# Patient Record
Sex: MALE | Race: WHITE | NOT HISPANIC OR LATINO | Employment: OTHER | URBAN - METROPOLITAN AREA
[De-identification: names, ages, dates, MRNs, and addresses within clinical notes are randomized per-mention and may not be internally consistent; named-entity substitution may affect disease eponyms.]

---

## 2017-01-03 ENCOUNTER — TELEPHONE (OUTPATIENT)
Dept: OTOLARYNGOLOGY | Facility: CLINIC | Age: 72
End: 2017-01-03

## 2017-01-03 NOTE — TELEPHONE ENCOUNTER
Patient come in today to sign his surgery consent, schedule for January 11.  Informed him that his MA or Nurse will call him to give time of surgery//bhg

## 2017-01-10 ENCOUNTER — TELEPHONE (OUTPATIENT)
Dept: OTOLARYNGOLOGY | Facility: CLINIC | Age: 72
End: 2017-01-10

## 2017-01-10 ENCOUNTER — ANESTHESIA EVENT (OUTPATIENT)
Dept: SURGERY | Facility: HOSPITAL | Age: 72
End: 2017-01-10
Payer: MEDICARE

## 2017-01-10 DIAGNOSIS — J32.4 CHRONIC PANSINUSITIS: Primary | ICD-10-CM

## 2017-01-10 NOTE — TELEPHONE ENCOUNTER
Surgery arrival time of 5:30 am provided to patient along with bathing instructions, and npo at midnight with verbal understanding.

## 2017-01-11 ENCOUNTER — HOSPITAL ENCOUNTER (OUTPATIENT)
Dept: RADIOLOGY | Facility: HOSPITAL | Age: 72
Discharge: HOME | End: 2017-01-11
Attending: OTOLARYNGOLOGY | Admitting: OTOLARYNGOLOGY
Payer: MEDICARE

## 2017-01-11 ENCOUNTER — ANESTHESIA (OUTPATIENT)
Dept: SURGERY | Facility: HOSPITAL | Age: 72
End: 2017-01-11
Payer: MEDICARE

## 2017-01-11 DIAGNOSIS — J32.4 CHRONIC PANSINUSITIS: ICD-10-CM

## 2017-01-11 PROBLEM — J34.2 DEVIATED NASAL SEPTUM: Status: RESOLVED | Noted: 2017-01-11 | Resolved: 2017-01-11

## 2017-01-11 PROBLEM — J34.2 DEVIATED NASAL SEPTUM: Status: ACTIVE | Noted: 2017-01-11

## 2017-01-11 PROCEDURE — 25000003 PHARM REV CODE 250: Performed by: NURSE ANESTHETIST, CERTIFIED REGISTERED

## 2017-01-11 PROCEDURE — 63600175 PHARM REV CODE 636 W HCPCS: Performed by: NURSE ANESTHETIST, CERTIFIED REGISTERED

## 2017-01-11 PROCEDURE — 25000003 PHARM REV CODE 250: Performed by: ANESTHESIOLOGY

## 2017-01-11 PROCEDURE — 70486 CT MAXILLOFACIAL W/O DYE: CPT | Mod: TC

## 2017-01-11 RX ORDER — ONDANSETRON 2 MG/ML
INJECTION INTRAMUSCULAR; INTRAVENOUS
Status: DISCONTINUED | OUTPATIENT
Start: 2017-01-11 | End: 2017-01-11

## 2017-01-11 RX ORDER — LIDOCAINE HCL/PF 100 MG/5ML
SYRINGE (ML) INTRAVENOUS
Status: DISCONTINUED | OUTPATIENT
Start: 2017-01-11 | End: 2017-01-11

## 2017-01-11 RX ORDER — MIDAZOLAM HYDROCHLORIDE 1 MG/ML
INJECTION, SOLUTION INTRAMUSCULAR; INTRAVENOUS
Status: DISCONTINUED | OUTPATIENT
Start: 2017-01-11 | End: 2017-01-11

## 2017-01-11 RX ORDER — PROPOFOL 10 MG/ML
VIAL (ML) INTRAVENOUS
Status: DISCONTINUED | OUTPATIENT
Start: 2017-01-11 | End: 2017-01-11

## 2017-01-11 RX ORDER — ROCURONIUM BROMIDE 10 MG/ML
INJECTION, SOLUTION INTRAVENOUS
Status: DISCONTINUED | OUTPATIENT
Start: 2017-01-11 | End: 2017-01-11

## 2017-01-11 RX ORDER — PROPOFOL 10 MG/ML
VIAL (ML) INTRAVENOUS CONTINUOUS PRN
Status: DISCONTINUED | OUTPATIENT
Start: 2017-01-11 | End: 2017-01-11

## 2017-01-11 RX ORDER — FENTANYL CITRATE 50 UG/ML
INJECTION, SOLUTION INTRAMUSCULAR; INTRAVENOUS
Status: DISCONTINUED | OUTPATIENT
Start: 2017-01-11 | End: 2017-01-11

## 2017-01-11 RX ORDER — DEXAMETHASONE SODIUM PHOSPHATE 4 MG/ML
INJECTION, SOLUTION INTRA-ARTICULAR; INTRALESIONAL; INTRAMUSCULAR; INTRAVENOUS; SOFT TISSUE
Status: DISCONTINUED | OUTPATIENT
Start: 2017-01-11 | End: 2017-01-11

## 2017-01-11 RX ADMIN — FENTANYL CITRATE 100 MCG: 50 INJECTION, SOLUTION INTRAMUSCULAR; INTRAVENOUS at 09:01

## 2017-01-11 RX ADMIN — FENTANYL CITRATE 100 MCG: 50 INJECTION, SOLUTION INTRAMUSCULAR; INTRAVENOUS at 11:01

## 2017-01-11 RX ADMIN — FENTANYL CITRATE 100 MCG: 50 INJECTION, SOLUTION INTRAMUSCULAR; INTRAVENOUS at 08:01

## 2017-01-11 RX ADMIN — DEXAMETHASONE SODIUM PHOSPHATE 12 MG: 4 INJECTION, SOLUTION INTRA-ARTICULAR; INTRALESIONAL; INTRAMUSCULAR; INTRAVENOUS; SOFT TISSUE at 10:01

## 2017-01-11 RX ADMIN — LIDOCAINE HYDROCHLORIDE 80 MG: 20 INJECTION, SOLUTION INTRAVENOUS at 08:01

## 2017-01-11 RX ADMIN — PROPOFOL 725 MG/HR: 10 INJECTION, EMULSION INTRAVENOUS at 08:01

## 2017-01-11 RX ADMIN — MIDAZOLAM HYDROCHLORIDE 2 MG: 1 INJECTION, SOLUTION INTRAMUSCULAR; INTRAVENOUS at 08:01

## 2017-01-11 RX ADMIN — SODIUM CHLORIDE, SODIUM LACTATE, POTASSIUM CHLORIDE, AND CALCIUM CHLORIDE: 600; 310; 30; 20 INJECTION, SOLUTION INTRAVENOUS at 08:01

## 2017-01-11 RX ADMIN — ONDANSETRON 4 MG: 2 INJECTION, SOLUTION INTRAMUSCULAR; INTRAVENOUS at 10:01

## 2017-01-11 RX ADMIN — SODIUM CHLORIDE, SODIUM LACTATE, POTASSIUM CHLORIDE, AND CALCIUM CHLORIDE: 600; 310; 30; 20 INJECTION, SOLUTION INTRAVENOUS at 09:01

## 2017-01-11 RX ADMIN — ROCURONIUM BROMIDE 50 MG: 10 INJECTION, SOLUTION INTRAVENOUS at 08:01

## 2017-01-11 RX ADMIN — SODIUM CHLORIDE, SODIUM LACTATE, POTASSIUM CHLORIDE, AND CALCIUM CHLORIDE: 600; 310; 30; 20 INJECTION, SOLUTION INTRAVENOUS at 11:01

## 2017-01-11 RX ADMIN — PROPOFOL 200 MG: 10 INJECTION, EMULSION INTRAVENOUS at 08:01

## 2017-01-11 NOTE — ANESTHESIA PREPROCEDURE EVALUATION
01/11/2017  Clay Marcos is a 71 y.o., male.    [facility] Anesthesia Evaluation    I have reviewed the Patient Summary Reports.    I have reviewed the Nursing Notes.   I have reviewed the Medications.   Prednisone    Review of Systems  Anesthesia Hx:  No problems with previous Anesthesia  Denies Family Hx of Anesthesia complications.   Denies Personal Hx of Anesthesia complications.   Social:  Former Smoker    Hematology/Oncology:     Oncology Normal     EENT/Dental:EENT/Dental Normal   Cardiovascular:   Exercise tolerance: good    Pulmonary:   Pneumonia    Renal/:  Renal/ Normal     Hepatic/GI:   GERD, well controlled    Musculoskeletal:   Arthritis     Neurological:  Neurology Normal    Endocrine:  Endocrine Normal    Dermatological:  Skin Normal    Psych:  Psychiatric Normal           Physical Exam  General:  Well nourished    Airway/Jaw/Neck:  Airway Findings: Mouth Opening: Normal Tongue: Normal  General Airway Assessment: Adult, Good  Mallampati: II  TM Distance: Normal, at least 6 cm      Dental:  Dental Findings: In tact   Chest/Lungs:  Chest/Lungs Findings: Normal Respiratory Rate     Heart/Vascular:  Heart Findings: Rate: Normal  Rhythm: Regular Rhythm  Sounds: Normal        Mental Status:  Mental Status Findings:  Cooperative, Alert and Oriented         Anesthesia Plan  Type of Anesthesia, risks & benefits discussed:  Anesthesia Type:  general  Patient's Preference:   Intra-op Monitoring Plan:   Intra-op Monitoring Plan Comments:   Post Op Pain Control Plan:   Post Op Pain Control Plan Comments:   Induction:   IV  Beta Blocker:  Patient is not currently on a Beta-Blocker (No further documentation required).       Informed Consent: Patient understands risks and agrees with Anesthesia plan.  Questions answered. Anesthesia consent signed with patient.  ASA Score: 2     Day of Surgery Review of History  & Physical: I have interviewed and examined the patient. I have reviewed the patient's H&P dated: 1/11/17. There are no significant changes.  H&P update referred to the surgeon.         Ready For Surgery From Anesthesia Perspective.

## 2017-01-11 NOTE — TRANSFER OF CARE
"Anesthesia Transfer of Care Note    Patient: Clay Marcos    Procedure(s) Performed: Procedure(s) (LRB):  SINUS SURGERY FUNCTIONAL ENDOSCOPIC WITH NAVIGATION  3 hour case   (Bilateral)    Patient location: PACU    Anesthesia Type: general    Transport from OR: Transported from OR on room air with adequate spontaneous ventilation    Post pain: adequate analgesia    Post assessment: no apparent anesthetic complications    Post vital signs: stable    Level of consciousness: awake    Nausea/Vomiting: no nausea/vomiting    Complications: none          Last vitals:   Visit Vitals    BP (!) 156/80 (BP Location: Left arm, Patient Position: Lying, BP Method: Automatic)    Pulse 61    Temp 36.4 °C (97.6 °F) (Oral)    Resp 18    Ht 6' 0.5" (1.842 m)    Wt 107.5 kg (237 lb)    SpO2 98%    BMI 31.7 kg/m2     "

## 2017-01-11 NOTE — ANESTHESIA POSTPROCEDURE EVALUATION
"Anesthesia Post Evaluation    Patient: Clay Marcos    Procedure(s) Performed: Procedure(s) (LRB):  SINUS SURGERY FUNCTIONAL ENDOSCOPIC WITH NAVIGATION  3 hour case   (Bilateral)    Final Anesthesia Type: general  Patient location during evaluation: PACU  Patient participation: Yes- Able to Participate  Level of consciousness: awake and alert  Post-procedure vital signs: reviewed and stable  Pain management: adequate  Airway patency: patent  PONV status at discharge: No PONV  Anesthetic complications: no      Cardiovascular status: blood pressure returned to baseline  Respiratory status: unassisted  Hydration status: euvolemic  Follow-up not needed.        Visit Vitals    /79    Pulse 67    Temp 36.8 °C (98.2 °F) (Temporal)    Resp 15    Ht 6' 0.5" (1.842 m)    Wt 107.5 kg (237 lb)    SpO2 (!) 94%    BMI 31.7 kg/m2       Pain/Bailey Score: Pain Assessment Performed: Yes (1/11/2017 12:05 PM)  Presence of Pain: complains of pain/discomfort (1/11/2017  1:30 PM)  Pain Rating Prior to Med Admin: 4 (1/11/2017  1:08 PM)  Pain Rating Post Med Admin: 3 (pt states pain tolerable) (1/11/2017  1:30 PM)  Bailey Score: 10 (1/11/2017  1:30 PM)      "

## 2017-01-11 NOTE — ANESTHESIA RELEASE NOTE
"Anesthesia Release from PACU Note    Patient: Clay Marcos    Procedure(s) Performed: Procedure(s) (LRB):  SINUS SURGERY FUNCTIONAL ENDOSCOPIC WITH NAVIGATION  3 hour case   (Bilateral)    Anesthesia type: general    Post pain: Adequate analgesia    Post assessment: no apparent anesthetic complications    Last Vitals:   Visit Vitals    BP (!) 156/80 (BP Location: Left arm, Patient Position: Lying, BP Method: Automatic)    Pulse 61    Temp 36.4 °C (97.6 °F) (Oral)    Resp 18    Ht 6' 0.5" (1.842 m)    Wt 107.5 kg (237 lb)    SpO2 98%    BMI 31.7 kg/m2       Post vital signs: stable    Level of consciousness: awake    Nausea/Vomiting: no nausea/no vomiting    Complications: none    Airway Patency: patent    Respiratory: unassisted    Cardiovascular: stable and blood pressure at baseline    Hydration: euvolemic  "

## 2017-01-18 ENCOUNTER — OFFICE VISIT (OUTPATIENT)
Dept: OTOLARYNGOLOGY | Facility: CLINIC | Age: 72
End: 2017-01-18
Payer: MEDICARE

## 2017-01-18 VITALS
SYSTOLIC BLOOD PRESSURE: 102 MMHG | WEIGHT: 239.88 LBS | TEMPERATURE: 97 F | DIASTOLIC BLOOD PRESSURE: 70 MMHG | HEART RATE: 68 BPM | BODY MASS INDEX: 32.08 KG/M2

## 2017-01-18 DIAGNOSIS — J32.4 CHRONIC PANSINUSITIS: Primary | ICD-10-CM

## 2017-01-18 PROCEDURE — 31237 NSL/SINS NDSC SURG BX POLYPC: CPT | Mod: 50,79,S$GLB, | Performed by: OTOLARYNGOLOGY

## 2017-01-18 PROCEDURE — 1159F MED LIST DOCD IN RCRD: CPT | Mod: S$GLB,,, | Performed by: OTOLARYNGOLOGY

## 2017-01-18 PROCEDURE — 1160F RVW MEDS BY RX/DR IN RCRD: CPT | Mod: S$GLB,,, | Performed by: OTOLARYNGOLOGY

## 2017-01-18 PROCEDURE — 99999 PR PBB SHADOW E&M-EST. PATIENT-LVL II: CPT | Mod: PBBFAC,,, | Performed by: OTOLARYNGOLOGY

## 2017-01-18 PROCEDURE — 1157F ADVNC CARE PLAN IN RCRD: CPT | Mod: S$GLB,,, | Performed by: OTOLARYNGOLOGY

## 2017-01-18 PROCEDURE — 99024 POSTOP FOLLOW-UP VISIT: CPT | Mod: S$GLB,,, | Performed by: OTOLARYNGOLOGY

## 2017-01-18 PROCEDURE — 1126F AMNT PAIN NOTED NONE PRSNT: CPT | Mod: S$GLB,,, | Performed by: OTOLARYNGOLOGY

## 2017-01-18 RX ORDER — METRONIDAZOLE 500 MG/1
500 TABLET ORAL 3 TIMES DAILY
Qty: 42 TABLET | Refills: 0 | Status: SHIPPED | OUTPATIENT
Start: 2017-01-18 | End: 2017-02-01

## 2017-01-18 RX ORDER — METRONIDAZOLE 500 MG/1
500 TABLET ORAL 3 TIMES DAILY
Qty: 42 TABLET | Refills: 0 | Status: SHIPPED | OUTPATIENT
Start: 2017-01-18 | End: 2017-01-18

## 2017-01-18 NOTE — PROGRESS NOTES
Subjective:   Patient: Clay Marcos [id number], :1945   Visit date:2017 4:50 PM    Chief Complaint: Status post sinus surgery    HPI:  Clay is a 71 y.o. male with Chronic sinusitis.    Subjective:  No pain.  Very mild drainage.  He has been using the rinses.  Also taking levaquin.    Surgery: 2016    Sinuses operated/OR findings:   Technical Procedures Used:   - bilateral Frontal sinusotomy, with or without removal of tissue (CPT 85138)  - bilateral Total ethmoidectomy (CPT 45257)  - BILATERAL Maxillary antrostomy without removal of tissue (CPT 49469)  - bilateral Sphenoid sinusotomy without removal of tissue (CPT 81362)  - Sterotactic computer assisted (navigational) procedure; cranial, extradural (CPT 28371)  - Septoplasty (CPT 55483)          Endoscopic Sinonasal Exam Findings at TIME OF SURGERY:  Purulent secretions in all sinuses except the right maxillary. Significant narrowing of the frontal sinuses due to prominent frontal beak bilaterally.      Final path: Nonspecific inflammation    OR Culture:   Anaerobic Culture PROPIONIBACTERIUM SPECIES  Few        Review of Systems:  -     Allergic/Immunologic: has No Known Allergies..  -     Constitutional: Current temp: 97.4 °F (36.3 °C) (Tympanic)    His meds, allergies, medical, surgical, social & family histories were reviewed & updated:  -     He has a current medication list which includes the following prescription(s): albuterol, levofloxacin, multivitamin, pantoprazole, tamsulosin, and metronidazole.  -     He  has a past medical history of Basal cell carcinoma; Calcaneal spur of left foot; Colon polyps; DDD (degenerative disc disease), lumbar; Diverticulosis of colon; Dyslipidemia; Dysmetabolic syndrome; Fracture of rib of left side; History of tobacco use; Internal and external hemorrhoids without complication; MVP (mitral valve prolapse); Obesity; Osteoarthritis of both thumbs (9/15/2015); and Pneumonia (2016).   -     He  does not have  any pertinent problems on file.   -     He  has a past surgical history that includes Hemorrhoid surgery; Excision basal cell carcinoma; Anal fistulotomy; and Tonsillectomy.  -     He  reports that he quit smoking about 39 years ago. His smoking use included Cigarettes. He has a 12.00 pack-year smoking history. He has never used smokeless tobacco. He reports that he drinks about 9.0 oz of alcohol per week  He reports that he does not use illicit drugs.  -     His family history includes Breast cancer in his mother; Coronary artery disease in his brother, other, sister, and sister; Heart attack in his brother, other, sister, and sister; Heart attacks under age 50 in his father; Hyperlipidemia in his brother; Vision loss in his other.  -     He has No Known Allergies.    Objective:   Physical Exam:  Vitals:    Visit Vitals    /70    Pulse 68    Temp 97.4 °F (36.3 °C) (Tympanic)    Wt 108.8 kg (239 lb 13.8 oz)    BMI 32.08 kg/m2     General appearance:  Well developed, well nourished    Eyes:  Extraocular motions intact, PERRL    Communication:  no hoarseness, no dysphonia    Ears:  Otoscopy of external auditory canals and tympanic membranes was normal, clinical speech reception thresholds grossly intact, no mass/lesion of auricle.  Nose:  No masses/lesions of external nose, nasal mucosa, septum, and turbinates were within normal limits.  Mouth:  No mass/lesion of lips, teeth, gums, hard/soft palate, tongue, tonsils, or oropharynx.    Cardiovascular:  No pedal edema; Radial Pulses +2     Neck & Lymphatics:  No cervical lymphadenopathy, no neck mass/crepitus/ asymmetry, trachea is midline, no thyroid enlargement/tenderness/mass.    Psych: Oriented x3,  Alert with normal mood and affect.     Respiration/Chest:  Symmetric expansion during respiration, normal respiratory effort.    Skin:  Warm and intact. No ulcerations of face, scalp, neck.      Assessment & Plan:   Chronic Rhinosinusitis s/p endoscopic sinus  surgery.  Individualized endoscopic debridements following routine functional endoscopic sinus surgery (FESS) provide improved long-term outcomes and may prevent future, more extensive revision procedures. Although two to three debridements in the first 30 days is typical for the majority of patients, once a week may be an appropriate frequency for postoperative debridement  in select patients with difficult problems. However, the frequency and length of time for which debridement is medically necessary will vary from case to case and must be individualized, a conclusion, which multiple studies analyzing debridement outcomes have acknowledged.  While 2-3 debridements will likely suffice in the majority of cases, there are situations in which a patient may require more frequent, very long-term debridements. Clinically, these include, but are not limited to, persistent crusting within the surgical bed, adhesion formation noted upon examination, more extensive surgery (eg, complex frontal sinusotomies, neoplasm  resections), underlying immunologic disorders, diffuse polyposis, revision FESS, mucociliary disorders, allergic fungal sinusitis, and postoperative complications (eg, visual loss, cerebrospinal fluid leak, nasal hemorrhage).     Clay presents today with significant crusting, discharge, synechia formation or narrowing of sinus ostia.  Therefore, the decision for endoscopic sinus debridement was determined to be necessary.    Clay will return to clinic in 2-3 weeks.    Changes to medical regimen: add flagyl to levaquin              NASAL ENDOSCOPY WITH POLYPECTOMY &/OR DEBRIDEMENT  (cpt 37606)  Procedure: Risks, benefits, and alternatives of the procedure were discussed with the patient, and the patient consented to the nasal endoscopy with debridement.  The nasal cavity was sprayed with a topical decongestant and anesthetic . The endoscope was passed into each nostril and each nasal cavity was visualized.  On  each side the nasal cavity, sinuses (if open), turbinates, and septum were examined with the findings described below.  Crusting and polypoid material was removed with suction and straight non thru cut forceps.   At the end of the examination, the scope was removed. The patient tolerated the procedure well with no complications.     Endoscopic Sinonasal Exam Findings:  -     Sinuses examined: bilateral maxillary, bilateral ethmoid anterior/posterior and bilateral sphenoid            The right sinus(es) have mild edema            The left sinus(es) have mild edema  -     Nasal secretions: dried crusts, dried blood and thick glue-like mucous bilaterally; especially severe in right maxillary with thick mucus filling and large crust in OMU; left with moderate crusting  -     Nasal septum: no significant deviation and no perforation appreciated   -     Inferior turbinate: - normal mucosa without significant hypertrophy - bilaterally  -     Middle turbinate: partially lateralized on the left  -     Other findings: - no mass or obstructive lesion -    Assessment & Plan:  See today's clinic note.

## 2017-01-18 NOTE — MR AVS SNAPSHOT
[facility]  [address] [address]  Phone: [phone]  Fax: [phone]                  Clay Marcos   2017 2:00 PM   Office Visit    Description:  Male : 1945   Provider:  Clay Philippe MD   Department:  [facility]                To Do List           Future Appointments        Provider Department Dept Phone    2017 9:00 AM KIERA Nieto [facility] - Gastroenterology [phone]    2017 9:10 AM [facility] CT1 LIMIT 500 LBS [facility] [phone]    2017 11:40 AM Luciano Hernadez MD [facility] - Pulmonary Services [phone]    2017 2:45 PM Clay Philippe MD [facility] Otohinol[facility]ology [phone]      Your Future Surgeries/Procedures     2017   Surgery with Travis Cardona MD   [facility]  ([facility])    [address] [address] [phone]              Goals (5 Years of Data)     None       These Medications        Disp Refills Start End    metronidazole (FLAGYL) 500 MG tablet 42 tablet 0 2017    Take 1 tablet (500 mg total) by mouth 3 (three) times daily. - Oral    Pharmacy: [id number] - [place], [place] - [address] [address] Ph #: [phone]         [facility]     [facility] Nurse Care [facility] -  Assistance  Registered nurses in the [facility] provide clinical advisement, health education, appointment booking, and other advisory services.  Call for this free service at [phone].             Medications           Message regarding Medications     Verify the changes and/or additions to your medication regime listed below are the same as discussed with your clinician today.  If any of these changes or additions are incorrect, please notify your healthcare provider.        START taking these NEW medications        Refills    metronidazole (FLAGYL) 500 MG tablet 0     Sig: Take 1 tablet (500 mg total) by mouth 3 (three) times daily.    Class: Normal    Route: Oral      STOP taking these medications     benzonatate (TESSALON) 100 MG capsule 100 mg as needed.     oxycodone-acetaminophen (PERCOCET) 5-325 mg per tablet Take 1 tablet by mouth every 4 (four) hours as needed for Pain.    predniSONE (DELTASONE) 20 MG tablet Take 3 tab in am x 3d, then 2 tab in am x 3d, then 1 tab in am x 3d, then 1/2 tab in am x 3d           Verify that the below list of medications is an accurate representation of the medications you are currently taking.  If none reported, the list may be blank. If incorrect, please contact your healthcare provider. Carry this list with you in case of emergency.           Current Medications     albuterol 90 mcg/actuation inhaler Inhale 2 puffs into the lungs every 6 (six) hours as needed for Wheezing.    levoFLOXacin (LEVAQUIN) 500 MG tablet Take 1 tablet (500 mg total) by mouth once daily.    metronidazole (FLAGYL) 500 MG tablet Take 1 tablet (500 mg total) by mouth 3 (three) times daily.    multivitamin (THERAGRAN) tablet Take 1 tablet by mouth once daily.    pantoprazole (PROTONIX) 40 MG tablet Take 1 tablet (40 mg total) by mouth 2 (two) times daily.    tamsulosin (FLOMAX) 0.4 mg Cp24 Take 1 capsule (0.4 mg total) by mouth every evening.           Clinical Reference Information           Vital Signs - Last Recorded  Most recent update: 1/18/2017  2:03 PM by Zarina Gomez LPN    BP Pulse Temp Wt BMI    102/70 68 97.4 °F (36.3 °C) (Tympanic) 108.8 kg (239 lb 13.8 oz) 32.08 kg/m2      Blood Pressure          Most Recent Value    BP  102/70      Allergies as of 1/18/2017     No Known Allergies      Immunizations Administered on Date of Encounter - 1/18/2017     None

## 2017-01-19 ENCOUNTER — OFFICE VISIT (OUTPATIENT)
Dept: GASTROENTEROLOGY | Facility: CLINIC | Age: 72
End: 2017-01-19
Payer: MEDICARE

## 2017-01-19 VITALS
WEIGHT: 239.44 LBS | DIASTOLIC BLOOD PRESSURE: 68 MMHG | HEART RATE: 68 BPM | SYSTOLIC BLOOD PRESSURE: 122 MMHG | HEIGHT: 73 IN | BODY MASS INDEX: 31.73 KG/M2

## 2017-01-19 DIAGNOSIS — R13.14 PHARYNGOESOPHAGEAL DYSPHAGIA: Primary | Chronic | ICD-10-CM

## 2017-01-19 DIAGNOSIS — K21.00 GASTROESOPHAGEAL REFLUX DISEASE WITH ESOPHAGITIS: ICD-10-CM

## 2017-01-19 PROCEDURE — 1160F RVW MEDS BY RX/DR IN RCRD: CPT | Mod: S$GLB,,, | Performed by: NURSE PRACTITIONER

## 2017-01-19 PROCEDURE — 99499 UNLISTED E&M SERVICE: CPT | Mod: S$GLB,,, | Performed by: NURSE PRACTITIONER

## 2017-01-19 PROCEDURE — 1126F AMNT PAIN NOTED NONE PRSNT: CPT | Mod: S$GLB,,, | Performed by: NURSE PRACTITIONER

## 2017-01-19 PROCEDURE — 1157F ADVNC CARE PLAN IN RCRD: CPT | Mod: S$GLB,,, | Performed by: NURSE PRACTITIONER

## 2017-01-19 PROCEDURE — 99214 OFFICE O/P EST MOD 30 MIN: CPT | Mod: S$GLB,,, | Performed by: NURSE PRACTITIONER

## 2017-01-19 PROCEDURE — 1159F MED LIST DOCD IN RCRD: CPT | Mod: S$GLB,,, | Performed by: NURSE PRACTITIONER

## 2017-01-19 PROCEDURE — 99999 PR PBB SHADOW E&M-EST. PATIENT-LVL III: CPT | Mod: PBBFAC,,, | Performed by: NURSE PRACTITIONER

## 2017-01-19 RX ORDER — FLUTICASONE PROPIONATE 50 MCG
SPRAY, SUSPENSION (ML) NASAL
COMMUNITY
Start: 2016-12-23 | End: 2017-01-19

## 2017-01-19 NOTE — PROGRESS NOTES
Clinic Follow Up:  [facility] Follow Up Note    Reason for Follow Up:  The primary encounter diagnosis was Pharyngoesophageal dysphagia. A diagnosis of Gastroesophageal reflux disease with esophagitis was also pertinent to this visit.    PCP: Tori Marino       HPI:  This is a 71 y.o. male here for follow up of the above  He reports that since his last visit, he has been feeling overall weel without any further GI complaints.  He has been taking his PPI as prescribed.  EGD showed LA Grade C (one or more mucosal breaks continuous between tops of 2        or more mucosal folds, less than 75% circumference) esophagitis with        no bleeding was found in the lower third of the esophagus.       A 3 cm hiatus hernia was present.  Repeat EGD is schedule for next week.     Review of Systems   Constitutional: Negative for chills, fever, malaise/fatigue and weight loss.   Respiratory: Negative for cough.    Cardiovascular: Negative for chest pain.   Gastrointestinal:        Per HPI   Musculoskeletal: Negative for myalgias.   Skin: Negative for itching and rash.   Neurological: Negative for headaches.   Psychiatric/Behavioral: The patient is not nervous/anxious.        Medical History:  Past Medical History   Diagnosis Date    Basal cell carcinoma      Scalp    Calcaneal spur of left foot     Colon polyps     DDD (degenerative disc disease), lumbar     Diverticulosis of colon     Dyslipidemia     Dysmetabolic syndrome     Fracture of rib of left side     History of tobacco use     Internal and external hemorrhoids without complication     MVP (mitral valve prolapse)     Obesity     Osteoarthritis of both thumbs 9/15/2015    Pneumonia 08/2016       Surgical History:   Past Surgical History   Procedure Laterality Date    Hemorrhoid surgery      Basal cell carcinoma excision       Scalp    Anal fistulotomy      Tonsillectomy         Family History:   Family History   Problem Relation  "Age of Onset    Heart attacks under age 50 Father      Age 45    Breast cancer Mother     Hyperlipidemia Brother     Coronary artery disease Brother      Status post CABG, age 55    Heart attack Brother     Coronary artery disease Sister      Status post CABG    Heart attack Sister     Coronary artery disease Sister      Status post stent    Heart attack Sister     Heart attack Other      Nephew, age 51    Vision loss Other     Coronary artery disease Other        Social History:   Social History   Substance Use Topics    Smoking status: Former Smoker     Packs/day: 1.00     Years: 12.00     Types: Cigarettes     Quit date: 1/1/1978    Smokeless tobacco: Never Used    Alcohol use 9.0 oz/week     14 Glasses of wine, 1 Shots of liquor per week      Comment: sometimes daily       Allergies: Reviewed    Home Medications:  Current Outpatient Prescriptions on File Prior to Visit   Medication Sig Dispense Refill    levoFLOXacin (LEVAQUIN) 500 MG tablet Take 1 tablet (500 mg total) by mouth once daily. 21 tablet 0    metronidazole (FLAGYL) 500 MG tablet Take 1 tablet (500 mg total) by mouth 3 (three) times daily. 42 tablet 0    multivitamin (THERAGRAN) tablet Take 1 tablet by mouth once daily.      pantoprazole (PROTONIX) 40 MG tablet Take 1 tablet (40 mg total) by mouth 2 (two) times daily. 60 tablet 11    tamsulosin (FLOMAX) 0.4 mg Cp24 Take 1 capsule (0.4 mg total) by mouth every evening. 30 capsule 11    [DISCONTINUED] albuterol 90 mcg/actuation inhaler Inhale 2 puffs into the lungs every 6 (six) hours as needed for Wheezing. 1 Inhaler 0     No current facility-administered medications on file prior to visit.        Physical Exam:  Vital Signs:  Visit Vitals    /68    Pulse 68    Ht 6' 0.5" (1.842 m)    Wt 108.6 kg (239 lb 6.7 oz)    BMI 32.02 kg/m2     Body mass index is 32.02 kg/(m^2).  Physical Exam   Constitutional: He is oriented to person, place, and time. He appears well-developed. "   HENT:   Head: Normocephalic.   Neck: Normal range of motion.   Cardiovascular: Normal rate and regular rhythm.    Pulmonary/Chest: Effort normal and breath sounds normal.   Abdominal: Soft. Bowel sounds are normal. He exhibits no distension. There is no tenderness.   Musculoskeletal: Normal range of motion.   Neurological: He is alert and oriented to person, place, and time.   Skin: Skin is warm and dry.   Psychiatric: He has a normal mood and affect.   Vitals reviewed.      Labs: Pertinent labs reviewed.        Assessment:  1. Pharyngoesophageal dysphagia    2. Gastroesophageal reflux disease with esophagitis        Recommendations:  Pharyngoesophageal dysphagia  - No longer having any difficulty swallowing.    Gastroesophageal reflux disease with esophagitis  - Continue PPI as directed.  - F/U EGD scheduled.  Further recs to follow result of procedure.         Return to Clinic:    Follow up to be determined by results of procedure.

## 2017-01-20 ENCOUNTER — HOSPITAL ENCOUNTER (OUTPATIENT)
Dept: RADIOLOGY | Facility: HOSPITAL | Age: 72
Discharge: HOME | End: 2017-01-20
Attending: INTERNAL MEDICINE
Payer: MEDICARE

## 2017-01-20 DIAGNOSIS — R91.8 MULTIPLE LUNG NODULES ON CT: ICD-10-CM

## 2017-01-20 PROCEDURE — 71260 CT THORAX DX C+: CPT | Mod: TC,PO

## 2017-01-20 PROCEDURE — 25500020 PHARM REV CODE 255: Mod: PO | Performed by: INTERNAL MEDICINE

## 2017-01-20 PROCEDURE — 71260 CT THORAX DX C+: CPT | Mod: 26,,, | Performed by: RADIOLOGY

## 2017-01-20 RX ADMIN — IOHEXOL 75 ML: 350 INJECTION, SOLUTION INTRAVENOUS at 09:01

## 2017-01-21 ENCOUNTER — TELEPHONE (OUTPATIENT)
Dept: PULMONOLOGY | Facility: CLINIC | Age: 72
End: 2017-01-21

## 2017-01-21 ENCOUNTER — OFFICE VISIT (OUTPATIENT)
Dept: PULMONOLOGY | Facility: CLINIC | Age: 72
End: 2017-01-21
Payer: MEDICARE

## 2017-01-21 VITALS
SYSTOLIC BLOOD PRESSURE: 102 MMHG | WEIGHT: 238 LBS | BODY MASS INDEX: 31.54 KG/M2 | DIASTOLIC BLOOD PRESSURE: 70 MMHG | RESPIRATION RATE: 18 BRPM | HEIGHT: 73 IN | HEART RATE: 76 BPM | OXYGEN SATURATION: 97 %

## 2017-01-21 DIAGNOSIS — R91.8 MULTIPLE LUNG NODULES ON CT: Primary | Chronic | ICD-10-CM

## 2017-01-21 DIAGNOSIS — R91.8 PULMONARY NODULES: Primary | ICD-10-CM

## 2017-01-21 DIAGNOSIS — G47.30 SLEEP-DISORDERED BREATHING: ICD-10-CM

## 2017-01-21 PROCEDURE — 99215 OFFICE O/P EST HI 40 MIN: CPT | Mod: S$GLB,,, | Performed by: INTERNAL MEDICINE

## 2017-01-21 PROCEDURE — 1126F AMNT PAIN NOTED NONE PRSNT: CPT | Mod: S$GLB,,, | Performed by: INTERNAL MEDICINE

## 2017-01-21 PROCEDURE — 99499 UNLISTED E&M SERVICE: CPT | Mod: S$GLB,,, | Performed by: INTERNAL MEDICINE

## 2017-01-21 PROCEDURE — 99999 PR PBB SHADOW E&M-EST. PATIENT-LVL III: CPT | Mod: PBBFAC,,, | Performed by: INTERNAL MEDICINE

## 2017-01-21 PROCEDURE — 1159F MED LIST DOCD IN RCRD: CPT | Mod: S$GLB,,, | Performed by: INTERNAL MEDICINE

## 2017-01-21 PROCEDURE — 1157F ADVNC CARE PLAN IN RCRD: CPT | Mod: S$GLB,,, | Performed by: INTERNAL MEDICINE

## 2017-01-21 PROCEDURE — 1160F RVW MEDS BY RX/DR IN RCRD: CPT | Mod: S$GLB,,, | Performed by: INTERNAL MEDICINE

## 2017-01-21 NOTE — PROGRESS NOTES
Subjective:      Established patient    Patient ID: Clay Marcos is a 71 y.o. male.    Patient Active Problem List   Diagnosis    Dysmetabolic syndrome    History of basal cell carcinoma    Tortuous aorta    MVP (mitral valve prolapse)    Dyslipidemia    Paresthesia of left leg    DDD (degenerative disc disease), lumbar    Multiple lung nodules on CT    Obesity (BMI 30-39.9)    Pharyngoesophageal dysphagia    Sleep-disordered breathing    GERD (gastroesophageal reflux disease)    Chronic pansinusitis       Problem list has been reviewed.    Chief Complaint: Pulmonary Nodules (rev ct)      HPI    Follow up for lung nodules and review CT chest. CT chest reviewd with patient who voiced understanding. He reports that his cough has improved.  Home sleep study has not yet been done. He hs been evaluated by GI. EGD noted esophagitis. He has been started on PPIs. ENT evaluation completed for chroninc pansinusitis. No pseudomonas cultured from sinus aspirate. He states that he  is fine and has no new onset pulmonary complaints. He denies  hemoptysis,  pain with breathing, wheezing, asthma.   A full  review of systems, past , family  and social histories was performed except as mentioned in the note above, these are non contributory to the main issues discussed  today    Previous Report Reviewed: lab reports, office notes and radiology reports     The following portions of the patient's history were reviewed and updated as appropriate: He  has a past medical history of Basal cell carcinoma; Calcaneal spur of left foot; Colon polyps; DDD (degenerative disc disease), lumbar; Diverticulosis of colon; Dyslipidemia; Dysmetabolic syndrome; Fracture of rib of left side; History of tobacco use; Internal and external hemorrhoids without complication; MVP (mitral valve prolapse); Obesity; Osteoarthritis of both thumbs (9/15/2015); and Pneumonia (08/2016).  He  has a past surgical history that includes Hemorrhoid surgery;  "Excision basal cell carcinoma; Anal fistulotomy; and Tonsillectomy.  His family history includes Breast cancer in his mother; Coronary artery disease in his brother, other, sister, and sister; Heart attack in his brother, other, sister, and sister; Heart attacks under age 50 in his father; Hyperlipidemia in his brother; Vision loss in his other.  He  reports that he quit smoking about 39 years ago. His smoking use included Cigarettes. He has a 12.00 pack-year smoking history. He has never used smokeless tobacco. He reports that he drinks about 9.0 oz of alcohol per week  He reports that he does not use illicit drugs.  He has a current medication list which includes the following prescription(s): levofloxacin, metronidazole, multivitamin, pantoprazole, and tamsulosin.  He has No Known Allergies..    Review of Systems   Constitutional: Negative for fever, chills, fatigue and night sweats.   HENT: Negative for nosebleeds, postnasal drip, rhinorrhea, sore throat and congestion.    Eyes: Negative for itching.   Respiratory: Positive for apnea, snoring and cough. Negative for hemoptysis, choking, chest tightness and orthopnea.    Cardiovascular: Negative for chest pain, palpitations and leg swelling.   Genitourinary: Negative for difficulty urinating and hematuria.   Endocrine: Negative for cold intolerance and heat intolerance.    Musculoskeletal: Positive for back pain. Negative for arthralgias, gait problem and joint swelling.   Gastrointestinal: Negative for nausea, vomiting, abdominal pain and abdominal distention.   Neurological: Negative for dizziness, syncope, light-headedness and headaches.   Hematological: No excessive bruising.   Psychiatric/Behavioral: Negative for confusion. The patient is not nervous/anxious.    All other systems reviewed and are negative.       Objective:     Visit Vitals    /70    Pulse 76    Resp 18    Ht 6' 0.5" (1.842 m)    Wt 108 kg (238 lb)    SpO2 97%    BMI 31.83 kg/m2 "     Body mass index is 31.83 kg/(m^2).     Physical Exam   Constitutional: He is oriented to person, place, and time. He appears well-developed and well-nourished.   HENT:   Head: Normocephalic and atraumatic.   Eyes: EOM are normal. Pupils are equal, round, and reactive to light.   Neck: Normal range of motion. Neck supple.   Cardiovascular: Normal rate and regular rhythm.    Pulmonary/Chest: Effort normal and breath sounds normal.   Abdominal: Soft. Bowel sounds are normal.   Musculoskeletal: Normal range of motion.   Neurological: He is alert and oriented to person, place, and time.   Skin: Skin is warm and dry.   Psychiatric: He has a normal mood and affect. His behavior is normal.   Nursing note and vitals reviewed.      Personal Diagnostic Review    CT chest with IV contrast:  1/ 20/17 :     Heart and Great vessels:Within normal limits.    Thoracic Adenopathy:Scattered shotty mediastinal and hilar lymph nodes appear unchanged slightly decreased in size from prior.    Lungs: Previously described smoothly marginated lobular mass like lesion on the surface of the diaphragm in the left lower lobe appears similar in size as seen on the axial images measuring 3.5 x 2.2 cm but does appear to have gradually decreased in size in transverse dimension measuring up to 2.1 cm as seen on coronal image 115, previously measuring 2.7 cm.  Findings are again favored to represent loculated pleural fluid or resolving hematoma.  The remaining portions of the left-sided pleural effusion has essentially resolved.  Mild scarring again noted in the bilateral apices.  Other small noncalcified pulmonary nodules are unchanged measuring up to 1 cm on the surface of the major fissure in the right lower lobe.  No new pulmonary nodules identified.  No parenchymal consolidations.    Upper Abdomen: No acute findings.    Bones: Left-sided rib fracture deformities are unchanged.      Assessment:     1. Multiple lung nodules on CT Stable   2.  Sleep-disordered breathing      Outpatient Encounter Prescriptions as of 2017   Medication Sig Dispense Refill    levoFLOXacin (LEVAQUIN) 500 MG tablet Take 1 tablet (500 mg total) by mouth once daily. 21 tablet 0    metronidazole (FLAGYL) 500 MG tablet Take 1 tablet (500 mg total) by mouth 3 (three) times daily. 42 tablet 0    multivitamin (THERAGRAN) tablet Take 1 tablet by mouth once daily.      pantoprazole (PROTONIX) 40 MG tablet Take 1 tablet (40 mg total) by mouth 2 (two) times daily. 60 tablet 11    tamsulosin (FLOMAX) 0.4 mg Cp24 Take 1 capsule (0.4 mg total) by mouth every evening. 30 capsule 11    [DISCONTINUED] albuterol 90 mcg/actuation inhaler Inhale 2 puffs into the lungs every 6 (six) hours as needed for Wheezing. 1 Inhaler 0    [DISCONTINUED] benzonatate (TESSALON) 100 MG capsule 100 mg as needed.       [DISCONTINUED] oxycodone-acetaminophen (PERCOCET) 5-325 mg per tablet Take 1 tablet by mouth every 4 (four) hours as needed for Pain. 30 tablet 0    [DISCONTINUED] predniSONE (DELTASONE) 20 MG tablet Take 3 tab in am x 3d, then 2 tab in am x 3d, then 1 tab in am x 3d, then 1/2 tab in am x 3d 21 tablet 0    [] omnipaque 350 iohexol 75 mL        No facility-administered encounter medications on file as of 2017.      Orders Placed This Encounter   Procedures    CT Chest With Contrast     Standing Status:   Future     Standing Expiration Date:   2018     Order Specific Question:   Reason for Exam:     Answer:   Lung nodules     Order Specific Question:   Is the patient allergic to iodine or contrast? Has a steroid / antihistamine prep been administered?     Answer:   No     Order Specific Question:   Is the patient on ANY Metformin drug such as Glugophage/Glucovance?           Should be off drug 48 hours after contrast. Check renal function before restart.     Answer:   No     Order Specific Question:   Age > 60 years?     Answer:   Yes     Order Specific Question:    History of Kidney Disease - including: decreased kidney function, dialysis, kidney transplay, single kidney, kidney cancer, kidney surgery?     Answer:   None     Order Specific Question:   Does the patient have high blood preasure requiring medical treatment?     Answer:   No     Order Specific Question:   Diabetes?     Answer:   No     Order Specific Question:   May the Radiologist modify the order per protocol to meet the clinical needs of the patient?     Answer:   Yes     Order Specific Question:   Recist criteria?     Answer:   No     Order Specific Question:   Will this service be billed to a Worker's Comp policy?     Answer:   No     Plan:     Discussed diagnosis, its evaluation, treatment and usual course. All questions answered.    Multiple lung nodules on CT  Improving. Repeat CT chest in 6 months.     Sleep-disordered breathing  Await sleep study. Patient will call sleep lab to schedule at his convenience..     TIME SPENT WITH PATIENT: Time spent: 40 minutes in face to face  discussion concerning diagnosis, prognosis, review of lab and test results, benefits of treatment as well as management of disease, counseling of patient and coordination of care between various health  care providers . Greater than half the time spent was used for coordination of care and counseling of patient.         Return in about 6 months (around 7/21/2017) for Multiple lung nodules.

## 2017-01-21 NOTE — MR AVS SNAPSHOT
[facility] - Pulmonary Services  [address]  [facility] [address]  Phone: [phone]  Fax: [phone]                  Clay Marcos   2017 11:40 AM   Office Visit    Description:  Male : 1945   Provider:  Luciano Hernadez MD   Department:  [facility] - Pulmonary Services           Reason for Visit     Pulmonary Nodules           Diagnoses this Visit        Comments    Multiple lung nodules on CT    -  Primary            To Do List           Future Appointments        Provider Department Dept Phone    2017 2:45 PM Clay Philippe MD [facility] - Otohinolaryngology [phone]    2017 10:00 AM LABORATORY, [facility] -  [phone]    2017 11:30 AM [facility] CT1 LIMIT 500 LBS [facility] -  [phone]    2017 1:00 PM Luciano Hernadez MD [facility] - Pulmonary Services [phone]      Your Future Surgeries/Procedures     2017   Surgery with Travis Cardona MD   [facility] -  ([facility])    [address]  [facility] [address] [phone]              Goals (5 Years of Data)     None      [facility]     [facility] Care [facility] -  Assistance  Registered nurses in the [facility] provide clinical advisement, health education, appointment booking, and other advisory services.  Call for this free service at [phone].             Medications           Message regarding Medications     Verify the changes and/or additions to your medication regime listed below are the same as discussed with your clinician today.  If any of these changes or additions are incorrect, please notify your healthcare provider.             Verify that the below list of medications is an accurate representation of the medications you are currently taking.  If none reported, the list may be blank. If incorrect, please contact your healthcare provider. Carry this list with you in case of  "emergency.           Current Medications     levoFLOXacin (LEVAQUIN) 500 MG tablet Take 1 tablet (500 mg total) by mouth once daily.    metronidazole (FLAGYL) 500 MG tablet Take 1 tablet (500 mg total) by mouth 3 (three) times daily.    multivitamin (THERAGRAN) tablet Take 1 tablet by mouth once daily.    pantoprazole (PROTONIX) 40 MG tablet Take 1 tablet (40 mg total) by mouth 2 (two) times daily.    tamsulosin (FLOMAX) 0.4 mg Cp24 Take 1 capsule (0.4 mg total) by mouth every evening.           Clinical Reference Information           Vital Signs - Last Recorded  Most recent update: 1/21/2017 12:00 PM by Kartik England LPN    BP Pulse Resp Ht Wt SpO2    102/70 76 18 6' 0.5" (1.842 m) 108 kg (238 lb) 97%    BMI                31.83 kg/m2          Blood Pressure          Most Recent Value    BP  102/70      Allergies as of 1/21/2017     No Known Allergies      Immunizations Administered on Date of Encounter - 1/21/2017     None      Orders Placed During Today's Visit     Future Labs/Procedures Expected by Expires    CT Chest With Contrast  7/21/2017 1/21/2018      "

## 2017-01-23 ENCOUNTER — TELEPHONE (OUTPATIENT)
Dept: PULMONOLOGY | Facility: CLINIC | Age: 72
End: 2017-01-23

## 2017-01-23 DIAGNOSIS — R91.8 PULMONARY NODULES: Primary | ICD-10-CM

## 2017-01-23 NOTE — PATIENT INSTRUCTIONS
Diagnosing Chest and Lung Problems: Imaging Tests  Youve been told that you need imaging tests to diagnose a problem in your chest or lung. These images (scans) help the doctor find the problem and figure out if it affects other structures. You will likely need more than one imaging test. If a mass has been found, imaging tests can also help find out if it has spread. Common imaging tests are described below.  CT scan  CT scans allow the healthcare provider to se a more detailed picture of the chest and lungs than a regular chest X-ray. During a CT scan, many images are taken of the lungs and chest. A computer combines the images to create one detailed image. In some cases, special dye (contrast) is given through an IV (intravenous) line. The contrast highlights any suspicious area on the scan.  PET scan    Positron emission tomography (PET) is used to diagnose chest and lung problems. For a PET scan, a safe radioactive liquid (tracer) is injected into the bloodstream. It takes about 45 minutes for the tracer to be in your system. Once the tracer is there, the healthcare provider takes a scan of your body. A PET scan can be helpful for finding cancer. It can also help find out if a cancer has spread. This is called staging. In some cases, you may need more testing before cancer is diagnosed or ruled out.  MRI   Like the CT scan, MRI takes many images of the chest and lungs. Contrast may be given through an IV line. The healthcare provider then takes a scan. MRI helps your provider find out if a mass is affecting other structures in the chest, such as blood vessels.  Getting ready for the test  Before your imaging test, do the following:  · Follow your healthcare providers instructions about eating and drinking  · Tell your provider about the medicines you take. You may need to stop taking certain medicines before the test.  · Discuss any allergies and health problems with your healthcare provider. Be sure to  tell him or her if you are allergic to iodine or contrast or if you have kidney problems.  · Tell your healthcare provider and the healthcare person doing the scan if you wear a medicated adhesive patch.  · Mention if you have any metal in your body. This includes loose pieces of metal or metal devices such as an aneurysm clip, a pacemaker, a prosthesis, or an intraocular lens.  · Tell your healthcare provider if you are pregnant.  During the test  For the test, you lie on your back inside a tubelike machine (scanner). You hear loud clicking sounds as images are taken. To make sure the images taken are clear, you must lie still. Straps may be used to help with this. Imaging tests (especially MRI) are done in a confined space. Talk with your healthcare provider before the day of your test if you are afraid of confined spaces. You may be given medicine (sedation) to help you relax during the test.     Risks and complications  · Swelling, infection, or other problems at the IV site  · Contrast or tracer-related problems, such as allergic reaction or kidney damage  · Damage to metal devices or prostheses from large magnetic MRI scanner   © 7590-3431 [employer]. [address] [place] [id number]. All rights reserved. This information is not intended as a substitute for professional medical care. Always follow your healthcare professional's instructions.

## 2017-01-30 ENCOUNTER — ANESTHESIA (OUTPATIENT)
Dept: ENDOSCOPY | Facility: HOSPITAL | Age: 72
End: 2017-01-30
Payer: MEDICARE

## 2017-01-30 ENCOUNTER — SURGERY (OUTPATIENT)
Age: 72
End: 2017-01-30

## 2017-01-30 ENCOUNTER — ANESTHESIA EVENT (OUTPATIENT)
Dept: ENDOSCOPY | Facility: HOSPITAL | Age: 72
End: 2017-01-30
Payer: MEDICARE

## 2017-01-30 VITALS — RESPIRATION RATE: 20 BRPM

## 2017-01-30 PROCEDURE — 25000003 PHARM REV CODE 250: Performed by: NURSE ANESTHETIST, CERTIFIED REGISTERED

## 2017-01-30 PROCEDURE — 63600175 PHARM REV CODE 636 W HCPCS: Performed by: NURSE ANESTHETIST, CERTIFIED REGISTERED

## 2017-01-30 PROCEDURE — 25000003 PHARM REV CODE 250: Performed by: INTERNAL MEDICINE

## 2017-01-30 RX ORDER — PROPOFOL 10 MG/ML
INJECTION, EMULSION INTRAVENOUS
Status: DISCONTINUED | OUTPATIENT
Start: 2017-01-30 | End: 2017-01-30

## 2017-01-30 RX ORDER — LIDOCAINE HCL/PF 100 MG/5ML
SYRINGE (ML) INTRAVENOUS
Status: DISCONTINUED | OUTPATIENT
Start: 2017-01-30 | End: 2017-01-30

## 2017-01-30 RX ADMIN — PROPOFOL 30 MG: 10 INJECTION, EMULSION INTRAVENOUS at 06:01

## 2017-01-30 RX ADMIN — SODIUM CHLORIDE, SODIUM LACTATE, POTASSIUM CHLORIDE, AND CALCIUM CHLORIDE: 600; 310; 30; 20 INJECTION, SOLUTION INTRAVENOUS at 06:01

## 2017-01-30 RX ADMIN — LIDOCAINE HYDROCHLORIDE 70 MG: 20 INJECTION, SOLUTION INTRAVENOUS at 06:01

## 2017-01-30 RX ADMIN — PROPOFOL 140 MG: 10 INJECTION, EMULSION INTRAVENOUS at 06:01

## 2017-01-30 NOTE — TRANSFER OF CARE
"Anesthesia Transfer of Care Note    Patient: Clay Marcos    Procedure(s) Performed: Procedure(s) (LRB):  ESOPHAGOGASTRODUODENOSCOPY (EGD) (N/A)    Patient location: PACU    Anesthesia Type: MAC    Transport from OR: Transported from OR on room air with adequate spontaneous ventilation    Post pain: adequate analgesia    Post assessment: no apparent anesthetic complications    Post vital signs: stable    Level of consciousness: sedated    Nausea/Vomiting: no nausea/vomiting    Complications: none          Last vitals:   Visit Vitals    /76 (BP Location: Right leg, Patient Position: Lying, BP Method: Automatic)    Pulse 77    Temp 36.5 °C (97.7 °F) (Oral)    Resp 20    Ht 6' 0.5" (1.842 m)    Wt 107 kg (236 lb)    SpO2 (!) 94%    BMI 31.57 kg/m2     "

## 2017-01-30 NOTE — ANESTHESIA PREPROCEDURE EVALUATION
01/30/2017  Clay Marcos is a 71 y.o., male.    [facility] Anesthesia Evaluation    I have reviewed the Patient Summary Reports.    I have reviewed the Nursing Notes.   I have reviewed the Medications.     Review of Systems  Anesthesia Hx:  No problems with previous Anesthesia    Social:  Former Smoker, No Alcohol Use    Hematology/Oncology:  Hematology Normal      Oncology Comments: Skin cancer    EENT/Dental:EENT/Dental Normal   Cardiovascular:   Exercise tolerance: good hyperlipidemia Cardiac stress test over 5 years ago due to MVP.  Tortuous aorta   Pulmonary:   Pneumonia Bronchitis 2016  Snore: possible sleep apnea  Lung nodules   Hepatic/GI:   GERD, well controlled 1800 last drink of fluid.   Musculoskeletal:   Arthritis         Physical Exam  General:  Well nourished, Obesity    Airway/Jaw/Neck:  Airway Findings: Mallampati: III                Anesthesia Plan  Type of Anesthesia, risks & benefits discussed:  Anesthesia Type:  MAC  Patient's Preference:   Intra-op Monitoring Plan:   Intra-op Monitoring Plan Comments:   Post Op Pain Control Plan:   Post Op Pain Control Plan Comments:   Induction:   IV  Beta Blocker:  Patient is not currently on a Beta-Blocker (No further documentation required).       Informed Consent: Patient understands risks and agrees with Anesthesia plan.  Questions answered. Anesthesia consent signed with patient.  ASA Score: 2     Day of Surgery Review of History & Physical: I have interviewed and examined the patient. I have reviewed the patient's H&P dated: 01/30/17. There are no significant changes.  H&P update referred to the surgeon.         Ready For Surgery From Anesthesia Perspective.

## 2017-01-30 NOTE — ANESTHESIA POSTPROCEDURE EVALUATION
"Anesthesia Post Evaluation    Patient: Clay Marcos    Procedure(s) Performed: Procedure(s) (LRB):  ESOPHAGOGASTRODUODENOSCOPY (EGD) (N/A)    Final Anesthesia Type: MAC  Patient location during evaluation: PACU  Patient participation: Yes- Able to Participate  Level of consciousness: awake and alert and oriented  Post-procedure vital signs: reviewed and stable  Pain management: adequate  Airway patency: patent  PONV status at discharge: No PONV  Anesthetic complications: no      Cardiovascular status: blood pressure returned to baseline  Respiratory status: unassisted, room air and spontaneous ventilation  Hydration status: euvolemic  Follow-up not needed.        Visit Vitals    /76 (BP Location: Right leg, Patient Position: Lying, BP Method: Automatic)    Pulse 77    Temp 36.5 °C (97.7 °F) (Oral)    Resp 20    Ht 6' 0.5" (1.842 m)    Wt 107 kg (236 lb)    SpO2 (!) 94%    BMI 31.57 kg/m2       Pain/Bailey Score: Pain Assessment Performed: Yes (1/30/2017  7:07 AM)  Presence of Pain: denies (1/30/2017  6:31 AM)  Bailey Score: 9 (1/30/2017  7:07 AM)      "

## 2017-02-01 ENCOUNTER — PATIENT MESSAGE (OUTPATIENT)
Dept: OTOLARYNGOLOGY | Facility: CLINIC | Age: 72
End: 2017-02-01

## 2017-02-07 ENCOUNTER — OFFICE VISIT (OUTPATIENT)
Dept: OTOLARYNGOLOGY | Facility: CLINIC | Age: 72
End: 2017-02-07
Payer: MEDICARE

## 2017-02-07 VITALS
TEMPERATURE: 98 F | BODY MASS INDEX: 32.05 KG/M2 | WEIGHT: 239.63 LBS | SYSTOLIC BLOOD PRESSURE: 123 MMHG | HEART RATE: 68 BPM | DIASTOLIC BLOOD PRESSURE: 73 MMHG

## 2017-02-07 DIAGNOSIS — J32.4 CHRONIC PANSINUSITIS: Primary | ICD-10-CM

## 2017-02-07 PROCEDURE — 31237 NSL/SINS NDSC SURG BX POLYPC: CPT | Mod: 50,79,S$GLB, | Performed by: OTOLARYNGOLOGY

## 2017-02-07 PROCEDURE — 1157F ADVNC CARE PLAN IN RCRD: CPT | Mod: S$GLB,,, | Performed by: OTOLARYNGOLOGY

## 2017-02-07 PROCEDURE — 1159F MED LIST DOCD IN RCRD: CPT | Mod: S$GLB,,, | Performed by: OTOLARYNGOLOGY

## 2017-02-07 PROCEDURE — 99213 OFFICE O/P EST LOW 20 MIN: CPT | Mod: 25,24,S$GLB, | Performed by: OTOLARYNGOLOGY

## 2017-02-07 PROCEDURE — 99999 PR PBB SHADOW E&M-EST. PATIENT-LVL III: CPT | Mod: PBBFAC,,, | Performed by: OTOLARYNGOLOGY

## 2017-02-07 PROCEDURE — 1160F RVW MEDS BY RX/DR IN RCRD: CPT | Mod: S$GLB,,, | Performed by: OTOLARYNGOLOGY

## 2017-02-07 PROCEDURE — 1126F AMNT PAIN NOTED NONE PRSNT: CPT | Mod: S$GLB,,, | Performed by: OTOLARYNGOLOGY

## 2017-02-07 NOTE — PROGRESS NOTES
Subjective:   Patient: Clay Marcos [id number], :1945   Visit date:2017 4:50 PM    Chief Complaint: Status post sinus surgery    HPI:  Clay is a 71 y.o. male with Chronic sinusitis.    Subjective:  No pain.  Very mild drainage.  He has been using the rinses.  Completed levaquin and flagyl.  Still having significant cough at night.  Last seen .     Surgery: 2016    Sinuses operated/OR findings:   Technical Procedures Used:   - bilateral Frontal sinusotomy, with or without removal of tissue (CPT 96110)  - bilateral Total ethmoidectomy (CPT 61184)  - BILATERAL Maxillary antrostomy without removal of tissue (CPT 13768)  - bilateral Sphenoid sinusotomy without removal of tissue (CPT 81683)  - Sterotactic computer assisted (navigational) procedure; cranial, extradural (CPT 75201)  - Septoplasty (CPT 24206)          Endoscopic Sinonasal Exam Findings at TIME OF SURGERY:  Purulent secretions in all sinuses except the right maxillary. Significant narrowing of the frontal sinuses due to prominent frontal beak bilaterally.      Final path: Nonspecific inflammation    OR Culture:   Anaerobic Culture PROPIONIBACTERIUM SPECIES  Few        Review of Systems:  -     Allergic/Immunologic: has No Known Allergies..  -     Constitutional: Current temp: 97.7 °F (36.5 °C) (Tympanic)    His meds, allergies, medical, surgical, social & family histories were reviewed & updated:  -     He has a current medication list which includes the following prescription(s): multivitamin, pantoprazole, and tamsulosin.  -     He  has a past medical history of Basal cell carcinoma; Calcaneal spur of left foot; Colon polyps; DDD (degenerative disc disease), lumbar; Diverticulosis of colon; Dyslipidemia; Dysmetabolic syndrome; Fracture of rib of left side; History of tobacco use; Internal and external hemorrhoids without complication; MVP (mitral valve prolapse); Obesity; Osteoarthritis of both thumbs (9/15/2015); and Pneumonia  (08/2016).   -     He  does not have any pertinent problems on file.   -     He  has a past surgical history that includes Hemorrhoid surgery; Excision basal cell carcinoma; Anal fistulotomy; Tonsillectomy; and Sinus surgery.  -     He  reports that he quit smoking about 39 years ago. His smoking use included Cigarettes. He has a 12.00 pack-year smoking history. He has never used smokeless tobacco. He reports that he drinks about 9.0 oz of alcohol per week  He reports that he does not use illicit drugs.  -     His family history includes Breast cancer in his mother; Coronary artery disease in his brother, other, sister, and sister; Heart attack in his brother, other, sister, and sister; Heart attacks under age 50 in his father; Hyperlipidemia in his brother; Vision loss in his other.  -     He has No Known Allergies.    Objective:   Physical Exam:  Vitals:    Visit Vitals    /73    Pulse 68    Temp 97.7 °F (36.5 °C) (Tympanic)    Wt 108.7 kg (239 lb 10.2 oz)    BMI 32.05 kg/m2     General appearance:  Well developed, well nourished    Eyes:  Extraocular motions intact, PERRL    Communication:  no hoarseness, no dysphonia    Ears:  Otoscopy of external auditory canals and tympanic membranes was normal, clinical speech reception thresholds grossly intact, no mass/lesion of auricle.  Nose:  No masses/lesions of external nose, nasal mucosa, septum, and turbinates were within normal limits.  Mouth:  No mass/lesion of lips, teeth, gums, hard/soft palate, tongue, tonsils, or oropharynx.    Cardiovascular:  No pedal edema; Radial Pulses +2     Neck & Lymphatics:  No cervical lymphadenopathy, no neck mass/crepitus/ asymmetry, trachea is midline, no thyroid enlargement/tenderness/mass.    Psych: Oriented x3,  Alert with normal mood and affect.     Respiration/Chest:  Symmetric expansion during respiration, normal respiratory effort.    Skin:  Warm and intact. No ulcerations of face, scalp, neck.      Assessment &  Plan:   Chronic Rhinosinusitis s/p endoscopic sinus surgery.  Individualized endoscopic debridements following routine functional endoscopic sinus surgery (FESS) provide improved long-term outcomes and may prevent future, more extensive revision procedures. Although two to three debridements in the first 30 days is typical for the majority of patients, once a week may be an appropriate frequency for postoperative debridement  in select patients with difficult problems. However, the frequency and length of time for which debridement is medically necessary will vary from case to case and must be individualized, a conclusion, which multiple studies analyzing debridement outcomes have acknowledged.  While 2-3 debridements will likely suffice in the majority of cases, there are situations in which a patient may require more frequent, very long-term debridements. Clinically, these include, but are not limited to, persistent crusting within the surgical bed, adhesion formation noted upon examination, more extensive surgery (eg, complex frontal sinusotomies, neoplasm  resections), underlying immunologic disorders, diffuse polyposis, revision FESS, mucociliary disorders, allergic fungal sinusitis, and postoperative complications (eg, visual loss, cerebrospinal fluid leak, nasal hemorrhage).     Clay presents today with significant crusting, discharge, synechia formation or narrowing of sinus ostia.  Therefore, the decision for endoscopic sinus debridement was determined to be necessary.    Clay will return to clinic in 4 weeks.    Changes to medical regimen: awaiting topical vancomycin/tobra/budesonide              NASAL ENDOSCOPY WITH POLYPECTOMY &/OR DEBRIDEMENT  (cpt 52542)  Procedure: Risks, benefits, and alternatives of the procedure were discussed with the patient, and the patient consented to the nasal endoscopy with debridement.  The nasal cavity was sprayed with a topical decongestant and anesthetic . The endoscope  was passed into each nostril and each nasal cavity was visualized.  On each side the nasal cavity, sinuses (if open), turbinates, and septum were examined with the findings described below.  Crusting and polypoid material was removed with suction and straight non thru cut forceps.   At the end of the examination, the scope was removed. The patient tolerated the procedure well with no complications.     Endoscopic Sinonasal Exam Findings:  -     Sinuses examined: bilateral maxillary, bilateral ethmoid anterior/posterior and bilateral sphenoid            The right sinus(es) have mild edema            The left sinus(es) have mild edema  -     Nasal secretions: dried crusts, dried blood and thick glue-like mucous bilaterally; especially severe in right maxillary with thick mucus filling and large crust in OMU; left with moderate crusting and purulence in posterior ethmoids  -     Nasal septum: no significant deviation and no perforation appreciated   -     Inferior turbinate: - normal mucosa without significant hypertrophy - bilaterally  -     Middle turbinate: lateralized on the left with synechia formation- this was lateralized with breakage of synechia and dissolvable packing was placed to hold the turbinate more medially.  -     Other findings: - no mass or obstructive lesion -    Assessment & Plan:  See today's clinic note.

## 2017-02-07 NOTE — MR AVS SNAPSHOT
[facility]  [address] [facility] [address]  Phone: [phone]  Fax: [phone]                  Clay Palominoner   2017 2:45 PM   Office Visit    Description:  Male : 1945   Provider:  Clay Philippe MD   Department:  [facility]           Reason for Visit     Follow-up                To Do List           Future Appointments        Provider Department Dept Phone    3/17/2017 9:30 AM Clay Philippe MD [facility] - [phone]    2017 10:00 AM LAB[facility]ATORY, [facility] - [phone]    2017 11:30 AM [facility] CT1 LIMIT [address] [facility] -  [phone]    2017 1:00 PM Luciano Hernadez MD [facility] [phone]      Goals (5 Years of Data)     None      [facility]     [facility] Nurse Care Line -  Assistance  Registered nurses in the [facility] provide clinical advisement, health education, appointment booking, and other advisory services.  Call for this free service at [phone].             Medications           Message regarding Medications     Verify the changes and/or additions to your medication regime listed below are the same as discussed with your clinician today.  If any of these changes or additions are incorrect, please notify your healthcare provider.             Verify that the below list of medications is an accurate representation of the medications you are currently taking.  If none reported, the list may be blank. If incorrect, please contact your healthcare provider. Carry this list with you in case of emergency.           Current Medications     multivitamin (THERAGRAN) tablet Take 1 tablet by mouth once daily.    pantoprazole (PROTONIX) 40 MG tablet Take 1 tablet (40 mg total) by mouth once daily.    tamsulosin (FLOMAX) 0.4 mg Cp24 Take 1 capsule (0.4 mg total) by mouth every evening.           Clinical Reference Information            Your Vitals Were     BP Pulse Temp Weight BMI    123/73 68 97.7 °F (36.5 °C) (Tympanic) 108.7 kg (239 lb 10.2 oz) 32.05 kg/m2      Blood Pressure          Most Recent Value    BP  123/73      Allergies as of 2/7/2017     No Known Allergies      Immunizations Administered on Date of Encounter - 2/7/2017     None      Language Assistance Services     ATTENTION: Language assistance services are available, free of charge. Please call [phone].      ATENCIÓN: Si habla juanito, tiene a early disposición servicios gratuitos de asistencia lingüística. Llame al [phone].     CHÚ Ý: N?u b?n nói Ti?ng Vi?t, có các d?ch v? h? tr? ngôn ng? mi?n phí dành cho b?n. G?i s? [phone].         [facility] complies with applicable Federal civil rights laws and does not discriminate on the basis of race, color, national origin, age, disability, or sex.

## 2017-02-13 ENCOUNTER — PATIENT MESSAGE (OUTPATIENT)
Dept: OTOLARYNGOLOGY | Facility: CLINIC | Age: 72
End: 2017-02-13

## 2017-02-15 ENCOUNTER — OFFICE VISIT (OUTPATIENT)
Dept: PULMONOLOGY | Facility: CLINIC | Age: 72
End: 2017-02-15
Payer: MEDICARE

## 2017-02-15 VITALS
SYSTOLIC BLOOD PRESSURE: 116 MMHG | DIASTOLIC BLOOD PRESSURE: 72 MMHG | HEART RATE: 70 BPM | WEIGHT: 237.63 LBS | OXYGEN SATURATION: 98 % | BODY MASS INDEX: 31.49 KG/M2 | HEIGHT: 73 IN

## 2017-02-15 DIAGNOSIS — R13.14 PHARYNGOESOPHAGEAL DYSPHAGIA: Chronic | ICD-10-CM

## 2017-02-15 DIAGNOSIS — J40 BRONCHITIS: ICD-10-CM

## 2017-02-15 DIAGNOSIS — K43.9 HERNIA OF ABDOMINAL WALL: ICD-10-CM

## 2017-02-15 DIAGNOSIS — S22.42XD CLOSED FRACTURE OF MULTIPLE RIBS OF LEFT SIDE WITH ROUTINE HEALING, SUBSEQUENT ENCOUNTER: ICD-10-CM

## 2017-02-15 DIAGNOSIS — J32.4 CHRONIC PANSINUSITIS: ICD-10-CM

## 2017-02-15 DIAGNOSIS — K21.00 GASTROESOPHAGEAL REFLUX DISEASE WITH ESOPHAGITIS: ICD-10-CM

## 2017-02-15 DIAGNOSIS — G47.30 SLEEP-DISORDERED BREATHING: ICD-10-CM

## 2017-02-15 DIAGNOSIS — J44.89 ASTHMA WITH COPD: Primary | ICD-10-CM

## 2017-02-15 DIAGNOSIS — S27.802D: ICD-10-CM

## 2017-02-15 PROCEDURE — 99499 UNLISTED E&M SERVICE: CPT | Mod: S$GLB,,, | Performed by: INTERNAL MEDICINE

## 2017-02-15 PROCEDURE — 99215 OFFICE O/P EST HI 40 MIN: CPT | Mod: S$GLB,,, | Performed by: INTERNAL MEDICINE

## 2017-02-15 PROCEDURE — 1160F RVW MEDS BY RX/DR IN RCRD: CPT | Mod: S$GLB,,, | Performed by: INTERNAL MEDICINE

## 2017-02-15 PROCEDURE — 1126F AMNT PAIN NOTED NONE PRSNT: CPT | Mod: S$GLB,,, | Performed by: INTERNAL MEDICINE

## 2017-02-15 PROCEDURE — 99999 PR PBB SHADOW E&M-EST. PATIENT-LVL III: CPT | Mod: PBBFAC,,, | Performed by: INTERNAL MEDICINE

## 2017-02-15 PROCEDURE — 1157F ADVNC CARE PLAN IN RCRD: CPT | Mod: S$GLB,,, | Performed by: INTERNAL MEDICINE

## 2017-02-15 PROCEDURE — 1159F MED LIST DOCD IN RCRD: CPT | Mod: S$GLB,,, | Performed by: INTERNAL MEDICINE

## 2017-02-15 RX ORDER — VANCOMYCIN HYDROCHLORIDE 500 MG/10ML
INJECTION, POWDER, LYOPHILIZED, FOR SOLUTION INTRAVENOUS
COMMUNITY
Start: 2017-02-09 | End: 2017-03-17

## 2017-02-15 RX ORDER — ALBUTEROL SULFATE 90 UG/1
2 AEROSOL, METERED RESPIRATORY (INHALATION) EVERY 6 HOURS
Qty: 1 INHALER | Refills: 12 | Status: SHIPPED | OUTPATIENT
Start: 2017-02-15 | End: 2017-07-21

## 2017-02-15 RX ORDER — BUDESONIDE 0.5 MG/2ML
0.5 INHALANT ORAL 2 TIMES DAILY
Qty: 120 ML | Refills: 11 | Status: SHIPPED | OUTPATIENT
Start: 2017-02-15 | End: 2017-02-19 | Stop reason: SDUPTHER

## 2017-02-15 RX ORDER — IPRATROPIUM BROMIDE AND ALBUTEROL SULFATE 2.5; .5 MG/3ML; MG/3ML
3 SOLUTION RESPIRATORY (INHALATION) EVERY 6 HOURS
Qty: 120 VIAL | Refills: 12 | Status: SHIPPED | OUTPATIENT
Start: 2017-02-15 | End: 2017-07-21

## 2017-02-15 RX ORDER — TOBRAMYCIN 40 MG/ML
INJECTION INTRAMUSCULAR; INTRAVENOUS
COMMUNITY
Start: 2017-02-09 | End: 2017-03-17

## 2017-02-15 RX ORDER — BUDESONIDE 0.5 MG/2ML
INHALANT ORAL
COMMUNITY
Start: 2017-02-09 | End: 2017-02-15 | Stop reason: SDUPTHER

## 2017-02-15 NOTE — Clinical Note
Please call patient - budesonide jet nebs sent to [facility] for authorization - need to send office note

## 2017-02-15 NOTE — MR AVS SNAPSHOT
[facility]  [address]  [place] [address]  Phone: [phone]  Fax: [phone]                  Clay Marcos   2/15/2017 1:00 PM   Office Visit    Description:  Male : 1945   Provider:  Carlos Blank MD   Department:  [facility]           Diagnoses this Visit        Comments    Asthma with COPD    -  Primary     Bronchitis         Sleep-disordered breathing                To Do List           Future Appointments        Provider Department Dept Phone    3/17/2017 9:30 AM Clay Philippe MD [facility] - [phone]    2017 10:00 AM LAB[facility]ATORY, [facility] - [phone]    2017 11:30 AM [facility] CT1 LIMIT 500 [address] [facility] - [phone]    2017 1:00 PM Luciano Hernadez MD [facility] [phone]      Goals (5 Years of Data)     None      Follow-Up and Disposition     Return if symptoms worsen or fail to improve.       These Medications        Disp Refills Start End    albuterol-ipratropium 2.5mg-0.5mg/3mL (DUO-NEB) 0.5 mg-3 mg(2.5 mg base)/3 mL nebulizer solution 120 vial 12 2/15/2017 2/15/2018    Take 3 mLs by nebulization every 6 (six) hours. - Nebulization    Pharmacy: [facility] #[facility] [place] A Ph #: [phone]       budesonide (PULMICORT) 0.5 mg/2 mL nebulizer solution 120 mL 11 2/15/2017     Take 2 mLs (0.5 mg total) by nebulization 2 (two) times daily. - Nebulization    Pharmacy: [facility] #[facility] [place], [place] - [address] [facility] A Ph #: [phone]       albuterol 90 mcg/actuation inhaler 1 Inhaler 12 2/15/2017 2/15/2018    Inhale 2 puffs into the lungs every 6 (six) hours. - Inhalation    Pharmacy: [facility] #[id number]  [place], [place] - [address] [facility] A Ph #: [phone]         [facility]     [facility] Nurse Care Line -  Assistance  Registered nurses in the [facility]  Call Center provide clinical advisement, health education, appointment booking, and other advisory services.  Call for this free service at [phone].             Medications           Message regarding Medications     Verify the changes and/or additions to your medication regime listed below are the same as discussed with your clinician today.  If any of these changes or additions are incorrect, please notify your healthcare provider.        START taking these NEW medications        Refills    albuterol-ipratropium 2.5mg-0.5mg/3mL (DUO-NEB) 0.5 mg-3 mg(2.5 mg base)/3 mL nebulizer solution 12    Sig: Take 3 mLs by nebulization every 6 (six) hours.    Class: Normal    Route: Nebulization    budesonide (PULMICORT) 0.5 mg/2 mL nebulizer solution 11    Sig: Take 2 mLs (0.5 mg total) by nebulization 2 (two) times daily.    Class: Normal    Route: Nebulization    albuterol 90 mcg/actuation inhaler 12    Sig: Inhale 2 puffs into the lungs every 6 (six) hours.    Class: Normal    Route: Inhalation           Verify that the below list of medications is an accurate representation of the medications you are currently taking.  If none reported, the list may be blank. If incorrect, please contact your healthcare provider. Carry this list with you in case of emergency.           Current Medications     budesonide (PULMICORT) 0.5 mg/2 mL nebulizer solution Take 2 mLs (0.5 mg total) by nebulization 2 (two) times daily.    multivitamin (THERAGRAN) tablet Take 1 tablet by mouth once daily.    pantoprazole (PROTONIX) 40 MG tablet Take 1 tablet (40 mg total) by mouth once daily.    tamsulosin (FLOMAX) 0.4 mg Cp24 Take 1 capsule (0.4 mg total) by mouth every evening.    tobramycin (NEBCIN) 40 mg/mL injection     vancomycin (VANCOCIN) 500 mg injection     albuterol 90 mcg/actuation inhaler Inhale 2 puffs into the lungs every 6 (six) hours.    albuterol-ipratropium 2.5mg-0.5mg/3mL (DUO-NEB) 0.5 mg-3 mg(2.5 mg base)/3 mL nebulizer  "solution Take 3 mLs by nebulization every 6 (six) hours.           Clinical Reference Information           Your Vitals Were     BP Pulse Height Weight SpO2 BMI    116/72 70 6' 0.5" (1.842 m) 107.8 kg (237 lb 10.5 oz) 98% 31.79 kg/m2      Blood Pressure          Most Recent Value    BP  116/72      Allergies as of 2/15/2017     No Known Allergies      Immunizations Administered on Date of Encounter - 2/15/2017     None      Instructions    Anti Reflux Regimen  1. Keep head of bed elevated 30 -45 % while sleeping  2. No eating of drinking for 3 hours before going to bed  3. Avoid sedatives at night while sleeping    Budesonide inhalation solution  What is this medicine?  BUDESONIDE (bue WOODY oh nide) is a corticosteroid. It helps decrease inflammation in your lungs. This medicine is used to treat the symptoms of asthma. Never use this medicine for an acute asthma attack.  How should I use this medicine?  This medicine is used in a nebulizer. Nebulizers make a liquid into an aerosol that you breathe in through your mouth or your mouth and nose into your lungs. You will be taught how to use your nebulizer. Rinse your mouth with water after use. Follow the directions on your prescription label. Do not mix this medicine with other medicines in your nebulizer. Do not use more often than directed.  Talk to your pediatrician regarding the use of this medicine in children. Special care may be needed.  What side effects may I notice from receiving this medicine?  Side effects that you should report to your doctor or health care professional as soon as possible:  · allergic reactions like skin rash, itching or hives, swelling of the face, lips, or tongue  · breathing problems  · changes in vision  · unusual swelling  · white patches or sores in the mouth or throat  Side effects that usually do not require medical attention (report to your doctor or health care professional if they continue or are bothersome):  · coughing, " hoarseness  · headache  · runny nose  · stomach upset  What may interact with this medicine?  Do not take this medicine with any of the following medications:  · mifepristone  This medicine may also interact with the following medications:  · cimetidine  · clarithromycin  · erythromycin  · itraconazole  · ketoconazole  · some vaccinations  What if I miss a dose?  If you miss a dose, use it as soon as you remember. If it is almost time for your next dose, use only that dose and continue with your regular schedule, spacing doses evenly. Do not use double or extra doses.  Where should I keep my medicine?  Keep out of the reach of children.  Store at a room temperature between 20 and 25 degrees C (68 and 77 degrees F). Do not refrigerate or freeze. Keep unopened vials in the foil pouch. When the package has been opened, the shelf life of the unused medicine is 2 weeks when protected from light. Unused medicine should be returned to the aluminum foil envelope right away to protect them from light. Throw away any unused medicine after the expiration date.  What should I tell my health care provider before I take this medicine?  They need to know if you have any of these conditions:  · bone problems  · glaucoma  · immune system problems  · infection, like chickenpox, tuberculosis, herpes, or fungal infection  · recent surgery or injury of the mouth or throat  · taking corticosteroids by mouth  · an unusual or allergic reaction to budesonide, steroids, other medicines, foods, dyes, or preservatives  · pregnant or trying to get pregnant  · breast-feeding  What should I watch for while using this medicine?  Visit your doctor or health care professional for regular checks on your progress. Check with your health care professional if your symptoms do not improve. If your symptoms get worse or if you need your short acting inhalers more often, call your doctor right away.  The medicine may increase your risk of getting an  infection. Stay away from people who are sick. Tell your doctor or health care professional if you are around anyone with measles or chickenpox.  Date Last Reviewed:   NOTE:This sheet is a summary. It may not cover all possible information. If you have questions about this medicine, talk to your doctor, pharmacist, or health care provider. Copyright© 2016 [employer]        Albuterol; Ipratropium inhalation aerosol  What is this medicine?  ALBUTEROL; IPRATROPIUM (al BYOO ter ole; i pra TROE pee um) has two bronchodilators. It helps open up the airways in your lungs to make it easier to breathe.This medicine is used to treat chronic obstructive pulmonary disease (COPD).  How should I use this medicine?  This medicine is for inhalation only. Follow the instructions on your prescription label. Do not use more often than directed. Make sure that you are using your inhaler correctly. Ask you doctor or health care provider if you have any questions.  Talk to your pediatrician regarding the use of this medicine in children. Special care may be needed.  What side effects may I notice from receiving this medicine?  Side effects that you should report to your doctor or health care professional as soon as possible:  · allergic reactions like skin rash, itching or hives, swelling of the face, lips, or tongue  · breathing problems  · feeling faint or lightheaded, falls  · fever  · high blood pressure  · irregular heartbeat or chest pain  · muscle cramps or weakness  · pain, tingling, numbness in the hands or feet  · vomiting  Side effects that usually do not require medical attention (report to your doctor or health care professional if they continue or are bothersome):  · blurred vision  · cough  · difficulty passing urine  · difficulty sleeping  · headache  · nervousness or trembling  · stuffy or runny nose  · unusual taste  · upset stomach  What may interact with this medicine?  Do not take this medicine with any of the  following medications:  · MAOIs like Carbex, Eldepryl, Marplan, Nardil, and Parnate  This medicine may also interact with the following medications:  · diuretics  · medicines for depression, anxiety, or psychotic disturbances  · medicines for irregular heartbeat  · medicines for weight loss including some herbal products  · methadone  · pimozide  · sertindole  · some medicines for blood pressure or the heart  What if I miss a dose?  If you miss a dose, use it as soon as you can. If it is almost time for your next dose, use only that dose. Do not use double or extra doses.  Where should I keep my medicine?  Keep out of the reach of children.  Store at a room temperature between 15 and 30 degrees C (59 and 86 degrees F). Do not freeze. This medicine does not work as well if it is too cold. Protect from humidity. Never throw the container into a fire or incinerator. Keep track of the number of sprays used and discard after 200 sprays. Throw away any unused medicine after the expiration date.  What should I tell my health care provider before I take this medicine?  They need to know if you have any of the following conditions:  · heart disease  · high blood pressure  · irregular heartbeat  · an unusual or allergic reaction to albuterol, ipratropium, atropine, soya protein, soybeans or peanuts, other medicines, foods, dyes, or preservatives  · pregnant or trying to get pregnant  · breast-feeding  What should I watch for while using this medicine?  Tell your doctor or health care professional if your symptoms do not improve. If your breathing gets worse while you are using this medicine, call your doctor right away. Do not stop using your medicine unless your doctor tells you to.  Your mouth may get dry. Chewing sugarless gum or sucking hard candy, and drinking plenty of water may help. Contact your doctor if the problem does not go away or is severe.  You may get dizzy or have blurred vision. Do not drive, use machinery,  or do anything that needs mental alertness until you know how this medicine affects you. Do not stand or sit up quickly, especially if you are an older patient. This reduces the risk of dizzy or fainting spells.  Date Last Reviewed:   NOTE:This sheet is a summary. It may not cover all possible information. If you have questions about this medicine, talk to your doctor, pharmacist, or health care provider. Copyright© 2016 [employer]      Use Budesonide jet nebs twice a day - no more or no less.  Use Albuterol prior to budesonide at least twice a day. May use albuterol every 4 - 6 hours if needed for shortness of breath, cough or chest congestion             Language Assistance Services     ATTENTION: Language assistance services are available, free of charge. Please call [phone].      ATENCIÓN: Si habla espwilma, tiene a early disposición servicios gratuitos de asistencia lingüística. Llame al [phone].     CHÚ Ý: N?u b?n nói Ti?ng Vi?t, có các d?ch v? h? tr? ngôn ng? mi?n phí dành cho b?n. G?i s? [phone].         [facility] complies with applicable Federal civil rights laws and does not discriminate on the basis of race, color, national origin, age, disability, or sex.

## 2017-02-15 NOTE — PROGRESS NOTES
Subjective:       Patient ID: Clay Marcos is a 71 y.o. male.    Chief Complaint: He       No chief complaint on file.    HPI     Abnormal CT scan  GERD  Sinusitis  Chronic Obstructive Pulmonary Disease    HISTORY OF PRESENT ILLNESS:  This 71-year-old gentleman who has been followed by   a member of the Pulmonary staff who comes into the office today for a second   Opinion.  Last year, the patient developed a pneumonia, had a severe   episode of coughing resulting in a hematoma on his chest wall.  He was treated   appropriately, but had persistent symptoms of postnasal drip, sinus congestion   as well as gastroesophageal reflux disease.  He was seen by Dr. Philippe for   pansinusitis.  He was seen by Dr. Cardona for gastroesophageal reflux.  He   comes in today to discuss the abnormalities on his CT scan and the fact that he   has swelling on the lateral aspect of his left lower chest.    The patient reports that swelling occurred following a severe episode of   coughing associated with hematoma that may have gotten slightly larger over the   past few months. Rib fractures were documented with these coughing episodes.   The patient has had multiple CT scans, which were reviewed.    Abnormalities noted on multiple CT scans of the chest performed on 10/17/2016   and 01/20/2017, best summarized as follows:  On a CT scan on 10/17/2016, the   patient had infiltrates in the left lower lobe primarily and he had increased   density associated with his diaphragm.  The infiltrates have cleared on   subsequent CT scan of 01/20/2017; however, the density in the left lower lobe   persists.  Radiologist reports that this has a fluid or blood density on CT scan   based on that fact this area appears to be slowly resolving on serial scans -    appears to be smaller.  I suspect most likely represents a hematoma on his   diaphragm associated with his severe episodes of coughing that he described   during his acute illness.   Additionally, the patient has a hernia of his lateral   chest wall in the location of his lower ribs and upper abdomen, while the   hematoma that he had previously is largely resolved.  He clearly has a weak   area in which the lateral abdominal muscle group - resulting in a hernia in   this area.  The patient also has a history of left-sided rib fractures, which   are unchanged/healing.  Radiologist does not make note of the abnormalities of the   lateral abdominal wall muscles, but based on my review of the CT scan and his   physical findings, I feel this is consistent with abdominal wall hernia.  I   offered obtaining a magnetic resonance imaging scan to further document this   abnormality, but at the present time, the patient declined.  I suspect if this   area persists in causing further discomfort at a later date, it may be useful to   investigate it further.  I suspect there is something could be done surgically,   but presently, the patient has persistent symptoms of postnasal drip, sinus   congestion as well as obstructive sleep apnea.  I believe it would be prudent to   have these problems resolved for any undergoing any type of surgical procedure.    The patient was informed of these findings and appears to understand.    I discussed in detail with him the association between obstructive sleep apnea   and gastroesophageal reflux disease.  On his last office visit with Dr. Hernadez  dated 01/21/2017, it was noted that he has sleep disordered breathing, thus a   sleep study has been ordered.  At this time, the patient does not want to have   this performed due to persistent symptoms of coughing and chest congestion.    Overall, I am in agreement with the plan outlined by the other physicians   mentioned above.  Of greatest concern was the abnormality noted on his CT scan.    I agree that this abnormal area on his CT scan will most likely resolve itself   over time, but followup CT scan of the chest was  recommended in six months.    A sleep study is recommended when the patient's sinus symptoms are improved.    I have advised the patient to wear a support belt around his mid section to help   prevent any further extension of his lateral abdominal wall hernia.    Time spent 50 minutes.  Followup examination with Dr. Satya SOSA/ANY  dd: 02/19/2017 22:01:12 (CST)  td: 02/20/2017 00:09:51 (CST)  Doc ID   #[id number]  Job ID #[id number]    CC:     [id number]      Past Medical History   Diagnosis Date    Basal cell carcinoma      Scalp    Calcaneal spur of left foot     Colon polyps     DDD (degenerative disc disease), lumbar     Diverticulosis of colon     Dyslipidemia     Dysmetabolic syndrome     Fracture of rib of left side     History of tobacco use     Internal and external hemorrhoids without complication     MVP (mitral valve prolapse)     Obesity     Osteoarthritis of both thumbs 9/15/2015    Pneumonia 08/2016     Past Surgical History   Procedure Laterality Date    Hemorrhoid surgery      Basal cell carcinoma excision       Scalp    Anal fistulotomy      Tonsillectomy      Sinus surgery       Social History     Social History    Marital status:      Spouse name: N/A    Number of children: N/A    Years of education: N/A     Occupational History    Not on file.     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     Review of Systems   Constitutional: Positive for fatigue. Negative for fever.   HENT: Positive for postnasal drip and rhinorrhea. Negative for congestion.    Respiratory:  Positive for apnea, cough, sputum production, shortness of breath, dyspnea on extertion, use of rescue inhaler and Paroxysmal Nocturnal Dyspnea.    Cardiovascular: Negative for chest pain, palpitations and leg swelling.   Skin: Negative for rash.   Gastrointestinal: Positive for abdominal pain, abdominal distention and acid reflux. Negative for nausea.        Discomfort on left lateral abdominal wall   Neurological: Negative for dizziness, syncope, weakness and light-headedness.   Hematological: Negative for adenopathy. Does not bruise/bleed easily.   Psychiatric/Behavioral: Positive for sleep disturbance. The patient is not nervous/anxious.        Objective:      Physical Exam   Constitutional: He is oriented to person, place, and time. He appears well-developed and well-nourished.   HENT:   Head: Normocephalic and atraumatic.   Mouth/Throat: Oropharyngeal exudate present.   Eyes: Conjunctivae are normal. Pupils are equal, round, and reactive to light.   Neck: Neck supple. No JVD present. No tracheal deviation present. No thyromegaly present.   Cardiovascular: Normal rate and regular rhythm.    No murmur heard.  Pulmonary/Chest: He has decreased breath sounds. He has wheezes in the right lower field and the left lower field. He has no rhonchi. He has no rales.   Abdominal: Soft. Bowel sounds are normal. A hernia is present.   hernai - left lateral abdominal wall   Musculoskeletal: Normal range of motion. He exhibits no edema or tenderness.   Lymphadenopathy:     He has no cervical adenopathy.   Neurological: He is alert and oriented to person, place, and time.   Skin: Skin is warm and dry.   Nursing note and vitals reviewed.    Personal Diagnostic Review  CT of chest performed on No flowsheet data found.      Radiology Result       Name:  :  Patient MRN:   Clay Marcos [id number]   Account Number: Room & Bed Accession Number:   [id number]   [id number]   Authorizing Physician: Patient Class: Diagnosis:    Ulciano Satya OP- Outpatient Diagnostic Testing Multiple lung nodules on CT [R91.8 (ICD-10-CM)]   Procedure: Exam Date: Reason for Exam:   CT Chest With Contrast 01/20/2017 Lung nodules          RESULTS:  Technique: CT of the chest was obtained following administration of 75 cc of IV Omnipaque 350. Multiplanar reconstructions were obtained and reviewed.     Comparison: 10/17/2016.     Findings:   Heart and Great vessels:Within normal limits.     Thoracic Adenopathy:Scattered shotty mediastinal and hilar lymph nodes appear unchanged slightly decreased in size from prior.     Lungs: Previously described smoothly marginated lobular masslike lesion on the surface of the diaphragm in the left lower lobe appears similar in size as seen on the axial images measuring 3.5 x 2.2 cm but does appear to have gradually decreased in size in transverse dimension measuring up to 2.1 cm as seen on coronal image 115, previously measuring 2.7 cm. Findings are again favored to represent loculated pleural fluid or resolving hematoma. The remaining portions of the left-sided pleural effusion has essentially resolved. Mild scarring again noted in the bilateral apices. Other small noncalcified pulmonary nodules are unchanged measuring up to 1 cm on the surface of the major fissure in the right lower lobe. No new pulmonary nodules identified. No parenchymal consolidations.     Upper Abdomen: No acute findings.     Bones: Left-sided rib fracture deformities are unchanged.  IMPRESSION:      1. Suspected slight gradual decrease in size of the smoothly marginated lobular masslike lesion in the left lower lobe along the surface of the diaphragm as compared to exams dating back to 9/1/2016. Findings are again favored to be benign in etiology and likely represent loculated pleural fluid or blood products. Consider additional followup CT in 6 months.     2. Other stable small pulmonary nodules as above.     3. Unchanged appearance of multiple  left-sided rib fracture deformities.     4. Continued interval decrease in size of left-sided pleural effusion.                 Electronically signed by: TIFFANIE LAY MD  Date:     01/20/17  Time:    10:12        Signed By: Tiffanie Lay MD on 1/20/2017 10:12 AM             Assessment:       1. Asthma with COPD    2. Hernia of abdominal wall - left lateral    3. Diaphragmatic hematoma, subsequent encounter    4. Sleep-disordered breathing    5. Bronchitis    6. Chronic pansinusitis    7. Pharyngoesophageal dysphagia    8. Gastroesophageal reflux disease with esophagitis    9. Closed fracture of multiple ribs of left side with routine healing, subsequent encounter        Outpatient Encounter Prescriptions as of 2/15/2017   Medication Sig Dispense Refill    budesonide (PULMICORT) 0.5 mg/2 mL nebulizer solution Take 2 mLs (0.5 mg total) by nebulization 2 (two) times daily. 120 mL 11    multivitamin (THERAGRAN) tablet Take 1 tablet by mouth once daily.      pantoprazole (PROTONIX) 40 MG tablet Take 1 tablet (40 mg total) by mouth once daily. 90 tablet 3    tamsulosin (FLOMAX) 0.4 mg Cp24 Take 1 capsule (0.4 mg total) by mouth every evening. 30 capsule 11    tobramycin (NEBCIN) 40 mg/mL injection       vancomycin (VANCOCIN) 500 mg injection       [DISCONTINUED] budesonide (PULMICORT) 0.5 mg/2 mL nebulizer solution       albuterol 90 mcg/actuation inhaler Inhale 2 puffs into the lungs every 6 (six) hours. 1 Inhaler 12    albuterol-ipratropium 2.5mg-0.5mg/3mL (DUO-NEB) 0.5 mg-3 mg(2.5 mg base)/3 mL nebulizer solution Take 3 mLs by nebulization every 6 (six) hours. 120 vial 12     No facility-administered encounter medications on file as of 2/15/2017.      No orders of the defined types were placed in this encounter.    Plan:       Requested Prescriptions     Signed Prescriptions Disp Refills    albuterol-ipratropium 2.5mg-0.5mg/3mL (DUO-NEB) 0.5 mg-3 mg(2.5 mg base)/3 mL nebulizer solution 120 vial 12      Sig: Take 3 mLs by nebulization every 6 (six) hours.    budesonide (PULMICORT) 0.5 mg/2 mL nebulizer solution 120 mL 11     Sig: Take 2 mLs (0.5 mg total) by nebulization 2 (two) times daily.    albuterol 90 mcg/actuation inhaler 1 Inhaler 12     Sig: Inhale 2 puffs into the lungs every 6 (six) hours.     Asthma with COPD  -     albuterol-ipratropium 2.5mg-0.5mg/3mL (DUO-NEB) 0.5 mg-3 mg(2.5 mg base)/3 mL nebulizer solution; Take 3 mLs by nebulization every 6 (six) hours.  Dispense: 120 vial; Refill: 12  -     budesonide (PULMICORT) 0.5 mg/2 mL nebulizer solution; Take 2 mLs (0.5 mg total) by nebulization 2 (two) times daily.  Dispense: 120 mL; Refill: 11  -     albuterol 90 mcg/actuation inhaler; Inhale 2 puffs into the lungs every 6 (six) hours.  Dispense: 1 Inhaler; Refill: 12    Hernia of abdominal wall - left lateral    Diaphragmatic hematoma, subsequent encounter    Sleep-disordered breathing    Bronchitis  -     albuterol-ipratropium 2.5mg-0.5mg/3mL (DUO-NEB) 0.5 mg-3 mg(2.5 mg base)/3 mL nebulizer solution; Take 3 mLs by nebulization every 6 (six) hours.  Dispense: 120 vial; Refill: 12  -     budesonide (PULMICORT) 0.5 mg/2 mL nebulizer solution; Take 2 mLs (0.5 mg total) by nebulization 2 (two) times daily.  Dispense: 120 mL; Refill: 11    Chronic pansinusitis    Pharyngoesophageal dysphagia    Gastroesophageal reflux disease with esophagitis    Closed fracture of multiple ribs of left side with routine healing, subsequent encounter           Return if symptoms worsen or fail to improve.    MEDICAL DECISION MAKING: Moderate to high complexity.  Overall, the multiple problems listed are of moderate to high severity that may impact quality of life and activities of daily living. Side effects of medications, treatment plan as well as options and alternatives reviewed and discussed with patient. There was counseling of patient concerning these issues.    Total time spent in face to face counseling and  coordination of care - 50  minutes over 50% of time was used in discussion of prognosis, risks, benefits of treatment, instructions and compliance with regimen . Discussion with other physicians or health care providers (DME, NP, pharmacy, respiratory therapy) occurred.

## 2017-02-15 NOTE — PATIENT INSTRUCTIONS
Anti Reflux Regimen  1. Keep head of bed elevated 30 -45 % while sleeping  2. No eating of drinking for 3 hours before going to bed  3. Avoid sedatives at night while sleeping    Budesonide inhalation solution  What is this medicine?  BUDESONIDE (bue WOODY oh nide) is a corticosteroid. It helps decrease inflammation in your lungs. This medicine is used to treat the symptoms of asthma. Never use this medicine for an acute asthma attack.  How should I use this medicine?  This medicine is used in a nebulizer. Nebulizers make a liquid into an aerosol that you breathe in through your mouth or your mouth and nose into your lungs. You will be taught how to use your nebulizer. Rinse your mouth with water after use. Follow the directions on your prescription label. Do not mix this medicine with other medicines in your nebulizer. Do not use more often than directed.  Talk to your pediatrician regarding the use of this medicine in children. Special care may be needed.  What side effects may I notice from receiving this medicine?  Side effects that you should report to your doctor or health care professional as soon as possible:  · allergic reactions like skin rash, itching or hives, swelling of the face, lips, or tongue  · breathing problems  · changes in vision  · unusual swelling  · white patches or sores in the mouth or throat  Side effects that usually do not require medical attention (report to your doctor or health care professional if they continue or are bothersome):  · coughing, hoarseness  · headache  · runny nose  · stomach upset  What may interact with this medicine?  Do not take this medicine with any of the following medications:  · mifepristone  This medicine may also interact with the following medications:  · cimetidine  · clarithromycin  · erythromycin  · itraconazole  · ketoconazole  · some vaccinations  What if I miss a dose?  If you miss a dose, use it as soon as you remember. If it is almost time for your  next dose, use only that dose and continue with your regular schedule, spacing doses evenly. Do not use double or extra doses.  Where should I keep my medicine?  Keep out of the reach of children.  Store at a room temperature between 20 and 25 degrees C (68 and 77 degrees F). Do not refrigerate or freeze. Keep unopened vials in the foil pouch. When the package has been opened, the shelf life of the unused medicine is 2 weeks when protected from light. Unused medicine should be returned to the aluminum foil envelope right away to protect them from light. Throw away any unused medicine after the expiration date.  What should I tell my health care provider before I take this medicine?  They need to know if you have any of these conditions:  · bone problems  · glaucoma  · immune system problems  · infection, like chickenpox, tuberculosis, herpes, or fungal infection  · recent surgery or injury of the mouth or throat  · taking corticosteroids by mouth  · an unusual or allergic reaction to budesonide, steroids, other medicines, foods, dyes, or preservatives  · pregnant or trying to get pregnant  · breast-feeding  What should I watch for while using this medicine?  Visit your doctor or health care professional for regular checks on your progress. Check with your health care professional if your symptoms do not improve. If your symptoms get worse or if you need your short acting inhalers more often, call your doctor right away.  The medicine may increase your risk of getting an infection. Stay away from people who are sick. Tell your doctor or health care professional if you are around anyone with measles or chickenpox.  Date Last Reviewed:   NOTE:This sheet is a summary. It may not cover all possible information. If you have questions about this medicine, talk to your doctor, pharmacist, or health care provider. Copyright© 2016 [employer]        Albuterol; Ipratropium inhalation aerosol  What is this  medicine?  ALBUTEROL; IPRATROPIUM (al BYOO ter ole; i pra GIBSONE pee um) has two bronchodilators. It helps open up the airways in your lungs to make it easier to breathe.This medicine is used to treat chronic obstructive pulmonary disease (COPD).  How should I use this medicine?  This medicine is for inhalation only. Follow the instructions on your prescription label. Do not use more often than directed. Make sure that you are using your inhaler correctly. Ask you doctor or health care provider if you have any questions.  Talk to your pediatrician regarding the use of this medicine in children. Special care may be needed.  What side effects may I notice from receiving this medicine?  Side effects that you should report to your doctor or health care professional as soon as possible:  · allergic reactions like skin rash, itching or hives, swelling of the face, lips, or tongue  · breathing problems  · feeling faint or lightheaded, falls  · fever  · high blood pressure  · irregular heartbeat or chest pain  · muscle cramps or weakness  · pain, tingling, numbness in the hands or feet  · vomiting  Side effects that usually do not require medical attention (report to your doctor or health care professional if they continue or are bothersome):  · blurred vision  · cough  · difficulty passing urine  · difficulty sleeping  · headache  · nervousness or trembling  · stuffy or runny nose  · unusual taste  · upset stomach  What may interact with this medicine?  Do not take this medicine with any of the following medications:  · MAOIs like Carbex, Eldepryl, Marplan, Nardil, and Parnate  This medicine may also interact with the following medications:  · diuretics  · medicines for depression, anxiety, or psychotic disturbances  · medicines for irregular heartbeat  · medicines for weight loss including some herbal products  · methadone  · pimozide  · sertindole  · some medicines for blood pressure or the heart  What if I miss a  dose?  If you miss a dose, use it as soon as you can. If it is almost time for your next dose, use only that dose. Do not use double or extra doses.  Where should I keep my medicine?  Keep out of the reach of children.  Store at a room temperature between 15 and 30 degrees C (59 and 86 degrees F). Do not freeze. This medicine does not work as well if it is too cold. Protect from humidity. Never throw the container into a fire or incinerator. Keep track of the number of sprays used and discard after 200 sprays. Throw away any unused medicine after the expiration date.  What should I tell my health care provider before I take this medicine?  They need to know if you have any of the following conditions:  · heart disease  · high blood pressure  · irregular heartbeat  · an unusual or allergic reaction to albuterol, ipratropium, atropine, soya protein, soybeans or peanuts, other medicines, foods, dyes, or preservatives  · pregnant or trying to get pregnant  · breast-feeding  What should I watch for while using this medicine?  Tell your doctor or health care professional if your symptoms do not improve. If your breathing gets worse while you are using this medicine, call your doctor right away. Do not stop using your medicine unless your doctor tells you to.  Your mouth may get dry. Chewing sugarless gum or sucking hard candy, and drinking plenty of water may help. Contact your doctor if the problem does not go away or is severe.  You may get dizzy or have blurred vision. Do not drive, use machinery, or do anything that needs mental alertness until you know how this medicine affects you. Do not stand or sit up quickly, especially if you are an older patient. This reduces the risk of dizzy or fainting spells.  Date Last Reviewed:   NOTE:This sheet is a summary. It may not cover all possible information. If you have questions about this medicine, talk to your doctor, pharmacist, or health care provider. Copyright© 2016 [employer]      Use Budesonide jet nebs twice a day - no more or no less.  Use Albuterol prior to budesonide at least twice a day. May use albuterol every 4 - 6 hours if needed for shortness of breath, cough or chest congestion

## 2017-02-16 ENCOUNTER — TELEPHONE (OUTPATIENT)
Dept: PULMONOLOGY | Facility: CLINIC | Age: 72
End: 2017-02-16

## 2017-02-19 PROBLEM — K43.9 HERNIA OF ABDOMINAL WALL: Status: ACTIVE | Noted: 2017-02-19

## 2017-02-19 RX ORDER — BUDESONIDE 0.5 MG/2ML
0.5 INHALANT ORAL 2 TIMES DAILY
Qty: 120 ML | Refills: 11 | Status: SHIPPED | OUTPATIENT
Start: 2017-02-19 | End: 2017-03-17 | Stop reason: SDUPTHER

## 2017-02-20 ENCOUNTER — TELEPHONE (OUTPATIENT)
Dept: PULMONOLOGY | Facility: CLINIC | Age: 72
End: 2017-02-20

## 2017-03-17 ENCOUNTER — OFFICE VISIT (OUTPATIENT)
Dept: OTOLARYNGOLOGY | Facility: CLINIC | Age: 72
End: 2017-03-17
Payer: MEDICARE

## 2017-03-17 ENCOUNTER — OFFICE VISIT (OUTPATIENT)
Dept: PULMONOLOGY | Facility: CLINIC | Age: 72
End: 2017-03-17
Payer: MEDICARE

## 2017-03-17 VITALS
WEIGHT: 240.75 LBS | OXYGEN SATURATION: 97 % | HEIGHT: 77 IN | BODY MASS INDEX: 28.43 KG/M2 | DIASTOLIC BLOOD PRESSURE: 82 MMHG | RESPIRATION RATE: 18 BRPM | SYSTOLIC BLOOD PRESSURE: 120 MMHG | HEART RATE: 82 BPM

## 2017-03-17 VITALS
SYSTOLIC BLOOD PRESSURE: 115 MMHG | HEART RATE: 77 BPM | DIASTOLIC BLOOD PRESSURE: 77 MMHG | TEMPERATURE: 98 F | WEIGHT: 240.75 LBS | BODY MASS INDEX: 32.2 KG/M2

## 2017-03-17 DIAGNOSIS — J32.4 CHRONIC PANSINUSITIS: Primary | ICD-10-CM

## 2017-03-17 DIAGNOSIS — J44.89 ASTHMA WITH COPD: Chronic | ICD-10-CM

## 2017-03-17 DIAGNOSIS — G47.30 SLEEP-DISORDERED BREATHING: ICD-10-CM

## 2017-03-17 DIAGNOSIS — R91.8 MULTIPLE LUNG NODULES ON CT: ICD-10-CM

## 2017-03-17 DIAGNOSIS — J40 BRONCHITIS: ICD-10-CM

## 2017-03-17 DIAGNOSIS — J44.89 ASTHMA WITH COPD: ICD-10-CM

## 2017-03-17 DIAGNOSIS — R05.9 COUGH IN ADULT: Primary | Chronic | ICD-10-CM

## 2017-03-17 PROCEDURE — 31231 NASAL ENDOSCOPY DX: CPT | Mod: S$GLB,,, | Performed by: OTOLARYNGOLOGY

## 2017-03-17 PROCEDURE — 99999 PR PBB SHADOW E&M-EST. PATIENT-LVL III: CPT | Mod: PBBFAC,,, | Performed by: OTOLARYNGOLOGY

## 2017-03-17 PROCEDURE — 99215 OFFICE O/P EST HI 40 MIN: CPT | Mod: 25,S$GLB,, | Performed by: INTERNAL MEDICINE

## 2017-03-17 PROCEDURE — 96372 THER/PROPH/DIAG INJ SC/IM: CPT | Mod: S$GLB,,, | Performed by: INTERNAL MEDICINE

## 2017-03-17 PROCEDURE — 1157F ADVNC CARE PLAN IN RCRD: CPT | Mod: S$GLB,,, | Performed by: INTERNAL MEDICINE

## 2017-03-17 PROCEDURE — 1157F ADVNC CARE PLAN IN RCRD: CPT | Mod: S$GLB,,, | Performed by: OTOLARYNGOLOGY

## 2017-03-17 PROCEDURE — 99499 UNLISTED E&M SERVICE: CPT | Mod: S$GLB,,, | Performed by: INTERNAL MEDICINE

## 2017-03-17 PROCEDURE — 1159F MED LIST DOCD IN RCRD: CPT | Mod: S$GLB,,, | Performed by: INTERNAL MEDICINE

## 2017-03-17 PROCEDURE — 1126F AMNT PAIN NOTED NONE PRSNT: CPT | Mod: S$GLB,,, | Performed by: OTOLARYNGOLOGY

## 2017-03-17 PROCEDURE — 1160F RVW MEDS BY RX/DR IN RCRD: CPT | Mod: S$GLB,,, | Performed by: OTOLARYNGOLOGY

## 2017-03-17 PROCEDURE — 1159F MED LIST DOCD IN RCRD: CPT | Mod: S$GLB,,, | Performed by: OTOLARYNGOLOGY

## 2017-03-17 PROCEDURE — 99999 PR PBB SHADOW E&M-EST. PATIENT-LVL III: CPT | Mod: PBBFAC,,, | Performed by: INTERNAL MEDICINE

## 2017-03-17 PROCEDURE — 99213 OFFICE O/P EST LOW 20 MIN: CPT | Mod: 25,S$GLB,, | Performed by: OTOLARYNGOLOGY

## 2017-03-17 PROCEDURE — 1160F RVW MEDS BY RX/DR IN RCRD: CPT | Mod: S$GLB,,, | Performed by: INTERNAL MEDICINE

## 2017-03-17 RX ORDER — PROMETHAZINE HYDROCHLORIDE AND DEXTROMETHORPHAN HYDROBROMIDE 6.25; 15 MG/5ML; MG/5ML
5 SYRUP ORAL
Qty: 473 ML | Refills: 11 | Status: SHIPPED | OUTPATIENT
Start: 2017-03-17 | End: 2017-03-27

## 2017-03-17 RX ORDER — PROMETHAZINE HYDROCHLORIDE AND DEXTROMETHORPHAN HYDROBROMIDE 6.25; 15 MG/5ML; MG/5ML
5 SYRUP ORAL
Qty: 473 ML | Refills: 11 | Status: SHIPPED | OUTPATIENT
Start: 2017-03-17 | End: 2017-03-17 | Stop reason: SDUPTHER

## 2017-03-17 RX ORDER — BUDESONIDE 0.5 MG/2ML
0.5 INHALANT ORAL 2 TIMES DAILY
Qty: 120 ML | Refills: 11 | Status: SHIPPED | OUTPATIENT
Start: 2017-03-17 | End: 2017-04-01

## 2017-03-17 RX ORDER — BUDESONIDE 0.5 MG/2ML
0.5 INHALANT ORAL 2 TIMES DAILY
Qty: 120 ML | Refills: 11 | Status: SHIPPED | OUTPATIENT
Start: 2017-03-17 | End: 2017-03-20

## 2017-03-17 RX ORDER — TRIAMCINOLONE ACETONIDE 40 MG/ML
40 INJECTION, SUSPENSION INTRA-ARTICULAR; INTRAMUSCULAR
Status: COMPLETED | OUTPATIENT
Start: 2017-03-17 | End: 2017-03-17

## 2017-03-17 RX ADMIN — TRIAMCINOLONE ACETONIDE 40 MG: 40 INJECTION, SUSPENSION INTRA-ARTICULAR; INTRAMUSCULAR at 03:03

## 2017-03-17 NOTE — PATIENT INSTRUCTIONS
Bronchitis with Wheezing (Viral or Bacterial: Adult)    Bronchitis is an infection of the air passages. It often occurs during a cold and is usually caused by a virus. Symptoms include cough with mucus (phlegm) and low-grade fever. This illness is contagious during the first few days and is spread through the air by coughing and sneezing, or by direct contact (touching the sick person and then touching your own eyes, nose, or mouth).  If there is a lot of inflammation, air flow is restricted. The air passages may also go into spasm, especially if you have asthma. This causes wheezing and difficulty breathing even in people who do not have asthma.  Bronchitis usually lasts 7 to 14 days. The wheezing should improve with treatment during the first week. An inhaler is often prescribed to relax the air passages and stop wheezing. Antibiotics will be prescribed if your doctor thinks there is also a secondary bacterial infection.  Home care  · If symptoms are severe, rest at home for the first 2 to 3 days. When you go back to your usual activities, don't let yourself get too tired.  · Do not smoke. Also avoid being exposed to secondhand smoke.  · You may use over-the-counter medicine to control fever or pain, unless another medicine was prescribed. Note: If you have chronic liver or kidney disease or have ever had a stomach ulcer or gastrointestinal bleeding, talk with your healthcare provider before using these medicines. Also talk to your provider if you are taking medicine to prevent blood clots.) Aspirin should never be given to anyone younger than 18 years of age who is ill with a viral infection or fever. It may cause severe liver or brain damage.  · Your appetite may be poor, so a light diet is fine. Avoid dehydration by drinking 6 to 8 glasses of fluids per day (such as water, soft drinks, sports drinks, juices, tea, or soup). Extra fluids will help loosen secretions in the nose and lungs.  · Over-the-counter  cough, cold, and sore-throat medicines will not shorten the length of the illness, but they may be helpful to reduce symptoms. (Note: Do not use decongestants if you have high blood pressure.)  · If you were given an inhaler, use it exactly as directed. If you need to use it more often than prescribed, your condition may be worsening. If this happens, contact your healthcare provider.  · If prescribed, finish all antibiotic medicine, even if you are feeling better after only a few days.  Follow-up care  Follow up with your healthcare provider, or as advised. If you had an X-ray or ECG (electrocardiogram), a specialist will review it. You will be notified of any new findings that may affect your care.  Note: If you are age 65 or older, or if you have a chronic lung disease or condition that affects your immune system, or you smoke, talk to your healthcare provider about having a pneumococcal vaccinations and a yearly influenza vaccination (flu shot).  When to seek medical advice  Call your healthcare provider right away if any of these occur:  · Fever of 100.4°F (38°C) or higher  · Coughing up increasing amounts of colored sputum  · Weakness, drowsiness, headache, facial pain, ear pain, or a stiff neck  Call 911, or get immediate medical care  Contact emergency services right away if any of these occur.  · Coughing up blood  · Worsening weakness, drowsiness, headache, or stiff neck  · Increased wheezing not helped with medication, shortness of breath, or pain with breathing  Date Last Reviewed: 9/13/2015  © 6302-7545 [employer]. [address] [place] [address], [place] [address]. All rights reserved. This information is not intended as a substitute for professional medical care. Always follow your healthcare professional's instructions.

## 2017-03-17 NOTE — MR AVS SNAPSHOT
[facility]  [address]  [facility] [address]  Phone: [phone]  Fax: [phone]                  Clay Marcos   3/17/2017 9:30 AM   Office Visit    Description:  Male : 1945   Provider:  Clay Philippe MD   Department:  [facility]           Reason for Visit     Follow-up     Cough           Diagnoses this Visit        Comments    Asthma with COPD         Bronchitis                To Do List           Future Appointments        Provider Department Dept Phone    3/17/2017 2:00 PM Luciano Hernadez MD [facility] [phone]    2017 8:15 AM Clay Philippe MD [facility] [phone]    2017 10:00 AM LABORATORY, [facility] -  [phone]    2017 11:30 AM [facility] CT1 LIMIT 500 LBS [facility] -  [phone]    2017 1:00 PM Luciano Hernadez MD [facility] [phone]      Goals (5 Years of Data)     None      [facility] On [facility]     [facility] On Call Nurse Care Line -  Assistance  Registered nurses in the [facility] provide clinical advisement, health education, appointment booking, and other advisory services.  Call for this free service at [phone].             Medications           Message regarding Medications     Verify the changes and/or additions to your medication regime listed below are the same as discussed with your clinician today.  If any of these changes or additions are incorrect, please notify your healthcare provider.             Verify that the below list of medications is an accurate representation of the medications you are currently taking.  If none reported, the list may be blank. If incorrect, please contact your healthcare provider. Carry this list with you in case of emergency.           Current Medications     albuterol 90 mcg/actuation inhaler Inhale 2 puffs into the lungs every 6 (six) hours.    albuterol-ipratropium 2.5mg-0.5mg/3mL (DUO-NEB) 0.5 mg-3 mg(2.5 mg  base)/3 mL nebulizer solution Take 3 mLs by nebulization every 6 (six) hours.    budesonide (PULMICORT) 0.5 mg/2 mL nebulizer solution Take 2 mLs (0.5 mg total) by nebulization 2 (two) times daily.    multivitamin (THERAGRAN) tablet Take 1 tablet by mouth once daily.    pantoprazole (PROTONIX) 40 MG tablet Take 1 tablet (40 mg total) by mouth once daily.    tamsulosin (FLOMAX) 0.4 mg Cp24 Take 1 capsule (0.4 mg total) by mouth every evening.    tobramycin (NEBCIN) 40 mg/mL injection     vancomycin (VANCOCIN) 500 mg injection            Clinical Reference Information           Your Vitals Were     BP Pulse Temp Weight BMI    115/77 77 97.9 °F (36.6 °C) (Tympanic) 109.2 kg (240 lb 11.9 oz) 32.2 kg/m2      Blood Pressure          Most Recent Value    BP  115/77      Allergies as of 3/17/2017     No Known Allergies      Immunizations Administered on Date of Encounter - 3/17/2017     None      Language Assistance Services     ATTENTION: Language assistance services are available, free of charge. Please call [phone].      ATENCIÓN: Si habla español, tiene a early disposición servicios gratuitos de asistencia lingüística. Llame al [phone].     BILL Ý: N?u b?n nói Ti?ng Vi?t, có các d?ch v? h? tr? ngôn ng? mi?n phí dành cho b?n. G?i s? [phone].         [facility] complies with applicable Federal civil rights laws and does not discriminate on the basis of race, color, national origin, age, disability, or sex.

## 2017-03-17 NOTE — PROGRESS NOTES
Subjective:      Established patient    Patient ID: Clay Marcos is a 71 y.o. male.    Patient Active Problem List   Diagnosis    Dysmetabolic syndrome    History of basal cell carcinoma    Tortuous aorta    MVP (mitral valve prolapse)    Dyslipidemia    Paresthesia of left leg    DDD (degenerative disc disease), lumbar    Multiple lung nodules on CT - probable hematoma LLL    Obesity (BMI 30-39.9)    Pharyngoesophageal dysphagia    Sleep-disordered breathing    GERD (gastroesophageal reflux disease)    Chronic pansinusitis    Hernia of abdominal wall - left lateral    Cough in adult    Asthma with COPD       Problem list has been reviewed.    Chief Complaint: Shortness of Breath      HPI    Accompanied by his wife. He reports recurrent cough and wheezing. Cough occurs more at night and is worse in recumbent position. Home sleep study has not yet been done. Repeat EGD revealed resolution of resolved. He is on Protonix.  ENT reports resolution of his chroninc pansinusitis.  He denies  hemoptysis,  pain with breathing, wheezing, asthma. Follow up CT chest not performed. HE IS REQUESTING COUGH SYRUP WITH CODEINE. He reports that he has ran out of his budesonide and is requesting a refill.    A full  review of systems, past , family  and social histories was performed except as mentioned in the note above, these are non contributory to the main issues discussed  today    COPD QUESTIONNAIRE  How often do you cough?: Most of the time  How often do you have phlegm (mucus) in your chest?: Some of the time  How often does your chest feel tight?: A little of the time  When you walk up a hill or one flight of stairs, how often are you breathless?: Most of the time  How often are you limited doing any activities at home?: Never  How often are you confident leaving the house despite your lung condition?: All of the time  How often do you sleep soundly?: Never  How often do you have energy?: Some of the  time  Total score: 20     His ACT score is 19.    Previous Report Reviewed: lab reports, office notes and radiology reports     The following portions of the patient's history were reviewed and updated as appropriate: He  has a past medical history of Basal cell carcinoma; Calcaneal spur of left foot; Colon polyps; DDD (degenerative disc disease), lumbar; Diverticulosis of colon; Dyslipidemia; Dysmetabolic syndrome; Fracture of rib of left side; History of tobacco use; Internal and external hemorrhoids without complication; MVP (mitral valve prolapse); Obesity; Osteoarthritis of both thumbs (9/15/2015); and Pneumonia (08/2016).  He  has a past surgical history that includes Hemorrhoid surgery; Excision basal cell carcinoma; Anal fistulotomy; Tonsillectomy; and Sinus surgery.  His family history includes Breast cancer in his mother; Coronary artery disease in his brother, other, sister, and sister; Heart attack in his brother, other, sister, and sister; Heart attacks under age 50 in his father; Hyperlipidemia in his brother; Vision loss in his other.  He  reports that he quit smoking about 39 years ago. His smoking use included Cigarettes. He has a 12.00 pack-year smoking history. He has never used smokeless tobacco. He reports that he drinks about 9.0 oz of alcohol per week  He reports that he does not use illicit drugs.  He has a current medication list which includes the following prescription(s): albuterol, albuterol-ipratropium 2.5mg-0.5mg/3ml, budesonide, budesonide, multivitamin, pantoprazole, tamsulosin, and promethazine-dextromethorphan.  He has No Known Allergies..    Review of Systems   Constitutional: Negative for fever, chills, fatigue and night sweats.   HENT: Negative for nosebleeds, postnasal drip, rhinorrhea, sore throat and congestion.    Eyes: Negative for itching.   Respiratory: Positive for apnea, snoring, cough and shortness of breath. Negative for hemoptysis, choking, chest tightness and  "orthopnea.    Cardiovascular: Negative for chest pain, palpitations and leg swelling.   Genitourinary: Negative for difficulty urinating and hematuria.   Endocrine: Negative for cold intolerance and heat intolerance.    Musculoskeletal: Positive for back pain. Negative for arthralgias, gait problem and joint swelling.   Gastrointestinal: Negative for nausea, vomiting, abdominal pain and abdominal distention.   Neurological: Negative for dizziness, syncope, light-headedness and headaches.   Hematological: No excessive bruising.   Psychiatric/Behavioral: Negative for confusion. The patient is not nervous/anxious.    All other systems reviewed and are negative.       Objective:     /82  Pulse 82  Resp 18  Ht 6' 5" (1.956 m)  Wt 109.2 kg (240 lb 11.9 oz)  SpO2 97%  BMI 28.55 kg/m2  Body mass index is 28.55 kg/(m^2).     Physical Exam   Constitutional: He is oriented to person, place, and time. He appears well-developed and well-nourished.   HENT:   Head: Normocephalic and atraumatic.   Eyes: EOM are normal. Pupils are equal, round, and reactive to light.   Neck: Normal range of motion. Neck supple.   Cardiovascular: Normal rate and regular rhythm.    Pulmonary/Chest: Effort normal and breath sounds normal.   Abdominal: Soft. Bowel sounds are normal.   Musculoskeletal: Normal range of motion.   Neurological: He is alert and oriented to person, place, and time.   Skin: Skin is warm and dry.   Psychiatric: He has a normal mood and affect. His behavior is normal.   Nursing note and vitals reviewed.      Personal Diagnostic Review  None pertinent.       Assessment:     1. Cough in adult Active   2. Asthma with COPD Active   3. Asthma with COPD    4. Bronchitis    5. Sleep-disordered breathing    6. Multiple lung nodules on CT - probable hematoma LLL      Outpatient Encounter Prescriptions as of 3/17/2017   Medication Sig Dispense Refill    albuterol 90 mcg/actuation inhaler Inhale 2 puffs into the lungs every 6 " (six) hours. 1 Inhaler 12    albuterol-ipratropium 2.5mg-0.5mg/3mL (DUO-NEB) 0.5 mg-3 mg(2.5 mg base)/3 mL nebulizer solution Take 3 mLs by nebulization every 6 (six) hours. 120 vial 12    budesonide (PULMICORT) 0.5 mg/2 mL nebulizer solution Take 2 mLs (0.5 mg total) by nebulization 2 (two) times daily. 120 mL 11    budesonide (PULMICORT) 0.5 mg/2 mL nebulizer solution Take 2 mLs (0.5 mg total) by nebulization 2 (two) times daily. 120 mL 11    multivitamin (THERAGRAN) tablet Take 1 tablet by mouth once daily.      pantoprazole (PROTONIX) 40 MG tablet Take 1 tablet (40 mg total) by mouth once daily. 90 tablet 3    tamsulosin (FLOMAX) 0.4 mg Cp24 Take 1 capsule (0.4 mg total) by mouth every evening. 30 capsule 11    [DISCONTINUED] budesonide (PULMICORT) 0.5 mg/2 mL nebulizer solution Take 2 mLs (0.5 mg total) by nebulization 2 (two) times daily. 120 mL 11    promethazine-dextromethorphan (PROMETHAZINE-DM) 6.25-15 mg/5 mL Syrp Take 5 mLs by mouth every 6 to 8 hours as needed. 473 mL 11    [DISCONTINUED] promethazine-dextromethorphan (PROMETHAZINE-DM) 6.25-15 mg/5 mL Syrp Take 5 mLs by mouth every 6 to 8 hours as needed. 473 mL 11    [DISCONTINUED] tobramycin (NEBCIN) 40 mg/mL injection       [DISCONTINUED] vancomycin (VANCOCIN) 500 mg injection        Facility-Administered Encounter Medications as of 3/17/2017   Medication Dose Route Frequency Provider Last Rate Last Dose    [COMPLETED] triamcinolone acetonide injection 40 mg  40 mg Intramuscular 1 time in Clinic/HOD Luciano Hernadez MD   40 mg at 03/17/17 1527     Orders Placed This Encounter   Procedures    Bronchial challenge with methacholine     Standing Status:   Future     Standing Expiration Date:   3/17/2018     Plan:     Discussed diagnosis, its evaluation, treatment and usual course. All questions answered.    Cough in adult  Etiology of cough unclear. Associated /Contriubutory factors    RESPIRATORY:  [] Shortness of breath    []   Wheeze  [x] Previous respiratory disease  [] Childhood asthma  ENT:  [] Hay fever  [] Throat clearing  [] Post nasal drip  [x] History of sinus disease / rhinitis  CARDIAC:  [] Chest pain  [] Ankle swelling  [] Orthopnea, PND  [] Known cardiac disease . Valvular heart disease  GI:  [] Heart burn / dyspepsia  [x] History of GERD  MEDICATIONS:  [] ACE inhibitor.      Plan:  All questions answered.  Prescription antitussive per orders..  Avoid exposure to tobacco smoke and fumes.  Bronchial challenge with methacholine; Future  triamcinolone acetonide injection 40 mg; Inject 1 mL (40 mg total) into the muscle one time.      Sleep-disordered breathing  Await sleep study. Patient will call sleep lab to schedule at his convenience.     Multiple lung nodules on CT - probable hematoma LLL  Repeating CT chest.     Asthma with COPD  Asthma ROS: taking medications as instructed, no medication side effects noted, no significant ongoing wheezing or shortness of breath, using bronchodilator MDI less than twice a week.   New concerns: Recurrent Cough.   Exam: appears well, vitals normal, no respiratory distress, acyanotic, normal RR, chest clear, no wheezing, crepitations, rhonchi, normal symmetric air entry.   Assessment:  Asthma reasonably well controlled.   Plan: Methacholine challenge test. Orders as documented in EMR. Pulmicort refilled.    TIME SPENT WITH PATIENT: Time spent: 40 minutes in face to face  discussion concerning diagnosis, prognosis, review of lab and test results, benefits of treatment as well as management of disease, counseling of patient and coordination of care between various health  care providers . Greater than half the time spent was used for coordination of care and counseling of patient.         Return in about 1 month (around 4/17/2017) for Cough.

## 2017-03-18 NOTE — ASSESSMENT & PLAN NOTE
Asthma ROS: taking medications as instructed, no medication side effects noted, no significant ongoing wheezing or shortness of breath, using bronchodilator MDI less than twice a week.   New concerns: Recurrent Cough.   Exam: appears well, vitals normal, no respiratory distress, acyanotic, normal RR, chest clear, no wheezing, crepitations, rhonchi, normal symmetric air entry.   Assessment:  Asthma reasonably well controlled.   Plan: Methacholine challenge test. Orders as documented in EMR. Pulmicort refilled.

## 2017-03-18 NOTE — ASSESSMENT & PLAN NOTE
Etiology of cough unclear. Associated /Contriubutory factors    RESPIRATORY:  [] Shortness of breath    []  Wheeze  [x] Previous respiratory disease  [] Childhood asthma  ENT:  [] Hay fever  [] Throat clearing  [] Post nasal drip  [x] History of sinus disease / rhinitis  CARDIAC:  [] Chest pain  [] Ankle swelling  [] Orthopnea, PND  [] Known cardiac disease . Valvular heart disease  GI:  [] Heart burn / dyspepsia  [x] History of GERD  MEDICATIONS:  [] ACE inhibitor.      Plan:  All questions answered.  Prescription antitussive per orders..  Avoid exposure to tobacco smoke and fumes.  Bronchial challenge with methacholine; Future  triamcinolone acetonide injection 40 mg; Inject 1 mL (40 mg total) into the muscle one time.

## 2017-03-20 ENCOUNTER — OFFICE VISIT (OUTPATIENT)
Dept: FAMILY MEDICINE | Facility: CLINIC | Age: 72
End: 2017-03-20
Payer: MEDICARE

## 2017-03-20 VITALS
BODY MASS INDEX: 32.13 KG/M2 | HEIGHT: 72 IN | DIASTOLIC BLOOD PRESSURE: 80 MMHG | SYSTOLIC BLOOD PRESSURE: 124 MMHG | HEART RATE: 88 BPM | OXYGEN SATURATION: 93 % | TEMPERATURE: 97 F | WEIGHT: 237.19 LBS | RESPIRATION RATE: 18 BRPM

## 2017-03-20 DIAGNOSIS — R05.3 CHRONIC COUGH: Primary | ICD-10-CM

## 2017-03-20 DIAGNOSIS — I77.1 TORTUOUS AORTA: ICD-10-CM

## 2017-03-20 DIAGNOSIS — F41.1 GAD (GENERALIZED ANXIETY DISORDER): ICD-10-CM

## 2017-03-20 DIAGNOSIS — K22.4 ESOPHAGEAL DYSMOTILITY: ICD-10-CM

## 2017-03-20 PROCEDURE — 1157F ADVNC CARE PLAN IN RCRD: CPT | Mod: S$GLB,,, | Performed by: FAMILY MEDICINE

## 2017-03-20 PROCEDURE — 99499 UNLISTED E&M SERVICE: CPT | Mod: S$GLB,,, | Performed by: FAMILY MEDICINE

## 2017-03-20 PROCEDURE — 1126F AMNT PAIN NOTED NONE PRSNT: CPT | Mod: S$GLB,,, | Performed by: FAMILY MEDICINE

## 2017-03-20 PROCEDURE — 1160F RVW MEDS BY RX/DR IN RCRD: CPT | Mod: S$GLB,,, | Performed by: FAMILY MEDICINE

## 2017-03-20 PROCEDURE — 99999 PR PBB SHADOW E&M-EST. PATIENT-LVL III: CPT | Mod: PBBFAC,,, | Performed by: FAMILY MEDICINE

## 2017-03-20 PROCEDURE — 1159F MED LIST DOCD IN RCRD: CPT | Mod: S$GLB,,, | Performed by: FAMILY MEDICINE

## 2017-03-20 PROCEDURE — 99214 OFFICE O/P EST MOD 30 MIN: CPT | Mod: S$GLB,,, | Performed by: FAMILY MEDICINE

## 2017-03-20 RX ORDER — CLONAZEPAM 1 MG/1
1 TABLET ORAL NIGHTLY
Qty: 30 TABLET | Refills: 2 | Status: SHIPPED | OUTPATIENT
Start: 2017-03-20 | End: 2017-04-01

## 2017-03-20 RX ORDER — DILTIAZEM HYDROCHLORIDE 180 MG/1
180 CAPSULE, COATED, EXTENDED RELEASE ORAL DAILY
Qty: 30 CAPSULE | Refills: 2 | Status: SHIPPED | OUTPATIENT
Start: 2017-03-20 | End: 2017-07-21

## 2017-03-20 NOTE — MR AVS SNAPSHOT
[facility]  [address] [facility] [address]  Phone: [phone]                  Clay Marcos   3/20/2017 11:15 AM   Office Visit    Description:  Male : 1945   Provider:  Tori Marino MD   Department:  [facility]           Reason for Visit     Shortness of Breath     Cough           Diagnoses this Visit        Comments    Chronic cough    -  Primary     SABIHA (generalized anxiety disorder)         Esophageal dysmotility         Tortuous aorta                To Do List           Future Appointments        Provider Department Dept Phone    2017 1:00 PM PULMONARY LAB, [facility] [phone]    2017 2:00 PM MD [facility] [phone]    2017 8:15 AM Clay Pihlippe MD [facility] - [phone]    2017 10:00 AM LAB[facility]ATORY, [facility] -  [phone]    2017 11:30 AM [facility] CT1 LIMIT 500 LBS [facility] -  [phone]      Goals (5 Years of Data)     None       These Medications        Disp Refills Start End    clonazePAM (KLONOPIN) 1 MG tablet 30 tablet 2 3/20/2017 3/20/2018    Take 1 tablet (1 mg total) by mouth every evening. - Oral    Pharmacy: [facility][id number] - [address] A Ph #: [phone]       diltiaZEM (CARDIZEM CD) 180 MG 24 hr capsule 30 capsule 2 3/20/2017 3/20/2018    Take 1 capsule (180 mg total) by mouth once daily. - Oral    Pharmacy: [id number] [address] [facility] Ph #: [phone]         [facility]     [facility] Care [facility] -  Assistance  Registered nurses in the [facility] provide clinical advisement, health education, appointment booking, and other advisory services.  Call for this free service at [phone].             Medications           Message regarding Medications      Verify the changes and/or additions to your medication regime listed below are the same as discussed with your clinician today.  If any of these changes or additions are incorrect, please notify your healthcare provider.        START taking these NEW medications        Refills    clonazePAM (KLONOPIN) 1 MG tablet 2    Sig: Take 1 tablet (1 mg total) by mouth every evening.    Class: Print    Route: Oral    diltiaZEM (CARDIZEM CD) 180 MG 24 hr capsule 2    Sig: Take 1 capsule (180 mg total) by mouth once daily.    Class: Normal    Route: Oral           Verify that the below list of medications is an accurate representation of the medications you are currently taking.  If none reported, the list may be blank. If incorrect, please contact your healthcare provider. Carry this list with you in case of emergency.           Current Medications     albuterol 90 mcg/actuation inhaler Inhale 2 puffs into the lungs every 6 (six) hours.    albuterol-ipratropium 2.5mg-0.5mg/3mL (DUO-NEB) 0.5 mg-3 mg(2.5 mg base)/3 mL nebulizer solution Take 3 mLs by nebulization every 6 (six) hours.    budesonide (PULMICORT) 0.5 mg/2 mL nebulizer solution Take 2 mLs (0.5 mg total) by nebulization 2 (two) times daily.    multivitamin (THERAGRAN) tablet Take 1 tablet by mouth once daily.    pantoprazole (PROTONIX) 40 MG tablet Take 1 tablet (40 mg total) by mouth once daily.    promethazine-dextromethorphan (PROMETHAZINE-DM) 6.25-15 mg/5 mL Syrp Take 5 mLs by mouth every 6 to 8 hours as needed.    tamsulosin (FLOMAX) 0.4 mg Cp24 Take 1 capsule (0.4 mg total) by mouth every evening.    clonazePAM (KLONOPIN) 1 MG tablet Take 1 tablet (1 mg total) by mouth every evening.    diltiaZEM (CARDIZEM CD) 180 MG 24 hr capsule Take 1 capsule (180 mg total) by mouth once daily.           Clinical Reference Information           Your Vitals Were     BP Pulse Temp Resp Height Weight    124/80 88 96.7 °F (35.9 °C) (Tympanic) 18 6' (1.829 m) 107.6 kg (237 lb 3.4  oz)    SpO2 BMI             93% 32.17 kg/m2         Blood Pressure          Most Recent Value    BP  124/80      Allergies as of 3/20/2017     No Known Allergies      Immunizations Administered on Date of Encounter - 3/20/2017     None      Language Assistance Services     ATTENTION: Language assistance services are available, free of charge. Please call [phone].      ATENCIÓN: Si habla serenityañol, tiene a early disposición servicios gratuitos de asistencia lingüística. Llame al [phone].     CHÚ Ý: N?u b?n nói Ti?ng Vi?t, có các d?ch v? h? tr? ngôn ng? mi?n phí dành cho b?n. G?i s? [phone].         [facility] complies with applicable Federal civil rights laws and does not discriminate on the basis of race, color, national origin, age, disability, or sex.

## 2017-03-20 NOTE — PROGRESS NOTES
CHIEF COMPLAINT: This is a 71-year-old male complaining of recurrent cough and shortness of breath.    SUBJECTIVE: The patient has been having episodes of persistent cough since at least May 2016.  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  He has been evaluated by pulmonology, gastroenterology and ENT.  ENT treated him for chronic pansinusitis and he had sinus surgery in January 2017.  He had barium swallow because of possible pharyngoesophageal dysphagia.  No aspiration was noted.  He had EGD with findings of grade C reflux esophagitis and hiatal hernia.  He was placed on Protonix 40 mg twice daily.    Patient complains of onset of excessive coughing over the last few days.  He states that he gets a tickling sensation in his lower lungs and then spasmodic coughing which he describes as machine gun-like in quality and intensity.  Coughing spasms are violent and hard.  Patient coughs up greenish yellow phlegm.  He wonders if he is having spasms in his esophagus.  His wife accompanies him and states that he is not sleeping because he is anxious.  Patient is currently taking Promethazine DM and using Pulmicort and albuterol inhalers.  He also uses a nebulizer with DuoNeb.  Patient denies fever, chills, sore throat, earache.  Patient was given Kenalog 40 mg IM by pulmonologist 3 days ago.  Patient has mildly minimally ectatic and aorta per chest x-ray, May 2016.    ROS:  GENERAL: Patient denies fever, chills, night sweats. Patient denies weight gain or loss. Patient denies anorexia, weakness or swollen glands. Positive for fatigue.  SKIN: Clear without rash.  Normal color and tone.  HEENT: Patient ear pain, hearing loss, sore throat, visual disturbance, eye irritation or discharge.  LUNGS: Patient denies wheezing or hemoptysis.  CARDIOVASCULAR: Patient denies chest pain, palpitations, syncope or lower extremity edema.  Positive for shortness of  breath.  GI: Patient denies abdominal pain, nausea, vomiting, diarrhea, constipation, blood in stool or melena.  MUSCULOSKELETAL: Patient denies joint pain, swelling, redness or warmth. Patient denies neck pain. Positive for back/flank pain.  NEURO: Patient denies headache, vertigo, numbness, tingling, weakness in limb, or abnormality of gait.     OBJECTIVE:    VITAL SIGNS: O2 saturation 93 %.  GENERAL: Well-developed well-nourished, obese, white male alert and oriented x3 in no acute distress. Memory, judgment and cognition without deficit. No audible wheezing.  SKIN: Extensive ecchymosis left flank extending anteriorly to abdomen.  HEENT: Eyes: Clear conjunctivae. No scleral icterus. Ears: Clear canals. Clear TMs. Nose: Mild bilateral congestion. Pharynx: Without injection or exudates.  NECK: Supple, normal range of motion. No lymphadenopathy. No masses or enlarged thyroid. No JVD. Carotids 2+ and equal. No bruits.  LUNGS: Clear to auscultation.  Normal respiratory effort.  CARDIOVASCULAR: Regular rhythm, normal S1, S2 without murmur, gallop or rub.  BACK: No CVA or spinal tenderness.  Normal range of motion in extension and flexion.  EXTREMITIES: No cyanosis, clubbing or edema. Distal pulses 2+ and equal. No joint effusion, erythema or warmth.  NEURO: Motor strength equal bilaterally. Sensation normal to touch. Gait without abnormality. No tremor.    ASSESSMENT:  1. Chronic cough    2. SABIHA (generalized anxiety disorder)    3. Esophageal dysmotility    4. Tortuous aorta      PLAN:   1.  Discussed treatment of anxiety with Klonopin 1 mg at bedtime for help with sleep.  2.  Continue pantoprazole.  3.  Trial of diltiazem 180 mg daily for possible esophageal spasm.  4.  Continue inhalers.  5.  Patient to have sleep study.  6.  Follow-up if no improvement or worsening symptoms.

## 2017-03-20 NOTE — PROGRESS NOTES
Subjective:   Patient: Clay Marcos [id number], :1945   Visit date:3/17/2017 4:50 PM    Chief Complaint: Status post sinus surgery    HPI:  Clay is a 71 y.o. male with Chronic sinusitis.    Subjective:  He is no longer having any significant nasal drainage.  He has no facial pressure or pain.  He is still having a fairly severe cough and some shortness of breath.  He had a breathing treatment recently that provided significant, temporary relief. He was last seen  and had severe crusting and nasal drainage.  I placed him on irrigations of budesonide/tobramycin/vancomycin.  He reports that a about 1 week after starting this, he began having clear rinses and dramatic improvement in congestion.      Surgery: 2016    Sinuses operated/OR findings:   Technical Procedures Used:   - bilateral Frontal sinusotomy, with or without removal of tissue (CPT 80093)  - bilateral Total ethmoidectomy (CPT 74562)  - BILATERAL Maxillary antrostomy without removal of tissue (CPT 22172)  - bilateral Sphenoid sinusotomy without removal of tissue (CPT 84683)  - Sterotactic computer assisted (navigational) procedure; cranial, extradural (CPT 80150)  - Septoplasty (CPT 12231)          Endoscopic Sinonasal Exam Findings at TIME OF SURGERY:  Purulent secretions in all sinuses except the right maxillary. Significant narrowing of the frontal sinuses due to prominent frontal beak bilaterally.      Final path: Nonspecific inflammation    OR Culture:   Anaerobic Culture PROPIONIBACTERIUM SPECIES  Few        Review of Systems:  -     Allergic/Immunologic: has No Known Allergies..  -     Constitutional: Current temp: 97.9 °F (36.6 °C) (Tympanic)    His meds, allergies, medical, surgical, social & family histories were reviewed & updated:  -     He has a current medication list which includes the following prescription(s): albuterol, albuterol-ipratropium 2.5mg-0.5mg/3ml, multivitamin, pantoprazole, tamsulosin, budesonide,  budesonide, and promethazine-dextromethorphan.  -     He  has a past medical history of Basal cell carcinoma; Calcaneal spur of left foot; Colon polyps; DDD (degenerative disc disease), lumbar; Diverticulosis of colon; Dyslipidemia; Dysmetabolic syndrome; Fracture of rib of left side; History of tobacco use; Internal and external hemorrhoids without complication; MVP (mitral valve prolapse); Obesity; Osteoarthritis of both thumbs (9/15/2015); and Pneumonia (08/2016).   -     He  does not have any pertinent problems on file.   -     He  has a past surgical history that includes Hemorrhoid surgery; Excision basal cell carcinoma; Anal fistulotomy; Tonsillectomy; and Sinus surgery.  -     He  reports that he quit smoking about 39 years ago. His smoking use included Cigarettes. He has a 12.00 pack-year smoking history. He has never used smokeless tobacco. He reports that he drinks about 9.0 oz of alcohol per week  He reports that he does not use illicit drugs.  -     His family history includes Breast cancer in his mother; Coronary artery disease in his brother, other, sister, and sister; Heart attack in his brother, other, sister, and sister; Heart attacks under age 50 in his father; Hyperlipidemia in his brother; Vision loss in his other.  -     He has No Known Allergies.    Objective:   Physical Exam:  Vitals:    /77  Pulse 77  Temp 97.9 °F (36.6 °C) (Tympanic)   Wt 109.2 kg (240 lb 11.9 oz)  BMI 32.2 kg/m2  General appearance:  Well developed, well nourished    Eyes:  Extraocular motions intact, PERRL    Communication:  no hoarseness, no dysphonia    Ears:  Otoscopy of external auditory canals and tympanic membranes was normal, clinical speech reception thresholds grossly intact, no mass/lesion of auricle.  Nose:  No masses/lesions of external nose, nasal mucosa, septum, and turbinates were within normal limits.  Mouth:  No mass/lesion of lips, teeth, gums, hard/soft palate, tongue, tonsils, or  oropharynx.    Cardiovascular:  No pedal edema; Radial Pulses +2     Neck & Lymphatics:  No cervical lymphadenopathy, no neck mass/crepitus/ asymmetry, trachea is midline, no thyroid enlargement/tenderness/mass.    Psych: Oriented x3,  Alert with normal mood and affect.     Respiration/Chest:  Symmetric expansion during respiration, normal respiratory effort.    Skin:  Warm and intact. No ulcerations of face, scalp, neck.      Assessment & Plan:   Chronic Rhinosinusitis s/p endoscopic sinus surgery.  Individualized endoscopic debridements following routine functional endoscopic sinus surgery (FESS) provide improved long-term outcomes and may prevent future, more extensive revision procedures. Although two to three debridements in the first 30 days is typical for the majority of patients, once a week may be an appropriate frequency for postoperative debridement  in select patients with difficult problems. However, the frequency and length of time for which debridement is medically necessary will vary from case to case and must be individualized, a conclusion, which multiple studies analyzing debridement outcomes have acknowledged.  While 2-3 debridements will likely suffice in the majority of cases, there are situations in which a patient may require more frequent, very long-term debridements. Clinically, these include, but are not limited to, persistent crusting within the surgical bed, adhesion formation noted upon examination, more extensive surgery (eg, complex frontal sinusotomies, neoplasm  resections), underlying immunologic disorders, diffuse polyposis, revision FESS, mucociliary disorders, allergic fungal sinusitis, and postoperative complications (eg, visual loss, cerebrospinal fluid leak, nasal hemorrhage).     Clay presents today without significant crusting, discharge, synechia formation or narrowing of sinus ostia.  Therefore, the decision for endoscopic sinus debridement was not determined to be  necessary.    Clay will return to clinic in 8 weeks.    Changes to medical regimen: stop antibiotic irrigations.  Saline irrigation and fluticasone BID    Nasal exam suggests that his cough is primarily pulmonary at this point.              NASAL ENDOSCOPY  Procedure: Risks, benefits, and alternatives of the procedure were discussed with the patient, and the patient consented to the nasal endoscopy with debridement.  The nasal cavity was sprayed with a topical decongestant and anesthetic . The endoscope was passed into each nostril and each nasal cavity was visualized.  On each side the nasal cavity, sinuses (if open), turbinates, and septum were examined with the findings described below.    At the end of the examination, the scope was removed. The patient tolerated the procedure well with no complications.     Endoscopic Sinonasal Exam Findings:  -     Sinuses examined: bilateral maxillary, bilateral ethmoid anterior/posterior and bilateral sphenoid            The right sinus(es) have normal mucosa            The left sinus(es) have normal mucosa  -     Nasal secretions: no significant secretions bilaterally  -     Nasal septum: no significant deviation and no perforation appreciated   -     Inferior turbinate: - normal mucosa without significant hypertrophy - bilaterally  -     Middle turbinate: lateralized on the left with synechia formation- this was lateralized with breakage of synechia and dissolvable packing was placed to hold the turbinate more medially.  -     Other findings: - no mass or obstructive lesion -    Assessment & Plan:  See today's clinic note.

## 2017-03-22 ENCOUNTER — PATIENT MESSAGE (OUTPATIENT)
Dept: PULMONOLOGY | Facility: CLINIC | Age: 72
End: 2017-03-22

## 2017-03-23 ENCOUNTER — PATIENT MESSAGE (OUTPATIENT)
Dept: PULMONOLOGY | Facility: CLINIC | Age: 72
End: 2017-03-23

## 2017-03-23 ENCOUNTER — PATIENT MESSAGE (OUTPATIENT)
Dept: FAMILY MEDICINE | Facility: CLINIC | Age: 72
End: 2017-03-23

## 2017-03-28 ENCOUNTER — OFFICE VISIT (OUTPATIENT)
Dept: SLEEP MEDICINE | Facility: CLINIC | Age: 72
End: 2017-03-28
Payer: MEDICARE

## 2017-03-28 DIAGNOSIS — G47.30 SLEEP-DISORDERED BREATHING: Chronic | ICD-10-CM

## 2017-03-28 DIAGNOSIS — G47.33 OSA (OBSTRUCTIVE SLEEP APNEA): ICD-10-CM

## 2017-03-28 PROCEDURE — 95806 SLEEP STUDY UNATT&RESP EFFT: CPT | Mod: S$GLB,,, | Performed by: INTERNAL MEDICINE

## 2017-03-28 PROCEDURE — 99999 PR PBB SHADOW E&M-EST. PATIENT-LVL I: CPT | Mod: PBBFAC,,,

## 2017-03-28 PROCEDURE — 99499 UNLISTED E&M SERVICE: CPT | Mod: S$GLB,,, | Performed by: INTERNAL MEDICINE

## 2017-03-28 NOTE — MR AVS SNAPSHOT
[facility]  [address]  [place] [address]  Phone: [phone]                  Clay Marcos   3/28/2017 8:00 AM   Office Visit    Description:  Male : 1945   Provider:  ANNE EMMANUEL   Department:  [facility]           Diagnoses this Visit        Comments    Sleep-disordered breathing                To Do List           Future Appointments        Provider Department Dept Phone    3/29/2017 3:20 PM PULMONARY LAB, [facility] - Pulm Function [facility] [phone]    2017 8:20 AM Luciano Hernadez MD [facility] [phone]    2017 8:15 AM Clay Philippe MD [facility] [phone]    2017 10:00 AM LABORATORY, [facility] -  [phone]    2017 11:30 AM [facility] CT1 LIMIT 500 LBS [facility] -  [phone]      Goals (5 Years of Data)     None      [facility]     [facility] Nurse Care Line -  Assistance  Registered nurses in the [facility] Center provide clinical advisement, health education, appointment booking, and other advisory services.  Call for this free service at [phone].             Medications           Message regarding Medications     Verify the changes and/or additions to your medication regime listed below are the same as discussed with your clinician today.  If any of these changes or additions are incorrect, please notify your healthcare provider.             Verify that the below list of medications is an accurate representation of the medications you are currently taking.  If none reported, the list may be blank. If incorrect, please contact your healthcare provider. Carry this list with you in case of emergency.           Current Medications     albuterol 90 mcg/actuation inhaler Inhale 2 puffs into the lungs every 6 (six) hours.    albuterol-ipratropium 2.5mg-0.5mg/3mL (DUO-NEB) 0.5 mg-3 mg(2.5 mg base)/3 mL nebulizer solution Take 3 mLs by  nebulization every 6 (six) hours.    budesonide (PULMICORT) 0.5 mg/2 mL nebulizer solution Take 2 mLs (0.5 mg total) by nebulization 2 (two) times daily.    clonazePAM (KLONOPIN) 1 MG tablet Take 1 tablet (1 mg total) by mouth every evening.    diltiaZEM (CARDIZEM CD) 180 MG 24 hr capsule Take 1 capsule (180 mg total) by mouth once daily.    multivitamin (THERAGRAN) tablet Take 1 tablet by mouth once daily.    pantoprazole (PROTONIX) 40 MG tablet Take 1 tablet (40 mg total) by mouth once daily.    tamsulosin (FLOMAX) 0.4 mg Cp24 Take 1 capsule (0.4 mg total) by mouth every evening.           Clinical Reference Information           Allergies as of 3/28/2017     No Known Allergies      Immunizations Administered on Date of Encounter - 3/28/2017     None      Orders Placed During Today's Visit      Normal Orders This Visit    Home Sleep Studies       Language Assistance Services     ATTENTION: Language assistance services are available, free of charge. Please call [phone].      ATENCIÓN: Si habla juanito, tiene a early disposición servicios gratuitos de asistencia lingüística. Llame al [phone].     BILL Ý: N?u b?n nói Ti?ng Vi?t, có các d?ch v? h? tr? ngôn ng? mi?n phí dành cho b?n. G?i s? [phone].         [facility] complies with applicable Federal civil rights laws and does not discriminate on the basis of race, color, national origin, age, disability, or sex.

## 2017-03-28 NOTE — PROGRESS NOTES
Assessment and Recommendations  Monitor duration was 7 hours 52 minutes.  Excluded respiratory signal 3 minutes. Excluded O2 signal 7 minutes.  Lowest oxygen saturation was 82%.  Cumulatively oxygen saturation was below 90% for 50% of the study duration.  This is about 3 hours and 56 minutes.  Average heart rate was 74 bpm with a maximal heart rate of 103 bpm.  Snoring recorded above 50 dB 100% of the time.  Apnea-hypopnea index/respiratory event index 11.2 events per hour. Total events 88.  Based on clinical data and data collected for monitoring device obstructive sleep apnea-hypopnea syndrome is  detected.  There is also concurrent significant hypoxemia related to sleep disorder breathing  Based on [employer] sleep medicine practice parameters treatment with CPAP or BiPAP is indicated.  In lab CPAP titration would be preferred to ensure resolution of hypoxemia symptoms  Follow-up with ordering clinician

## 2017-03-29 ENCOUNTER — PROCEDURE VISIT (OUTPATIENT)
Dept: PULMONOLOGY | Facility: CLINIC | Age: 72
End: 2017-03-29
Payer: MEDICARE

## 2017-03-29 DIAGNOSIS — R05.9 COUGH IN ADULT: Chronic | ICD-10-CM

## 2017-03-29 PROCEDURE — 94070 EVALUATION OF WHEEZING: CPT | Mod: S$GLB,,, | Performed by: INTERNAL MEDICINE

## 2017-03-30 LAB
POST FEF 25 75: 0.34 L/S (ref 1.72–3.41)
POST FET 100: 12.94 S
POST FEV1 FVC: 44 %
POST FEV1: 1.26 L (ref 3.03–3.86)
POST FIF 50: 4.93 L/S
POST FVC: 2.88 L (ref 4.21–5.19)
POST PEF: 4.47 L/S (ref 7.4–9.85)
PRE FEF 25 75: 0.31 L/S (ref 1.72–3.41)
PRE FET 100: 10.62 S
PRE FEV1 FVC: 44 %
PRE FEV1: 1.17 L (ref 3.03–3.86)
PRE FIF 50: 3.71 L/S
PRE FVC: 2.63 L (ref 4.21–5.19)
PRE PEF: 4.91 L/S (ref 7.4–9.85)
PREDICTED FEV1 FVC: 73.19 % (ref 68.35–78.03)
PREDICTED FEV1: 3.45 L (ref 3.03–3.86)
PREDICTED FVC: 4.7 L (ref 4.21–5.19)
PROVOCATION PROTOCOL: ABNORMAL

## 2017-03-31 ENCOUNTER — TELEPHONE (OUTPATIENT)
Dept: PULMONOLOGY | Facility: CLINIC | Age: 72
End: 2017-03-31

## 2017-03-31 DIAGNOSIS — G47.33 OSA (OBSTRUCTIVE SLEEP APNEA): Primary | ICD-10-CM

## 2017-03-31 NOTE — TELEPHONE ENCOUNTER
Sleep study reviewed. Pateint has mild MARCOS . AHI of 11.2 events per hour. During the study, moderate oxygen desaturation was noted. Given the severity of oxygen desaturation, in lab CPAP titration is recommended. CPAP titration has been ordered. Please inform patient.

## 2017-04-01 ENCOUNTER — OFFICE VISIT (OUTPATIENT)
Dept: PULMONOLOGY | Facility: CLINIC | Age: 72
End: 2017-04-01
Payer: MEDICARE

## 2017-04-01 VITALS
SYSTOLIC BLOOD PRESSURE: 142 MMHG | OXYGEN SATURATION: 94 % | DIASTOLIC BLOOD PRESSURE: 80 MMHG | HEART RATE: 78 BPM | WEIGHT: 235 LBS | BODY MASS INDEX: 31.83 KG/M2 | HEIGHT: 72 IN | RESPIRATION RATE: 18 BRPM

## 2017-04-01 DIAGNOSIS — G47.33 OSA (OBSTRUCTIVE SLEEP APNEA): ICD-10-CM

## 2017-04-01 DIAGNOSIS — J44.89 ASTHMA WITH COPD: Primary | Chronic | ICD-10-CM

## 2017-04-01 PROBLEM — J32.4 CHRONIC PANSINUSITIS: Status: RESOLVED | Noted: 2017-01-11 | Resolved: 2017-04-01

## 2017-04-01 PROBLEM — R05.9 COUGH IN ADULT: Chronic | Status: RESOLVED | Noted: 2017-03-17 | Resolved: 2017-04-01

## 2017-04-01 PROCEDURE — 99999 PR PBB SHADOW E&M-EST. PATIENT-LVL III: CPT | Mod: PBBFAC,,, | Performed by: INTERNAL MEDICINE

## 2017-04-01 PROCEDURE — 1157F ADVNC CARE PLAN IN RCRD: CPT | Mod: S$GLB,,, | Performed by: INTERNAL MEDICINE

## 2017-04-01 PROCEDURE — 1159F MED LIST DOCD IN RCRD: CPT | Mod: S$GLB,,, | Performed by: INTERNAL MEDICINE

## 2017-04-01 PROCEDURE — 99499 UNLISTED E&M SERVICE: CPT | Mod: S$GLB,,, | Performed by: INTERNAL MEDICINE

## 2017-04-01 PROCEDURE — 99215 OFFICE O/P EST HI 40 MIN: CPT | Mod: S$GLB,,, | Performed by: INTERNAL MEDICINE

## 2017-04-01 PROCEDURE — 1160F RVW MEDS BY RX/DR IN RCRD: CPT | Mod: S$GLB,,, | Performed by: INTERNAL MEDICINE

## 2017-04-01 RX ORDER — MONTELUKAST SODIUM 10 MG/1
10 TABLET ORAL NIGHTLY
Qty: 30 TABLET | Refills: 11 | Status: SHIPPED | OUTPATIENT
Start: 2017-04-01 | End: 2017-05-01

## 2017-04-01 RX ORDER — FLUTICASONE FUROATE AND VILANTEROL 200; 25 UG/1; UG/1
1 POWDER RESPIRATORY (INHALATION) DAILY
Qty: 60 EACH | Refills: 11 | Status: SHIPPED | OUTPATIENT
Start: 2017-04-01 | End: 2017-09-22

## 2017-04-01 NOTE — PATIENT INSTRUCTIONS
Lung Anatomy  Your lungs take air in to give your body oxygen, which the body needs to work. Your lungs, like all the tissues in your body, are made up of billions of tiny specialized cells. Old lung cells die and are replaced by new, identical lung cells. This natural process helps ensure healthy lungs.    Date Last Reviewed: 11/1/2016  © 5112-0728 [employer]. [address] [place] [address]. All rights reserved. This information is not intended as a substitute for professional medical care. Always follow your healthcare professional's instructions.

## 2017-04-01 NOTE — ASSESSMENT & PLAN NOTE
Asthma ROS: taking medications as instructed, no medication side effects noted, no significant ongoing wheezing or shortness of breath, using bronchodilator MDI less than twice a week.   New concerns: Recurrent Cough.   Exam: appears well, vitals normal, no respiratory distress, acyanotic, normal RR, chest clear, no wheezing, crepitations, rhonchi, normal symmetric air entry.   Assessment:  Asthma poorly controlled.    Plan: START BREO. START SINGULAIR. Orders as documented in EMR.

## 2017-04-01 NOTE — PROGRESS NOTES
Subjective:      Established patient    Patient ID: Clay Marcos is a 71 y.o. male.    Patient Active Problem List   Diagnosis    Dysmetabolic syndrome    History of basal cell carcinoma    Tortuous aorta    MVP (mitral valve prolapse)    Dyslipidemia    Paresthesia of left leg    DDD (degenerative disc disease), lumbar    Multiple lung nodules on CT - probable hematoma LLL    Obesity (BMI 30-39.9)    Pharyngoesophageal dysphagia    MARCOS (obstructive sleep apnea)    GERD (gastroesophageal reflux disease)    Hernia of abdominal wall - left lateral    Asthma with COPD       Problem list has been reviewed.    Chief Complaint: Cough (rev meth and sleep test)      HPI    Accompanied by his wife. He reports that his cough has improved.  Home sleep study revealed AHI of 11.1 with oxygen saturation < 90% for more than 50% of the time. CPAP titration has been ordered.Pateint was unable to perform MCT however he reacted to the first dose of methacholine with wheezing, coughing indicating a clinical response. He remains on Protonix.  CT chest awaited.  He denies  hemoptysis,  pain with breathing, wheezing, asthma. He reports that he has ran out of his budesonide and is requesting a refill.    A full  review of systems, past , family  and social histories was performed except as mentioned in the note above, these are non contributory to the main issues discussed  today    COPD QUESTIONNAIRE  How often do you cough?: Most of the time  How often do you have phlegm (mucus) in your chest?: Most of the time  How often does your chest feel tight?: Some of the time  When you walk up a hill or one flight of stairs, how often are you breathless?: Most of the time  How often are you limited doing any activities at home?: Some of the time  How often are you confident leaving the house despite your lung condition?: All of the time  How often do you sleep soundly?: Almost never  How often do you have energy?: A little of the  time  Total score: 25     His ACT score is 10.    Previous Report Reviewed: lab reports, office notes and radiology reports     The following portions of the patient's history were reviewed and updated as appropriate: He  has a past medical history of Basal cell carcinoma; Calcaneal spur of left foot; Colon polyps; DDD (degenerative disc disease), lumbar; Diverticulosis of colon; Dyslipidemia; Dysmetabolic syndrome; Fracture of rib of left side; History of tobacco use; Internal and external hemorrhoids without complication; MVP (mitral valve prolapse); Obesity; Osteoarthritis of both thumbs (9/15/2015); and Pneumonia (08/2016).  He  has a past surgical history that includes Hemorrhoid surgery; Excision basal cell carcinoma; Anal fistulotomy; Tonsillectomy; and Sinus surgery (01/2017).  His family history includes Breast cancer in his mother; Coronary artery disease in his brother, other, sister, and sister; Heart attack in his brother, other, sister, and sister; Heart attacks under age 50 in his father; Hyperlipidemia in his brother; Vision loss in his other.  He  reports that he quit smoking about 39 years ago. His smoking use included Cigarettes. He has a 12.00 pack-year smoking history. He has never used smokeless tobacco. He reports that he drinks about 9.0 oz of alcohol per week  He reports that he does not use illicit drugs.  He has a current medication list which includes the following prescription(s): albuterol, albuterol-ipratropium 2.5mg-0.5mg/3ml, multivitamin, pantoprazole, tamsulosin, diltiazem, fluticasone-vilanterol, and montelukast.  He has No Known Allergies..    Review of Systems   Constitutional: Negative for fever, chills, fatigue and night sweats.   HENT: Negative for nosebleeds, postnasal drip, rhinorrhea, sore throat and congestion.    Eyes: Negative for itching.   Respiratory: Positive for apnea, snoring, cough and shortness of breath. Negative for hemoptysis, choking, chest tightness and  orthopnea.    Cardiovascular: Negative for chest pain, palpitations and leg swelling.   Genitourinary: Negative for difficulty urinating and hematuria.   Endocrine: Negative for cold intolerance and heat intolerance.    Musculoskeletal: Positive for back pain. Negative for arthralgias, gait problem and joint swelling.   Gastrointestinal: Negative for nausea, vomiting, abdominal pain and abdominal distention.   Neurological: Negative for dizziness, syncope, light-headedness and headaches.   Hematological: No excessive bruising.   Psychiatric/Behavioral: Negative for confusion. The patient is not nervous/anxious.    All other systems reviewed and are negative.       Objective:     BP (!) 142/80  Pulse 78  Resp 18  Ht 6' (1.829 m)  Wt 106.6 kg (235 lb 0.2 oz)  SpO2 (!) 94%  BMI 31.87 kg/m2  Body mass index is 31.87 kg/(m^2).     Physical Exam   Constitutional: He is oriented to person, place, and time. He appears well-developed and well-nourished.   HENT:   Head: Normocephalic and atraumatic.   Eyes: EOM are normal. Pupils are equal, round, and reactive to light.   Neck: Normal range of motion. Neck supple.   Cardiovascular: Normal rate and regular rhythm.    Pulmonary/Chest: Effort normal and breath sounds normal.   Abdominal: Soft. Bowel sounds are normal.   Musculoskeletal: Normal range of motion.   Neurological: He is alert and oriented to person, place, and time.   Skin: Skin is warm and dry.   Psychiatric: He has a normal mood and affect. His behavior is normal.   Nursing note and vitals reviewed.      Personal Diagnostic Review  Bronchial challenge with methacholine: False negative.       Assessment:     1. Asthma with COPD Active   2. MARCOS (obstructive sleep apnea) Active     Outpatient Encounter Prescriptions as of 4/1/2017   Medication Sig Dispense Refill    albuterol 90 mcg/actuation inhaler Inhale 2 puffs into the lungs every 6 (six) hours. 1 Inhaler 12    albuterol-ipratropium 2.5mg-0.5mg/3mL  (DUO-NEB) 0.5 mg-3 mg(2.5 mg base)/3 mL nebulizer solution Take 3 mLs by nebulization every 6 (six) hours. 120 vial 12    multivitamin (THERAGRAN) tablet Take 1 tablet by mouth once daily.      pantoprazole (PROTONIX) 40 MG tablet Take 1 tablet (40 mg total) by mouth once daily. 90 tablet 3    tamsulosin (FLOMAX) 0.4 mg Cp24 Take 1 capsule (0.4 mg total) by mouth every evening. 30 capsule 11    [DISCONTINUED] budesonide (PULMICORT) 0.5 mg/2 mL nebulizer solution Take 2 mLs (0.5 mg total) by nebulization 2 (two) times daily. 120 mL 11    diltiaZEM (CARDIZEM CD) 180 MG 24 hr capsule Take 1 capsule (180 mg total) by mouth once daily. 30 capsule 2    fluticasone-vilanterol (BREO ELLIPTA) 200-25 mcg/dose DsDv diskus inhaler Inhale 1 puff into the lungs once daily. Controller 60 each 11    montelukast (SINGULAIR) 10 mg tablet Take 1 tablet (10 mg total) by mouth every evening. 30 tablet 11    [] promethazine-dextromethorphan (PROMETHAZINE-DM) 6.25-15 mg/5 mL Syrp Take 5 mLs by mouth every 6 to 8 hours as needed. 473 mL 11    [DISCONTINUED] clonazePAM (KLONOPIN) 1 MG tablet Take 1 tablet (1 mg total) by mouth every evening. 30 tablet 2     No facility-administered encounter medications on file as of 2017.      No orders of the defined types were placed in this encounter.    Plan:     Discussed diagnosis, its evaluation, treatment and usual course. All questions answered.    MARCOS (obstructive sleep apnea)  CPAP titration awaited.    Asthma with COPD  Asthma ROS: taking medications as instructed, no medication side effects noted, no significant ongoing wheezing or shortness of breath, using bronchodilator MDI less than twice a week.   New concerns: Recurrent Cough.   Exam: appears well, vitals normal, no respiratory distress, acyanotic, normal RR, chest clear, no wheezing, crepitations, rhonchi, normal symmetric air entry.   Assessment:  Asthma poorly controlled.    Plan: START BREO. START SINGULAIR.  Orders as documented in EMR.     TIME SPENT WITH PATIENT: Time spent: 40 minutes in face to face  discussion concerning diagnosis, prognosis, review of lab and test results, benefits of treatment as well as management of disease, counseling of patient and coordination of care between various health  care providers . Greater than half the time spent was used for coordination of care and counseling of patient.         Return in about 6 weeks (around 5/13/2017) for MARCOS, Asthma with COPD.

## 2017-04-08 ENCOUNTER — HOSPITAL ENCOUNTER (OUTPATIENT)
Dept: SLEEP MEDICINE | Facility: HOSPITAL | Age: 72
Discharge: HOME | End: 2017-04-08
Attending: INTERNAL MEDICINE
Payer: MEDICARE

## 2017-04-08 DIAGNOSIS — G47.33 OSA (OBSTRUCTIVE SLEEP APNEA): ICD-10-CM

## 2017-04-08 PROCEDURE — 95811 POLYSOM 6/>YRS CPAP 4/> PARM: CPT

## 2017-04-08 PROCEDURE — 95811 POLYSOM 6/>YRS CPAP 4/> PARM: CPT | Mod: 26,,, | Performed by: PSYCHOLOGIST

## 2017-05-03 ENCOUNTER — TELEPHONE (OUTPATIENT)
Dept: PULMONOLOGY | Facility: CLINIC | Age: 72
End: 2017-05-03

## 2017-05-03 DIAGNOSIS — G47.33 OBSTRUCTIVE SLEEP APNEA: Primary | ICD-10-CM

## 2017-05-03 NOTE — TELEPHONE ENCOUNTER
Patient notified of sleep study results and need for full night bipap titration study. Patient verbalized understanding

## 2017-05-15 ENCOUNTER — OFFICE VISIT (OUTPATIENT)
Dept: OTOLARYNGOLOGY | Facility: CLINIC | Age: 72
End: 2017-05-15
Payer: MEDICARE

## 2017-05-15 VITALS
HEART RATE: 66 BPM | DIASTOLIC BLOOD PRESSURE: 86 MMHG | SYSTOLIC BLOOD PRESSURE: 146 MMHG | BODY MASS INDEX: 32.55 KG/M2 | TEMPERATURE: 97 F | WEIGHT: 240.31 LBS | HEIGHT: 72 IN | RESPIRATION RATE: 18 BRPM

## 2017-05-15 DIAGNOSIS — K13.0 CELLULITIS, LIP: Primary | ICD-10-CM

## 2017-05-15 DIAGNOSIS — J32.4 CHRONIC PANSINUSITIS: ICD-10-CM

## 2017-05-15 PROCEDURE — 99214 OFFICE O/P EST MOD 30 MIN: CPT | Mod: 25,S$GLB,, | Performed by: OTOLARYNGOLOGY

## 2017-05-15 PROCEDURE — 1159F MED LIST DOCD IN RCRD: CPT | Mod: S$GLB,,, | Performed by: OTOLARYNGOLOGY

## 2017-05-15 PROCEDURE — 31231 NASAL ENDOSCOPY DX: CPT | Mod: S$GLB,,, | Performed by: OTOLARYNGOLOGY

## 2017-05-15 PROCEDURE — 99999 PR PBB SHADOW E&M-EST. PATIENT-LVL III: CPT | Mod: PBBFAC,,, | Performed by: OTOLARYNGOLOGY

## 2017-05-15 PROCEDURE — 1126F AMNT PAIN NOTED NONE PRSNT: CPT | Mod: S$GLB,,, | Performed by: OTOLARYNGOLOGY

## 2017-05-15 PROCEDURE — 1160F RVW MEDS BY RX/DR IN RCRD: CPT | Mod: S$GLB,,, | Performed by: OTOLARYNGOLOGY

## 2017-05-15 RX ORDER — MONTELUKAST SODIUM 10 MG/1
10 TABLET ORAL NIGHTLY
COMMUNITY
End: 2018-04-09 | Stop reason: SDUPTHER

## 2017-05-15 RX ORDER — CLINDAMYCIN HYDROCHLORIDE 300 MG/1
300 CAPSULE ORAL 3 TIMES DAILY
Qty: 21 CAPSULE | Refills: 0 | Status: SHIPPED | OUTPATIENT
Start: 2017-05-15 | End: 2017-05-22

## 2017-05-15 NOTE — PROGRESS NOTES
Subjective:   Patient: Clay Marcos [id number], :1945   Visit date:5/15/2017 4:50 PM    Chief Complaint: Status post sinus surgery    HPI:  Clay is a 71 y.o. male with Chronic sinusitis.    Subjective:  He is no longer having any significant nasal drainage.  He has no facial pressure or pain.  He was seen  and had severe crusting and nasal drainage.  I placed him on irrigations of budesonide/tobramycin/vancomycin.  He reports that a about 1 week after starting this, he began having clear rinses and dramatic improvement in congestion.  He then returned in March.  Nasal endoscopy was only remarkable for lateralization of the left MT but no longer with secretions.   He was still having a severe cough.  He had PFT's performed and this suggested asthma.  He was placed on Breo.  Over the past month, the cough has resolved.  No facial pressure or pain.  No nasal drainage.  Still using fluticasone.  Over the past several hours, he has noted significant swelling in the upper lip.  He has moderate pain with this.  He's had no drainage or fevers.  Pain is a pressure-like sensation.  He denies any modifying factors.     Surgery: 2016    Sinuses operated/OR findings:   Technical Procedures Used:   - bilateral Frontal sinusotomy, with or without removal of tissue (CPT 99016)  - bilateral Total ethmoidectomy (CPT 40906)  - BILATERAL Maxillary antrostomy without removal of tissue (CPT 13617)  - bilateral Sphenoid sinusotomy without removal of tissue (CPT 63962)  - Sterotactic computer assisted (navigational) procedure; cranial, extradural (CPT 22313)  - Septoplasty (CPT 76966)          Endoscopic Sinonasal Exam Findings at TIME OF SURGERY:  Purulent secretions in all sinuses except the right maxillary. Significant narrowing of the frontal sinuses due to prominent frontal beak bilaterally.      Final path: Nonspecific inflammation    OR Culture:   Anaerobic Culture PROPIONIBACTERIUM SPECIES  Few        Review of  Systems:  -     Allergic/Immunologic: has No Known Allergies..  -     Constitutional: Current temp: 96.7 °F (35.9 °C) (Tympanic)    His meds, allergies, medical, surgical, social & family histories were reviewed & updated:  -     He has a current medication list which includes the following prescription(s): diltiazem, fluticasone-vilanterol, montelukast, multivitamin, pantoprazole, tamsulosin, albuterol, albuterol-ipratropium 2.5mg-0.5mg/3ml, and clindamycin.  -     He  has a past medical history of Basal cell carcinoma; Calcaneal spur of left foot; Colon polyps; DDD (degenerative disc disease), lumbar; Diverticulosis of colon; Dyslipidemia; Dysmetabolic syndrome; Fracture of rib of left side; History of tobacco use; Internal and external hemorrhoids without complication; MVP (mitral valve prolapse); Obesity; Osteoarthritis of both thumbs (9/15/2015); and Pneumonia (08/2016).   -     He  does not have any pertinent problems on file.   -     He  has a past surgical history that includes Hemorrhoid surgery; Excision basal cell carcinoma; Anal fistulotomy; Tonsillectomy; and Sinus surgery (01/2017).  -     He  reports that he quit smoking about 39 years ago. His smoking use included Cigarettes. He has a 12.00 pack-year smoking history. He has never used smokeless tobacco. He reports that he drinks about 9.0 oz of alcohol per week  He reports that he does not use illicit drugs.  -     His family history includes Breast cancer in his mother; Coronary artery disease in his brother, other, sister, and sister; Heart attack in his brother, other, sister, and sister; Heart attacks under age 50 in his father; Hyperlipidemia in his brother; Vision loss in his other.  -     He has No Known Allergies.    Objective:   Physical Exam:  Vitals:    BP (!) 146/86  Pulse 66  Temp 96.7 °F (35.9 °C) (Tympanic)   Resp 18  Ht 6' (1.829 m)  Wt 109 kg (240 lb 4.8 oz)  BMI 32.59 kg/m2  General appearance:  Well developed, well  nourished    Eyes:  Extraocular motions intact, PERRL    Communication:  no hoarseness, no dysphonia    Ears:  Otoscopy of external auditory canals and tympanic membranes was normal, clinical speech reception thresholds grossly intact, no mass/lesion of auricle.  Nose:  No masses/lesions of external nose, nasal mucosa, septum, and turbinates were within normal limits.  Mouth:  No mass/lesion of teeth, gums, hard/soft palate, tongue, tonsils, or oropharynx.  Centrally on the upper lip, there is erythema and some induration.  There is no fluctuance.    Cardiovascular:  No pedal edema; Radial Pulses +2     Neck & Lymphatics:  No cervical lymphadenopathy, no neck mass/crepitus/ asymmetry, trachea is midline, no thyroid enlargement/tenderness/mass.    Psych: Oriented x3,  Alert with normal mood and affect.     Respiration/Chest:  Symmetric expansion during respiration, normal respiratory effort.    Skin:  Warm and intact. No ulcerations of face, scalp, neck.      Assessment & Plan:   Chronic Rhinosinusitis s/p endoscopic sinus surgery.  Individualized endoscopic debridements following routine functional endoscopic sinus surgery (FESS) provide improved long-term outcomes and may prevent future, more extensive revision procedures. Although two to three debridements in the first 30 days is typical for the majority of patients, once a week may be an appropriate frequency for postoperative debridement  in select patients with difficult problems. However, the frequency and length of time for which debridement is medically necessary will vary from case to case and must be individualized, a conclusion, which multiple studies analyzing debridement outcomes have acknowledged.  While 2-3 debridements will likely suffice in the majority of cases, there are situations in which a patient may require more frequent, very long-term debridements. Clinically, these include, but are not limited to, persistent crusting within the surgical  bed, adhesion formation noted upon examination, more extensive surgery (eg, complex frontal sinusotomies, neoplasm  resections), underlying immunologic disorders, diffuse polyposis, revision FESS, mucociliary disorders, allergic fungal sinusitis, and postoperative complications (eg, visual loss, cerebrospinal fluid leak, nasal hemorrhage).     Clay presents today without significant crusting, discharge, synechia formation or narrowing of sinus ostia.  Therefore, the decision for endoscopic sinus debridement was not determined to be necessary.    Clay will return to clinic in 1 year or earlier if problems arise..    Changes to medical regimen: none.  Continue fluticasone.    Clinda for suspected cellulitis of upper lip.              NASAL ENDOSCOPY  Procedure: Risks, benefits, and alternatives of the procedure were discussed with the patient, and the patient consented to the nasal endoscopy with debridement.  The nasal cavity was sprayed with a topical decongestant and anesthetic . The endoscope was passed into each nostril and each nasal cavity was visualized.  On each side the nasal cavity, sinuses (if open), turbinates, and septum were examined with the findings described below.    At the end of the examination, the scope was removed. The patient tolerated the procedure well with no complications.     Endoscopic Sinonasal Exam Findings:  -     Sinuses examined: bilateral maxillary, bilateral ethmoid anterior/posterior and bilateral sphenoid            The right sinus(es) have normal mucosa            The left sinus(es) have normal mucosa  -     Nasal secretions: no significant secretions bilaterally  -     Nasal septum: no significant deviation and no perforation appreciated   -     Inferior turbinate: - normal mucosa without significant hypertrophy - bilaterally  -     Middle turbinate: normal bilaterally  -     Other findings: - no mass or obstructive lesion -    Assessment & Plan:  See today's clinic note.

## 2017-06-16 ENCOUNTER — HOSPITAL ENCOUNTER (OUTPATIENT)
Dept: SLEEP MEDICINE | Facility: HOSPITAL | Age: 72
Discharge: HOME | End: 2017-06-16
Attending: INTERNAL MEDICINE
Payer: MEDICARE

## 2017-06-16 DIAGNOSIS — G47.39 TREATMENT-EMERGENT CENTRAL SLEEP APNEA: ICD-10-CM

## 2017-06-16 DIAGNOSIS — G47.33 OBSTRUCTIVE SLEEP APNEA: Primary | ICD-10-CM

## 2017-06-16 PROCEDURE — 95811 POLYSOM 6/>YRS CPAP 4/> PARM: CPT | Mod: 26,,, | Performed by: PSYCHOLOGIST

## 2017-06-16 PROCEDURE — 95811 POLYSOM 6/>YRS CPAP 4/> PARM: CPT

## 2017-06-28 ENCOUNTER — TELEPHONE (OUTPATIENT)
Dept: PULMONOLOGY | Facility: CLINIC | Age: 72
End: 2017-06-28

## 2017-06-28 DIAGNOSIS — G47.33 OBSTRUCTIVE SLEEP APNEA: Primary | Chronic | ICD-10-CM

## 2017-06-28 NOTE — TELEPHONE ENCOUNTER
Sleep study reviewed: CPAP titration was inconclusive and has to be repeated: Please schedule and inform patient.

## 2017-07-20 ENCOUNTER — TELEPHONE (OUTPATIENT)
Dept: PULMONOLOGY | Facility: CLINIC | Age: 72
End: 2017-07-20

## 2017-07-20 NOTE — TELEPHONE ENCOUNTER
----- Message from Jacque Krishna sent at 7/20/2017  8:36 AM CDT -----  Contact: Pt  Pt is requesting to speak to the nurse regarding his appts for tomorrow. Pls call pt back at [phone].

## 2017-07-21 ENCOUNTER — HOSPITAL ENCOUNTER (OUTPATIENT)
Dept: RADIOLOGY | Facility: HOSPITAL | Age: 72
Discharge: HOME | End: 2017-07-21
Attending: INTERNAL MEDICINE
Payer: MEDICARE

## 2017-07-21 ENCOUNTER — OFFICE VISIT (OUTPATIENT)
Dept: PULMONOLOGY | Facility: CLINIC | Age: 72
End: 2017-07-21
Payer: MEDICARE

## 2017-07-21 VITALS
WEIGHT: 243.94 LBS | HEART RATE: 87 BPM | OXYGEN SATURATION: 97 % | DIASTOLIC BLOOD PRESSURE: 80 MMHG | BODY MASS INDEX: 32.33 KG/M2 | SYSTOLIC BLOOD PRESSURE: 132 MMHG | HEIGHT: 73 IN | RESPIRATION RATE: 18 BRPM

## 2017-07-21 DIAGNOSIS — G47.33 OSA (OBSTRUCTIVE SLEEP APNEA): Primary | Chronic | ICD-10-CM

## 2017-07-21 DIAGNOSIS — R91.8 MULTIPLE LUNG NODULES ON CT: Chronic | ICD-10-CM

## 2017-07-21 DIAGNOSIS — J44.89 ASTHMA WITH COPD: Chronic | ICD-10-CM

## 2017-07-21 PROCEDURE — 99999 PR PBB SHADOW E&M-EST. PATIENT-LVL III: CPT | Mod: PBBFAC,,, | Performed by: INTERNAL MEDICINE

## 2017-07-21 PROCEDURE — 99215 OFFICE O/P EST HI 40 MIN: CPT | Mod: S$GLB,,, | Performed by: INTERNAL MEDICINE

## 2017-07-21 PROCEDURE — 1159F MED LIST DOCD IN RCRD: CPT | Mod: S$GLB,,, | Performed by: INTERNAL MEDICINE

## 2017-07-21 RX ORDER — FLUTICASONE PROPIONATE 50 MCG
SPRAY, SUSPENSION (ML) NASAL
COMMUNITY
Start: 2017-05-16 | End: 2017-09-22

## 2017-07-21 RX ADMIN — IOHEXOL 75 ML: 350 INJECTION, SOLUTION INTRAVENOUS at 12:07

## 2017-07-21 NOTE — PROGRESS NOTES
Subjective:      Patient ID: Clay Marcos is a 71 y.o. male.    Patient Active Problem List   Diagnosis    Dysmetabolic syndrome    History of basal cell carcinoma    Tortuous aorta    MVP (mitral valve prolapse)    Dyslipidemia    Paresthesia of left leg    DDD (degenerative disc disease), lumbar    Multiple lung nodules on CT - probable hematoma LLL    Obesity (BMI 30-39.9)    Pharyngoesophageal dysphagia    MARCOS (obstructive sleep apnea)    GERD (gastroesophageal reflux disease)    Hernia of abdominal wall - left lateral    Asthma with COPD    Treatment-emergent central sleep apnea     Problem list has been reviewed.    Chief Complaint: Asthma with copd; Pulmonary Nodules; and Complex Sleep Apnea    HPI  He is accompanied by his spouse. CT chest and CPAP titration reviewed. Lung nodules are stable. Treatment emergent central sleep apnea noted. BIPAP titration at higher pressures or ASV titration will be scheduled. He states that he is fine and has no specific pulmonary complaints. He denies cough sputum, hemoptysis,  pain with breathing, wheezing, asthma. He reports a soft tissue bulge on his left anterolateral costal margin which is probably a hernia. I have recommended surgical evaluation. Probable relation with his ribe fracture was discussed. There is not significant corresponding associated abnormality reported  on his chest CT scan. He will contact surgery and schedule an appointment for evaluation.  His current respiratory therapy regimen is BREO, SINGULAIR, ALBUTEROL which provides relief. He is  adherent with his regimen. A full  review of systems, past , family  and social histories was performed except as mentioned in the note above, these are non contributory to the main issues discussed  today    Asthma Control Test  In the past 4  weeks, how much of the time did your asthma keep you from getting as much done at work, school or at home?: None of the time  During the past 4 weeks, how  often have you had shortness of breath?: Not at all  During the past 4 weeks, how often did your asthma symptoms (wheezing, couging, shortness of breath, chest tightness or pain) wake you up at night or earlier than usual in the morning?: Not at all  During the past 4 weeks, how often have you used your rescue inhaler or nebulizer medication (such as albuterol)?: Not at all  How would you rate your asthma control during the past 4 weeks?: Completely controlled  If your score is 19 or less, your asthma may not be under control: 25     COPD QUESTIONNAIRE  How often do you cough?: A little of the time  How often do you have phlegm (mucus) in your chest?: A little of the time  How often does your chest feel tight?: Never  When you walk up a hill or one flight of stairs, how often are you breathless?: Never  How often are you limited doing any activities at home?: Never  How often are you confident leaving the house despite your lung condition?: All of the time  How often do you sleep soundly?: All of the time  How often do you have energy?: Most of the time  Total score: 5     [place] Sleepiness Scale   EPWORTH SLEEPINESS SCALE 4/1/2017   Sitting and reading 0   Watching TV 3   Sitting, inactive in a public place (e.g. a theatre or a meeting) 1   As a passenger in a car for an hour without a break 2   Lying down to rest in the afternoon when circumstances permit 2   Sitting and talking to someone 0   Sitting quietly after a lunch without alcohol 0   In a car, while stopped for a few minutes in traffic 0   Total score 8     Previous Report Reviewed: lab reports, office notes and radiology reports     The following portions of the patient's history were reviewed and updated as appropriate: He  has a past medical history of Basal cell carcinoma; Calcaneal spur of left foot; Colon polyps; DDD (degenerative disc disease), lumbar; Diverticulosis of colon; Dyslipidemia; Dysmetabolic syndrome; Fracture of rib of left side;  History of tobacco use; Internal and external hemorrhoids without complication; MVP (mitral valve prolapse); Obesity; Osteoarthritis of both thumbs (9/15/2015); and Pneumonia (08/2016).  He  has a past surgical history that includes Hemorrhoid surgery; Excision basal cell carcinoma; Anal fistulotomy; Tonsillectomy; and Sinus surgery (01/2017).  His family history includes Breast cancer in his mother; Coronary artery disease in his brother, other, sister, and sister; Heart attack in his brother, other, sister, and sister; Heart attacks under age 50 in his father; Hyperlipidemia in his brother; Vision loss in his other.  He  reports that he quit smoking about 39 years ago. His smoking use included Cigarettes. He has a 12.00 pack-year smoking history. He has never used smokeless tobacco. He reports that he drinks about 9.0 oz of alcohol per week . He reports that he does not use drugs.  He has a current medication list which includes the following prescription(s): fluticasone, fluticasone-vilanterol, montelukast, multivitamin, pantoprazole, and tamsulosin.  He has No Known Allergies..    Review of Systems   Constitutional: Negative for fever, chills, fatigue and night sweats.   HENT: Negative for nosebleeds, postnasal drip, rhinorrhea, sore throat and congestion.    Eyes: Negative for itching.   Respiratory: Positive for apnea, snoring, cough and shortness of breath. Negative for hemoptysis, choking, chest tightness and orthopnea.    Cardiovascular: Negative for chest pain, palpitations and leg swelling.   Genitourinary: Negative for difficulty urinating and hematuria.   Endocrine: Negative for cold intolerance and heat intolerance.    Musculoskeletal: Positive for back pain. Negative for arthralgias, gait problem and joint swelling.   Gastrointestinal: Negative for nausea, vomiting, abdominal pain and abdominal distention.   Neurological: Negative for dizziness, syncope, light-headedness and headaches.  "  Hematological: No excessive bruising.   Psychiatric/Behavioral: Negative for confusion. The patient is not nervous/anxious.    All other systems reviewed and are negative.     Objective:   /80   Pulse 87   Resp 18   Ht 6' 1" (1.854 m)   Wt 110.6 kg (243 lb 15 oz)   SpO2 97%   BMI 32.18 kg/m²   Body mass index is 32.18 kg/m².    Physical Exam   Constitutional: He is oriented to person, place, and time. He appears well-developed and well-nourished.   HENT:   Head: Normocephalic and atraumatic.   Eyes: EOM are normal. Pupils are equal, round, and reactive to light.   Neck: Normal range of motion. Neck supple.   Cardiovascular: Normal rate and regular rhythm.    Pulmonary/Chest: Effort normal and breath sounds normal.   Abdominal: Soft. Bowel sounds are normal.   Musculoskeletal: Normal range of motion.   Neurological: He is alert and oriented to person, place, and time.   Skin: Skin is warm and dry.   Psychiatric: He has a normal mood and affect. His behavior is normal.   Nursing note and vitals reviewed.      Personal Diagnostic Review:    CPAP titration:  The BiPAP titration polysomnography revealed that BiPAP (Bi - Flex 3, humidity 2) of 14 cm IPAP / 10 cm EPAP, the  pressure most completely tested, was inadequate, as it increased the rate of respiratory events 34 % to A + H Index = 15.0 events / hr asleep in 2.5 hrs sleep (0.5 hrs REM sleep). Central sleep apneas persisted throughout both BiPAP pressures (12 cm / 8 cm and 14 cm / 10 cm), but higher BiPAP pressures should have been tested. Snoring was very nearly eliminated at BiPAP 14 cm / 10 cm. The overall A + H Index was 20.0 / hr asleep, with 13.5 respiratory event - related arousals / hr asleep (plus an additional 2.5 RERAs / hr asleep) for the titration trial. The mean SpO2 value was 94.6 % throughout the study, with a minimum oxygen saturation during sleep of 86.0 % (waking baseline SpO2 was 97 %). A large ResMed Mirage Quattro full - face CPAP " mask was used and was poorly tolerated, as sleep during BiPAP was markedly reduced and greatly impaired. Please see PAP trial outcomes table, below.      CT Chest With Contrast :    Heart and Great vessels:Within normal limits.    Thoracic Adenopathy:Scattered shotty mediastinal and hilar lymph nodes appear unchanged slightly decreased in size from prior.    Lungs: Previously described smoothly marginated lobular masslike lesion on the surface of the diaphragm in the left lower lobe appears similar in size as seen on the axial images measuring 3.4 x 2.2 cm (previously 3.5 x 2.2 cm).  Scarring is noted at the left lung base and findings can reflect rounded atelectasis or small amount residual loculated effusion or. No significant pleural effusions Mild scarring again noted in the bilateral apices. Stable nodule is seen within the right lower lobe just above the diaphragm on image 54. No new pulmonary nodules identified.  No parenchymal consolidations.    Upper Abdomen: No acute findings.    Bones: Left-sided rib fracture deformities are unchanged.    Assessment:     1. MARCOS (obstructive sleep apnea) Stable   2. Asthma with COPD Stable   3. Multiple lung nodules on CT - probable hematoma LLL Stable     Outpatient Encounter Prescriptions as of 7/21/2017   Medication Sig Dispense Refill    fluticasone (FLONASE) 50 mcg/actuation nasal spray       fluticasone-vilanterol (BREO ELLIPTA) 200-25 mcg/dose DsDv diskus inhaler Inhale 1 puff into the lungs once daily. Controller 60 each 11    montelukast (SINGULAIR) 10 mg tablet Take 10 mg by mouth every evening.      multivitamin (THERAGRAN) tablet Take 1 tablet by mouth once daily.      pantoprazole (PROTONIX) 40 MG tablet Take 1 tablet (40 mg total) by mouth once daily. 90 tablet 3    tamsulosin (FLOMAX) 0.4 mg Cp24 Take 1 capsule (0.4 mg total) by mouth every evening. 30 capsule 11    [DISCONTINUED] albuterol 90 mcg/actuation inhaler Inhale 2 puffs into the lungs every  6 (six) hours. 1 Inhaler 12    [DISCONTINUED] albuterol-ipratropium 2.5mg-0.5mg/3mL (DUO-NEB) 0.5 mg-3 mg(2.5 mg base)/3 mL nebulizer solution Take 3 mLs by nebulization every 6 (six) hours. 120 vial 12    [DISCONTINUED] diltiaZEM (CARDIZEM CD) 180 MG 24 hr capsule Take 1 capsule (180 mg total) by mouth once daily. 30 capsule 2    [] omnipaque 350 iohexol 75 mL        No facility-administered encounter medications on file as of 2017.      Orders Placed This Encounter   Procedures    CPAP titration (Must have diagnosis of MARCOS from previous sleep study.)     Standing Status:   Future     Standing Expiration Date:   2018     Scheduling Instructions:      Repeat BiPAP (higher pressures) and / or Auto - servoventilation titration PSG is recommended     Plan:     Discussed diagnosis, its evaluation, treatment and usual course. All questions answered.    Asthma with COPD  Asthma ROS: taking medications as instructed, no medication side effects noted, no significant ongoing wheezing or shortness of breath, using bronchodilator MDI less than twice a week.   New concerns: NONE  Exam: appears well, vitals normal, no respiratory distress, acyanotic, normal RR, chest clear, no wheezing, crepitations, rhonchi, normal symmetric air entry.   Assessment:  Asthma poorly controlled.    Plan: CONTIUE BREO. CONTUNUE SINGULAIR.     Multiple lung nodules on CT - probable hematoma LLL  Stable. Repeat CT chest in 12 months.     MARCOS (obstructive sleep apnea)  Repeat BiPAP (higher pressures) and / or Auto - servoventilation titration PSG is recommended      TIME SPENT WITH PATIENT: Time spent: 40 minutes in face to face  discussion concerning diagnosis, prognosis, review of lab and test results, benefits of treatment as well as management of disease, counseling of patient and coordination of care between various health  care providers . Greater than half the time spent was used for coordination of care and counseling of  patient.        Return in about 6 weeks (around 9/1/2017) for MARCOS, Asthma with COPD, Multiple lung nodules.

## 2017-07-21 NOTE — ASSESSMENT & PLAN NOTE
Asthma ROS: taking medications as instructed, no medication side effects noted, no significant ongoing wheezing or shortness of breath, using bronchodilator MDI less than twice a week.   New concerns: NONE  Exam: appears well, vitals normal, no respiratory distress, acyanotic, normal RR, chest clear, no wheezing, crepitations, rhonchi, normal symmetric air entry.   Assessment:  Asthma poorly controlled.    Plan: CONTIUE BREO. CONTUNUE SINGULAIR.

## 2017-07-24 ENCOUNTER — PATIENT MESSAGE (OUTPATIENT)
Dept: FAMILY MEDICINE | Facility: CLINIC | Age: 72
End: 2017-07-24

## 2017-07-24 DIAGNOSIS — K46.9 HERNIA: Primary | ICD-10-CM

## 2017-07-24 NOTE — PATIENT INSTRUCTIONS
Continuous Positive Air Pressure (CPAP)  Continuous positive air pressure (CPAP) uses gentle air pressure to hold the airway open. CPAP is often the most effective treatment for sleep apnea and severe snoring. It works very well for many people. But keep in mind that it can take several adjustments before the setup is right for you.    How CPAP works  The CPAPmachine  is a small portable pump beside the bed. The pump sends air through a hose, which is held over your nose and mouth by a mask. Mild air pressure is gently pushed through your airway. The air pressure nudges sagging tissues aside. This widens the airway so you can breathe better. CPAP may be combined with other kinds of therapy for sleep apnea.     A mask over the nose gently directs air into the throat to keep the airway open.   Types of air pressure treatments  There are different types of CPAP. Your doctor or CPAP technician will help you decide which type is best for you:  · Basic CPAP keeps the pressure constant all night long.  · A bilevel device (BiPAP) provides more pressure when you breathe in and less when you breathe out. A BiPAP machine also may be set to provide automatic breaths to maintain breathing if you stop breathing while sleeping.  · An autoCPAP device automatically adjusts pressure throughout the night and in response to changes such as body position, sleep stage, and snoring.  Date Last Reviewed: 8/10/2015  © 9686-7254 [employer]. [address] [place] [address], [place] [address]. All rights reserved. This information is not intended as a substitute for professional medical care. Always follow your healthcare professional's instructions.

## 2017-07-24 NOTE — TELEPHONE ENCOUNTER
He wants an appointment on summa with Dr. Ac.  He might change his mind if the sooner appointment than [place].  Consult ordered.

## 2017-08-02 ENCOUNTER — OFFICE VISIT (OUTPATIENT)
Dept: SURGERY | Facility: CLINIC | Age: 72
End: 2017-08-02
Payer: MEDICARE

## 2017-08-02 VITALS
WEIGHT: 244.25 LBS | SYSTOLIC BLOOD PRESSURE: 134 MMHG | BODY MASS INDEX: 32.23 KG/M2 | HEART RATE: 61 BPM | DIASTOLIC BLOOD PRESSURE: 83 MMHG | TEMPERATURE: 98 F

## 2017-08-02 DIAGNOSIS — R22.2 MASS OF LEFT CHEST WALL: Primary | ICD-10-CM

## 2017-08-02 PROCEDURE — 1126F AMNT PAIN NOTED NONE PRSNT: CPT | Mod: S$GLB,,, | Performed by: SURGERY

## 2017-08-02 PROCEDURE — 99999 PR PBB SHADOW E&M-EST. PATIENT-LVL III: CPT | Mod: PBBFAC,,, | Performed by: SURGERY

## 2017-08-02 PROCEDURE — 1159F MED LIST DOCD IN RCRD: CPT | Mod: S$GLB,,, | Performed by: SURGERY

## 2017-08-02 PROCEDURE — 99203 OFFICE O/P NEW LOW 30 MIN: CPT | Mod: S$GLB,,, | Performed by: SURGERY

## 2017-08-02 NOTE — PROGRESS NOTES
Patient ID: Clay Marcos is a 71 y.o. male.    Chief Complaint: Consult      HPI:  The patient was seen in consultation for for a left flank protrusion at the lower chest and upper abdominal area.  He is noticed this for about 6 months and the wife feels its increasing in size.  Patient denies really any pain and he is able to perform exercises and golfs without any limitations.  The patient notes that about 1 year ago he actually developed a pneumonia which required hospitalization for several days.  Before presenting to the hospital with pneumonia he had severe coughing and remembers one episode where he had a particularly severe coughing bout which caused severe pain in his left chest.  Soon after that he had significant bruising and swelling on the left lateral chest wall.  He is being followed by pulmonary since then for was felt to be asthma and COPD.  He only smoked for about 12 years and quit about 40 years ago.  He does have obstructive sleep apnea and is getting further evaluation for that.  Currently he denies any real respiratory complaints.  Denies any GI complaints.  He had a CT of the chest recently which showed what appeared to be an area in the left lower chest and mild old effusion that appears to have resolved.  The protrusion could not be seen well as it was not included in the films.  The patient denies any blunt trauma to this area and denies any abdominal surgery in this area.        Review of Systems   Constitutional: Negative for activity change and unexpected weight change.   HENT: Negative for hearing loss, rhinorrhea and trouble swallowing.    Eyes: Negative for discharge and visual disturbance.   Respiratory: Negative for chest tightness and wheezing.         See history of present illness   Cardiovascular: Negative for chest pain and palpitations.   Gastrointestinal: Negative for blood in stool, constipation, diarrhea and vomiting.   Endocrine: Negative for polydipsia and polyuria.    Genitourinary: Negative for difficulty urinating, hematuria and urgency.   Musculoskeletal: Positive for arthralgias. Negative for joint swelling and neck pain.   Neurological: Negative for weakness and headaches.   Psychiatric/Behavioral: Negative for confusion and dysphoric mood.       Current Outpatient Prescriptions   Medication Sig Dispense Refill    fluticasone (FLONASE) 50 mcg/actuation nasal spray       fluticasone-vilanterol (BREO ELLIPTA) 200-25 mcg/dose DsDv diskus inhaler Inhale 1 puff into the lungs once daily. Controller 60 each 11    montelukast (SINGULAIR) 10 mg tablet Take 10 mg by mouth every evening.      multivitamin (THERAGRAN) tablet Take 1 tablet by mouth once daily.      pantoprazole (PROTONIX) 40 MG tablet Take 1 tablet (40 mg total) by mouth once daily. 90 tablet 3    tamsulosin (FLOMAX) 0.4 mg Cp24 Take 1 capsule (0.4 mg total) by mouth every evening. 30 capsule 11     No current facility-administered medications for this visit.        Review of patient's allergies indicates:  No Known Allergies    Past Medical History:   Diagnosis Date    Basal cell carcinoma     Scalp    Calcaneal spur of left foot     Colon polyps     DDD (degenerative disc disease), lumbar     Diverticulosis of colon     Dyslipidemia     Dysmetabolic syndrome     Fracture of rib of left side     History of tobacco use     Internal and external hemorrhoids without complication     MVP (mitral valve prolapse)     Obesity     Osteoarthritis of both thumbs 9/15/2015    Pneumonia 08/2016       Past Surgical History:   Procedure Laterality Date    ANAL FISTULOTOMY      BASAL CELL CARCINOMA EXCISION      Scalp    HEMORRHOID SURGERY      SINUS SURGERY  01/2017    TONSILLECTOMY         Family History   Problem Relation Age of Onset    Heart attacks under age 50 Father      Age 45    Breast cancer Mother     Hyperlipidemia Brother     Coronary artery disease Brother      Status post CABG, age 55     Heart attack Brother     Coronary artery disease Sister      Status post CABG    Heart attack Sister     Coronary artery disease Sister      Status post stent    Heart attack Sister     Heart attack Other      Nephew, age 51    Vision loss Other     Coronary artery disease Other        Social History     Social History    Marital status:      Spouse name: N/A    Number of children: N/A    Years of education: N/A     Occupational History    Not on file.     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       Vitals:    08/02/17 1107   BP: 134/83   Pulse: 61   Temp: 97.9 °F (36.6 °C)       Physical Exam   Constitutional: He is oriented to person, place, and time. He appears well-developed and well-nourished.   HENT:   Head: Atraumatic.   Eyes: EOM are normal.   Neck: Neck supple.   Cardiovascular: Normal rate, regular rhythm and normal heart sounds.    Pulmonary/Chest: Effort normal and breath sounds normal. No respiratory distress.   Abdominal: Soft. Bowel sounds are normal. He exhibits no distension. There is no tenderness.   Examination of the lower lateral left chest and upper lateral abdomen/flank reveals an obvious protrusion.  This protrusion does increase with coughing and straining.  It covers an area about 15 x 13 cm.  It appears to be a thinned out area on the patient's abdominal wall laterally.  Its there appears to be some muscle fascia but it's very thinned out and difficult to tell whether is a true hernia there.  Also there appears to be a significant gap posterolaterally on  his left chest between about the ninth rib and 10th 11th and 12th rib.  The fascia between the ninth and 10th rib is thinned out significantly and there is a large gap between those 2 ribs.  When he coughs there is mild protrusion in that area and this protrusion extends down to join the lateral abdominal wall protrusion.   Lymphadenopathy:     He has no cervical adenopathy.   Neurological: He is alert and oriented to person, place, and time.   Psychiatric: He has a normal mood and affect. His behavior is normal.    radiology: Did review his most recent CT scan of the chest.  These small abnormalities in the pleura appeared to be resolving.  The protrusion at the upper abdominal area could not be completely included in the CAT scan.  However a large gap between the ninth and 10th rib posterior laterally can be seen.    Assessment & Plan:    impression: Left lateral upper abdominal wall protrusion which appears to be a significant thinning out of the upper abdominal wall musculature.  There is also contiguous with this a large palpable gap between what appears to be the ninth and 10th rib.  The strong firm fascia ordinarily seen between the ribs is now thinned out with development of a large gap between the ninth and 10th rib.  I think this had to have occurred with the severe episode of coughing 1 year ago with severe bruising in the left chest wall.  I think he probably tore a muscle fascia between the ribs which led to a thinning of the musculature there.  Probably when he tore this this area he sustained summa denervation of this musculature which has led to the thinning out of the muscle and the denervated area between the ninth and 10th ribs causing attenuation and protrusion.  Recommendations.  I told the patient about to get a better imaging of the upper left abdominal area just to make sure that there is not a complete hernia there.  I told patient and wife as he has not had any symptoms from this wound  possibility is to observe the area and inferiorly, symptomatic over the area which are enlarged to consider repair of this area.  There is a definite hernia present by imaging then would consider repair of the area.  However I told him I would be a fairly uninvolved operation as mesh would probably have to be placed between the  ribs and along the lateral abdominal wall.  He will see me back after the CT scan.

## 2017-08-02 NOTE — LETTER
August 2, 2017      Tori Marino MD  [address]  [place] [address]           [facility] General Surgery  [address]  [place] [address]  Phone: [phone]  Fax: [phone]          Patient: Clay Marcos   MR Number: [id number]   YOB: 1945   Date of Visit: 8/2/2017       Dear Dr. Tori Marino:    Thank you for referring Clay Marcos to me for evaluation. Attached you will find relevant portions of my assessment and plan of care.    If you have questions, please do not hesitate to call me. I look forward to following Clay Marcos along with you.    Sincerely,    Everette Ac MD    Enclosure  CC:  No Recipients    If you would like to receive this communication electronically, please contact [email or web address] or [phone] to request more information on [employer] Link access.    For providers and/or their staff who would like to refer a patient to [facility], please contact us through our one-stop-shop provider referral line, [facility] , at [phone].    If you feel you have received this communication in error or would no longer like to receive these types of communications, please e-mail [email or web address]

## 2017-08-03 ENCOUNTER — HOSPITAL ENCOUNTER (OUTPATIENT)
Dept: SLEEP MEDICINE | Facility: HOSPITAL | Age: 72
Discharge: HOME | End: 2017-08-03
Attending: INTERNAL MEDICINE
Payer: MEDICARE

## 2017-08-03 DIAGNOSIS — G47.33 OSA (OBSTRUCTIVE SLEEP APNEA): ICD-10-CM

## 2017-08-03 DIAGNOSIS — G47.39 TREATMENT-EMERGENT CENTRAL SLEEP APNEA: ICD-10-CM

## 2017-08-03 PROCEDURE — 95811 POLYSOM 6/>YRS CPAP 4/> PARM: CPT | Mod: 26,,, | Performed by: INTERNAL MEDICINE

## 2017-08-03 NOTE — Clinical Note
"STUDY PARAMETERS: The study was performed with a sleep technologist in attendance for the entire test period.  Video monitoring was carried out throughout the study, and the following clinical parameters were recorded:  SUMMARY STATEMENTS: 1. Findings related to sleep diagnoses: " Titration for BiPAP and ASV. " The best BiPAP settings were 14/10 with 3 central emergent apneas.  AHI reduced to 15.0 events per hour..  Treatment emergent central apneas were present on bi-level 8/4, 12/8. " With ASV settings AHI was reduced to 0.0/h.  There were 25 respiratory effort related arousals.  Mean oxygen saturation 92.1%.  ASV settings 20 cm max pressure, 12/8 max/min EPAP, age over 3 max/min pressure support, rate set to auto. " Total of 27 central apneas were recorded during the study abolished with ASV settings. " The overall AHI was 17.1 (88 events). 2. EEG abnormalities: " Sleep efficiency was poor 65.1%.  Similar to prior studies. " Short sleep latency normal REM latency.  Sleep architecture was normal."

## 2017-08-04 PROCEDURE — 95811 POLYSOM 6/>YRS CPAP 4/> PARM: CPT

## 2017-08-07 NOTE — PROCEDURES
"STUDY PARAMETERS: The study was performed with a sleep technologist in attendance for the entire test period.  Video monitoring was carried out throughout the study, and the following clinical parameters were recorded:    SUMMARY STATEMENTS:  1. Findings related to sleep diagnoses:   Titration for BiPAP and ASV.   The best BiPAP settings were 14/10 with 3 central emergent apneas.  AHI reduced to 15.0 events per hour..  Treatment emergent central apneas were present on bi-level 8/4, 12/8.   With ASV settings AHI was reduced to 0.0/h.  There were 25 respiratory effort related arousals.  Mean oxygen saturation 92.1%.  ASV settings 20 cm max pressure, 12/8 max/min EPAP, age over 3 max/min pressure support, rate set to auto.   Total of 27 central apneas were recorded during the study abolished with ASV settings.   The overall AHI was 17.1 (88 events).  2. EEG abnormalities:   Sleep efficiency was poor 65.1%.  Similar to prior studies.   Short sleep latency normal REM latency.  Sleep architecture was normal.   Arousal remained high 50.4 events per hour.  3. ECG abnormalities:   Sinus bradycardia and occasional PVCs.    INTERPRETATION:     1. Patient tolerated titration relatively well.  2. Settings of bi-level 14/10 were well-tolerated however best AHI reversal was with   ASV settings: Max pressure 20, EPAP Max 12/ EPAP min 8, PS max: 8, PS min 3 and rate set to auto.  3. Mild periodic limb movements  4.   RECOMMENDATIONS:     1. Please complete 2-D echocardiogram to ensure normal EF before commencing on ASV settings.  2. Commenced therapy with the above stated ASV settings.          See imported Sleep Study result in "Chart Review" under the   "Media tab".      (This Sleep Study was interpreted by a Board Certified Sleep   Specialist who conducted an epoch-by-epoch review of the entire   raw data recording.)     (The indication for this sleep study was reviewed and deemed   appropriate by [facility] " Parameters or other reasons by a   Board Certified Sleep Specialist.)    Maurice Ramey MD

## 2017-08-08 ENCOUNTER — TELEPHONE (OUTPATIENT)
Dept: RADIOLOGY | Facility: HOSPITAL | Age: 72
End: 2017-08-08

## 2017-08-09 ENCOUNTER — HOSPITAL ENCOUNTER (OUTPATIENT)
Dept: RADIOLOGY | Facility: HOSPITAL | Age: 72
Discharge: HOME | End: 2017-08-09
Attending: SURGERY
Payer: MEDICARE

## 2017-08-09 ENCOUNTER — OFFICE VISIT (OUTPATIENT)
Dept: SURGERY | Facility: CLINIC | Age: 72
End: 2017-08-09
Payer: MEDICARE

## 2017-08-09 VITALS
WEIGHT: 246.69 LBS | HEART RATE: 60 BPM | BODY MASS INDEX: 32.55 KG/M2 | SYSTOLIC BLOOD PRESSURE: 130 MMHG | TEMPERATURE: 99 F | DIASTOLIC BLOOD PRESSURE: 80 MMHG

## 2017-08-09 DIAGNOSIS — R22.2 MASS OF LEFT CHEST WALL: ICD-10-CM

## 2017-08-09 DIAGNOSIS — R22.2 MASS OF LEFT CHEST WALL: Primary | ICD-10-CM

## 2017-08-09 PROCEDURE — 71260 CT THORAX DX C+: CPT | Mod: 26,,, | Performed by: RADIOLOGY

## 2017-08-09 PROCEDURE — 99999 PR PBB SHADOW E&M-EST. PATIENT-LVL III: CPT | Mod: PBBFAC,,, | Performed by: SURGERY

## 2017-08-09 PROCEDURE — 99211 OFF/OP EST MAY X REQ PHY/QHP: CPT | Mod: S$GLB,,, | Performed by: SURGERY

## 2017-08-09 PROCEDURE — 74178 CT ABD&PLV WO CNTR FLWD CNTR: CPT | Mod: 26,,, | Performed by: RADIOLOGY

## 2017-08-09 RX ADMIN — IOHEXOL 30 ML: 350 INJECTION, SOLUTION INTRAVENOUS at 08:08

## 2017-08-09 RX ADMIN — IOHEXOL 100 ML: 350 INJECTION, SOLUTION INTRAVENOUS at 09:08

## 2017-08-10 NOTE — PROGRESS NOTES
She returns in follow-up please see my note from last week.  Bases follow for the CAT scan of the abdomen and pelvis to better evaluate the protrusion from his left lateral flank at the rib margin.  Again he denies any change and it over the last week and denies any pain.  He can do all his regular activities without discomfort.  Objective:'s lateral protrusion which extends into his upper abdomen and actually goes posteriorly between the ninth and 10th rib is about the same.  The area weakness measures about 12 x 15 cm.  On clinical exam the protrusion appears to be extreme attenuation of the lateral muscle wall.  I cannot feel a distinct hernia at the lateral muscle wall was very thinned out.  This correlates to the CT findings.  I do not have a report from radiology yet however on my evaluation of the CT findings they are: Large separation between the ninth and 10th ribs which indicated a break in the costal cartilage posterolaterally.  There is a large gap between the ninth and 10th ribs posteriorly and the 10th 11th and 12th ribs are displaced inferiorly.  Between the ninth and 10th rib and extending inferolaterally there is attenuation of the muscle fascia.  It appears that this most likely the external oblique fascia is still intact with the deeper muscle is been .  Impression: Attenuation of the fascia, where only the external oblique fascia is intact causing a weakness and protrusion of the abdominal and chest wall posterolaterally between the ninth and 10th ribs.  Again the area covers a 15 x 12 cm area of fascial weakness.  Recommendations: At this point the patient does not have any symptoms from it.  I told him if he needs to do strenuous exercises, he make consider wearing a lumbar support belt.  He is not real anxious for surgery as he realizes that surgery would involve placement of a large piece of mesh.  I told the patient I would be glad to recheck him in about 2 months and if this were  to enlarge then consider surgery at that time or if it enlarges significantly for a recheck him.

## 2017-08-15 ENCOUNTER — OFFICE VISIT (OUTPATIENT)
Dept: PULMONOLOGY | Facility: CLINIC | Age: 72
End: 2017-08-15
Payer: MEDICARE

## 2017-08-15 VITALS
RESPIRATION RATE: 18 BRPM | SYSTOLIC BLOOD PRESSURE: 120 MMHG | BODY MASS INDEX: 32.7 KG/M2 | OXYGEN SATURATION: 96 % | WEIGHT: 246.69 LBS | HEIGHT: 73 IN | HEART RATE: 60 BPM | DIASTOLIC BLOOD PRESSURE: 80 MMHG

## 2017-08-15 DIAGNOSIS — J44.89 ASTHMA WITH COPD: Primary | Chronic | ICD-10-CM

## 2017-08-15 DIAGNOSIS — R91.8 MULTIPLE LUNG NODULES ON CT: ICD-10-CM

## 2017-08-15 DIAGNOSIS — G47.39 TREATMENT-EMERGENT CENTRAL SLEEP APNEA: ICD-10-CM

## 2017-08-15 DIAGNOSIS — G47.33 OSA (OBSTRUCTIVE SLEEP APNEA): ICD-10-CM

## 2017-08-15 PROCEDURE — 3008F BODY MASS INDEX DOCD: CPT | Mod: S$GLB,,, | Performed by: INTERNAL MEDICINE

## 2017-08-15 PROCEDURE — 99499 UNLISTED E&M SERVICE: CPT | Mod: S$GLB,,, | Performed by: INTERNAL MEDICINE

## 2017-08-15 PROCEDURE — 99215 OFFICE O/P EST HI 40 MIN: CPT | Mod: S$GLB,,, | Performed by: INTERNAL MEDICINE

## 2017-08-15 PROCEDURE — 99999 PR PBB SHADOW E&M-EST. PATIENT-LVL III: CPT | Mod: PBBFAC,,, | Performed by: INTERNAL MEDICINE

## 2017-08-15 PROCEDURE — 1159F MED LIST DOCD IN RCRD: CPT | Mod: S$GLB,,, | Performed by: INTERNAL MEDICINE

## 2017-08-15 NOTE — PATIENT INSTRUCTIONS
Continuous Positive Air Pressure (CPAP)  Continuous positive air pressure (CPAP) uses gentle air pressure to hold the airway open. CPAP is often the most effective treatment for sleep apnea and severe snoring. It works very well for many people. But keep in mind that it can take several adjustments before the setup is right for you.    How CPAP works  The CPAPmachine  is a small portable pump beside the bed. The pump sends air through a hose, which is held over your nose and mouth by a mask. Mild air pressure is gently pushed through your airway. The air pressure nudges sagging tissues aside. This widens the airway so you can breathe better. CPAP may be combined with other kinds of therapy for sleep apnea.     A mask over the nose gently directs air into the throat to keep the airway open.   Types of air pressure treatments  There are different types of CPAP. Your doctor or CPAP technician will help you decide which type is best for you:  · Basic CPAP keeps the pressure constant all night long.  · A bilevel device (BiPAP) provides more pressure when you breathe in and less when you breathe out. A BiPAP machine also may be set to provide automatic breaths to maintain breathing if you stop breathing while sleeping.  · An autoCPAP device automatically adjusts pressure throughout the night and in response to changes such as body position, sleep stage, and snoring.  Date Last Reviewed: 8/10/2015  © 4013-1263 [employer]. [address] [place] [address], [place] [address]. All rights reserved. This information is not intended as a substitute for professional medical care. Always follow your healthcare professional's instructions.

## 2017-08-15 NOTE — ASSESSMENT & PLAN NOTE
Start ASV . (DME - [facility])     ASV settings AHI was reduced to 0.0/h. There were 25 respiratory effort related arousals. Mean oxygen saturation 92.1%. ASV settings 20 cm max pressure, 12/8 max/min EPAP, age over 3 max/min pressure support, rate set to auto.        Discussed therapeutic goals for positive airway pressure therapy: Ideal is usage 100% of nights for 6 - 8 hours per night. Minimum usage is 70% of night for at least 4 hours per night used. Pateint expressed understanding. All Questions answered.

## 2017-08-15 NOTE — PROGRESS NOTES
Subjective:      Patient ID: Clay Marcos is a 71 y.o. male.    Patient Active Problem List   Diagnosis    Dysmetabolic syndrome    History of basal cell carcinoma    Tortuous aorta    MVP (mitral valve prolapse)    Dyslipidemia    Paresthesia of left leg    DDD (degenerative disc disease), lumbar    Multiple lung nodules on CT - probable hematoma LLL    Obesity (BMI 30-39.9)    Pharyngoesophageal dysphagia    MARCOS (obstructive sleep apnea)    GERD (gastroesophageal reflux disease)    Hernia of abdominal wall - left lateral    Asthma with COPD    Treatment-emergent central sleep apnea     Problem list has been reviewed.    Chief Complaint: Sleep Apnea and Asthma with COPD    HPI  He is accompanied by his spouse. ASV was sufficient to abolish his treatment related central apneas. He states that he is fine and has no specific pulmonary complaints. He denies cough sputum, hemoptysis,  pain with breathing, wheezing, asthma. He reports a soft tissue bulge on his left anterolateral costal margin which is probably a hernia. I have recommended surgical evaluation. Probable relation with his ribe fracture was discussed. There is not significant corresponding associated abnormality reported  on his chest CT scan. He will contact surgery and schedule an appointment for evaluation.  His current respiratory therapy regimen is BREO, SINGULAIR, ALBUTEROL which provides relief. He seldom uses albuterol. He is  adherent with his regimen. A full  review of systems, past , family  and social histories was performed except as mentioned in the note above, these are non contributory to the main issues discussed  today    Answers for HPI/ROS submitted by the patient on 8/8/2017   Asthma  In the past 4 weeks, how much of the time did your asthma keep you from getting as much done at work, school, or at home?: none of the time  During the past 4 weeks, how often have you had shortness of breath?: not at all  During the past 4  weeks, how often did your asthma symptoms (Wheezing, coughing, shortness of breath, chest tightness or pain) wake you up at night or earlier that usual in the morning?: not at all  During the past 4 weeks, how often have you used your rescue inhaler or nebulizer medication (such as albuterol)?: not at all  How would you rate your asthma control during the past 4 weeks?: completely controlled   : 25       COPD QUESTIONNAIRE  How often do you cough?: A little of the time  How often do you have phlegm (mucus) in your chest?: A little of the time  How often does your chest feel tight?: Never  When you walk up a hill or one flight of stairs, how often are you breathless?: Never  How often are you limited doing any activities at home?: Never  How often are you confident leaving the house despite your lung condition?: All of the time  How often do you sleep soundly?: All of the time  How often do you have energy?: Most of the time  Total score: 5     [place] Sleepiness Scale   EPWORTH SLEEPINESS SCALE 8/15/2017 4/1/2017   Sitting and reading 1 0   Watching TV 1 3   Sitting, inactive in a public place (e.g. a theatre or a meeting) 0 1   As a passenger in a car for an hour without a break 0 2   Lying down to rest in the afternoon when circumstances permit 0 2   Sitting and talking to someone 0 0   Sitting quietly after a lunch without alcohol 0 0   In a car, while stopped for a few minutes in traffic 0 0   Total score 2 8     Previous Report Reviewed: lab reports, office notes and radiology reports     The following portions of the patient's history were reviewed and updated as appropriate: He  has a past medical history of Basal cell carcinoma; Calcaneal spur of left foot; Colon polyps; DDD (degenerative disc disease), lumbar; Diverticulosis of colon; Dyslipidemia; Dysmetabolic syndrome; Fracture of rib of left side; History of tobacco use; Internal and external hemorrhoids without complication; MVP (mitral valve prolapse);  Obesity; Osteoarthritis of both thumbs (9/15/2015); and Pneumonia (08/2016).  He  has a past surgical history that includes Hemorrhoid surgery; Excision basal cell carcinoma; Anal fistulotomy; Tonsillectomy; and Sinus surgery (01/2017).  His family history includes Breast cancer in his mother; Coronary artery disease in his brother, other, sister, and sister; Heart attack in his brother, other, sister, and sister; Heart attacks under age 50 in his father; Hyperlipidemia in his brother; Vision loss in his other.  He  reports that he quit smoking about 39 years ago. His smoking use included Cigarettes. He has a 12.00 pack-year smoking history. He has never used smokeless tobacco. He reports that he drinks about 9.0 oz of alcohol per week . He reports that he does not use drugs.  He has a current medication list which includes the following prescription(s): fluticasone, fluticasone-vilanterol, montelukast, multivitamin, pantoprazole, and tamsulosin.  He has No Known Allergies..    Review of Systems   Constitutional: Negative for fever, chills, fatigue and night sweats.   HENT: Negative for nosebleeds, postnasal drip, rhinorrhea, sore throat and congestion.    Eyes: Negative for itching.   Respiratory: Positive for apnea, snoring, cough and shortness of breath. Negative for hemoptysis, choking, chest tightness and orthopnea.    Cardiovascular: Negative for chest pain, palpitations and leg swelling.   Genitourinary: Negative for difficulty urinating and hematuria.   Endocrine: Negative for cold intolerance and heat intolerance.    Musculoskeletal: Positive for back pain. Negative for arthralgias, gait problem and joint swelling.   Gastrointestinal: Negative for nausea, vomiting, abdominal pain and abdominal distention.   Neurological: Negative for dizziness, syncope, light-headedness and headaches.   Hematological: No excessive bruising.   Psychiatric/Behavioral: Negative for confusion. The patient is not  "nervous/anxious.    All other systems reviewed and are negative.     Objective:   /80   Pulse 60   Resp 18   Ht 6' 1" (1.854 m)   Wt 111.9 kg (246 lb 11.1 oz)   SpO2 96%   BMI 32.55 kg/m²   Body mass index is 32.55 kg/m².    Physical Exam   Constitutional: He is oriented to person, place, and time. He appears well-developed and well-nourished.   HENT:   Head: Normocephalic and atraumatic.   Eyes: EOM are normal. Pupils are equal, round, and reactive to light.   Neck: Normal range of motion. Neck supple.   Cardiovascular: Normal rate and regular rhythm.    Pulmonary/Chest: Effort normal and breath sounds normal.   Abdominal: Soft. Bowel sounds are normal.   Musculoskeletal: Normal range of motion.   Neurological: He is alert and oriented to person, place, and time.   Skin: Skin is warm and dry.   Psychiatric: He has a normal mood and affect. His behavior is normal.   Nursing note and vitals reviewed.      Personal Diagnostic Review:       Titration for BiPAP and ASV:   The best BiPAP settings were 14/10 with 3 central emergent apneas. AHI reduced to 15.0 events per hour.. Treatment emergent central apneas were present on bi-level 8/4, 12/8.   With ASV settings AHI was reduced to 0.0/h. There were 25 respiratory effort related arousals. Mean oxygen saturation 92.1%. ASV settings 20 cm max pressure, 12/8 max/min EPAP, age over 3 max/min pressure support, rate set to auto.   Total of 27 central apneas were recorded during the study abolished with ASV settings.   The overall AHI was 17.1 (88 events).         Assessment:     1. Asthma with COPD    2. MARCOS (obstructive sleep apnea)    3. Treatment-emergent central sleep apnea    4. Multiple lung nodules on CT - probable hematoma LLL      Outpatient Encounter Prescriptions as of 8/15/2017   Medication Sig Dispense Refill    fluticasone (FLONASE) 50 mcg/actuation nasal spray       fluticasone-vilanterol (BREO ELLIPTA) 200-25 mcg/dose DsDv diskus inhaler Inhale 1 " "puff into the lungs once daily. Controller 60 each 11    montelukast (SINGULAIR) 10 mg tablet Take 10 mg by mouth every evening.      multivitamin (THERAGRAN) tablet Take 1 tablet by mouth once daily.      pantoprazole (PROTONIX) 40 MG tablet Take 1 tablet (40 mg total) by mouth once daily. 90 tablet 3    tamsulosin (FLOMAX) 0.4 mg Cp24 Take 1 capsule (0.4 mg total) by mouth every evening. 30 capsule 11     No facility-administered encounter medications on file as of 8/15/2017.      Orders Placed This Encounter   Procedures    HME - OTHER     Order Specific Question:   Type of Equipment:     Answer:   ASV settings 20 cm max pressure, 12/8 max/min EPAP, age over 3 max/min pressure support, rate set to auto.     Order Specific Question:   Height:     Answer:   6' 1" (1.854 m)     Order Specific Question:   Weight:     Answer:   111.9 kg (246 lb 11.1 oz)    CPAP/BIPAP SUPPLIES     Order Specific Question:   Type of mask:     Answer:   FFM     Order Specific Question:   Headgear?     Answer:   Yes     Order Specific Question:   Tubing?     Answer:   Yes     Order Specific Question:   Humidifier chamber?     Answer:   Yes     Order Specific Question:   Chin strap?     Answer:   Yes     Order Specific Question:   Filters?     Answer:   Yes     Order Specific Question:   Length of need (1-99 months):     Answer:   99     Plan:     Discussed diagnosis, its evaluation, treatment and usual course. All questions answered.    Treatment-emergent central sleep apnea  Start ASV . ([facility] -  [facility])     ASV settings AHI was reduced to 0.0/h. There were 25 respiratory effort related arousals. Mean oxygen saturation 92.1%. ASV settings 20 cm max pressure, 12/8 max/min EPAP, age over 3 max/min pressure support, rate set to auto.        Discussed therapeutic goals for positive airway pressure therapy: Ideal is usage 100% of nights for 6 - 8 hours per night. Minimum usage is 70% of night for at least 4 hours per night used. Pateint " expressed understanding. All Questions answered.    Multiple lung nodules on CT - probable hematoma LLL  Stable. Repeat CT chest in 12 months.     Asthma with COPD  Asthma ROS: taking medications as instructed, no medication side effects noted, no significant ongoing wheezing or shortness of breath, using bronchodilator MDI less than twice a week.   New concerns: NONE  Exam: appears well, vitals normal, no respiratory distress, acyanotic, normal RR, chest clear, no wheezing, crepitations, rhonchi, normal symmetric air entry.   Assessment:  Asthma poorly controlled.    Plan: CONTIUE BREO. CONTUNUE SINGULAIR.     TIME SPENT WITH PATIENT: Time spent: 30 minutes in face to face  discussion concerning diagnosis, prognosis, review of lab and test results, benefits of treatment as well as management of disease, counseling of patient and coordination of care between various health  care providers . Greater than half the time spent was used for coordination of care and counseling of patient.        Return in about 6 weeks (around 9/26/2017) for MARCOS, Asthma with COPD, Multiple lung nodules.

## 2017-09-15 ENCOUNTER — TELEPHONE (OUTPATIENT)
Dept: FAMILY MEDICINE | Facility: CLINIC | Age: 72
End: 2017-09-15

## 2017-09-15 NOTE — TELEPHONE ENCOUNTER
----- Message from Melissa Turner sent at 9/15/2017  2:42 PM CDT -----  Patient is scheduled for cataract surgery on 9/28. States he would like to be worked in on next week to see provider only. Patient was advised of availability. Please adv/call [phone].//thanks. cw

## 2017-09-22 ENCOUNTER — OFFICE VISIT (OUTPATIENT)
Dept: FAMILY MEDICINE | Facility: CLINIC | Age: 72
End: 2017-09-22
Payer: MEDICARE

## 2017-09-22 VITALS
TEMPERATURE: 97 F | BODY MASS INDEX: 33.4 KG/M2 | RESPIRATION RATE: 18 BRPM | DIASTOLIC BLOOD PRESSURE: 84 MMHG | WEIGHT: 252 LBS | HEART RATE: 75 BPM | HEIGHT: 73 IN | SYSTOLIC BLOOD PRESSURE: 138 MMHG

## 2017-09-22 DIAGNOSIS — G47.39 TREATMENT-EMERGENT CENTRAL SLEEP APNEA: ICD-10-CM

## 2017-09-22 DIAGNOSIS — H26.9 CATARACT OF RIGHT EYE, UNSPECIFIED CATARACT TYPE: Primary | ICD-10-CM

## 2017-09-22 DIAGNOSIS — E88.810 DYSMETABOLIC SYNDROME: ICD-10-CM

## 2017-09-22 DIAGNOSIS — R13.14 PHARYNGOESOPHAGEAL DYSPHAGIA: Chronic | ICD-10-CM

## 2017-09-22 DIAGNOSIS — K21.00 GASTROESOPHAGEAL REFLUX DISEASE WITH ESOPHAGITIS: ICD-10-CM

## 2017-09-22 DIAGNOSIS — E78.5 DYSLIPIDEMIA: ICD-10-CM

## 2017-09-22 DIAGNOSIS — J44.89 ASTHMA WITH COPD: Chronic | ICD-10-CM

## 2017-09-22 PROCEDURE — 90662 IIV NO PRSV INCREASED AG IM: CPT | Mod: S$GLB,,, | Performed by: FAMILY MEDICINE

## 2017-09-22 PROCEDURE — 99499 UNLISTED E&M SERVICE: CPT | Mod: S$GLB,,, | Performed by: FAMILY MEDICINE

## 2017-09-22 PROCEDURE — G0008 ADMIN INFLUENZA VIRUS VAC: HCPCS | Mod: S$GLB,,, | Performed by: FAMILY MEDICINE

## 2017-09-22 PROCEDURE — 99214 OFFICE O/P EST MOD 30 MIN: CPT | Mod: S$GLB,,, | Performed by: FAMILY MEDICINE

## 2017-09-22 PROCEDURE — 3008F BODY MASS INDEX DOCD: CPT | Mod: S$GLB,,, | Performed by: FAMILY MEDICINE

## 2017-09-22 PROCEDURE — 1159F MED LIST DOCD IN RCRD: CPT | Mod: S$GLB,,, | Performed by: FAMILY MEDICINE

## 2017-09-22 PROCEDURE — 1126F AMNT PAIN NOTED NONE PRSNT: CPT | Mod: S$GLB,,, | Performed by: FAMILY MEDICINE

## 2017-09-22 PROCEDURE — 99999 PR PBB SHADOW E&M-EST. PATIENT-LVL III: CPT | Mod: PBBFAC,,, | Performed by: FAMILY MEDICINE

## 2017-09-22 NOTE — PROGRESS NOTES
CHIEF COMPLAINT: This is a 71-year-old male here for preoperative clearance for right cataract removal per Dr. Nabil Tadeo due to patient's chronic medical conditions.    SUBJECTIVE: Patient has decreased vision in right eye and requires cataract removal and lens implant.  He has had no previous problems with anesthesia.  He denies family history of perioperative complications.  Patient was recently started on BiPAP for central sleep apnea.  He is followed by pulmonary and was diagnosed with asthma.  Patient has GERD and recently discontinued pantoprazole.  However after short period of time, reflux symptoms reoccurred.  Patient takes Flomax for BPH with urinary obstruction which causes urinary frequency.  Patient recently had corticosteroid injection in knee due to osteoarthritis per Dr. Park.    ROS:  GENERAL: Patient denies fever, chills, night sweats. Patient denies weight gain. Patient denies anorexia, fatigue, weakness or swollen glands.  SKIN: Patient denies rash or hair loss.  HEENT: Patient denies sore throat, ear pain, hearing loss, nasal congestion, or runny nose. Patient denies visual disturbance, eye irritation or discharge.  LUNGS: Patient denies cough, wheeze or hemoptysis.  CARDIOVASCULAR: Patient denies chest pain, shortness of breath, palpitations, syncope or lower extremity edema.  GI: Patient denies abdominal pain, nausea, vomiting, diarrhea, constipation, blood in stool or melena.  GENITOURINARY: Patient denies dysuria, frequency, hematuria, nocturia, urgency or incontinence.  MUSCULOSKELETAL: Patient denies joint swelling, redness or warmth.  Positive for joint pain.  NEUROLOGIC: Patient denies headache, vertigo, paresthesias, weakness in limb, dysarthria, dysphagia or abnormality of gait.  PSYCHIATRIC: Patient denies anxiety, depression, or memory loss.     OBJECTIVE:   GENERAL: Well-developed well-nourished, obese, white male alert and oriented x3, in no acute distress. Memory, judgment and  cognition without deficit.   SKIN: Clear without rash. Normal color and tone.   HEENT: Eyes: Clear conjunctivae. Pupils equal reactive to light and accommodation. Ears: Clear TMs clear canals. Nose: Without congestion. Pharynx: Without injection or exudates.  NECK: Supple, normal range of motion. No masses, nodes or enlarged thyroid. No JVD. Carotids 2+ and equal. No bruits.  LUNGS: Clear to auscultation. Normal respiratory effort.  CARDIOVASCULAR: Regular rhythm, normal S1, S2 without murmur, gallop or rub.  BACK: No CVA or spinal tenderness.  ABDOMEN: Normal appearance. Active bowel sounds. Soft, nontender without mass or organomegaly. No rebound or guarding.  EXTREMITIES: Without cyanosis, clubbing or edema. Distal pulses 2+ and equal. Normal range of motion in all extremities. No joint effusion, erythema or warmth.    NEUROLOGIC: Motor strength equal bilaterally. Sensation normal to touch. Deep tendon reflexes 2+ and equal. Gait without abnormality. No tremor.     ASSESSMENT:  1. Cataract of right eye, unspecified cataract type    2. Asthma with COPD    3. Pharyngoesophageal dysphagia    4. Dyslipidemia    5. Dysmetabolic syndrome    6. Gastroesophageal reflux disease with esophagitis    7. Treatment-emergent central sleep apnea      PLAN:   1.  Avoid NSAIDs and aspirin one week prior to surgery.  2.  Patient is cleared for surgery.    Thank you for allow me to participate in the care of this patient.

## 2017-11-01 ENCOUNTER — TELEPHONE (OUTPATIENT)
Dept: PULMONOLOGY | Facility: CLINIC | Age: 72
End: 2017-11-01

## 2017-11-02 ENCOUNTER — OFFICE VISIT (OUTPATIENT)
Dept: PULMONOLOGY | Facility: CLINIC | Age: 72
End: 2017-11-02
Payer: MEDICARE

## 2017-11-02 VITALS
HEART RATE: 81 BPM | BODY MASS INDEX: 33.44 KG/M2 | RESPIRATION RATE: 18 BRPM | HEIGHT: 73 IN | WEIGHT: 252.31 LBS | SYSTOLIC BLOOD PRESSURE: 132 MMHG | DIASTOLIC BLOOD PRESSURE: 80 MMHG | OXYGEN SATURATION: 94 %

## 2017-11-02 DIAGNOSIS — E66.9 OBESITY (BMI 30-39.9): Chronic | ICD-10-CM

## 2017-11-02 DIAGNOSIS — R91.8 MULTIPLE LUNG NODULES ON CT: Chronic | ICD-10-CM

## 2017-11-02 DIAGNOSIS — G47.39 TREATMENT-EMERGENT CENTRAL SLEEP APNEA: Primary | Chronic | ICD-10-CM

## 2017-11-02 DIAGNOSIS — J44.89 ASTHMA WITH COPD: Chronic | ICD-10-CM

## 2017-11-02 PROCEDURE — 99215 OFFICE O/P EST HI 40 MIN: CPT | Mod: S$GLB,,, | Performed by: INTERNAL MEDICINE

## 2017-11-02 PROCEDURE — 99499 UNLISTED E&M SERVICE: CPT | Mod: S$GLB,,, | Performed by: INTERNAL MEDICINE

## 2017-11-02 PROCEDURE — 99999 PR PBB SHADOW E&M-EST. PATIENT-LVL III: CPT | Mod: PBBFAC,,, | Performed by: INTERNAL MEDICINE

## 2017-11-02 NOTE — ASSESSMENT & PLAN NOTE
General weight loss/lifestyle modification strategies discussed (elicit support from others; identify saboteurs; non-food rewards, etc).  Diet interventions: low calorie (1000 kCal/d) deficit diet.

## 2017-11-02 NOTE — PROGRESS NOTES
Subjective:      Patient ID: Clay Marcos is a 72 y.o. male.    Patient Active Problem List   Diagnosis    Dysmetabolic syndrome    History of basal cell carcinoma    Tortuous aorta    MVP (mitral valve prolapse)    Dyslipidemia    Paresthesia of left leg    DDD (degenerative disc disease), lumbar    Multiple lung nodules on CT - probable hematoma LLL    Obesity (BMI 30-39.9)    Pharyngoesophageal dysphagia    GERD (gastroesophageal reflux disease)    Hernia of abdominal wall - left lateral    Asthma with COPD    Treatment-emergent central sleep apnea     Problem list has been reviewed.    Chief Complaint: Asthma with COPD and Sleep Apnea    HPI  He is currently on ASV. He is adherent. Hedefinitely thinks PAP is beneficial to his health and he wants to continue with PAP therapy.  He states that he is fine and has no specific pulmonary complaints.     He repots that he caught a cold on a recent cruise. He is currently recovering. He denies cough sputum, hemoptysis,  pain with breathing, wheezing, asthma.    His current respiratory therapy regimen is BREO, SINGULAIR, ALBUTEROL which provides relief. He seldom uses albuterol. He is  adherent with his regimen. A full  review of systems, past , family  and social histories was performed except as mentioned in the note above, these are non contributory to the main issues discussed  today      Compliance Summary  10/3/2017 - 11/1/2017 (30 days)  Days with Device Usage 30 days  Days without Device Usage 0 days  Percent Days with Device Usage 100.0%  Cumulative Usage 8 days 10 hrs. 40 mins. 9 secs.  Maximum Usage (1 Day) 8 hrs. 13 mins. 13 secs.  Average Usage (All Days) 6 hrs. 45 mins. 20 secs.  Average Usage (Days Used) 6 hrs. 45 mins. 20 secs.  Minimum Usage (1 Day) 35 secs.  Percent of Days with Usage >= 4 Hours 93.3%  Percent of Days with Usage < 4 Hours 6.7%  Date Range  Total Blower Time 8 days 10 hrs. 40 mins. 17 secs.  autoSV Summary  90% of the time  device EPAP was <= 8.8 cmH2O  Average Percent of Night in Periodic Breathing 0.3%  Average Time in Large Leak Per Day 0 secs.  Average Breath Rate 16.9 bpm  Average Minute Vent 9.6  Average AHI 0.6  Average Percent Night in Large Leak 0.0%  90% of the time device Pressure Support was <= 4.0 cmH2O  Average Pressure Support 3.1 cmH2O  Average EPAP 8.1 cmH2O      Answers for HPI/ROS submitted by the patient on 10/31/2017   Asthma  In the past 4 weeks, how much of the time did your asthma keep you from getting as much done at work, school, or at home?: none of the time  During the past 4 weeks, how often have you had shortness of breath?: not at all  During the past 4 weeks, how often did your asthma symptoms (Wheezing, coughing, shortness of breath, chest tightness or pain) wake you up at night or earlier that usual in the morning?: not at all  During the past 4 weeks, how often have you used your rescue inhaler or nebulizer medication (such as albuterol)?: not at all  How would you rate your asthma control during the past 4 weeks?: completely controlled   : 25       COPD QUESTIONNAIRE  How often do you cough?: Almost never  How often do you have phlegm (mucus) in your chest?: Almost never  How often does your chest feel tight?: Never  When you walk up a hill or one flight of stairs, how often are you breathless?: Never  How often are you limited doing any activities at home?: Most of the time  How often are you confident leaving the house despite your lung condition?: All of the time  How often do you sleep soundly?: A little of the time  How often do you have energy?: All of the time  Total score: 9     [place] Sleepiness Scale   EPWORTH SLEEPINESS SCALE 11/2/2017 8/15/2017 4/1/2017   Sitting and reading 1 1 0   Watching TV 1 1 3   Sitting, inactive in a public place (e.g. a theatre or a meeting) 0 0 1   As a passenger in a car for an hour without a break 0 0 2   Lying down to rest in the afternoon when circumstances  permit 0 0 2   Sitting and talking to someone 0 0 0   Sitting quietly after a lunch without alcohol 1 0 0   In a car, while stopped for a few minutes in traffic 0 0 0   Total score 3 2 8       Immunization status reviewed and up to date.    Previous Report Reviewed: lab reports, office notes and radiology reports     The following portions of the patient's history were reviewed and updated as appropriate: He  has a past medical history of Basal cell carcinoma; Calcaneal spur of left foot; Colon polyps; DDD (degenerative disc disease), lumbar; Diverticulosis of colon; Dyslipidemia; Dysmetabolic syndrome; Fracture of rib of left side; History of tobacco use; Internal and external hemorrhoids without complication; MVP (mitral valve prolapse); Obesity; Osteoarthritis of both thumbs (9/15/2015); and Pneumonia (08/2016).  He  has a past surgical history that includes Hemorrhoid surgery; Excision basal cell carcinoma; Anal fistulotomy; Tonsillectomy; and Sinus surgery (01/2017).  His family history includes Breast cancer in his mother; Coronary artery disease in his brother, other, sister, and sister; Heart attack in his brother, other, sister, and sister; Heart attacks under age 50 in his father; Hyperlipidemia in his brother; Vision loss in his other.  He  reports that he quit smoking about 39 years ago. His smoking use included Cigarettes. He has a 12.00 pack-year smoking history. He has never used smokeless tobacco. He reports that he drinks about 9.0 oz of alcohol per week . He reports that he does not use drugs.  He has a current medication list which includes the following prescription(s): montelukast, multivitamin, pantoprazole, and tamsulosin.  He has No Known Allergies..    Review of Systems   Constitutional: Negative for fever, chills, fatigue and night sweats.   HENT: Negative for nosebleeds, postnasal drip, rhinorrhea, sore throat and congestion.    Eyes: Negative for itching.   Respiratory: Positive for  "apnea, snoring, cough and shortness of breath. Negative for hemoptysis, choking, chest tightness and orthopnea.    Cardiovascular: Negative for chest pain, palpitations and leg swelling.   Genitourinary: Negative for difficulty urinating and hematuria.   Endocrine: Negative for cold intolerance and heat intolerance.    Musculoskeletal: Positive for back pain. Negative for arthralgias, gait problem and joint swelling.   Gastrointestinal: Negative for nausea, vomiting, abdominal pain and abdominal distention.   Neurological: Negative for dizziness, syncope, light-headedness and headaches.   Hematological: No excessive bruising.   Psychiatric/Behavioral: Negative for confusion. The patient is not nervous/anxious.    All other systems reviewed and are negative.     Objective:   /80   Pulse 81   Resp 18   Ht 6' 1" (1.854 m)   Wt 114.4 kg (252 lb 5.1 oz)   SpO2 (!) 94%   BMI 33.29 kg/m²   Body mass index is 33.29 kg/m².    Physical Exam   Constitutional: He is oriented to person, place, and time. He appears well-developed and well-nourished.   HENT:   Head: Normocephalic and atraumatic.   Eyes: EOM are normal. Pupils are equal, round, and reactive to light.   Neck: Normal range of motion. Neck supple.   Cardiovascular: Normal rate and regular rhythm.    Pulmonary/Chest: Effort normal and breath sounds normal.   Abdominal: Soft. Bowel sounds are normal.   Musculoskeletal: Normal range of motion.   Neurological: He is alert and oriented to person, place, and time.   Skin: Skin is warm and dry.   Psychiatric: He has a normal mood and affect. His behavior is normal.   Nursing note and vitals reviewed.      Personal Diagnostic Review:  PAP complaince download.    Assessment:     1. Treatment-emergent central sleep apnea Stable   2. Asthma with COPD Stable   3. Multiple lung nodules on CT - probable hematoma LLL Stable   4. Obesity (BMI 30-39.9) Stable     Outpatient Encounter Prescriptions as of 11/2/2017 "   Medication Sig Dispense Refill    montelukast (SINGULAIR) 10 mg tablet Take 10 mg by mouth every evening.      multivitamin (THERAGRAN) tablet Take 1 tablet by mouth once daily.      pantoprazole (PROTONIX) 40 MG tablet Take 1 tablet (40 mg total) by mouth once daily. 90 tablet 3    tamsulosin (FLOMAX) 0.4 mg Cp24 Take 1 capsule (0.4 mg total) by mouth every evening. 30 capsule 11     No facility-administered encounter medications on file as of 11/2/2017.      No orders of the defined types were placed in this encounter.    Plan:     Discussed diagnosis, its evaluation, treatment and usual course. All questions answered.    Multiple lung nodules on CT - probable hematoma LLL  Repeat CT chest in 08/2018.     Asthma with COPD  Asthma ROS: taking medications as instructed, no medication side effects noted, no significant ongoing wheezing or shortness of breath, using bronchodilator MDI less than twice a week.   New concerns: NONE  Exam: appears well, vitals normal, no respiratory distress, acyanotic, normal RR, chest clear, no wheezing, crepitations, rhonchi, normal symmetric air entry.   Assessment:  Asthma poorly controlled.    Plan: CONTIUE BREO. CONTUNUE SINGULAIR.     Obesity (BMI 30-39.9)  General weight loss/lifestyle modification strategies discussed (elicit support from others; identify saboteurs; non-food rewards, etc).  Diet interventions: low calorie (1000 kCal/d) deficit diet.      Treatment-emergent central sleep apnea  Continue ASV . ([facility] -  [facility])     ASV settings AHI was reduced to 0.0/h. There were 25 respiratory effort related arousals. Mean oxygen saturation 92.1%. ASV settings 20 cm max pressure, 12/8 max/min EPAP, age over 3 max/min pressure support, rate set to auto.        Discussed therapeutic goals for positive airway pressure therapy: Ideal is usage 100% of nights for 6 - 8 hours per night. Minimum usage is 70% of night for at least 4 hours per night used. Pateint expressed  understanding. All Questions answered.    TIME SPENT WITH PATIENT: Time spent: 40 minutes in face to face  discussion concerning diagnosis, prognosis, review of lab and test results, benefits of treatment as well as management of disease, counseling of patient and coordination of care between various health  care providers . Greater than half the time spent was used for coordination of care and counseling of patient.        Return in about 6 weeks (around 12/14/2017) for MARCOS, Obesity, Asthma with COPD, Multiple lung nodules.

## 2017-11-02 NOTE — PATIENT INSTRUCTIONS
Lung Anatomy  Your lungs take air in to give your body oxygen, which the body needs to work. Your lungs, like all the tissues in your body, are made up of billions of tiny specialized cells. Old lung cells die and are replaced by new, identical lung cells. This natural process helps ensure healthy lungs.    Date Last Reviewed: 11/1/2016  © 2343-2838 [employer]. [address] [place] [address], [place] [address]. All rights reserved. This information is not intended as a substitute for professional medical care. Always follow your healthcare professional's instructions.

## 2017-11-02 NOTE — ASSESSMENT & PLAN NOTE
Continue ASV . ([facility] - [facility])     ASV settings AHI was reduced to 0.0/h. There were 25 respiratory effort related arousals. Mean oxygen saturation 92.1%. ASV settings 20 cm max pressure, 12/8 max/min EPAP, age over 3 max/min pressure support, rate set to auto.        Discussed therapeutic goals for positive airway pressure therapy: Ideal is usage 100% of nights for 6 - 8 hours per night. Minimum usage is 70% of night for at least 4 hours per night used. Pateint expressed understanding. All Questions answered.

## 2017-11-15 ENCOUNTER — OFFICE VISIT (OUTPATIENT)
Dept: URGENT CARE | Facility: CLINIC | Age: 72
End: 2017-11-15
Payer: MEDICARE

## 2017-11-15 VITALS
WEIGHT: 252.56 LBS | HEART RATE: 84 BPM | BODY MASS INDEX: 33.47 KG/M2 | DIASTOLIC BLOOD PRESSURE: 80 MMHG | OXYGEN SATURATION: 98 % | TEMPERATURE: 99 F | SYSTOLIC BLOOD PRESSURE: 140 MMHG | HEIGHT: 73 IN

## 2017-11-15 DIAGNOSIS — J32.9 SINUSITIS, UNSPECIFIED CHRONICITY, UNSPECIFIED LOCATION: Primary | ICD-10-CM

## 2017-11-15 PROCEDURE — 99999 PR PBB SHADOW E&M-EST. PATIENT-LVL IV: CPT | Mod: PBBFAC,,, | Performed by: NURSE PRACTITIONER

## 2017-11-15 PROCEDURE — 99214 OFFICE O/P EST MOD 30 MIN: CPT | Mod: 25,S$GLB,, | Performed by: NURSE PRACTITIONER

## 2017-11-15 PROCEDURE — 96372 THER/PROPH/DIAG INJ SC/IM: CPT | Mod: S$GLB,,, | Performed by: FAMILY MEDICINE

## 2017-11-15 RX ORDER — AZITHROMYCIN 250 MG/1
250 TABLET, FILM COATED ORAL DAILY
Qty: 6 TABLET | Refills: 0 | Status: SHIPPED | OUTPATIENT
Start: 2017-11-15 | End: 2017-11-21

## 2017-11-15 RX ORDER — BETAMETHASONE SODIUM PHOSPHATE AND BETAMETHASONE ACETATE 3; 3 MG/ML; MG/ML
9 INJECTION, SUSPENSION INTRA-ARTICULAR; INTRALESIONAL; INTRAMUSCULAR; SOFT TISSUE
Status: COMPLETED | OUTPATIENT
Start: 2017-11-15 | End: 2017-11-15

## 2017-11-15 RX ORDER — FLUTICASONE FUROATE AND VILANTEROL 100; 25 UG/1; UG/1
1 POWDER RESPIRATORY (INHALATION) DAILY
COMMUNITY
End: 2018-04-09 | Stop reason: SDUPTHER

## 2017-11-15 RX ADMIN — BETAMETHASONE SODIUM PHOSPHATE AND BETAMETHASONE ACETATE 9 MG: 3; 3 INJECTION, SUSPENSION INTRA-ARTICULAR; INTRALESIONAL; INTRAMUSCULAR; SOFT TISSUE at 03:11

## 2017-11-15 NOTE — PATIENT INSTRUCTIONS
Sinusitis (Antibiotic Treatment)    The sinuses are air-filled spaces within the bones of the face. They connect to the inside of the nose. Sinusitis is an inflammation of the tissue lining the sinus cavity. Sinus inflammation can occur during a cold. It can also be due to allergies to pollens and other particles in the air. Sinusitis can cause symptoms of sinus congestion and fullness. A sinus infection causes fever, headache and facial pain. There is often green or yellow drainage from the nose or into the back of the throat (post-nasal drip). You have been given antibiotics to treat this condition.  Home care:  · Take the full course of antibiotics as instructed. Do not stop taking them, even if you feel better.  · Drink plenty of water, hot tea, and other liquids. This may help thin mucus. It also may promote sinus drainage.  · Heat may help soothe painful areas of the face. Use a towel soaked in hot water. Or,  the shower and direct the hot spray onto your face. Using a vaporizer along with a menthol rub at night may also help.   · An expectorant containing guaifenesin may help thin the mucus and promote drainage from the sinuses.  · Over-the-counter decongestants may be used unless a similar medicine was prescribed. Nasal sprays work the fastest. Use one that contains phenylephrine or oxymetazoline. First blow the nose gently. Then use the spray. Do not use these medicines more often than directed on the label or symptoms may get worse. You may also use tablets containing pseudoephedrine. Avoid products that combine ingredients, because side effects may be increased. Read labels. You can also ask the pharmacist for help. (NOTE: Persons with high blood pressure should not use decongestants. They can raise blood pressure.)  · Over-the-counter antihistamines may help if allergies contributed to your sinusitis.    · Do not use nasal rinses or irrigation during an acute sinus infection, unless told to by  your health care provider. Rinsing may spread the infection to other sinuses.  · Use acetaminophen or ibuprofen to control pain, unless another pain medicine was prescribed. (If you have chronic liver or kidney disease or ever had a stomach ulcer, talk with your doctor before using these medicines. Aspirin should never be used in anyone under 18 years of age who is ill with a fever. It may cause severe liver damage.)  · Don't smoke. This can worsen symptoms.  Follow-up care  Follow up with your healthcare provider or our staff if you are not improving within the next week.  When to seek medical advice  Call your healthcare provider if any of these occur:  · Facial pain or headache becoming more severe  · Stiff neck  · Unusual drowsiness or confusion  · Swelling of the forehead or eyelids  · Vision problems, including blurred or double vision  · Fever of 100.4ºF (38ºC) or higher, or as directed by your healthcare provider  · Seizure  · Breathing problems  · Symptoms not resolving within 10 days  Date Last Reviewed: 4/13/2015  © 6112-6754 [employer]. [address] [place] [address], [place] [address]. All rights reserved. This information is not intended as a substitute for professional medical care. Always follow your healthcare professional's instructions.

## 2017-11-15 NOTE — PROGRESS NOTES
Subjective:       Patient ID: Clay Marcos is a 72 y.o. male.    Chief Complaint: Sinus Problem    Pt is a 72 year old male to clinic today with complaints of SOB, wheezing, sinus congestion, and cough that began 2.5 weeks ago.       Sinus Problem   This is a new problem. The current episode started 1 to 4 weeks ago. The problem has been gradually worsening since onset. There has been no fever. He is experiencing no pain. Associated symptoms include congestion, coughing, shortness of breath and sinus pressure. Pertinent negatives include no chills, diaphoresis, ear pain, headaches, hoarse voice, neck pain, sneezing, sore throat or swollen glands. Treatments tried: otc antihistamine, mucinex, afirin, delsym  The treatment provided mild relief.     Review of Systems   Constitutional: Negative for chills, diaphoresis, fatigue and fever.   HENT: Positive for congestion, postnasal drip and sinus pressure. Negative for ear discharge, ear pain, hoarse voice, rhinorrhea, sinus pain, sneezing, sore throat and trouble swallowing.    Eyes: Negative for pain.   Respiratory: Positive for cough and shortness of breath. Negative for chest tightness and wheezing.    Cardiovascular: Negative for chest pain and palpitations.   Gastrointestinal: Negative for abdominal pain, constipation, diarrhea, nausea and vomiting.   Genitourinary: Negative for dysuria.   Musculoskeletal: Negative for back pain, myalgias and neck pain.   Skin: Negative for rash.   Neurological: Negative for dizziness, light-headedness, numbness and headaches.       Objective:      Physical Exam   Constitutional: He is oriented to person, place, and time. He appears well-developed and well-nourished. No distress.   HENT:   Head: Normocephalic.   Right Ear: Tympanic membrane, external ear and ear canal normal. No tenderness. Tympanic membrane is not bulging.   Left Ear: Tympanic membrane, external ear and ear canal normal. No tenderness. Tympanic membrane is not  bulging.   Nose: Mucosal edema present. No rhinorrhea. Right sinus exhibits maxillary sinus tenderness. Right sinus exhibits no frontal sinus tenderness. Left sinus exhibits maxillary sinus tenderness. Left sinus exhibits no frontal sinus tenderness.   Mouth/Throat: Uvula is midline, oropharynx is clear and moist and mucous membranes are normal. No oropharyngeal exudate, posterior oropharyngeal edema or posterior oropharyngeal erythema. No tonsillar exudate.   Eyes: Conjunctivae and EOM are normal. Pupils are equal, round, and reactive to light.   Neck: Normal range of motion. Neck supple.   Cardiovascular: Normal rate, regular rhythm, S1 normal, S2 normal and normal heart sounds.  Exam reveals no gallop and no friction rub.    No murmur heard.  Pulmonary/Chest: Effort normal and breath sounds normal. No accessory muscle usage. No apnea, no tachypnea and no bradypnea. No respiratory distress. He has no decreased breath sounds. He has no wheezes. He has no rhonchi. He has no rales.   Lymphadenopathy:        Head (right side): No submental, no submandibular and no tonsillar adenopathy present.        Head (left side): No submental, no submandibular and no tonsillar adenopathy present.     He has no cervical adenopathy.   Neurological: He is alert and oriented to person, place, and time.   Skin: Skin is warm and dry. No rash noted. He is not diaphoretic.   Psychiatric: He has a normal mood and affect. His speech is normal and behavior is normal. Thought content normal.   Nursing note and vitals reviewed.      Assessment:       1. Sinusitis, unspecified chronicity, unspecified location        Plan:   Sinusitis, unspecified chronicity, unspecified location  -     betamethasone acetate-betamethasone sodium phosphate injection 9 mg; Inject 1.5 mLs (9 mg total) into the muscle one time.  -     azithromycin (Z-FRANSICO) 250 MG tablet; Take 1 tablet (250 mg total) by mouth once daily. Take 2 tablets by mouth on day 1, then one  tablet daily on days 2-5.  Dispense: 6 tablet; Refill: 0      · Rest and increase fluids.   · May apply warm compresses as needed.   · Saline nasal spray or saline irrigation (Neti pot) to loosen nasal congestion.  · Flonase or Nasacort to reduce inflammation in the sinus cavities.  · Take antibiotics exactly as prescribed. Make sure to complete the entire course of antibiotics even if you start feeling better. This will prevent recurrence of your infection and bacterial resistance.   · Do not drive, drink alcohol, or take any other sedating medications or substances while taking cough syrup.   · Follow up with your primary care provider or with ENT if not improved within a few days or sooner for any new or worsening symptoms.   · Go to the ER for any fever that does not improve with Tylenol/Ibuprofen, neck stiffness, rash, severe headache, vision changes, shortness of breath, chest pain, severe facial pain or swelling, or for any other new and concerning symptoms.

## 2017-11-15 NOTE — PROGRESS NOTES
After obtaining consent, and per orders of Dmitry Vargas NP , injection of SEE MAR given by Queenie Maloney. Patient instructed to remain in clinic for 20 minutes afterwards, and to report any adverse reaction to me immediately.

## 2017-11-21 ENCOUNTER — OFFICE VISIT (OUTPATIENT)
Dept: FAMILY MEDICINE | Facility: CLINIC | Age: 72
End: 2017-11-21
Payer: MEDICARE

## 2017-11-21 VITALS
SYSTOLIC BLOOD PRESSURE: 126 MMHG | WEIGHT: 248.25 LBS | DIASTOLIC BLOOD PRESSURE: 74 MMHG | HEART RATE: 90 BPM | TEMPERATURE: 98 F | HEIGHT: 73 IN | RESPIRATION RATE: 18 BRPM | BODY MASS INDEX: 32.9 KG/M2

## 2017-11-21 DIAGNOSIS — Z12.5 PROSTATE CANCER SCREENING: ICD-10-CM

## 2017-11-21 DIAGNOSIS — E88.810 DYSMETABOLIC SYNDROME: ICD-10-CM

## 2017-11-21 DIAGNOSIS — Z87.891 HISTORY OF TOBACCO USE: ICD-10-CM

## 2017-11-21 DIAGNOSIS — Z00.00 PREVENTATIVE HEALTH CARE: Primary | ICD-10-CM

## 2017-11-21 DIAGNOSIS — I77.1 TORTUOUS AORTA: ICD-10-CM

## 2017-11-21 DIAGNOSIS — E78.5 DYSLIPIDEMIA: ICD-10-CM

## 2017-11-21 PROCEDURE — 99999 PR PBB SHADOW E&M-EST. PATIENT-LVL III: CPT | Mod: PBBFAC,,, | Performed by: FAMILY MEDICINE

## 2017-11-21 PROCEDURE — 99397 PER PM REEVAL EST PAT 65+ YR: CPT | Mod: 25,S$GLB,, | Performed by: FAMILY MEDICINE

## 2017-11-21 PROCEDURE — 76706 US ABDL AORTA SCREEN AAA: CPT | Mod: S$GLB,,, | Performed by: FAMILY MEDICINE

## 2017-11-21 PROCEDURE — 99499 UNLISTED E&M SERVICE: CPT | Mod: S$GLB,,, | Performed by: FAMILY MEDICINE

## 2017-11-21 RX ORDER — TAMSULOSIN HYDROCHLORIDE 0.4 MG/1
0.4 CAPSULE ORAL NIGHTLY
Qty: 30 CAPSULE | Refills: 11 | Status: SHIPPED | OUTPATIENT
Start: 2017-11-21 | End: 2018-03-22 | Stop reason: SDUPTHER

## 2017-11-21 NOTE — PROGRESS NOTES
CHIEF COMPLAINT: This is a 72-year-old male here for preventive health exam.    SUBJECTIVE: Patient is doing well without complaints except as related to resolving upper respiratory infection.  Patient has questions about discontinuing PPI, which was given to him for reflux esophagitis as seen on EGD.  He has a history of pharyngoesophageal dysphagia.  Patient has BPH with urinary symptoms for which he takes Flomax.    Eye exam September 2017. Colonoscopy April 2012 due again April 2022. Pneumovax March 2012. Zostavax April 2011. TD booster August 2012. Flu vaccine September 2017.  Prevnar August 2015.     ROS:  GENERAL: Patient denies fever, chills, night sweats. Patient denies weight loss. Patient denies anorexia, fatigue, weakness or swollen glands.  SKIN: Patient denies rash or hair loss.  HEENT: Patient denies sore throat, ear pain, hearing loss, nasal congestion, or runny nose. Patient denies visual disturbance, eye irritation or discharge.  LUNGS: Patient denies cough, wheeze or hemoptysis.  CARDIOVASCULAR: Patient denies chest pain, shortness of breath, palpitations, syncope or lower extremity edema.  GI: Patient denies abdominal pain, nausea, vomiting, diarrhea, constipation, blood in stool or melena.  GENITOURINARY: Patient denies dysuria, frequency, hematuria, nocturia, urgency or incontinence.  MUSCULOSKELETAL: Patient denies joint pain, swelling, redness or warmth.  NEUROLOGIC: Patient denies headache, vertigo, paresthesias, weakness in limb, dysarthria, dysphagia or abnormality of gait.  PSYCHIATRIC: Patient denies anxiety, depression, or memory loss.     OBJECTIVE:   GENERAL: Well-developed well-nourished, obese, white male alert and oriented x3, in no acute distress. Memory, judgment and cognition without deficit. Weight gain of 10 pounds in the last year.  SKIN: Clear without rash. Normal color and tone.   HEENT: Eyes: Clear conjunctivae. Pupils equal reactive to light and accommodation. Ears: Clear  TMs clear canals. Nose: Without congestion. Pharynx: Without injection or exudates.  NECK: Supple, normal range of motion. No masses, nodes or enlarged thyroid. No JVD. Carotids 2+ and equal. No bruits.  LUNGS: Clear to auscultation. Normal respiratory effort.  CARDIOVASCULAR: Regular rhythm, normal S1, S2 without murmur, gallop or rub.  BACK: No CVA or spinal tenderness.  ABDOMEN: Normal appearance. Active bowel sounds. Soft, nontender without mass or organomegaly. No rebound or guarding.  EXTREMITIES: Without cyanosis, clubbing or edema. Distal pulses 2+ and equal. Normal range of motion in all extremities. No joint effusion, erythema or warmth. Onychomycosis toenails.  NEUROLOGIC: Cranial nerves II through XII without deficit. Motor strength equal bilaterally. Sensation normal to touch. Deep tendon reflexes 2+ and equal. Gait without abnormality. No tremor. Negative cerebellar signs.   RUSTY: Minimally enlarged prostate, smooth, nontender. No masses. Anorectal exam: No masses, nontender. Heme negative stool x2.    ASSESSMENT:  1. Preventative health care    2. Dyslipidemia    3. Dysmetabolic syndrome    4. Tortuous aorta    5. History of tobacco use    6. Prostate cancer screening      PLAN:   1.  Weight reduction.  Exercise regularly.  2.  Age-appropriate counseling.  3.  Fasting lab.  4.  Abdominal aortic ultrasound.  5.  Refill medications.

## 2017-11-22 ENCOUNTER — OFFICE VISIT (OUTPATIENT)
Dept: SURGERY | Facility: CLINIC | Age: 72
End: 2017-11-22
Payer: MEDICARE

## 2017-11-22 ENCOUNTER — LAB VISIT (OUTPATIENT)
Dept: LAB | Facility: HOSPITAL | Age: 72
End: 2017-11-22
Attending: FAMILY MEDICINE
Payer: MEDICARE

## 2017-11-22 VITALS
RESPIRATION RATE: 18 BRPM | HEIGHT: 72 IN | SYSTOLIC BLOOD PRESSURE: 118 MMHG | DIASTOLIC BLOOD PRESSURE: 74 MMHG | HEART RATE: 71 BPM | TEMPERATURE: 98 F | BODY MASS INDEX: 33.62 KG/M2 | WEIGHT: 248.25 LBS

## 2017-11-22 DIAGNOSIS — E78.5 DYSLIPIDEMIA: ICD-10-CM

## 2017-11-22 DIAGNOSIS — E88.810 DYSMETABOLIC SYNDROME: ICD-10-CM

## 2017-11-22 DIAGNOSIS — R22.2 MASS OF LEFT CHEST WALL: Primary | ICD-10-CM

## 2017-11-22 DIAGNOSIS — Z12.5 PROSTATE CANCER SCREENING: ICD-10-CM

## 2017-11-22 LAB
ALBUMIN SERPL BCP-MCNC: 3.6 G/DL
ALP SERPL-CCNC: 191 U/L
ALT SERPL W/O P-5'-P-CCNC: 20 U/L
ANION GAP SERPL CALC-SCNC: 4 MMOL/L
AST SERPL-CCNC: 18 U/L
BASOPHILS # BLD AUTO: 0.04 K/UL
BASOPHILS NFR BLD: 0.5 %
BILIRUB SERPL-MCNC: 0.9 MG/DL
BUN SERPL-MCNC: 21 MG/DL
CALCIUM SERPL-MCNC: 9.2 MG/DL
CHLORIDE SERPL-SCNC: 104 MMOL/L
CHOLEST SERPL-MCNC: 141 MG/DL
CHOLEST/HDLC SERPL: 3.3 {RATIO}
CO2 SERPL-SCNC: 30 MMOL/L
COMPLEXED PSA SERPL-MCNC: 1.2 NG/ML
CREAT SERPL-MCNC: 1.1 MG/DL
DIFFERENTIAL METHOD: ABNORMAL
EOSINOPHIL # BLD AUTO: 1.1 K/UL
EOSINOPHIL NFR BLD: 12.8 %
ERYTHROCYTE [DISTWIDTH] IN BLOOD BY AUTOMATED COUNT: 13.1 %
EST. GFR  (AFRICAN AMERICAN): >60 ML/MIN/1.73 M^2
EST. GFR  (NON AFRICAN AMERICAN): >60 ML/MIN/1.73 M^2
GLUCOSE SERPL-MCNC: 101 MG/DL
HCT VFR BLD AUTO: 47 %
HDLC SERPL-MCNC: 43 MG/DL
HDLC SERPL: 30.5 %
HGB BLD-MCNC: 15.8 G/DL
IMM GRANULOCYTES # BLD AUTO: 0.03 K/UL
IMM GRANULOCYTES NFR BLD AUTO: 0.4 %
LDLC SERPL CALC-MCNC: 76.6 MG/DL
LYMPHOCYTES # BLD AUTO: 1.3 K/UL
LYMPHOCYTES NFR BLD: 15.5 %
MCH RBC QN AUTO: 30.5 PG
MCHC RBC AUTO-ENTMCNC: 33.6 G/DL
MCV RBC AUTO: 91 FL
MONOCYTES # BLD AUTO: 0.9 K/UL
MONOCYTES NFR BLD: 11.3 %
NEUTROPHILS # BLD AUTO: 4.9 K/UL
NEUTROPHILS NFR BLD: 59.5 %
NONHDLC SERPL-MCNC: 98 MG/DL
NRBC BLD-RTO: 0 /100 WBC
PLATELET # BLD AUTO: 244 K/UL
PMV BLD AUTO: 9 FL
POTASSIUM SERPL-SCNC: 4.3 MMOL/L
PROT SERPL-MCNC: 7.4 G/DL
RBC # BLD AUTO: 5.18 M/UL
SODIUM SERPL-SCNC: 138 MMOL/L
TRIGL SERPL-MCNC: 107 MG/DL
TSH SERPL DL<=0.005 MIU/L-ACNC: 2.36 UIU/ML
WBC # BLD AUTO: 8.25 K/UL

## 2017-11-22 PROCEDURE — 36415 COLL VENOUS BLD VENIPUNCTURE: CPT | Mod: PO

## 2017-11-22 PROCEDURE — 99999 PR PBB SHADOW E&M-EST. PATIENT-LVL III: CPT | Mod: PBBFAC,,, | Performed by: SURGERY

## 2017-11-22 PROCEDURE — 80061 LIPID PANEL: CPT

## 2017-11-22 PROCEDURE — 80053 COMPREHEN METABOLIC PANEL: CPT

## 2017-11-22 PROCEDURE — 99211 OFF/OP EST MAY X REQ PHY/QHP: CPT | Mod: S$GLB,,, | Performed by: SURGERY

## 2017-11-22 PROCEDURE — 85025 COMPLETE CBC W/AUTO DIFF WBC: CPT

## 2017-11-22 PROCEDURE — 84443 ASSAY THYROID STIM HORMONE: CPT

## 2017-11-22 PROCEDURE — 84153 ASSAY OF PSA TOTAL: CPT

## 2017-11-22 NOTE — PROGRESS NOTES
Patient returns now in follow-up.  He was seen by me about 3 months ago.  Please see my previous notes.  In summary, in January of this year patient had a bad upper respiratory infection and had severe coughing and developed severe left lateral lower chest pains with a lot of bruising and swelling on the chest wall.  Soon after that he noticed swelling or bulge in his left chest area laterally.  Evaluation by me in August revealed that patient basically, when he coughed real hard for the muscles between his eighth and ninth ribs less leaving a gap between his eighth and ninth ribs causing the ninth and 10th 11th rib to retract posteriorly.  CT scan shows evidence of a tear in his muscles  however, it appears the external oblique fascia is intact.  Clinically on my exam at that time he has an area when standing about 15 x 13 cm whether is a mild protrusion from his lateral left flank area.  At this point and back then is totally asymptomatic.  He does all his regular physical activities without any problems.  He does not feel this protrusion has changed in size at all.  Denies any GI or  complaints.    Objective: Vital signs are stable.  He is alert and oriented ×3.  Chest is clear throughout with good respiratory excursion.  Heart reveals normal rate and rhythm.  Abdomen soft and nontender.  The patient stands or sits there is a bulge noted in the left lateral flank area.  It comes out between the eighth and ninth ribs.  Again measures about 15 x 13 cm in diameter.  The actual protrusion is unchanged from 3 months ago.  It remains soft and nontender.  Again it feels like there is a thinned area of the external oblique fascia over wine a protrusion.    Impression: The patient to for a segment of muscle between the eighth and ninth ribs about 11 months ago.  Clinically the external oblique is still intact.  The protrusion is stable in size and asymptomatic at this time.  Plan: Again counseled him as to the options  of repair with mesh versus continued observation.  His point the patient feels he is having no pain and the bulge as stable, therefore want to continue to observe it.  He is exercising but avoiding any strenuous abdominal muscle straining.  He will see me back in 3 months for return sooner should he develop any changes.

## 2017-11-28 ENCOUNTER — TELEPHONE (OUTPATIENT)
Dept: RADIOLOGY | Facility: HOSPITAL | Age: 72
End: 2017-11-28

## 2017-11-29 ENCOUNTER — HOSPITAL ENCOUNTER (OUTPATIENT)
Dept: RADIOLOGY | Facility: HOSPITAL | Age: 72
Discharge: HOME | End: 2017-11-29
Attending: FAMILY MEDICINE
Payer: MEDICARE

## 2017-11-29 DIAGNOSIS — Z87.891 HISTORY OF TOBACCO USE: ICD-10-CM

## 2017-11-29 PROCEDURE — 76775 US EXAM ABDO BACK WALL LIM: CPT | Mod: TC,PO

## 2017-11-29 PROCEDURE — 76775 US EXAM ABDO BACK WALL LIM: CPT | Mod: 26,,, | Performed by: RADIOLOGY

## 2017-12-14 ENCOUNTER — OFFICE VISIT (OUTPATIENT)
Dept: PULMONOLOGY | Facility: CLINIC | Age: 72
End: 2017-12-14
Payer: MEDICARE

## 2017-12-14 VITALS
BODY MASS INDEX: 34.34 KG/M2 | RESPIRATION RATE: 18 BRPM | WEIGHT: 253.5 LBS | HEIGHT: 72 IN | DIASTOLIC BLOOD PRESSURE: 82 MMHG | OXYGEN SATURATION: 95 % | SYSTOLIC BLOOD PRESSURE: 130 MMHG | HEART RATE: 70 BPM

## 2017-12-14 DIAGNOSIS — J44.89 ASTHMA WITH COPD: Chronic | ICD-10-CM

## 2017-12-14 DIAGNOSIS — G47.39 TREATMENT-EMERGENT CENTRAL SLEEP APNEA: Primary | Chronic | ICD-10-CM

## 2017-12-14 DIAGNOSIS — E66.9 OBESITY (BMI 30-39.9): Chronic | ICD-10-CM

## 2017-12-14 DIAGNOSIS — R91.8 MULTIPLE LUNG NODULES ON CT: Chronic | ICD-10-CM

## 2017-12-14 PROCEDURE — 99499 UNLISTED E&M SERVICE: CPT | Mod: S$GLB,,, | Performed by: INTERNAL MEDICINE

## 2017-12-14 PROCEDURE — 99215 OFFICE O/P EST HI 40 MIN: CPT | Mod: S$GLB,,, | Performed by: INTERNAL MEDICINE

## 2017-12-14 PROCEDURE — 99999 PR PBB SHADOW E&M-EST. PATIENT-LVL III: CPT | Mod: PBBFAC,,, | Performed by: INTERNAL MEDICINE

## 2017-12-14 NOTE — ASSESSMENT & PLAN NOTE
Asthma ROS: taking medications as instructed, no medication side effects noted, no significant ongoing wheezing or shortness of breath, using bronchodilator MDI less than twice a week.   New concerns: NONE  Exam: appears well, vitals normal, no respiratory distress, acyanotic, normal RR, chest clear, no wheezing, crepitations, rhonchi, normal symmetric air entry.   Assessment:  Asthma poorly controlled.    Plan: CONTIUE BREO. CONTUNUE SINGULAIR. Current regimen is effective. Continue current regimen. PFT in 8 months.

## 2017-12-14 NOTE — PROGRESS NOTES
Subjective:      Patient ID: Clay Marcos is a 72 y.o. male.    Patient Active Problem List   Diagnosis    Dysmetabolic syndrome    History of basal cell carcinoma    Tortuous aorta    MVP (mitral valve prolapse)    Dyslipidemia    Paresthesia of left leg    DDD (degenerative disc disease), lumbar    Multiple lung nodules on CT - probable hematoma LLL    Obesity (BMI 30-39.9)    Pharyngoesophageal dysphagia    GERD (gastroesophageal reflux disease)    Hernia of abdominal wall - left lateral    Asthma with COPD    Treatment-emergent central sleep apnea     Problem list has been reviewed.    Chief Complaint: Asthma with COPD and Sleep Apnea    HPI  He is currently on ASV. He is adherent. He definitely thinks PAP is beneficial to his health and he wants to continue with PAP therapy.  He states that he is fine and has no specific pulmonary complaints.     Patients reports NYHA I dyspnea    The patient does not have currently have symptoms / an exacerbation.       He reports a slight productive cough. No recent change in breathing.     His current respiratory therapy regimen is BREO, SINGULAIR, ALBUTEROL which provides relief. He seldom uses albuterol. He is  adherent with his regimen.     Answers for HPI/ROS submitted by the patient on 12/12/2017   Asthma  In the past 4 weeks, how much of the time did your asthma keep you from getting as much done at work, school, or at home?: none of the time  During the past 4 weeks, how often have you had shortness of breath?: not at all  During the past 4 weeks, how often did your asthma symptoms (Wheezing, coughing, shortness of breath, chest tightness or pain) wake you up at night or earlier that usual in the morning?: not at all  During the past 4 weeks, how often have you used your rescue inhaler or nebulizer medication (such as albuterol)?: not at all  How would you rate your asthma control during the past 4 weeks?: well controlled   : 24       COPD  QUESTIONNAIRE  How often do you cough?: Most of the time  How often do you have phlegm (mucus) in your chest?: A little of the time  How often does your chest feel tight?: A little of the time  When you walk up a hill or one flight of stairs, how often are you breathless?: A little of the time  How often are you limited doing any activities at home?: Some of the time  How often are you confident leaving the house despite your lung condition?: All of the time  How often do you sleep soundly?: Some of the time  How often do you have energy?: A little of the time  Total score: 18     He is on ASV:  ASV settings AHI was reduced to 0.0/h. There were 25 respiratory effort related arousals. Mean oxygen saturation 92.1%. ASV settings 20 cm max pressure, 12/8 max/min EPAP, age over 3 max/min pressure support, rate set to auto.    Compliance Summary  11/14/2017 - 12/13/2017 (30 days)  Days with Device Usage 30 days  Days without Device Usage 0 days  Percent Days with Device Usage 100.0%  Cumulative Usage 8 days 19 hrs. 28 mins. 37 secs.  Maximum Usage (1 Day) 8 hrs. 50 mins. 13 secs.  Average Usage (All Days) 7 hrs. 2 mins. 57 secs.  Average Usage (Days Used) 7 hrs. 2 mins. 57 secs.  Minimum Usage (1 Day) 1 hrs. 44 mins. 45 secs.  Percent of Days with Usage >= 4 Hours 96.7%  Percent of Days with Usage < 4 Hours 3.3%  Date Range  Total Blower Time 8 days 19 hrs. 28 mins. 49 secs.  autoSV Summary  90% of the time device EPAP was <= 8.6 cmH2O  Average Percent of Night in Periodic Breathing 0.4%  Average Time in Large Leak Per Day 0 secs.  Average Breath Rate 17.7 bpm  Average Minute Vent 9.5  Average AHI 0.6  Average Percent Night in Large Leak 0.0%  90% of the time device Pressure Support was <= 3.9 cmH2O  Average Pressure Support 3.1 cmH2O  Average EPAP 8.1 cmH2O    Vincent Sleepiness Scale   EPWORTH SLEEPINESS SCALE 12/14/2017 11/2/2017 8/15/2017 4/1/2017   Sitting and reading 1 1 1 0   Watching TV 1 1 1 3   Sitting, inactive  in a public place (e.g. a theatre or a meeting) 1 0 0 1   As a passenger in a car for an hour without a break 0 0 0 2   Lying down to rest in the afternoon when circumstances permit 0 0 0 2   Sitting and talking to someone 0 0 0 0   Sitting quietly after a lunch without alcohol 0 1 0 0   In a car, while stopped for a few minutes in traffic 0 0 0 0   Total score 3 3 2 8       Immunization status reviewed and up to date.    A full  review of systems, past , family  and social histories was performed except as mentioned in the note above, these are non contributory to the main issues discussed  today    Previous Report Reviewed: lab reports, office notes and radiology reports     The following portions of the patient's history were reviewed and updated as appropriate: He  has a past medical history of Basal cell carcinoma; BPH with urinary obstruction; Calcaneal spur of left foot; Colon polyps; DDD (degenerative disc disease), lumbar; Diverticulosis of colon; Dyslipidemia; Dysmetabolic syndrome; Fracture of rib of left side; History of tobacco use; Internal and external hemorrhoids without complication; MVP (mitral valve prolapse); Obesity; Osteoarthritis of both thumbs (9/15/2015); and Pneumonia (08/2016).  He  has a past surgical history that includes Hemorrhoid surgery; Excision basal cell carcinoma; Anal fistulotomy; Tonsillectomy; Sinus surgery (01/2017); and Cataract extraction w/  intraocular lens implant (Right, 09/2017).  His family history includes Breast cancer in his mother; Coronary artery disease in his brother, other, sister, and sister; Dementia in his sister; Heart attack in his brother, other, sister, and sister; Heart attacks under age 50 in his father; Hyperlipidemia in his brother; Vision loss in his other.  He  reports that he quit smoking about 39 years ago. His smoking use included Cigarettes. He has a 12.00 pack-year smoking history. He has never used smokeless tobacco. He reports that he  drinks about 9.0 oz of alcohol per week . He reports that he does not use drugs.  He has a current medication list which includes the following prescription(s): fluticasone-vilanterol, montelukast, multivitamin, pantoprazole, and tamsulosin.  He has No Known Allergies..    Review of Systems   Constitutional: Negative for fever, chills, fatigue and night sweats.   HENT: Negative for nosebleeds, postnasal drip, rhinorrhea, sore throat and congestion.    Eyes: Negative for itching.   Respiratory: Positive for apnea, snoring, cough and shortness of breath. Negative for hemoptysis, choking, chest tightness and orthopnea.    Cardiovascular: Negative for chest pain, palpitations and leg swelling.   Genitourinary: Negative for difficulty urinating and hematuria.   Endocrine: Negative for cold intolerance and heat intolerance.    Musculoskeletal: Positive for back pain. Negative for arthralgias, gait problem and joint swelling.   Gastrointestinal: Negative for nausea, vomiting, abdominal pain and abdominal distention.   Neurological: Negative for dizziness, syncope, light-headedness and headaches.   Hematological: No excessive bruising.   Psychiatric/Behavioral: Negative for confusion. The patient is not nervous/anxious.    All other systems reviewed and are negative.     Objective:   /82   Pulse 70   Resp 18   Ht 6' (1.829 m)   Wt 115 kg (253 lb 8.5 oz)   SpO2 95%   BMI 34.38 kg/m²   Body mass index is 34.38 kg/m².    Physical Exam   Constitutional: He is oriented to person, place, and time. He appears well-developed and well-nourished.   HENT:   Head: Normocephalic and atraumatic.   Eyes: EOM are normal. Pupils are equal, round, and reactive to light.   Neck: Normal range of motion. Neck supple.   Cardiovascular: Normal rate and regular rhythm.    Pulmonary/Chest: Effort normal and breath sounds normal.   Abdominal: Soft. Bowel sounds are normal.   Musculoskeletal: Normal range of motion.   Neurological: He is  alert and oriented to person, place, and time.   Skin: Skin is warm and dry.   Psychiatric: He has a normal mood and affect. His behavior is normal.   Nursing note and vitals reviewed.      Personal Diagnostic Review:  PAP complaince download.    Assessment:     1. Treatment-emergent central sleep apnea Stable   2. Asthma with COPD Stable   3. Multiple lung nodules on CT - probable hematoma LLL Stable   4. Obesity (BMI 30-39.9) Stable     Outpatient Encounter Prescriptions as of 12/14/2017   Medication Sig Dispense Refill    fluticasone-vilanterol (BREO ELLIPTA) 100-25 mcg/dose diskus inhaler Inhale 1 puff into the lungs once daily. Controller      montelukast (SINGULAIR) 10 mg tablet Take 10 mg by mouth every evening.      multivitamin (THERAGRAN) tablet Take 1 tablet by mouth once daily.      pantoprazole (PROTONIX) 40 MG tablet Take 1 tablet (40 mg total) by mouth once daily. 90 tablet 3    tamsulosin (FLOMAX) 0.4 mg Cp24 Take 1 capsule (0.4 mg total) by mouth every evening. 30 capsule 11     No facility-administered encounter medications on file as of 12/14/2017.      Orders Placed This Encounter   Procedures    CT Chest With Contrast     Standing Status:   Future     Standing Expiration Date:   12/14/2018     Order Specific Question:   Reason for Exam:     Answer:   Lung nodules     Order Specific Question:   Is the patient allergic to iodine or contrast? Has a steroid / antihistamine prep been administered?     Answer:   No     Order Specific Question:   Is the patient on ANY Metformin drug such as Glugophage/Glucovance?           Should be off drug 48 hours after contrast. Check renal function before restart.     Answer:   No     Order Specific Question:   Does the patient have any of the following risk factors?     Answer:   None     Order Specific Question:   Age > 60 years?     Answer:   Yes     Order Specific Question:   History of Kidney Disease - including: decreased kidney function, dialysis,  kidney transplay, single kidney, kidney cancer, kidney surgery?     Answer:   None     Order Specific Question:   Does the patient have high blood pressure requiring medical treatment?     Answer:   No     Order Specific Question:   Diabetes?     Answer:   No    Complete PFT with bronchodilator     Standing Status:   Future     Standing Expiration Date:   1/24/2019     Plan:     Discussed diagnosis, its evaluation, treatment and usual course. All questions answered.    Asthma with COPD  Asthma ROS: taking medications as instructed, no medication side effects noted, no significant ongoing wheezing or shortness of breath, using bronchodilator MDI less than twice a week.   New concerns: NONE  Exam: appears well, vitals normal, no respiratory distress, acyanotic, normal RR, chest clear, no wheezing, crepitations, rhonchi, normal symmetric air entry.   Assessment:  Asthma poorly controlled.    Plan: CONTIUE BREO. CONTUNUE SINGULAIR. Current regimen is effective. Continue current regimen. PFT in 8 months.     Multiple lung nodules on CT - probable hematoma LLL  Stable and controlled. Repeat CT chest in 08/2018.     Obesity (BMI 30-39.9)  General weight loss/lifestyle modification strategies discussed (elicit support from others; identify saboteurs; non-food rewards, etc).  Diet interventions: low calorie (1000 kCal/d) deficit diet.    Treatment-emergent central sleep apnea  Continue ASV . (DME - [facility])     ASV settings AHI was reduced to 0.0/h. There were 25 respiratory effort related arousals. Mean oxygen saturation 92.1%. ASV settings 20 cm max pressure, 12/8 max/min EPAP, age over 3 max/min pressure support, rate set to auto.        Discussed therapeutic goals for positive airway pressure therapy: Ideal is usage 100% of nights for 6 - 8 hours per night. Minimum usage is 70% of night for at least 4 hours per night used. Pateint expressed understanding. All Questions answered.    TIME SPENT WITH PATIENT: Time spent:  40 minutes in face to face  discussion concerning diagnosis, prognosis, review of lab and test results, benefits of treatment as well as management of disease, counseling of patient and coordination of care between various health  care providers . Greater than half the time spent was used for coordination of care and counseling of patient.        Return in about 8 months (around 8/14/2018) for Asthma with COPD, Multiple lung nodules, Obesity, MARCOS.

## 2017-12-14 NOTE — PATIENT INSTRUCTIONS
Lung Anatomy  Your lungs take air in to give your body oxygen, which the body needs to work. Your lungs, like all the tissues in your body, are made up of billions of tiny specialized cells. Old lung cells die and are replaced by new, identical lung cells. This natural process helps ensure healthy lungs.    Date Last Reviewed: 11/1/2016  © 5871-4672 [employer]. [address] [place] [address], [place] [address]. All rights reserved. This information is not intended as a substitute for professional medical care. Always follow your healthcare professional's instructions.

## 2017-12-19 DIAGNOSIS — R91.8 MULTIPLE LUNG NODULES ON CT: Primary | ICD-10-CM

## 2018-01-30 ENCOUNTER — PATIENT MESSAGE (OUTPATIENT)
Dept: OTOLARYNGOLOGY | Facility: CLINIC | Age: 73
End: 2018-01-30

## 2018-01-30 RX ORDER — FLUTICASONE PROPIONATE 50 MCG
2 SPRAY, SUSPENSION (ML) NASAL DAILY
Qty: 2 BOTTLE | Refills: 11 | Status: SHIPPED | OUTPATIENT
Start: 2018-01-30 | End: 2018-03-27 | Stop reason: SDUPTHER

## 2018-02-01 ENCOUNTER — PATIENT MESSAGE (OUTPATIENT)
Dept: SURGERY | Facility: CLINIC | Age: 73
End: 2018-02-01

## 2018-02-28 ENCOUNTER — OFFICE VISIT (OUTPATIENT)
Dept: SURGERY | Facility: CLINIC | Age: 73
End: 2018-02-28
Payer: MEDICARE

## 2018-02-28 VITALS
HEART RATE: 74 BPM | WEIGHT: 251.75 LBS | SYSTOLIC BLOOD PRESSURE: 122 MMHG | TEMPERATURE: 99 F | BODY MASS INDEX: 34.15 KG/M2 | DIASTOLIC BLOOD PRESSURE: 79 MMHG

## 2018-02-28 DIAGNOSIS — R22.2 MASS OF LEFT CHEST WALL: Primary | ICD-10-CM

## 2018-02-28 PROCEDURE — 99999 PR PBB SHADOW E&M-EST. PATIENT-LVL II: CPT | Mod: PBBFAC,,, | Performed by: SURGERY

## 2018-02-28 PROCEDURE — 99211 OFF/OP EST MAY X REQ PHY/QHP: CPT | Mod: S$GLB,,, | Performed by: SURGERY

## 2018-02-28 NOTE — PROGRESS NOTES
Patient returns now on follow-up.  I initially saw the patient about 6 months ago.  He was complaining of a swelling and posterolaterally at the junction of his lower chest and lower lateral abdomen.  With CT-guided workup and taking the patient's history it became apparent that with a vigorous coughing months previously a total muscle fascia between the ninth and 10th ribs on the left side posterolaterally.  Clinically this left with him that appears to be a attenuated area muscle fascia and the bulge in that area.  The patient is doing his regular activities and continue to exercise and he has no real symptoms in that area of protrusion.  He denies any GI or  complaints.  He denies any pain in the area.    Objective: Vital signs are stable.  He is alert and oriented ×3.  Chest is clear throughout.  Heart reveals normal rate and rhythm.  With the patient sitting, posterolaterally there is a bulge compared to the right side that is unchanged in the last 6 months.  On measuring the area with the patient sitting there is an area of weakness or eventration that is again about 15 x 13 cm.  This area appears to be between about the ninth and 10th ribs.  The muscle fascia area is essentially gone except for what clinically appears to be the external oblique fascia.  Rest of his abdomen is soft and nontender in this area of protrusion is nontender and stable.    Impression an area protrusion between the ninth and 10th ribs resulting from probably a tear with vigorous coughing about a year ago.  This protrusion does remain the same in size and the patient is asymptomatic.  Plan: As this area is stable in size and not causing any symptoms would continue to observe it.  I do feel this probably a thin external oblique fascia that's overlying it.  The temporal repair of this area would involve a fairly large and painful procedure.  The patient at this point would just like to have it observed.  He'll return should increase in  size or start causing symptoms.

## 2018-03-14 ENCOUNTER — PES CALL (OUTPATIENT)
Dept: ADMINISTRATIVE | Facility: CLINIC | Age: 73
End: 2018-03-14

## 2018-03-22 RX ORDER — TAMSULOSIN HYDROCHLORIDE 0.4 MG/1
0.4 CAPSULE ORAL NIGHTLY
Qty: 90 CAPSULE | Refills: 2 | Status: SHIPPED | OUTPATIENT
Start: 2018-03-22 | End: 2019-01-06 | Stop reason: SDUPTHER

## 2018-03-27 RX ORDER — FLUTICASONE PROPIONATE 50 MCG
2 SPRAY, SUSPENSION (ML) NASAL DAILY
Qty: 2 BOTTLE | Refills: 11 | Status: SHIPPED | OUTPATIENT
Start: 2018-03-27 | End: 2019-04-04 | Stop reason: SDUPTHER

## 2018-04-09 ENCOUNTER — PATIENT MESSAGE (OUTPATIENT)
Dept: PULMONOLOGY | Facility: CLINIC | Age: 73
End: 2018-04-09

## 2018-04-09 RX ORDER — MONTELUKAST SODIUM 10 MG/1
10 TABLET ORAL NIGHTLY
Qty: 90 TABLET | Refills: 3 | Status: SHIPPED | OUTPATIENT
Start: 2018-04-09 | End: 2019-04-04 | Stop reason: SDUPTHER

## 2018-04-09 RX ORDER — FLUTICASONE FUROATE AND VILANTEROL 100; 25 UG/1; UG/1
1 POWDER RESPIRATORY (INHALATION) DAILY
Qty: 60 EACH | Refills: 11 | Status: SHIPPED | OUTPATIENT
Start: 2018-04-09 | End: 2019-12-05

## 2018-08-13 DIAGNOSIS — R91.1 LUNG NODULE: Primary | ICD-10-CM

## 2018-08-14 ENCOUNTER — PROCEDURE VISIT (OUTPATIENT)
Dept: PULMONOLOGY | Facility: CLINIC | Age: 73
End: 2018-08-14
Payer: MEDICARE

## 2018-08-14 ENCOUNTER — HOSPITAL ENCOUNTER (OUTPATIENT)
Dept: RADIOLOGY | Facility: HOSPITAL | Age: 73
Discharge: HOME | End: 2018-08-14
Attending: INTERNAL MEDICINE
Payer: MEDICARE

## 2018-08-14 ENCOUNTER — OFFICE VISIT (OUTPATIENT)
Dept: PULMONOLOGY | Facility: CLINIC | Age: 73
End: 2018-08-14
Payer: MEDICARE

## 2018-08-14 VITALS
OXYGEN SATURATION: 96 % | WEIGHT: 244 LBS | BODY MASS INDEX: 33.05 KG/M2 | HEART RATE: 76 BPM | DIASTOLIC BLOOD PRESSURE: 80 MMHG | SYSTOLIC BLOOD PRESSURE: 126 MMHG | RESPIRATION RATE: 18 BRPM | HEIGHT: 72 IN

## 2018-08-14 DIAGNOSIS — R91.8 MULTIPLE LUNG NODULES ON CT: Chronic | ICD-10-CM

## 2018-08-14 DIAGNOSIS — J44.89 ASTHMA WITH COPD: Chronic | ICD-10-CM

## 2018-08-14 DIAGNOSIS — G47.39 TREATMENT-EMERGENT CENTRAL SLEEP APNEA: Chronic | ICD-10-CM

## 2018-08-14 DIAGNOSIS — E66.9 OBESITY (BMI 30-39.9): Chronic | ICD-10-CM

## 2018-08-14 DIAGNOSIS — J45.20 MILD INTERMITTENT ASTHMA WITHOUT COMPLICATION: Primary | Chronic | ICD-10-CM

## 2018-08-14 LAB
BRPFT: ABNORMAL
DLCO ADJ PRE: 23.63 ML/(MIN*MMHG) (ref 21.39–35.24)
DLCO PRE: 23.63 ML/(MIN*MMHG) (ref 21.39–35.24)
DLCO SINGLE BREATH LLN: 21.39
DLCO SINGLE BREATH PRE REF: 83.5 %
DLCO SINGLE BREATH REF: 28.32
DLCOC SBVA LLN: 2.67
DLCOC SBVA PRE REF: 106 %
DLCOC SBVA REF: 3.75
DLCOC SINGLE BREATH LLN: 21.39
DLCOC SINGLE BREATH PRE REF: 83.5 %
DLCOC SINGLE BREATH REF: 28.32
DLCOVA LLN: 2.67
DLCOVA PRE REF: 106 %
DLCOVA PRE: 3.98 ML/(MIN*MMHG*L) (ref 2.67–4.84)
DLCOVA REF: 3.75
DLVAADJ PRE: 3.98 ML/(MIN*MMHG*L) (ref 2.67–4.84)
ERV LLN: 1.08
ERV PRE REF: 74.6 %
ERV PRE: 0.8 L (ref 1.08–1.08)
ERV REF: 1.08
ERVN2 LLN: 1.08
ERVN2 REF: 1.08
FEF 25 75 CHG: 8.1 %
FEF 25 75 LLN: 0.99
FEF 25 75 POST REF: 75.2 %
FEF 25 75 POST: 1.79 L/S (ref 0.99–3.79)
FEF 25 75 PRE REF: 69.6 %
FEF 25 75 PRE: 1.66 L/S (ref 0.99–3.79)
FEF 25 75 REF: 2.39
FET100 CHG: 20.3 %
FET100 POST: 12.7 SEC
FET100 PRE: 10.56 SEC
FEV1 CHG: 2.4 %
FEV1 FVC CHG: 1.6 %
FEV1 FVC LLN: 61
FEV1 FVC POST REF: 94.3 %
FEV1 FVC POST: 70.84 % (ref 61.25–88.95)
FEV1 FVC PRE REF: 92.8 %
FEV1 FVC PRE: 69.7 % (ref 61.25–88.95)
FEV1 FVC REF: 75
FEV1 LLN: 2.34
FEV1 POST REF: 89.2 %
FEV1 POST: 2.93 L (ref 2.34–4.23)
FEV1 PRE REF: 87.1 %
FEV1 PRE: 2.86 L (ref 2.34–4.23)
FEV1 REF: 3.28
FEV6 CHG: -0 %
FEV6 LLN: 3.44
FEV6 POST REF: 89.1 %
FEV6 POST: 3.92 L (ref 3.44–5.36)
FEV6 PRE REF: 89.1 %
FEV6 PRE: 3.92 L (ref 3.44–5.36)
FEV6 REF: 4.4
FRCN2 LLN: 2.86
FRCN2 REF: 3.85
FRCPL PRE: 2.4 L
FRCPLETH LLN: 2.86
FRCPLETH PREREF: 62.3 %
FRCPLETH REF: 3.85
FVC CHG: 0.7 %
FVC LLN: 3.23
FVC POST REF: 93.9 %
FVC POST: 4.14 L (ref 3.23–5.58)
FVC PRE REF: 93.2 %
FVC PRE: 4.11 L (ref 3.23–5.58)
FVC REF: 4.4
IVC PRE: 3.92 L (ref 3.23–5.58)
IVC SINGLE BREATH LLN: 3.23
IVC SINGLE BREATH PRE REF: 88.9 %
IVC SINGLE BREATH REF: 4.4
MVV LLN: 111
MVV REF: 130
PEF CHG: -21.5 %
PEF LLN: 6.06
PEF POST REF: 96.9 %
PEF POST: 8.26 L/S (ref 6.06–10.97)
PEF PRE REF: 123.5 %
PEF PRE: 10.52 L/S (ref 6.06–10.97)
PEF REF: 8.52
RAW LLN: 3.06
RAW PRE REF: 86.3 %
RAW PRE: 2.64 CMH2O*S/L (ref 3.06–3.06)
RAW REF: 3.06
RV LLN: 2.1
RV PRE REF: 59.4 %
RV PRE: 1.65 L (ref 2.1–3.45)
RV REF: 2.77
RVN2 LLN: 2.1
RVN2 REF: 2.77
RVN2TLCN2 LLN: 33
RVN2TLCN2 REF: 42
RVTLC LLN: 33
RVTLC PRE REF: 61.5 %
RVTLC PRE: 26.11 % (ref 33.45–51.41)
RVTLC REF: 42
TLC LLN: 6.39
TLC PRE REF: 83.6 %
TLC PRE: 6.31 L (ref 6.39–8.69)
TLC REF: 7.54
TLCN2 LLN: 6.39
TLCN2 REF: 7.54
VA PRE: 5.94 L (ref 7.39–7.39)
VA SINGLE BREATH LLN: 7.39
VA SINGLE BREATH PRE REF: 80.4 %
VA SINGLE BREATH REF: 7.39
VC LLN: 3.23
VC PRE REF: 105.8 %
VC PRE: 4.66 L (ref 3.23–5.58)
VC REF: 4.4
VCMAXN2 LLN: 3.23
VCMAXN2 REF: 4.4
VTGRAWPRE: 4.41 L

## 2018-08-14 PROCEDURE — 94729 DIFFUSING CAPACITY: CPT | Mod: S$GLB,,, | Performed by: INTERNAL MEDICINE

## 2018-08-14 PROCEDURE — 94060 EVALUATION OF WHEEZING: CPT | Mod: S$GLB,,, | Performed by: INTERNAL MEDICINE

## 2018-08-14 PROCEDURE — 71260 CT THORAX DX C+: CPT | Mod: TC

## 2018-08-14 PROCEDURE — 99999 PR PBB SHADOW E&M-EST. PATIENT-LVL III: CPT | Mod: PBBFAC,,, | Performed by: INTERNAL MEDICINE

## 2018-08-14 PROCEDURE — 25500020 PHARM REV CODE 255: Performed by: INTERNAL MEDICINE

## 2018-08-14 PROCEDURE — 94726 PLETHYSMOGRAPHY LUNG VOLUMES: CPT | Mod: S$GLB,,, | Performed by: INTERNAL MEDICINE

## 2018-08-14 PROCEDURE — 99215 OFFICE O/P EST HI 40 MIN: CPT | Mod: 25,S$GLB,, | Performed by: INTERNAL MEDICINE

## 2018-08-14 PROCEDURE — 99499 UNLISTED E&M SERVICE: CPT | Mod: S$GLB,,, | Performed by: INTERNAL MEDICINE

## 2018-08-14 RX ORDER — CETIRIZINE HYDROCHLORIDE 10 MG/1
10 TABLET ORAL DAILY
COMMUNITY
End: 2019-07-22

## 2018-08-14 RX ADMIN — IOHEXOL 75 ML: 350 INJECTION, SOLUTION INTRAVENOUS at 09:08

## 2018-08-14 NOTE — ASSESSMENT & PLAN NOTE
Continue ASV . (DME - [facility])     ASV settings 20 cm max pressure, 12/8 max/min EPAP, age over 3 max/min pressure support, rate set to auto.        Discussed therapeutic goals for positive airway pressure therapy: Ideal is usage 100% of nights for 6 - 8 hours per night. Minimum usage is 70% of night for at least 4 hours per night used. Pateint expressed understanding. All Questions answered.    Complaince download in 1 year.     CPAP supplies renewed.

## 2018-08-14 NOTE — ASSESSMENT & PLAN NOTE
Asthma ROS: taking medications as instructed, no medication side effects noted, no significant ongoing wheezing or shortness of breath, using bronchodilator MDI less than twice a week.   New concerns: NONE  Exam: appears well, vitals normal, no respiratory distress, acyanotic, normal RR, chest clear, no wheezing, crepitations, rhonchi, normal symmetric air entry.   Assessment:  Asthma poorly controlled.    Plan: CONTIUE BREO & SINGULAIR. Current regimen is effective. Continue current regimen. PFT in 12 months.

## 2018-08-14 NOTE — PATIENT INSTRUCTIONS
Chronic Lung Disease: Preventing Lung Infections  Chronic lung diseases include chronic obstructive pulmonary disease (COPD), which includes chronic bronchitis and emphysema. Other chronic lung diseases include pulmonary fibrosis, sarcoidosis, and other conditions. When you have chronic lung diseases, it's very important to protect yourself from respiratory infections, like colds, the flu, and lung infections. Infections may cause your lung condition to worsen. Although you can't completely avoid them, there are things you can do to lessen the chance of infections.    Take precautions  Taking the following precautions can help you avoid illness:  · Remember to keep your hands away from your nose and mouth. Germs on your hands get into your respiratory system this way.  · Wash your hands often. When you wash them:  ¨ Use soap and warm water.  ¨ Rub your hands together well for at least 20 seconds.  ¨ Make sure to rinse them well.  ¨ Dry your hands on clean towels or air-dry them.  · Use hand  containing alcohol, if you are unable to wash your hands. Use the  after touching doorknobs, handles, and supermarket carts, for example, since lots of people touch them. Then wash your hands as soon as you can.  · To help prevent the flu, get a flu vaccination every year. This may be given at your healthcare provider's office, a drugstore, or pharmacy, or at work. Get your flu shot as soon as the vaccines are available in your area. This is usually around September each year.  · To help prevent pneumococcal pneumonia, get pneumonia vaccinations. Talk with your healthcare provider about which pneumococcal vaccinations you need.  · Try to stay away from people with respiratory infections, such as colds or the flu. Stay away from crowded places, like shopping centers or movie theatres during cold and flu season.  · If you smoke, think about quitting. In addition to causing or worsening many lung conditions, the  lung damage from smoking increases your risk of infections. Stay away from others who smoke, too. This is also harmful and increases your chance of infections.  Date Last Reviewed: 4/14/2016  © 3559-4225 [employer] [address] [place] [address], [place] [address]. All rights reserved. This information is not intended as a substitute for professional medical care. Always follow your healthcare professional's instructions.

## 2018-08-14 NOTE — PROGRESS NOTES
Subjective:      Patient ID: Clay Marcos is a 72 y.o. male.    Patient Active Problem List   Diagnosis    Dysmetabolic syndrome    History of basal cell carcinoma    Tortuous aorta    MVP (mitral valve prolapse)    Dyslipidemia    Paresthesia of left leg    DDD (degenerative disc disease), lumbar    Multiple lung nodules on CT - probable hematoma LLL    Obesity (BMI 30-39.9)    Pharyngoesophageal dysphagia    GERD (gastroesophageal reflux disease)    Hernia of abdominal wall - left lateral    Mild intermittent asthma without complication    Treatment-emergent central sleep apnea     Problem list has been reviewed.    Chief Complaint: Asthma; COPD; and Sleep Apnea    HPI    CT chest reviewed with patient who voiced understanding.     Patients reports NYHA I dyspnea    The patient does not have currently have symptoms / an exacerbation.       He reports a slight productive cough. No recent change in breathing.     His current respiratory therapy regimen is BREO, SINGULAIR, ALBUTEROL which provides relief. He seldom uses albuterol. He is  adherent with his regimen.     Answers for HPI/ROS submitted by the patient on 8/9/2018   Asthma  In the past 4 weeks, how much of the time did your asthma keep you from getting as much done at work, school, or at home?: none of the time  During the past 4 weeks, how often have you had shortness of breath?: not at all  During the past 4 weeks, how often did your asthma symptoms (Wheezing, coughing, shortness of breath, chest tightness or pain) wake you up at night or earlier that usual in the morning?: not at all  During the past 4 weeks, how often have you used your rescue inhaler or nebulizer medication (such as albuterol)?: not at all  How would you rate your asthma control during the past 4 weeks?: completely controlled   : 25         He is on ASV:  ASV settings 20 cm max pressure, 12/8 max/min EPAP, age over 3 max/min pressure support, rate auto. He definitely  thinks PAP is beneficial to his health and he wants to continue with PAP therapy.  He states that he is fine and has no specific pulmonary complaints    Compliance Summary  5/15/2018 - 8/12/2018 (90 days)  Days with Device Usage 90 days  Days without Device Usage 0 days  Percent Days with Device Usage 100.0%  Cumulative Usage 28 days 11 hrs. 10 mins. 59 secs.  Maximum Usage (1 Day) 9 hrs. 14 mins. 44 secs.  Average Usage (All Days) 7 hrs. 35 mins. 27 secs.  Average Usage (Days Used) 7 hrs. 35 mins. 27 secs.  Minimum Usage (1 Day) 1 hrs. 58 mins. 56 secs.  Percent of Days with Usage >= 4 Hours 97.8%  Percent of Days with Usage < 4 Hours 2.2%  Date Range  Total Blower Time 28 days 11 hrs. 11 mins. 28 secs.  autoSV Summary  90% of the time device EPAP was <= 9.3 cmH2O  Average Percent of Night in Periodic Breathing 0.3%  Average Time in Large Leak Per Day 0 secs.  Average Breath Rate 18.6 bpm  Average Minute Vent 10.5  Average AHI 0.6  Average Percent Night in Large Leak 0.0%  90% of the time device Pressure Support was <= 4.0 cmH2O  Average Pressure Support 3.1 cmH2O  Average EPAP 8.4 cmH2O      [place] Sleepiness Scale   EPWORTH SLEEPINESS SCALE 8/14/2018 12/14/2017 11/2/2017 8/15/2017 4/1/2017   Sitting and reading 2 1 1 1 0   Watching TV 2 1 1 1 3   Sitting, inactive in a public place (e.g. a theatre or a meeting) 0 1 0 0 1   As a passenger in a car for an hour without a break 1 0 0 0 2   Lying down to rest in the afternoon when circumstances permit 0 0 0 0 2   Sitting and talking to someone 0 0 0 0 0   Sitting quietly after a lunch without alcohol 2 0 1 0 0   In a car, while stopped for a few minutes in traffic 0 0 0 0 0   Total score 7 3 3 2 8       Immunization status reviewed and up to date.    A full  review of systems, past , family  and social histories was performed except as mentioned in the note above, these are non contributory to the main issues discussed  today    Previous Report Reviewed: lab reports,  office notes and radiology reports     The following portions of the patient's history were reviewed and updated as appropriate: He  has a past medical history of Basal cell carcinoma, BPH with urinary obstruction, Calcaneal spur of left foot, Colon polyps, DDD (degenerative disc disease), lumbar, Diverticulosis of colon, Dyslipidemia, Dysmetabolic syndrome, Fracture of rib of left side, History of tobacco use, Internal and external hemorrhoids without complication, MVP (mitral valve prolapse), Obesity, Osteoarthritis of both thumbs (9/15/2015), and Pneumonia (08/2016).  He  has a past surgical history that includes Hemorrhoid surgery; Excision basal cell carcinoma; Anal fistulotomy; Tonsillectomy; Sinus surgery (01/2017); Cataract extraction w/  intraocular lens implant (Right, 09/2017); ESOPHAGOGASTRODUODENOSCOPY (EGD) (N/A, 1/30/2017); SINUS SURGERY FUNCTIONAL ENDOSCOPIC WITH NAVIGATION  (Bilateral, 1/11/2017); SEPTOPLASTY (1/11/2017); and ESOPHAGOGASTRODUODENOSCOPY (EGD) (N/A, 11/28/2016).  His family history includes Breast cancer in his mother; Coronary artery disease in his brother, other, sister, and sister; Dementia in his sister; Heart attack in his brother, other, sister, and sister; Heart attacks under age 50 in his father; Hyperlipidemia in his brother; Vision loss in his other.  He  reports that he quit smoking about 40 years ago. His smoking use included cigarettes. He has a 12.00 pack-year smoking history. he has never used smokeless tobacco. He reports that he drinks about 9.0 oz of alcohol per week. He reports that he does not use drugs.  He has a current medication list which includes the following prescription(s): cetirizine, fluticasone, fluticasone-vilanterol, montelukast, multivitamin, and tamsulosin.  He has No Known Allergies..    Review of Systems   Constitutional: Negative for fever, chills, fatigue and night sweats.   HENT: Negative for nosebleeds, postnasal drip, rhinorrhea, sore throat  and congestion.    Eyes: Negative for itching.   Respiratory: Positive for apnea, snoring, cough and shortness of breath. Negative for hemoptysis, choking, chest tightness and orthopnea.    Cardiovascular: Negative for chest pain, palpitations and leg swelling.   Genitourinary: Negative for difficulty urinating and hematuria.   Endocrine: Negative for cold intolerance and heat intolerance.    Musculoskeletal: Positive for back pain. Negative for arthralgias, gait problem and joint swelling.   Gastrointestinal: Negative for nausea, vomiting, abdominal pain and abdominal distention.   Neurological: Negative for dizziness, syncope, light-headedness and headaches.   Hematological: No excessive bruising.   Psychiatric/Behavioral: Negative for confusion. The patient is not nervous/anxious.    All other systems reviewed and are negative.     Objective:   /80   Pulse 76   Resp 18   Ht 6' (1.829 m)   Wt 110.7 kg (244 lb)   SpO2 96%   BMI 33.09 kg/m²   Body mass index is 33.09 kg/m².    Physical Exam   Constitutional: He is oriented to person, place, and time. He appears well-developed and well-nourished.   HENT:   Head: Normocephalic and atraumatic.   Eyes: EOM are normal. Pupils are equal, round, and reactive to light.   Neck: Normal range of motion. Neck supple.   Cardiovascular: Normal rate and regular rhythm.   Pulmonary/Chest: Effort normal and breath sounds normal.   Abdominal: Soft. Bowel sounds are normal.   Musculoskeletal: Normal range of motion.   Neurological: He is alert and oriented to person, place, and time.   Skin: Skin is warm and dry.   Psychiatric: He has a normal mood and affect. His behavior is normal.   Nursing note and vitals reviewed.      Personal Diagnostic Review:  PAP complaince download.      Pulmonary function tests: FEV1: 2.86  (87 % predicted), FVC:  4.11 (93.2 % predicted), FEV1/FVC:  70, T.31 (83.6 % predicted), RV/TLVC: 26 (61.5 % predicted), DLCO: 23.63 (83.5 % predicted)   Normal PFT     CT Chest With Contrast: 08/14/18:   Chronic posttraumatic changes of the left lateral lung base and left upper abdomen are again noted including lateral intercostal herniation of abdominal contents, stable, including the spleen, and short-segment of splenic flexure colon. No evidence of hernia related complication.    The left posterior lung base nodule now measures 2.9 x 2.2 cm, previously 3.2 x 2.2 cm as remeasured on prior study of 08/09/2017, and 3.7 x 2.4 cm as measured on prior study of 09/01/2016.  This has demonstrated 2 year stability, and is most likely benign, representing posttraumatic splenosis, round atelectasis related to posttraumatic change, or fibrotic pleural-parenchymal scarring. Given the size of this nodule, would advocate for annual follow-up for a total of 4 years to confirm benignity (approximately September 2020    Assessment / plan:       Discussed diagnosis, its evaluation, treatment and usual course. All questions answered.    Problem List Items Addressed This Visit        Pulmonary    Multiple lung nodules on CT - probable hematoma LLL    Current Assessment & Plan     Stable and controlled. Repeat CT chest in 08/2019.          Relevant Orders    CT Chest With Contrast    Mild intermittent asthma without complication - Primary    Current Assessment & Plan     Asthma ROS: taking medications as instructed, no medication side effects noted, no significant ongoing wheezing or shortness of breath, using bronchodilator MDI less than twice a week.   New concerns: NONE  Exam: appears well, vitals normal, no respiratory distress, acyanotic, normal RR, chest clear, no wheezing, crepitations, rhonchi, normal symmetric air entry.   Assessment:  Asthma poorly controlled.    Plan: KEISHAIUE BREO & SINGULAIR. Current regimen is effective. Continue current regimen. PFT in 12 months.          Relevant Orders    Spirometry without bronchodilator       Endocrine    Obesity (BMI 30-39.9)  (Chronic)    Current Assessment & Plan     General weight loss/lifestyle modification strategies discussed (elicit support from others; identify saboteurs; non-food rewards, etc).  Diet interventions: low calorie (1000 kCal/d) deficit diet.            Other    Treatment-emergent central sleep apnea    Current Assessment & Plan     Continue ASV . (DME - [facility])     ASV settings 20 cm max pressure, 12/8 max/min EPAP, age over 3 max/min pressure support, rate set to auto.        Discussed therapeutic goals for positive airway pressure therapy: Ideal is usage 100% of nights for 6 - 8 hours per night. Minimum usage is 70% of night for at least 4 hours per night used. Pateint expressed understanding. All Questions answered.    Complaince download in 1 year.     CPAP supplies renewed.          Relevant Orders    CPAP/BIPAP SUPPLIES        TIME SPENT WITH PATIENT: Time spent: 40 minutes in face to face  discussion concerning diagnosis, prognosis, review of lab and test results, benefits of treatment as well as management of disease, counseling of patient and coordination of care between various health  care providers . Greater than half the time spent was used for coordination of care and counseling of patient.        Follow-up in about 1 year (around 8/14/2019), or if symptoms worsen or fail to improve, for Asthma, Obesity, MARCOS, Lung Nodule.

## 2018-11-16 ENCOUNTER — OFFICE VISIT (OUTPATIENT)
Dept: OTOLARYNGOLOGY | Facility: CLINIC | Age: 73
End: 2018-11-16
Payer: MEDICARE

## 2018-11-16 VITALS
DIASTOLIC BLOOD PRESSURE: 84 MMHG | BODY MASS INDEX: 33.61 KG/M2 | HEART RATE: 76 BPM | SYSTOLIC BLOOD PRESSURE: 125 MMHG | WEIGHT: 247.81 LBS

## 2018-11-16 DIAGNOSIS — Z98.890 HISTORY OF ENDOSCOPIC SINUS SURGERY: Primary | ICD-10-CM

## 2018-11-16 PROCEDURE — 99999 PR PBB SHADOW E&M-EST. PATIENT-LVL III: CPT | Mod: PBBFAC,HCNC,, | Performed by: OTOLARYNGOLOGY

## 2018-11-16 PROCEDURE — 99213 OFFICE O/P EST LOW 20 MIN: CPT | Mod: 25,HCNC,S$GLB, | Performed by: OTOLARYNGOLOGY

## 2018-11-16 PROCEDURE — 1101F PT FALLS ASSESS-DOCD LE1/YR: CPT | Mod: CPTII,HCNC,S$GLB, | Performed by: OTOLARYNGOLOGY

## 2018-11-16 PROCEDURE — 31231 NASAL ENDOSCOPY DX: CPT | Mod: HCNC,S$GLB,, | Performed by: OTOLARYNGOLOGY

## 2018-11-16 NOTE — PROGRESS NOTES
Subjective:   Patient: Clay Marcos [id number], :1945   Visit date:2018 1:21 PM    Chief Complaint:  Follow-up (c/o nasal drip with some green mucus that he cough's up )    HPI:  Clay is a 73 y.o. male who is here for evaluation of chronic post nasal drip and nasal congestion. He c/o persistent thick, discolored mucus he is coughing up. He is constantly clearing his throat. Denies rhinorrhea. No recent pulmonary infections.           FESS 2017    Rast significant for Cat dander    Review of Systems:  -     Allergic/Immunologic: has No Known Allergies..  -     Constitutional: Current temp:      His meds, allergies, medical, surgical, social & family histories were reviewed & updated:  -     He has a current medication list which includes the following prescription(s): cetirizine, fluticasone, fluticasone-vilanterol, montelukast, multivitamin, and tamsulosin.  -     He  has a past medical history of Basal cell carcinoma, BPH with urinary obstruction, Calcaneal spur of left foot, Colon polyps, DDD (degenerative disc disease), lumbar, Diverticulosis of colon, Dyslipidemia, Dysmetabolic syndrome, Fracture of rib of left side, History of tobacco use, Internal and external hemorrhoids without complication, MVP (mitral valve prolapse), Obesity, Osteoarthritis of both thumbs (9/15/2015), and Pneumonia (2016).   -     He does not have any pertinent problems on file.   -     He  has a past surgical history that includes Hemorrhoid surgery; Excision basal cell carcinoma; Anal fistulotomy; Tonsillectomy; Sinus surgery (2017); Cataract extraction w/  intraocular lens implant (Right, 2017); ESOPHAGOGASTRODUODENOSCOPY (EGD) (N/A, 2017); SINUS SURGERY FUNCTIONAL ENDOSCOPIC WITH NAVIGATION  (Bilateral, 2017); SEPTOPLASTY (2017); and ESOPHAGOGASTRODUODENOSCOPY (EGD) (N/A, 2016).  -     He  reports that he quit smoking about 40 years ago. His smoking use included cigarettes. He has a 12.00  pack-year smoking history. he has never used smokeless tobacco. He reports that he drinks about 9.0 oz of alcohol per week. He reports that he does not use drugs.  -     His family history includes Breast cancer in his mother; Coronary artery disease in his brother, other, sister, and sister; Dementia in his sister; Heart attack in his brother, other, sister, and sister; Heart attacks under age 50 in his father; Hyperlipidemia in his brother; Vision loss in his other.  -     He has No Known Allergies.    Objective:     Physical Exam:  Vitals:  /84   Pulse 76   Wt 112.4 kg (247 lb 12.8 oz)   BMI 33.61 kg/m²   Appearance:  Well-developed, well-nourished.  Communication:  Able to communicate, no hoarseness.  Head & Face:  Normocephalic, atraumatic, no sinus tenderness, normal facial strength.  Eyes:  Extraocular motions intact.  Ears:  Otoscopy of external auditory canals and tympanic membranes was normal, clinical speech reception thresholds grossly intact, no mass/lesion of auricle.  Nose:  No masses/lesions of external nose, nasal mucosa, septum, and turbinates were within normal limits.  Mouth:  No mass/lesion of lips, teeth, gums, hard/soft palate, tongue, tonsils, or oropharynx.  Neck & Lymphatics:  No cervical lymphadenopathy, no neck mass/crepitus/ asymmetry, trachea is midline, no thyroid enlargement/tenderness/mass.  Neuro/Psych: Alert with normal mood and affect.   Abdominal: Normal appearance.   Respiration/Chest:  Symmetric expansion during respiration, normal respiratory effort.  Skin:  Warm and intact  Cardiovascular:  No peripheral vascular edema or varicosities.    Assessment & Plan:   Clay was seen today for follow-up.    Diagnoses and all orders for this visit:    History of endoscopic sinus surgery          Mucinex (regular) x 2-3 weeks  Nasal saline irrigations      Clay Philippe MD.    ------------------------------------------------------------------------------------------------------------------------------    Patient: Clay Marcos [id number], :1945  Procedure date:2018  Patient's medications, allergies, past medical, surgical, social and family histories were reviewed and updated as appropriate.  Chief Complaint:  Follow-up (c/o nasal drip with some green mucus that he cough's up )    HPI:  Clay is a 73 y.o. male with chronic sinusitis.      Procedure: Risks, benefits, and alternatives of the procedure were discussed with the patient, and the patient consented to the nasal endoscopy.  The nasal cavity was sprayed with a topical decongestant and anesthetic (if needed). The endoscope was passed into each nostril and each nasal cavity was visualized.  On each side the nasal cavity, sinuses (if open), turbinates, and septum were examined with the findings described below. At the end of the examination, the scope was removed. The patient tolerated the procedure well with no complications.     Endoscopic Sinonasal Exam Findings:  -     Sinuses examined: bilateral maxillary and bilateral ethmoid anterior/posterior            The right sinus(es) have normal mucosa            The left sinus(es) have normal mucosa  -     Nasal secretions: - no discolored secretions noted - bilaterally  -     Nasal septum: no significant deviation and no perforation appreciated   -     Inferior turbinate: - normal mucosa without significant hypertrophy - bilaterally  -     Middle turbinate: - normal mucosa without significant hypertrophy - bilaterally  -     Other findings: - no mass or obstructive lesion -    Assessment & Plan:  See today's clinic note

## 2018-12-04 ENCOUNTER — OFFICE VISIT (OUTPATIENT)
Dept: FAMILY MEDICINE | Facility: CLINIC | Age: 73
End: 2018-12-04
Payer: MEDICARE

## 2018-12-04 VITALS
WEIGHT: 248.88 LBS | TEMPERATURE: 98 F | SYSTOLIC BLOOD PRESSURE: 122 MMHG | DIASTOLIC BLOOD PRESSURE: 80 MMHG | BODY MASS INDEX: 33.71 KG/M2 | HEIGHT: 72 IN | HEART RATE: 81 BPM

## 2018-12-04 DIAGNOSIS — J45.20 MILD INTERMITTENT ASTHMA WITHOUT COMPLICATION: ICD-10-CM

## 2018-12-04 DIAGNOSIS — N13.8 BPH WITH URINARY OBSTRUCTION: ICD-10-CM

## 2018-12-04 DIAGNOSIS — Z00.00 PREVENTATIVE HEALTH CARE: Primary | ICD-10-CM

## 2018-12-04 DIAGNOSIS — Z12.5 PROSTATE CANCER SCREENING: ICD-10-CM

## 2018-12-04 DIAGNOSIS — E88.810 DYSMETABOLIC SYNDROME: Chronic | ICD-10-CM

## 2018-12-04 DIAGNOSIS — E78.5 DYSLIPIDEMIA: Chronic | ICD-10-CM

## 2018-12-04 DIAGNOSIS — I77.1 TORTUOUS AORTA: ICD-10-CM

## 2018-12-04 DIAGNOSIS — N40.1 BPH WITH URINARY OBSTRUCTION: ICD-10-CM

## 2018-12-04 PROCEDURE — 99999 PR PBB SHADOW E&M-EST. PATIENT-LVL III: CPT | Mod: PBBFAC,HCNC,, | Performed by: FAMILY MEDICINE

## 2018-12-04 PROCEDURE — 99499 UNLISTED E&M SERVICE: CPT | Mod: S$GLB,,, | Performed by: FAMILY MEDICINE

## 2018-12-04 PROCEDURE — 99397 PER PM REEVAL EST PAT 65+ YR: CPT | Mod: HCNC,S$GLB,, | Performed by: FAMILY MEDICINE

## 2018-12-05 NOTE — PROGRESS NOTES
CHIEF COMPLAINT:  This is a 73-year-old male here for preventive health exam.    SUBJECTIVE: Patient is doing well without complaints except for knee pain.  He has questions related to the use of Celebrex.  Patient has BPH with urinary symptoms for which he takes Flomax 0.4 mg daily.  He continues to see Pulmonary for mild asthma and has been diagnosed with central sleep apnea.  He uses a type of CPAP and Breo inhaler 1 puff daily.  He takes Singulair 10 mg daily.     Eye exam October 2018. Colonoscopy April 2012 due again April 2022. Pneumovax March 2012. Zostavax April 2011. TD booster August 2012. Flu vaccine October 2018.  Prevnar August 2015.     ROS:  GENERAL: Patient denies fever, chills, night sweats. Patient denies weight loss. Patient denies anorexia, fatigue, weakness or swollen glands.  SKIN: Patient denies rash or hair loss.  HEENT: Patient denies sore throat, ear pain, hearing loss, nasal congestion, or runny nose. Patient denies visual disturbance, eye irritation or discharge.  LUNGS: Patient denies cough, wheeze or hemoptysis.  CARDIOVASCULAR: Patient denies chest pain, shortness of breath, palpitations, syncope or lower extremity edema.  GI: Patient denies abdominal pain, nausea, vomiting, diarrhea, constipation, blood in stool or melena.  GENITOURINARY: Patient denies dysuria, frequency, hematuria, nocturia, urgency or incontinence.  MUSCULOSKELETAL: Patient denies joint pain, swelling, redness or warmth.  NEUROLOGIC: Patient denies headache, vertigo, paresthesias, weakness in limb, dysarthria, dysphagia or abnormality of gait.  PSYCHIATRIC: Patient denies anxiety, depression, or memory loss.     OBJECTIVE:   GENERAL: Well-developed well-nourished, obese, white male alert and oriented x3, in no acute distress. Memory, judgment and cognition without deficit. Weight gain of 10 pounds in the last year.  SKIN: Clear without rash. Normal color and tone.   HEENT: Eyes: Clear conjunctivae. Pupils equal  reactive to light and accommodation. Ears: Clear TMs clear canals. Nose: Without congestion. Pharynx: Without injection or exudates.  NECK: Supple, normal range of motion. No masses, nodes or enlarged thyroid. No JVD. Carotids 2+ and equal. No bruits.  LUNGS: Clear to auscultation. Normal respiratory effort.  CARDIOVASCULAR: Regular rhythm, normal S1, S2 without murmur, gallop or rub.  BACK: No CVA or spinal tenderness.  ABDOMEN: Normal appearance. Active bowel sounds. Soft, nontender without mass or organomegaly. No rebound or guarding.  EXTREMITIES: Without cyanosis, clubbing or edema. Distal pulses 2+ and equal. Normal range of motion in all extremities. No joint effusion, erythema or warmth. Onychomycosis toenails.  NEUROLOGIC: Cranial nerves II through XII without deficit. Motor strength equal bilaterally. Sensation normal to touch. Deep tendon reflexes 2+ and equal. Gait without abnormality. No tremor. Negative cerebellar signs.   RUSTY: Minimally enlarged prostate, smooth, nontender. No masses. Anorectal exam: No masses, nontender. Heme negative stool x2.    ASSESSMENT:  1. Preventative health care    2. Mild intermittent asthma without complication    3. Dyslipidemia    4. Dysmetabolic syndrome    5. Tortuous aorta    6. BPH with urinary obstruction    7. Prostate cancer screening      PLAN:   1.  Weight reduction.  Exercise regularly.  2.  Age-appropriate counseling.  3.  Fasting lab.  4.  Refill medications as needed.  5.  Follow-up annually.  6.  Discussed the use of Celebrex or Tylenol Arthritis Strength for joint pain. Patient declines prescription at this time.

## 2018-12-11 ENCOUNTER — LAB VISIT (OUTPATIENT)
Dept: LAB | Facility: HOSPITAL | Age: 73
End: 2018-12-11
Attending: FAMILY MEDICINE
Payer: MEDICARE

## 2018-12-11 DIAGNOSIS — Z12.5 PROSTATE CANCER SCREENING: ICD-10-CM

## 2018-12-11 DIAGNOSIS — E88.810 DYSMETABOLIC SYNDROME: Chronic | ICD-10-CM

## 2018-12-11 DIAGNOSIS — E78.5 DYSLIPIDEMIA: Chronic | ICD-10-CM

## 2018-12-11 LAB
ALBUMIN SERPL BCP-MCNC: 3.7 G/DL
ALP SERPL-CCNC: 128 U/L
ALT SERPL W/O P-5'-P-CCNC: 18 U/L
ANION GAP SERPL CALC-SCNC: 9 MMOL/L
AST SERPL-CCNC: 24 U/L
BASOPHILS # BLD AUTO: 0.05 K/UL
BASOPHILS NFR BLD: 0.7 %
BILIRUB SERPL-MCNC: 0.6 MG/DL
BUN SERPL-MCNC: 18 MG/DL
CALCIUM SERPL-MCNC: 9.3 MG/DL
CHLORIDE SERPL-SCNC: 104 MMOL/L
CHOLEST SERPL-MCNC: 152 MG/DL
CHOLEST/HDLC SERPL: 3.3 {RATIO}
CO2 SERPL-SCNC: 26 MMOL/L
COMPLEXED PSA SERPL-MCNC: 1.1 NG/ML
CREAT SERPL-MCNC: 1.1 MG/DL
DIFFERENTIAL METHOD: ABNORMAL
EOSINOPHIL # BLD AUTO: 0.6 K/UL
EOSINOPHIL NFR BLD: 7.9 %
ERYTHROCYTE [DISTWIDTH] IN BLOOD BY AUTOMATED COUNT: 13.1 %
EST. GFR  (AFRICAN AMERICAN): >60 ML/MIN/1.73 M^2
EST. GFR  (NON AFRICAN AMERICAN): >60 ML/MIN/1.73 M^2
GLUCOSE SERPL-MCNC: 100 MG/DL
HCT VFR BLD AUTO: 44 %
HDLC SERPL-MCNC: 46 MG/DL
HDLC SERPL: 30.3 %
HGB BLD-MCNC: 14.9 G/DL
IMM GRANULOCYTES # BLD AUTO: 0.02 K/UL
IMM GRANULOCYTES NFR BLD AUTO: 0.3 %
LDLC SERPL CALC-MCNC: 83.8 MG/DL
LYMPHOCYTES # BLD AUTO: 1.4 K/UL
LYMPHOCYTES NFR BLD: 20.3 %
MCH RBC QN AUTO: 30.7 PG
MCHC RBC AUTO-ENTMCNC: 33.9 G/DL
MCV RBC AUTO: 91 FL
MONOCYTES # BLD AUTO: 0.9 K/UL
MONOCYTES NFR BLD: 12.3 %
NEUTROPHILS # BLD AUTO: 4.2 K/UL
NEUTROPHILS NFR BLD: 58.5 %
NONHDLC SERPL-MCNC: 106 MG/DL
NRBC BLD-RTO: 1 /100 WBC
PLATELET # BLD AUTO: 222 K/UL
PMV BLD AUTO: 9.7 FL
POTASSIUM SERPL-SCNC: 4.9 MMOL/L
PROT SERPL-MCNC: 7.3 G/DL
RBC # BLD AUTO: 4.85 M/UL
SODIUM SERPL-SCNC: 139 MMOL/L
TRIGL SERPL-MCNC: 111 MG/DL
TSH SERPL DL<=0.005 MIU/L-ACNC: 2.15 UIU/ML
WBC # BLD AUTO: 7.1 K/UL

## 2018-12-11 PROCEDURE — 80053 COMPREHEN METABOLIC PANEL: CPT | Mod: HCNC

## 2018-12-11 PROCEDURE — 85025 COMPLETE CBC W/AUTO DIFF WBC: CPT | Mod: HCNC

## 2018-12-11 PROCEDURE — 36415 COLL VENOUS BLD VENIPUNCTURE: CPT | Mod: HCNC,PO

## 2018-12-11 PROCEDURE — 84153 ASSAY OF PSA TOTAL: CPT | Mod: HCNC

## 2018-12-11 PROCEDURE — 84443 ASSAY THYROID STIM HORMONE: CPT | Mod: HCNC

## 2018-12-11 PROCEDURE — 80061 LIPID PANEL: CPT | Mod: HCNC

## 2019-01-06 RX ORDER — TAMSULOSIN HYDROCHLORIDE 0.4 MG/1
0.4 CAPSULE ORAL NIGHTLY
Qty: 90 CAPSULE | Refills: 3 | Status: SHIPPED | OUTPATIENT
Start: 2019-01-06 | End: 2020-02-09

## 2019-03-28 ENCOUNTER — PATIENT MESSAGE (OUTPATIENT)
Dept: FAMILY MEDICINE | Facility: CLINIC | Age: 74
End: 2019-03-28

## 2019-04-04 RX ORDER — FLUTICASONE PROPIONATE 50 MCG
SPRAY, SUSPENSION (ML) NASAL
Qty: 48 G | Refills: 11 | Status: SHIPPED | OUTPATIENT
Start: 2019-04-04 | End: 2019-07-22

## 2019-04-04 RX ORDER — MONTELUKAST SODIUM 10 MG/1
TABLET ORAL
Qty: 90 TABLET | Refills: 3 | Status: SHIPPED | OUTPATIENT
Start: 2019-04-04 | End: 2020-02-27

## 2019-06-19 ENCOUNTER — PES CALL (OUTPATIENT)
Dept: ADMINISTRATIVE | Facility: CLINIC | Age: 74
End: 2019-06-19

## 2019-07-22 ENCOUNTER — OFFICE VISIT (OUTPATIENT)
Dept: INTERNAL MEDICINE | Facility: CLINIC | Age: 74
End: 2019-07-22
Payer: MEDICARE

## 2019-07-22 VITALS
SYSTOLIC BLOOD PRESSURE: 108 MMHG | HEART RATE: 70 BPM | DIASTOLIC BLOOD PRESSURE: 62 MMHG | HEIGHT: 71 IN | OXYGEN SATURATION: 97 % | BODY MASS INDEX: 31.57 KG/M2 | TEMPERATURE: 97 F | WEIGHT: 225.5 LBS

## 2019-07-22 DIAGNOSIS — J45.20 MILD INTERMITTENT ASTHMA WITHOUT COMPLICATION: ICD-10-CM

## 2019-07-22 DIAGNOSIS — R20.2 PARESTHESIA OF LEFT LEG: ICD-10-CM

## 2019-07-22 DIAGNOSIS — Z85.828 HISTORY OF BASAL CELL CARCINOMA (BCC) OF SKIN: ICD-10-CM

## 2019-07-22 DIAGNOSIS — Z00.00 ENCOUNTER FOR PREVENTIVE HEALTH EXAMINATION: Primary | ICD-10-CM

## 2019-07-22 DIAGNOSIS — I34.1 MVP (MITRAL VALVE PROLAPSE): ICD-10-CM

## 2019-07-22 DIAGNOSIS — M51.36 DDD (DEGENERATIVE DISC DISEASE), LUMBAR: ICD-10-CM

## 2019-07-22 DIAGNOSIS — N40.1 BPH WITH URINARY OBSTRUCTION: ICD-10-CM

## 2019-07-22 DIAGNOSIS — E78.5 DYSLIPIDEMIA: Chronic | ICD-10-CM

## 2019-07-22 DIAGNOSIS — N13.8 BPH WITH URINARY OBSTRUCTION: ICD-10-CM

## 2019-07-22 DIAGNOSIS — G47.39 TREATMENT-EMERGENT CENTRAL SLEEP APNEA: ICD-10-CM

## 2019-07-22 DIAGNOSIS — I77.1 TORTUOUS AORTA: ICD-10-CM

## 2019-07-22 DIAGNOSIS — K21.00 GASTROESOPHAGEAL REFLUX DISEASE WITH ESOPHAGITIS: ICD-10-CM

## 2019-07-22 DIAGNOSIS — K43.9 HERNIA OF ABDOMINAL WALL: ICD-10-CM

## 2019-07-22 DIAGNOSIS — R91.8 MULTIPLE LUNG NODULES ON CT: ICD-10-CM

## 2019-07-22 DIAGNOSIS — E66.9 OBESITY (BMI 30-39.9): ICD-10-CM

## 2019-07-22 DIAGNOSIS — E88.810 DYSMETABOLIC SYNDROME: Chronic | ICD-10-CM

## 2019-07-22 PROCEDURE — G0439 PPPS, SUBSEQ VISIT: HCPCS | Mod: HCNC,S$GLB,, | Performed by: NURSE PRACTITIONER

## 2019-07-22 PROCEDURE — 99499 RISK ADDL DX/OHS AUDIT: ICD-10-PCS | Mod: S$GLB,,, | Performed by: NURSE PRACTITIONER

## 2019-07-22 PROCEDURE — 99999 PR PBB SHADOW E&M-EST. PATIENT-LVL IV: ICD-10-PCS | Mod: PBBFAC,HCNC,, | Performed by: NURSE PRACTITIONER

## 2019-07-22 PROCEDURE — G0439 PR MEDICARE ANNUAL WELLNESS SUBSEQUENT VISIT: ICD-10-PCS | Mod: HCNC,S$GLB,, | Performed by: NURSE PRACTITIONER

## 2019-07-22 PROCEDURE — 99499 UNLISTED E&M SERVICE: CPT | Mod: S$GLB,,, | Performed by: NURSE PRACTITIONER

## 2019-07-22 PROCEDURE — 99999 PR PBB SHADOW E&M-EST. PATIENT-LVL IV: CPT | Mod: PBBFAC,HCNC,, | Performed by: NURSE PRACTITIONER

## 2019-07-22 NOTE — PATIENT INSTRUCTIONS
Counseling and Referral of Other Preventative  (Italic type indicates deductible and co-insurance are waived)    Patient Name: Clay Marcos  Today's Date: 7/22/2019    Health Maintenance       Date Due Completion Date    Shingles Vaccine (2 of 3) 05/30/2011 4/4/2011    Influenza Vaccine 08/01/2019 10/3/2018    PROSTATE-SPECIFIC ANTIGEN 12/11/2019 12/11/2018    Colonoscopy 04/25/2022 4/25/2012    TETANUS VACCINE 08/14/2022 8/14/2012    Lipid Panel 12/11/2023 12/11/2018        No orders of the defined types were placed in this encounter.    The following information is provided to all patients.  This information is to help you find resources for any of the problems found today that may be affecting your health:                Living healthy guide: www.[facility].[place].gov      Understanding Diabetes: www.diabetes.org      Eating healthy: www.cdc.gov/healthyweight      CDC home safety checklist: www.cdc.gov/steadi/patient.html      Agency on Aging: www.goea.[place].gov      Alcoholics anonymous (AA): www.aa.org      Physical Activity: www.darrick.nih.gov/ce9ocsf      Tobacco use: www.quitwithusla.org

## 2019-07-22 NOTE — PROGRESS NOTES
"Clay Marcos presented for a  Medicare AWV and comprehensive Health Risk Assessment today. The following components were reviewed and updated:    · Medical history  · Family History  · Social history  · Allergies and Current Medications  · Health Risk Assessment  · Health Maintenance  · Care Team     ** See Completed Assessments for Annual Wellness Visit within the encounter summary.**       The following assessments were completed:  · Living Situation  · CAGE  · Depression Screening  · Timed Get Up and Go  · Whisper Test  · Cognitive Function Screening  · Nutrition Screening  · ADL Screening  · PAQ Screening    Vitals:    07/22/19 1311   BP: 108/62   BP Location: Left arm   Patient Position: Sitting   Pulse: 70   Temp: 96.8 °F (36 °C)   TempSrc: Tympanic   SpO2: 97%   Weight: 102.3 kg (225 lb 8.5 oz)   Height: 5' 11" (1.803 m)     Body mass index is 31.46 kg/m².  Physical Exam   Constitutional: He is oriented to person, place, and time. Vital signs are normal. He appears well-developed and well-nourished.   HENT:   Head: Normocephalic and atraumatic.   Neck: Normal range of motion.   Cardiovascular: Normal rate and regular rhythm.   Pulmonary/Chest: Effort normal and breath sounds normal.   Abdominal:       Musculoskeletal: Normal range of motion.   Neurological: He is alert and oriented to person, place, and time.   Skin: Skin is warm.   Psychiatric: He has a normal mood and affect. His behavior is normal.              Diagnoses and health risks identified today and associated recommendations/orders:    1. Encounter for preventive health examination  Due for annual exam Nov-Dec 2019.    2. DDD (degenerative disc disease), lumbar  Stable.  Will continue current treatment plan.     3. Mild intermittent asthma without complication  Stable.  Taking Breo.  Has routine pulmonary visits. Will continue current treatment plan.     4. Multiple lung nodules on CT - probable hematoma LLL  Stable CT chest 08/2018.  Has routine " pulmonary visits.Will continue current treatment plan.     5. Dyslipidemia  Stable.  Will continue current treatment plan.   Component      Latest Ref Rng & Units 12/11/2018 11/22/2017   Cholesterol      120 - 199 mg/dL 152 141   Triglycerides      30 - 150 mg/dL 111 107   HDL      40 - 75 mg/dL 46 43   LDL Cholesterol External      63.0 - 159.0 mg/dL 83.8 76.6   Hdl/Cholesterol Ratio      20.0 - 50.0 % 30.3 30.5   Total Cholesterol/HDL Ratio      2.0 - 5.0 3.3 3.3   Non-HDL Cholesterol      mg/dL 106 98       6. MVP (mitral valve prolapse)  Stable.  Will continue current treatment plan.     7. Tortuous aorta  Stable.  Will continue current treatment plan.     8. BPH with urinary obstruction  Stable.  Will continue current treatment plan.     9. Dysmetabolic syndrome  Stable.  Will continue current treatment plan.     10. Obesity (BMI 30-39.9)  Stable.  Lost 23 lbs since Dec. 2018.  Currently exercises daily and following a diet.     11. Gastroesophageal reflux disease with esophagitis  Stable symptoms since weigh loss.  Continue current treatment plan.     12. Hernia of abdominal wall - left lateral  Stable.  Will continue current treatment plan.     13. Paresthesia of left leg  Stable.  Will continue current treatment plan.     14. Treatment-emergent central sleep apnea  Stable.  Using CPAP.  Will continue current treatment plan.    15. History of basal cell carcinoma (BCC) of skin  Stable.  Has dermatology visits twice a year.        Provided Clay with a 5-10 year written screening schedule and personal prevention plan. Recommendations were developed using the USPSTF age appropriate recommendations. Education, counseling, and referrals were provided as needed. After Visit Summary printed and given to patient which includes a list of additional screenings\tests needed.    Follow up for HRA.    Annika Luther NP

## 2019-08-12 ENCOUNTER — OFFICE VISIT (OUTPATIENT)
Dept: PULMONOLOGY | Facility: CLINIC | Age: 74
End: 2019-08-12
Payer: MEDICARE

## 2019-08-12 ENCOUNTER — HOSPITAL ENCOUNTER (OUTPATIENT)
Dept: RADIOLOGY | Facility: HOSPITAL | Age: 74
Discharge: HOME | End: 2019-08-12
Attending: INTERNAL MEDICINE
Payer: MEDICARE

## 2019-08-12 ENCOUNTER — CLINICAL SUPPORT (OUTPATIENT)
Dept: PULMONOLOGY | Facility: CLINIC | Age: 74
End: 2019-08-12
Payer: MEDICARE

## 2019-08-12 VITALS
SYSTOLIC BLOOD PRESSURE: 106 MMHG | OXYGEN SATURATION: 98 % | BODY MASS INDEX: 30.34 KG/M2 | RESPIRATION RATE: 18 BRPM | HEART RATE: 69 BPM | WEIGHT: 224 LBS | HEIGHT: 72 IN | DIASTOLIC BLOOD PRESSURE: 60 MMHG

## 2019-08-12 DIAGNOSIS — R91.8 MULTIPLE LUNG NODULES ON CT: Chronic | ICD-10-CM

## 2019-08-12 DIAGNOSIS — G47.39 TREATMENT-EMERGENT CENTRAL SLEEP APNEA: Chronic | ICD-10-CM

## 2019-08-12 DIAGNOSIS — R91.8 MULTIPLE LUNG NODULES ON CT: ICD-10-CM

## 2019-08-12 DIAGNOSIS — J45.20 MILD INTERMITTENT ASTHMA WITHOUT COMPLICATION: Primary | Chronic | ICD-10-CM

## 2019-08-12 DIAGNOSIS — E66.9 OBESITY (BMI 30-39.9): Chronic | ICD-10-CM

## 2019-08-12 LAB
BRPFT: NORMAL
FEF 25 75 LLN: 0.96
FEF 25 75 PRE REF: 74.3 %
FEF 25 75 REF: 2.34
FEV1 FVC LLN: 61
FEV1 FVC PRE REF: 92 %
FEV1 FVC REF: 75
FEV1 LLN: 2.3
FEV1 PRE REF: 92.2 %
FEV1 REF: 3.25
FVC LLN: 3.2
FVC PRE REF: 99.5 %
FVC REF: 4.37
PEF LLN: 5.96
PEF PRE REF: 126.5 %
PEF REF: 8.41
PRE FEF 25 75: 1.74 L/S (ref 0.96–3.73)
PRE FET 100: 6.93 SEC
PRE FEV1 FVC: 68.96 % (ref 60.98–88.95)
PRE FEV1: 3 L (ref 2.3–4.2)
PRE FVC: 4.34 L (ref 3.2–5.54)
PRE PEF: 10.64 L/S (ref 5.96–10.86)

## 2019-08-12 PROCEDURE — 99499 UNLISTED E&M SERVICE: CPT | Mod: S$GLB,,, | Performed by: INTERNAL MEDICINE

## 2019-08-12 PROCEDURE — 99215 PR OFFICE/OUTPT VISIT, EST, LEVL V, 40-54 MIN: ICD-10-PCS | Mod: 25,HCNC,S$GLB, | Performed by: INTERNAL MEDICINE

## 2019-08-12 PROCEDURE — 99999 PR PBB SHADOW E&M-EST. PATIENT-LVL III: CPT | Mod: PBBFAC,HCNC,, | Performed by: INTERNAL MEDICINE

## 2019-08-12 PROCEDURE — 99499 RISK ADDL DX/OHS AUDIT: ICD-10-PCS | Mod: S$GLB,,, | Performed by: INTERNAL MEDICINE

## 2019-08-12 PROCEDURE — 94010 BREATHING CAPACITY TEST: ICD-10-PCS | Mod: HCNC,S$GLB,, | Performed by: INTERNAL MEDICINE

## 2019-08-12 PROCEDURE — 1101F PT FALLS ASSESS-DOCD LE1/YR: CPT | Mod: HCNC,CPTII,S$GLB, | Performed by: INTERNAL MEDICINE

## 2019-08-12 PROCEDURE — 99215 OFFICE O/P EST HI 40 MIN: CPT | Mod: 25,HCNC,S$GLB, | Performed by: INTERNAL MEDICINE

## 2019-08-12 PROCEDURE — 71250 CT THORAX DX C-: CPT | Mod: TC,HCNC

## 2019-08-12 PROCEDURE — 99999 PR PBB SHADOW E&M-EST. PATIENT-LVL III: ICD-10-PCS | Mod: PBBFAC,HCNC,, | Performed by: INTERNAL MEDICINE

## 2019-08-12 PROCEDURE — 94010 BREATHING CAPACITY TEST: CPT | Mod: HCNC,S$GLB,, | Performed by: INTERNAL MEDICINE

## 2019-08-12 PROCEDURE — 1101F PR PT FALLS ASSESS DOC 0-1 FALLS W/OUT INJ PAST YR: ICD-10-PCS | Mod: HCNC,CPTII,S$GLB, | Performed by: INTERNAL MEDICINE

## 2019-08-12 NOTE — ASSESSMENT & PLAN NOTE
Asthma ROS: taking medications as instructed, no medication side effects noted, no significant ongoing wheezing or shortness of breath, using bronchodilator MDI less than twice a week.   New concerns: NONE  Exam: appears well, vitals normal, no respiratory distress, acyanotic, normal RR, chest clear, no wheezing, crepitations, rhonchi, normal symmetric air entry.   Assessment:  Asthma poorly controlled.    Plan: SINGULAIR. Current regimen is effective. Continue current regimen.Spirometry in 12 months.

## 2019-08-12 NOTE — ASSESSMENT & PLAN NOTE
Stable and controlled. Continue radiologic monitoring. Benign etiology favoured.      Fleischner Society Guidelines:    High risk patient:   Smoking history, history of malignancy or risk factors for malignancy.( non-solid ground glass opacities and partially solid nodules may require longer follow up to exclude indolent adenocarcinoma)      Nodule size Low risk patient High risk patient   4 mm  No follow up Follow up CT in 12 months. If unchanged, no further follow up.   4 - 6 mm Follow up CT in 12 months; if unchanged, no further follow up.  Initial follow up CT in 6 - 12 months, then at 18 - 24 months if no change.      6 - 8 mm  Initial follow up CT at 6 - 12 months and at 18 months if no change.  Initial follow up CT at 3 - 6 months. Then at 9-12 months and 24 months if no change.      > 8 mm Initial follow up CT at 3, 6, 9 and 24 months, dynamic contrast enhanced CT, PET-CT and/or biopsy. Initial follow up CT at 3, 6, 9 and 24 months, dynamic contrast enhanced CT, PET-CT and/or biopsy.

## 2019-08-12 NOTE — PROGRESS NOTES
Subjective:      Patient ID: Clay Marcos is a 73 y.o. male.    Patient Active Problem List   Diagnosis    Dysmetabolic syndrome    Tortuous aorta    MVP (mitral valve prolapse)    Dyslipidemia    Paresthesia of left leg    DDD (degenerative disc disease), lumbar    Multiple lung nodules on CT    Obesity (BMI 30-39.9)    GERD (gastroesophageal reflux disease)    Hernia of abdominal wall - left lateral    Mild intermittent asthma without complication    Treatment-emergent central sleep apnea    BPH with urinary obstruction     Problem list has been reviewed.    Chief Complaint: Asthma and Sleep Apnea    HPI    Spirometry and CT chest reviewed with patient who voiced understanding.     Patients reports no dyspnea    The patient does not have currently have symptoms / an exacerbation.       He reports a slight productive cough. No recent change in breathing.     His current respiratory therapy regimen is SINGULAIR  which provides relief. He seldom uses albuterol. He is  adherent with his regimen.     Answers for HPI/ROS submitted by the patient on 8/11/2019   Asthma  In the past 4 weeks, how much of the time did your asthma keep you from getting as much done at work, school, or at home?: none of the time  During the past 4 weeks, how often have you had shortness of breath?: not at all  During the past 4 weeks, how often did your asthma symptoms (Wheezing, coughing, shortness of breath, chest tightness or pain) wake you up at night or earlier that usual in the morning?: not at all  During the past 4 weeks, how often have you used your rescue inhaler or nebulizer medication (such as albuterol)?: not at all  How would you rate your asthma control during the past 4 weeks?: completely controlled   : 25       Current Disease Severity  no daytime asthma symptoms.   no nighttime asthma symptoms.   Frequency B-agonist use:less than or equal to 2 days per week.   Number of urgent/emergent visit in last year:  0  Current limitations in activity from asthma: none.   Number of days of school or work missed in the last month: not applicable.     Asthma Classification (General Symptom Frequency):  Mild Intermittent (< 2 x wk)  Mild Persistent (> 2 x wk, < daily)  Moderate Persistent (daily; almost daily inhaler)  Severe Persistent (continual; limited activities)    He is on ASV:  ASV settings 20 cm max pressure, 12/8 max/min EPAP, age over 3 max/min pressure support, rate auto. He definitely thinks PAP is beneficial to his health and he wants to continue with PAP therapy.  He states that he is fine and has no specific pulmonary complaints.    Compliance Summary  5/14/2019 - 8/11/2019 (90 days)  Days with Device Usage 88 days  Days without Device Usage 2 days  Percent Days with Device Usage 97.8%  Cumulative Usage 19 days 11 hrs. 56 mins. 40 secs.  Maximum Usage (1 Day) 8 hrs. 43 mins. 9 secs.  Average Usage (All Days) 5 hrs. 11 mins. 57 secs.  Average Usage (Days Used) 5 hrs. 19 mins. 3 secs.  Minimum Usage (1 Day) 33 secs.  Percent of Days with Usage >= 4 Hours 63.3%  Percent of Days with Usage < 4 Hours 36.7%  Date Range  Total Blower Time 19 days 11 hrs. 56 mins. 57 secs.  autoSV Summary  90% of the time device EPAP was <= 9.0 cmH2O  Average Percent of Night in Periodic Breathing 0.1%  Average Time in Large Leak Per Day 0 secs.  Average Breath Rate 17.6 bpm  Average Minute Vent 9.4  Average AHI 0.5  Average Percent Night in Large Leak 0.0%  90% of the time device Pressure Support was <= 3.8 cmH2O  Average Pressure Support 3.1 cmH2O  Average EPAP 8.2 cmH2O    [place] Sleepiness Scale   EPWORTH SLEEPINESS SCALE 8/12/2019 8/14/2018 12/14/2017 11/2/2017 8/15/2017 4/1/2017   Sitting and reading 0 2 1 1 1 0   Watching TV 1 2 1 1 1 3   Sitting, inactive in a public place (e.g. a theatre or a meeting) 0 0 1 0 0 1   As a passenger in a car for an hour without a break 0 1 0 0 0 2   Lying down to rest in the afternoon when  circumstances permit 0 0 0 0 0 2   Sitting and talking to someone 0 0 0 0 0 0   Sitting quietly after a lunch without alcohol 1 2 0 1 0 0   In a car, while stopped for a few minutes in traffic 0 0 0 0 0 0   Total score 2 7 3 3 2 8       Immunization status reviewed and up to date.    A full  review of systems, past , family  and social histories was performed except as mentioned in the note above, these are non contributory to the main issues discussed  today    Previous Report Reviewed: lab reports, office notes and radiology reports     The following portions of the patient's history were reviewed and updated as appropriate: He  has a past medical history of Basal cell carcinoma, BPH with urinary obstruction, Calcaneal spur of left foot, Colon polyps, DDD (degenerative disc disease), lumbar, Diverticulosis of colon, Dyslipidemia, Dysmetabolic syndrome, Fracture of rib of left side, History of tobacco use, Internal and external hemorrhoids without complication, MVP (mitral valve prolapse), Obesity, Osteoarthritis of both thumbs (9/15/2015), Pharyngoesophageal dysphagia, Pneumonia (08/2016), and Reflux esophagitis.  He  has a past surgical history that includes Hemorrhoid surgery; Excision basal cell carcinoma; Anal fistulotomy; Tonsillectomy; Sinus surgery (01/2017); and Cataract extraction w/  intraocular lens implant (Right, 09/2017).  His family history includes Breast cancer in his mother; Coronary artery disease in his brother, other, sister, and sister; Dementia in his sister; Heart attack in his brother, other, sister, and sister; Heart attacks under age 50 in his father; Hyperlipidemia in his brother; Vision loss in his other.  He  reports that he quit smoking about 41 years ago. His smoking use included cigarettes. He has a 12.00 pack-year smoking history. He has never used smokeless tobacco. He reports that he drinks about 9.0 oz of alcohol per week. He reports that he does not use drugs.  He has a  current medication list which includes the following prescription(s): montelukast, multivitamin, tamsulosin, and fluticasone furoate-vilanterol.  He has No Known Allergies..    Review of Systems   Constitutional: Negative for fever, chills, fatigue and night sweats.   HENT: Negative for nosebleeds, postnasal drip, rhinorrhea, sore throat and congestion.    Eyes: Negative for itching.   Respiratory: Positive for apnea, snoring, cough and shortness of breath. Negative for hemoptysis, choking, chest tightness and orthopnea.    Cardiovascular: Negative for chest pain, palpitations and leg swelling.   Genitourinary: Negative for difficulty urinating and hematuria.   Endocrine: Negative for cold intolerance and heat intolerance.    Musculoskeletal: Positive for back pain. Negative for arthralgias, gait problem and joint swelling.   Gastrointestinal: Negative for nausea, vomiting, abdominal pain and abdominal distention.   Neurological: Negative for dizziness, syncope, light-headedness and headaches.   Hematological: No excessive bruising.   Psychiatric/Behavioral: Negative for confusion. The patient is not nervous/anxious.    All other systems reviewed and are negative.     Objective:   /60   Pulse 69   Resp 18   Ht 6' (1.829 m)   Wt 101.6 kg (224 lb)   SpO2 98%   BMI 30.38 kg/m²   Body mass index is 30.38 kg/m².    Physical Exam   Constitutional: He is oriented to person, place, and time. He appears well-developed and well-nourished.   HENT:   Head: Normocephalic and atraumatic.   Eyes: Pupils are equal, round, and reactive to light. EOM are normal.   Neck: Normal range of motion. Neck supple.   Cardiovascular: Normal rate and regular rhythm.   Pulmonary/Chest: Effort normal and breath sounds normal.   Abdominal: Soft. Bowel sounds are normal.   Musculoskeletal: Normal range of motion.   Neurological: He is alert and oriented to person, place, and time.   Skin: Skin is warm and dry.   Psychiatric: He has a  normal mood and affect. His behavior is normal.   Nursing note and vitals reviewed.      Personal Diagnostic Review:    Pulmonary function tests: FEV1: 2.99  (92.2 % predicted), FVC:  4.34 (99.5 % predicted), FEV1/FVC: 69%.  Normal Spirometry     CT Chest With Contrast: 08/14/18:     Base of Neck: No significant abnormality.    Airways: Patent.    Lungs: Bilateral apical scarring.    Stable right lateral lung base 9 mm nodule abutting the diaphragm compared to prior study of 09/01/2016.    Left posterior lung base nodule is largely stable measuring 3.0 x 2.0 cm.    Pleura: No pleural fluid.No pleural calcification.  Mild pleural thickening along the minor fissure on the right.    Shantell/Mediastinum: No pathologic hannah enlargement.    Esophagus: Normal.    Heart/pericardium: Normal size.  No pericardial effusion or calcification.    Pulmonary vasculature: Pulmonary arteries distribute normally.  There are four pulmonary veins.    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  The aorta maintains normal caliber, contour and course. There is no calcification of the thoracic aorta.  There is  no coronary artery calcification.    Thoracic soft tissues: Normal. Both breasts are present.    Bones: No acute fracture. No suspicious lytic or sclerotic lesion.    Upper Abdomen: No abnormality of the partially imaged upper abdomen. Chronic posttraumatic changes of the left lateral lung base and left upper abdomen are again noted including lateral intercostal herniation of abdominal contents, stable, including the spleen, and short-segment of splenic flexure colon.  Mild nonspecific perinephric stranding.  Fatty atrophy of the pancreas is noted.  Mild the            Assessment / plan:       Discussed diagnosis, its evaluation, treatment and usual course. All questions answered.    Problem List Items Addressed This Visit        Pulmonary    Multiple lung nodules on CT (Chronic)    Current Assessment & Plan     Stable and  controlled. Continue radiologic monitoring. Benign etiology favoured.      Fleischner Society Guidelines:    High risk patient:   Smoking history, history of malignancy or risk factors for malignancy.( non-solid ground glass opacities and partially solid nodules may require longer follow up to exclude indolent adenocarcinoma)      Nodule size Low risk patient High risk patient   4 mm  No follow up Follow up CT in 12 months. If unchanged, no further follow up.   4 - 6 mm Follow up CT in 12 months; if unchanged, no further follow up.  Initial follow up CT in 6 - 12 months, then at 18 - 24 months if no change.      6 - 8 mm  Initial follow up CT at 6 - 12 months and at 18 months if no change.  Initial follow up CT at 3 - 6 months. Then at 9-12 months and 24 months if no change.      > 8 mm Initial follow up CT at 3, 6, 9 and 24 months, dynamic contrast enhanced CT, PET-CT and/or biopsy. Initial follow up CT at 3, 6, 9 and 24 months, dynamic contrast enhanced CT, PET-CT and/or biopsy.            Relevant Orders    CT Chest With Contrast    Basic metabolic panel    Mild intermittent asthma without complication - Primary    Current Assessment & Plan     Asthma ROS: taking medications as instructed, no medication side effects noted, no significant ongoing wheezing or shortness of breath, using bronchodilator MDI less than twice a week.   New concerns: NONE  Exam: appears well, vitals normal, no respiratory distress, acyanotic, normal RR, chest clear, no wheezing, crepitations, rhonchi, normal symmetric air entry.   Assessment:  Asthma poorly controlled.    Plan: SINGULAIR. Current regimen is effective. Continue current regimen.Spirometry in 12 months.          Relevant Orders    Spirometry without Bronchodilator       Endocrine    Obesity (BMI 30-39.9)    Current Assessment & Plan     General weight loss/lifestyle modification strategies discussed (elicit support from others; identify saboteurs; non-food rewards,  etc).  Diet interventions: low calorie (1000 kCal/d) deficit diet.            Other    Treatment-emergent central sleep apnea    Current Assessment & Plan     Continue ASV . (DME - [facility])     ASV settings 20 cm max pressure, 12/8 max/min EPAP, age over 3 max/min pressure support, rate set to auto.        Discussed therapeutic goals for positive airway pressure therapy: Ideal is usage 100% of nights for 6 - 8 hours per night. Minimum usage is 70% of night for at least 4 hours per night used. Pateint expressed understanding. All Questions answered.    Complaince download in 1 year.     CPAP supplies renewed.          Relevant Orders    CPAP/BIPAP SUPPLIES        TIME SPENT WITH PATIENT: Time spent: 40 minutes in face to face  discussion concerning diagnosis, prognosis, review of lab and test results, benefits of treatment as well as management of disease, counseling of patient and coordination of care between various health  care providers . Greater than half the time spent was used for coordination of care and counseling of patient.        Follow up in about 1 year (around 8/12/2020) for MARCOS, Obesity, Asthma, Multiple lung nodules.

## 2019-08-12 NOTE — PATIENT INSTRUCTIONS
Lung Anatomy  Your lungs take air in to give your body oxygen, which the body needs to work. Your lungs, like all the tissues in your body, are made up of billions of tiny specialized cells. Old lung cells die and are replaced by new, identical lung cells. This natural process helps ensure healthy lungs.    Date Last Reviewed: 11/1/2016  © 0298-3751 [employer]. [address] [place] [address], [place] [address]. All rights reserved. This information is not intended as a substitute for professional medical care. Always follow your healthcare professional's instructions.

## 2019-12-05 ENCOUNTER — OFFICE VISIT (OUTPATIENT)
Dept: FAMILY MEDICINE | Facility: CLINIC | Age: 74
End: 2019-12-05
Payer: MEDICARE

## 2019-12-05 VITALS
DIASTOLIC BLOOD PRESSURE: 64 MMHG | HEART RATE: 87 BPM | TEMPERATURE: 99 F | SYSTOLIC BLOOD PRESSURE: 138 MMHG | WEIGHT: 233.44 LBS | BODY MASS INDEX: 31.62 KG/M2 | HEIGHT: 72 IN | OXYGEN SATURATION: 96 %

## 2019-12-05 DIAGNOSIS — Z00.00 PREVENTATIVE HEALTH CARE: Primary | ICD-10-CM

## 2019-12-05 DIAGNOSIS — N40.1 BPH WITH URINARY OBSTRUCTION: ICD-10-CM

## 2019-12-05 DIAGNOSIS — N13.8 BPH WITH URINARY OBSTRUCTION: ICD-10-CM

## 2019-12-05 DIAGNOSIS — E78.5 DYSLIPIDEMIA: Chronic | ICD-10-CM

## 2019-12-05 DIAGNOSIS — E88.810 DYSMETABOLIC SYNDROME: Chronic | ICD-10-CM

## 2019-12-05 PROCEDURE — 99999 PR PBB SHADOW E&M-EST. PATIENT-LVL III: CPT | Mod: PBBFAC,HCNC,, | Performed by: FAMILY MEDICINE

## 2019-12-05 PROCEDURE — 99397 PER PM REEVAL EST PAT 65+ YR: CPT | Mod: HCNC,S$GLB,, | Performed by: FAMILY MEDICINE

## 2019-12-05 PROCEDURE — 99999 PR PBB SHADOW E&M-EST. PATIENT-LVL III: ICD-10-PCS | Mod: PBBFAC,HCNC,, | Performed by: FAMILY MEDICINE

## 2019-12-05 PROCEDURE — 99397 PR PREVENTIVE VISIT,EST,65 & OVER: ICD-10-PCS | Mod: HCNC,S$GLB,, | Performed by: FAMILY MEDICINE

## 2019-12-05 NOTE — PROGRESS NOTES
CHIEF COMPLAINT:  This is a 74-year-old male here for preventive health exam.     SUBJECTIVE: Patient is doing well without complaints except for right knee pain.  Patient has BPH with urinary symptoms for which he takes Flomax 0.4 mg daily.  He continues to see Pulmonary for mild asthma and follow up of multiple pulmonary nodules.  He has central sleep apnea.  He uses a type of CPAP.  He takes Singulair 10 mg daily.     Eye exam October 2019. Colonoscopy April 2012 due again April 2022. Pneumovax March 2012. Zostavax April 2011. TD booster August 2012. Flu vaccine October 2019.  Prevnar August 2015.     ROS:  GENERAL: Patient denies fever, chills, night sweats. Patient denies weight loss. Patient denies anorexia, fatigue, weakness or swollen glands.  SKIN: Patient denies rash or hair loss.  HEENT: Patient denies sore throat, ear pain, hearing loss, nasal congestion, or runny nose. Patient denies visual disturbance, eye irritation or discharge.  LUNGS: Patient denies cough, wheeze or hemoptysis.  CARDIOVASCULAR: Patient denies chest pain, shortness of breath, palpitations, syncope or lower extremity edema.  GI: Patient denies abdominal pain, nausea, vomiting, diarrhea, constipation, blood in stool or melena.  GENITOURINARY: Patient denies dysuria, frequency, hematuria, nocturia, urgency or incontinence.  MUSCULOSKELETAL: Patient denies joint pain, swelling, redness or warmth.  NEUROLOGIC: Patient denies headache, vertigo, paresthesias, weakness in limb, dysarthria, dysphagia or abnormality of gait.  PSYCHIATRIC: Patient denies anxiety, depression, or memory loss.     OBJECTIVE:   GENERAL: Well-developed well-nourished, obese, white male alert and oriented x3, in no acute distress. Memory, judgment and cognition without deficit. Weight gain of 10 pounds in the last year.  SKIN: Clear without rash. Normal color and tone.   HEENT: Eyes: Clear conjunctivae. Pupils equal reactive to light and accommodation. Ears: Clear  TMs clear canals. Nose: Without congestion. Pharynx: Without injection or exudates.  NECK: Supple, normal range of motion. No masses, nodes or enlarged thyroid. No JVD. Carotids 2+ and equal. No bruits.  LUNGS: Clear to auscultation. Normal respiratory effort.  CARDIOVASCULAR: Regular rhythm, normal S1, S2 without murmur, gallop or rub.  BACK: No CVA or spinal tenderness.  ABDOMEN: Normal appearance. Active bowel sounds. Soft, nontender without mass or organomegaly. No rebound or guarding.  EXTREMITIES: Without cyanosis, clubbing or edema. Distal pulses 2+ and equal. Normal range of motion in all extremities. No joint effusion, erythema or warmth. Onychomycosis toenails.  NEUROLOGIC: Cranial nerves II through XII without deficit. Motor strength equal bilaterally. Sensation normal to touch. Deep tendon reflexes 2+ and equal. Gait without abnormality. No tremor. Negative cerebellar signs.   RUSTY: Minimally enlarged prostate, smooth, nontender. No masses. Anorectal exam: No masses, nontender. Heme negative stool x2.    ASSESSMENT:  1. Preventative health care    2. Dyslipidemia    3. Dysmetabolic syndrome    4. BPH with urinary obstruction      PLAN:  1.  Weight reduction. Exercise regularly.  2.  Age-appropriate counseling.  3.  Fasting lab.  4.  Patient declines stress test for strong FH CAD.  5.  Refill medications as needed.  6.  Follow up annually.    This note is generated with speech recognition software and is subject to transcription error and sound alike phrases that may be missed by proofreading.

## 2019-12-09 ENCOUNTER — LAB VISIT (OUTPATIENT)
Dept: LAB | Facility: HOSPITAL | Age: 74
End: 2019-12-09
Attending: FAMILY MEDICINE
Payer: MEDICARE

## 2019-12-09 DIAGNOSIS — E78.5 DYSLIPIDEMIA: Chronic | ICD-10-CM

## 2019-12-09 LAB
BASOPHILS # BLD AUTO: 0.05 K/UL (ref 0–0.2)
BASOPHILS NFR BLD: 0.7 % (ref 0–1.9)
CHOLEST SERPL-MCNC: 155 MG/DL (ref 120–199)
CHOLEST/HDLC SERPL: 3.3 {RATIO} (ref 2–5)
DIFFERENTIAL METHOD: ABNORMAL
EOSINOPHIL # BLD AUTO: 0.6 K/UL (ref 0–0.5)
EOSINOPHIL NFR BLD: 8 % (ref 0–8)
ERYTHROCYTE [DISTWIDTH] IN BLOOD BY AUTOMATED COUNT: 13.2 % (ref 11.5–14.5)
HCT VFR BLD AUTO: 40.9 % (ref 40–54)
HDLC SERPL-MCNC: 47 MG/DL (ref 40–75)
HDLC SERPL: 30.3 % (ref 20–50)
HGB BLD-MCNC: 14.1 G/DL (ref 14–18)
IMM GRANULOCYTES # BLD AUTO: 0.03 K/UL (ref 0–0.04)
IMM GRANULOCYTES NFR BLD AUTO: 0.4 % (ref 0–0.5)
LDLC SERPL CALC-MCNC: 85.6 MG/DL (ref 63–159)
LYMPHOCYTES # BLD AUTO: 1.7 K/UL (ref 1–4.8)
LYMPHOCYTES NFR BLD: 22.3 % (ref 18–48)
MCH RBC QN AUTO: 33.1 PG (ref 27–31)
MCHC RBC AUTO-ENTMCNC: 34.5 G/DL (ref 32–36)
MCV RBC AUTO: 96 FL (ref 82–98)
MONOCYTES # BLD AUTO: 0.9 K/UL (ref 0.3–1)
MONOCYTES NFR BLD: 12 % (ref 4–15)
NEUTROPHILS # BLD AUTO: 4.2 K/UL (ref 1.8–7.7)
NEUTROPHILS NFR BLD: 56.6 % (ref 38–73)
NONHDLC SERPL-MCNC: 108 MG/DL
NRBC BLD-RTO: 0 /100 WBC
PLATELET # BLD AUTO: 205 K/UL (ref 150–350)
PMV BLD AUTO: 9.5 FL (ref 9.2–12.9)
RBC # BLD AUTO: 4.26 M/UL (ref 4.6–6.2)
TRIGL SERPL-MCNC: 112 MG/DL (ref 30–150)
TSH SERPL DL<=0.005 MIU/L-ACNC: 2.27 UIU/ML (ref 0.4–4)
WBC # BLD AUTO: 7.49 K/UL (ref 3.9–12.7)

## 2019-12-09 PROCEDURE — 84443 ASSAY THYROID STIM HORMONE: CPT | Mod: HCNC

## 2019-12-09 PROCEDURE — 80061 LIPID PANEL: CPT | Mod: HCNC

## 2019-12-09 PROCEDURE — 36415 COLL VENOUS BLD VENIPUNCTURE: CPT | Mod: HCNC

## 2019-12-09 PROCEDURE — 85025 COMPLETE CBC W/AUTO DIFF WBC: CPT | Mod: HCNC

## 2019-12-09 PROCEDURE — 80053 COMPREHEN METABOLIC PANEL: CPT | Mod: HCNC

## 2019-12-10 LAB
ALBUMIN SERPL BCP-MCNC: 3.6 G/DL (ref 3.5–5.2)
ALP SERPL-CCNC: 104 U/L (ref 55–135)
ALT SERPL W/O P-5'-P-CCNC: 17 U/L (ref 10–44)
ANION GAP SERPL CALC-SCNC: 6 MMOL/L (ref 8–16)
AST SERPL-CCNC: 19 U/L (ref 10–40)
BILIRUB SERPL-MCNC: 0.5 MG/DL (ref 0.1–1)
BUN SERPL-MCNC: 22 MG/DL (ref 8–23)
CALCIUM SERPL-MCNC: 9 MG/DL (ref 8.7–10.5)
CHLORIDE SERPL-SCNC: 105 MMOL/L (ref 95–110)
CO2 SERPL-SCNC: 26 MMOL/L (ref 23–29)
CREAT SERPL-MCNC: 1 MG/DL (ref 0.5–1.4)
EST. GFR  (AFRICAN AMERICAN): >60 ML/MIN/1.73 M^2
EST. GFR  (NON AFRICAN AMERICAN): >60 ML/MIN/1.73 M^2
GLUCOSE SERPL-MCNC: 100 MG/DL (ref 70–110)
POTASSIUM SERPL-SCNC: 4.3 MMOL/L (ref 3.5–5.1)
PROT SERPL-MCNC: 6.8 G/DL (ref 6–8.4)
SODIUM SERPL-SCNC: 137 MMOL/L (ref 136–145)

## 2020-01-27 ENCOUNTER — PATIENT MESSAGE (OUTPATIENT)
Dept: FAMILY MEDICINE | Facility: CLINIC | Age: 75
End: 2020-01-27

## 2020-01-27 ENCOUNTER — OFFICE VISIT (OUTPATIENT)
Dept: URGENT CARE | Facility: CLINIC | Age: 75
End: 2020-01-27
Payer: MEDICARE

## 2020-01-27 VITALS
BODY MASS INDEX: 31.56 KG/M2 | DIASTOLIC BLOOD PRESSURE: 74 MMHG | TEMPERATURE: 96 F | HEIGHT: 72 IN | WEIGHT: 233 LBS | OXYGEN SATURATION: 97 % | HEART RATE: 70 BPM | SYSTOLIC BLOOD PRESSURE: 143 MMHG | RESPIRATION RATE: 18 BRPM

## 2020-01-27 DIAGNOSIS — M54.50 ACUTE RIGHT-SIDED LOW BACK PAIN WITHOUT SCIATICA: Primary | ICD-10-CM

## 2020-01-27 PROCEDURE — 99214 PR OFFICE/OUTPT VISIT, EST, LEVL IV, 30-39 MIN: ICD-10-PCS | Mod: 25,S$GLB,, | Performed by: NURSE PRACTITIONER

## 2020-01-27 PROCEDURE — 96372 PR INJECTION,THERAP/PROPH/DIAG2ST, IM OR SUBCUT: ICD-10-PCS | Mod: S$GLB,,, | Performed by: FAMILY MEDICINE

## 2020-01-27 PROCEDURE — 99214 OFFICE O/P EST MOD 30 MIN: CPT | Mod: 25,S$GLB,, | Performed by: NURSE PRACTITIONER

## 2020-01-27 PROCEDURE — 96372 THER/PROPH/DIAG INJ SC/IM: CPT | Mod: S$GLB,,, | Performed by: FAMILY MEDICINE

## 2020-01-27 RX ORDER — NABUMETONE 500 MG/1
500 TABLET, FILM COATED ORAL 2 TIMES DAILY
Qty: 28 TABLET | Refills: 0 | Status: SHIPPED | OUTPATIENT
Start: 2020-01-27 | End: 2020-02-10

## 2020-01-27 RX ORDER — CYCLOBENZAPRINE HCL 10 MG
10 TABLET ORAL 3 TIMES DAILY PRN
Qty: 15 TABLET | Refills: 0 | Status: SHIPPED | OUTPATIENT
Start: 2020-01-27 | End: 2020-01-27 | Stop reason: ALTCHOICE

## 2020-01-27 RX ORDER — BACLOFEN 20 MG/1
20 TABLET ORAL 3 TIMES DAILY
Qty: 15 TABLET | Refills: 0 | Status: SHIPPED | OUTPATIENT
Start: 2020-01-27 | End: 2020-12-15

## 2020-01-27 RX ORDER — KETOROLAC TROMETHAMINE 30 MG/ML
30 INJECTION, SOLUTION INTRAMUSCULAR; INTRAVENOUS
Status: COMPLETED | OUTPATIENT
Start: 2020-01-27 | End: 2020-01-27

## 2020-01-27 RX ADMIN — KETOROLAC TROMETHAMINE 30 MG: 30 INJECTION, SOLUTION INTRAMUSCULAR; INTRAVENOUS at 03:01

## 2020-01-27 NOTE — PATIENT INSTRUCTIONS
Back Pain (Acute or Chronic)    Back pain is one of the most common problems. The good news is that most people feel better in 1 to 2 weeks, and most of the rest in 1 to 2 months. Most people can remain active.  People experience and describe pain differently; not everyone is the same.  · The pain can be sharp, stabbing, shooting, aching, cramping or burning.  · Movement, standing, bending, lifting, sitting, or walking may worsen pain.  · It can be localized to one spot or area, or it can be more generalized.  · It can spread or radiate upwards, to the front, or go down your arms or legs (sciatica).  · It can cause muscle spasm.  Most of the time, mechanical problems with the muscles or spine cause the pain. Mechanical problems are usually caused by an injury to the muscles or ligaments. While illness can cause back pain, it is usually not caused by a serious illness. Mechanical problems include:   · Physical activity such as sports, exercise, work, or normal activity  · Overexertion, lifting, pushing, pulling incorrectly or too aggressively  · Sudden twisting, bending, or stretching from an accident, or accidental movement  · Poor posture  · Stretching or moving wrong, without noticing pain at the time  · Poor coordination, lack of regular exercise (check with your doctor about this)  · Spinal disc disease or arthritis  · Stress  Pain can also be related to pregnancy, or illness like appendicitis, bladder or kidney infections, pelvic infections, and many other things.  Acute back pain usually gets better in 1 to 2 weeks. Back pain related to disk disease, arthritis in the spinal joints or spinal stenosis (narrowing of the spinal canal) can become chronic and last for months or years.  Unless you had a physical injury (for example, a car accident or fall) X-rays are usually not needed for the initial evaluation of back pain. If pain continues and does not respond to medical treatment, X-rays and other tests may be  needed.  Home care  Try these home care recommendations:  · When in bed, try to find a position of comfort. A firm mattress is best. Try lying flat on your back with pillows under your knees. You can also try lying on your side with your knees bent up towards your chest and a pillow between your knees.  · At first, do not try to stretch out the sore spots. If there is a strain, it is not like the good soreness you get after exercising without an injury. In this case, stretching may make it worse.  · Avoid prolong sitting, long car rides, or travel. This puts more stress on the lower back than standing or walking.  · During the first 24 to 72 hours after an acute injury or flare up of chronic back pain, apply an ice pack to the painful area for 20 minutes and then remove it for 20 minutes. Do this over a period of 60 to 90 minutes or several times a day. This will reduce swelling and pain. Wrap the ice pack in a thin towel or plastic to protect your skin.  · You can start with ice, then switch to heat. Heat (hot shower, hot bath, or heating pad) reduces pain and works well for muscle spasms. Heat can be applied to the painful area for 20 minutes then remove it for 20 minutes. Do this over a period of 60 to 90 minutes or several times a day. Do not sleep on a heating pad. It can lead to skin burns or tissue damage.  · You can alternate ice and heat therapy. Talk with your doctor about the best treatment for your back pain.  · Therapeutic massage can help relax the back muscles without stretching them.  · Be aware of safe lifting methods and do not lift anything without stretching first.  Medicines  Talk to your doctor before using medicine, especially if you have other medical problems or are taking other medicines.  · You may use over-the-counter medicine as directed on the bottle to control pain, unless another pain medicine was prescribed. If you have chronic conditions like diabetes, liver or kidney disease,  stomach ulcers, or gastrointestinal bleeding, or are taking blood thinners, talk to your doctor before taking any medicine.  · Be careful if you are given a prescription medicines, narcotics, or medicine for muscle spasms. They can cause drowsiness, affect your coordination, reflexes, and judgement. Do not drive or operate heavy machinery.  Follow-up care  Follow up with your healthcare provider, or as advised.   A radiologist will review any X-rays that were taken. Your provide will notify you of any new findings that may affect your care.  Call 911  Call emergency services if any of the following occur:  · Trouble breathing  · Confusion  · Very drowsy or trouble awakening  · Fainting or loss of consciousness  · Rapid or very slow heart rate  · Loss of bowel or bladder control  When to seek medical advice  Call your healthcare provider right away if any of these occur:   · Pain becomes worse or spreads to your legs  · Weakness or numbness in one or both legs  · Numbness in the groin or genital area  Date Last Reviewed: 7/1/2016 © 2000-2017 [employer]. [address] [place] [address]. All rights reserved. This information is not intended as a substitute for professional medical care. Always follow your healthcare professional's instructions.        Self-Care for Low Back Pain    Most people have low back pain now and then. In many cases, it isnt serious and self-care can help. Sometimes low back pain can be a sign of a bigger problem. Call your healthcare provider if your pain returns often or gets worse over time. For the long-term care of your back, get regular exercise, lose any excess weight and learn good posture.  Take a short rest  Lying down during the day may be beneficial for short periods of time if severe pain increases with sitting or standing. Long-term bed rest could be detrimental.  Reduce pain and swelling  Cold reduces swelling. Both cold and heat can reduce pain. Protect  your skin by placing a towel between your body and the ice or heat source.  · For the first few days, apply an ice pack for 15 to 20 minutes .  · After the first few days, try heat for 15 minutes at a time to ease pain. Never sleep on a heating pad.  · Over-the-counter medicine can help control pain and swelling. Try aspirin or ibuprofen.  Exercise  Exercise can help your back heal. It also helps your back get stronger and more flexible, preventing any reinjury. Ask your healthcare provider about specific exercises for your back.  Use good posture to avoid reinjury  · When moving, bend at the hips and knees. Dont bend at the waist or twist around.  · When lifting, keep the object close to your body. Dont try to lift more than you can handle.  · When sitting, keep your lower back supported. Use a rolled-up towel as needed.  Seek immediate medical care if:  · Youre unable to stand or walk.  · You have a temperature over 100.4°F (38.0°C)  · You have frequent, painful, or bloody urination.  · You have severe abdominal pain.  · You have a sharp, stabbing pain.  · Your pain is constant.  · You have pain or numbness in your leg.  · You feel pain in a new area of your back.  · You notice that the pain isnt decreasing after more than a week.   Date Last Reviewed: 9/29/2015  © 6394-8237 [employer]. [address] [place] [address], [place] [address]. All rights reserved. This information is not intended as a substitute for professional medical care. Always follow your healthcare professional's instructions.        Relieving Back Pain  Back pain is a common problem. You can strain back muscles by lifting too much weight or just by moving the wrong way. Back strain can be uncomfortable, even painful. And it can take weeks or months to improve. To help yourself feel better and prevent future back strains, try these tips.  Important Note: Do not give aspirin to children or teens without first discussing it with your  healthcare provider.      ? Ice    Ice reduces muscle pain and swelling. It helps most during the first 24 to 48 hours after an injury.  · Wrap an ice pack or a bag of frozen peas in a thin towel. (Never place ice directly on your skin.)  · Place the ice where your back hurts the most.  · Dont ice for more than 20 minutes at a time.  · You can use ice several times a day.  ? Medicines  Over-the-counter pain relievers can include acetaminophen and anti-inflammatory medicines, which includes aspirin or ibuprofen. They can help ease discomfort. Some also reduce swelling.  · Tell your healthcare provider about any medicines you are already taking.  · Take medicines only as directed.  ? Heat  After the first 48 hours, heat can relax sore muscles and improve blood flow.  · Try a warm bath or shower. Or use a heating pad set on low. To prevent a burn, keep a cloth between you and the heating pad.  · Dont use a heating pad for more than 15 minutes at a time. Never sleep on a heating pad.  Date Last Reviewed: 9/1/2015  © 9636-1691 [employer]. [address] [place] [address]. All rights reserved. This information is not intended as a substitute for professional medical care. Always follow your healthcare professional's instructions.

## 2020-01-27 NOTE — PROGRESS NOTES
Subjective:       Patient ID: Clay Marcos is a 74 y.o. male.    Vitals:  height is 6' (1.829 m) and weight is 105.7 kg (233 lb). His temperature is 96.3 °F (35.7 °C). His blood pressure is 143/74 (abnormal) and his pulse is 70. His respiration is 18 and oxygen saturation is 97%.     Chief Complaint: Back Pain (lower right side back pain for about one week. Pt states he believes it is muscular.)    lower right side back pain for about one week. Pt states he believes it is muscular.    Back Pain   This is a new problem. The current episode started in the past 7 days. The problem occurs constantly. The problem is unchanged. The quality of the pain is described as aching, stabbing and shooting. The pain does not radiate. The pain is at a severity of 8/10. The pain is moderate. Pertinent negatives include no abdominal pain, bladder incontinence, bowel incontinence, dysuria or numbness.       Constitution: Negative for fatigue.   Gastrointestinal: Negative for abdominal pain and bowel incontinence.   Genitourinary: Negative for dysuria, urgency, bladder incontinence and hematuria.   Musculoskeletal: Positive for back pain and muscle ache. Negative for muscle cramps and history of spine disorder.   Skin: Negative for rash.   Neurological: Negative for coordination disturbances, numbness and tingling.       Objective:      Physical Exam   Constitutional: He is oriented to person, place, and time. Vital signs are normal. He appears well-developed and well-nourished. He is active and cooperative. No distress.   HENT:   Head: Normocephalic and atraumatic.   Nose: Nose normal.   Mouth/Throat: Oropharynx is clear and moist and mucous membranes are normal.   Eyes: Conjunctivae and lids are normal.   Neck: Trachea normal, normal range of motion, full passive range of motion without pain and phonation normal. Neck supple.   Cardiovascular: Normal rate, regular rhythm, normal heart sounds, intact distal pulses and normal pulses.    Pulmonary/Chest: Effort normal and breath sounds normal.   Abdominal: Soft. Normal appearance and bowel sounds are normal. He exhibits no abdominal bruit, no pulsatile midline mass and no mass.   Musculoskeletal: He exhibits no edema or deformity.        Lumbar back: He exhibits tenderness (RLB), pain (RLB), spasm (RLB) and abnormal pulse. He exhibits normal range of motion, no bony tenderness, no swelling, no edema, no deformity and no laceration.   Neurological: He is alert and oriented to person, place, and time. He has normal strength and normal reflexes. No sensory deficit.   Skin: Skin is warm, dry, intact, not diaphoretic and no rash. Capillary refill takes less than 2 seconds.   Psychiatric: He has a normal mood and affect. His speech is normal and behavior is normal. Judgment and thought content normal. Cognition and memory are normal.   Nursing note and vitals reviewed.        Assessment:       1. Acute right-sided low back pain without sciatica        Plan:         Acute right-sided low back pain without sciatica  -     nabumetone (RELAFEN) 500 MG tablet; Take 1 tablet (500 mg total) by mouth 2 (two) times daily. for 14 days  Dispense: 28 tablet; Refill: 0  -     cyclobenzaprine (FLEXERIL) 10 MG tablet; Take 1 tablet (10 mg total) by mouth 3 (three) times daily as needed for Muscle spasms.  Dispense: 15 tablet; Refill: 0  -     ketorolac injection 30 mg         PRICE (Protect, Rest, Ice, Compression and Elevate) affected area.  Alternate heat and cold packs   Take prescribed medications as directed.  Follow up with Primary Care Provider/Ortho

## 2020-01-30 ENCOUNTER — TELEPHONE (OUTPATIENT)
Dept: URGENT CARE | Facility: CLINIC | Age: 75
End: 2020-01-30

## 2020-01-30 NOTE — TELEPHONE ENCOUNTER
Called pt to see how he was feeling.  He states he is feeling about the same.  He has an appt to see Dr. Marino tomorrow.

## 2020-01-31 ENCOUNTER — OFFICE VISIT (OUTPATIENT)
Dept: FAMILY MEDICINE | Facility: CLINIC | Age: 75
End: 2020-01-31
Payer: MEDICARE

## 2020-01-31 VITALS
SYSTOLIC BLOOD PRESSURE: 130 MMHG | WEIGHT: 239.63 LBS | HEART RATE: 61 BPM | HEIGHT: 72 IN | DIASTOLIC BLOOD PRESSURE: 82 MMHG | OXYGEN SATURATION: 97 % | BODY MASS INDEX: 32.46 KG/M2 | TEMPERATURE: 97 F

## 2020-01-31 DIAGNOSIS — I77.1 TORTUOUS AORTA: ICD-10-CM

## 2020-01-31 DIAGNOSIS — M79.18 RIGHT BUTTOCK PAIN: Primary | ICD-10-CM

## 2020-01-31 DIAGNOSIS — M51.36 DDD (DEGENERATIVE DISC DISEASE), LUMBAR: ICD-10-CM

## 2020-01-31 DIAGNOSIS — M46.1 SACROILIITIS: ICD-10-CM

## 2020-01-31 PROCEDURE — 99214 OFFICE O/P EST MOD 30 MIN: CPT | Mod: 25,HCNC,S$GLB, | Performed by: FAMILY MEDICINE

## 2020-01-31 PROCEDURE — 96372 THER/PROPH/DIAG INJ SC/IM: CPT | Mod: HCNC,S$GLB,, | Performed by: FAMILY MEDICINE

## 2020-01-31 PROCEDURE — 99499 UNLISTED E&M SERVICE: CPT | Mod: HCNC,S$GLB,, | Performed by: FAMILY MEDICINE

## 2020-01-31 PROCEDURE — 1159F PR MEDICATION LIST DOCUMENTED IN MEDICAL RECORD: ICD-10-PCS | Mod: HCNC,S$GLB,, | Performed by: FAMILY MEDICINE

## 2020-01-31 PROCEDURE — 1125F PR PAIN SEVERITY QUANTIFIED, PAIN PRESENT: ICD-10-PCS | Mod: HCNC,S$GLB,, | Performed by: FAMILY MEDICINE

## 2020-01-31 PROCEDURE — 99214 PR OFFICE/OUTPT VISIT, EST, LEVL IV, 30-39 MIN: ICD-10-PCS | Mod: 25,HCNC,S$GLB, | Performed by: FAMILY MEDICINE

## 2020-01-31 PROCEDURE — 1125F AMNT PAIN NOTED PAIN PRSNT: CPT | Mod: HCNC,S$GLB,, | Performed by: FAMILY MEDICINE

## 2020-01-31 PROCEDURE — 1159F MED LIST DOCD IN RCRD: CPT | Mod: HCNC,S$GLB,, | Performed by: FAMILY MEDICINE

## 2020-01-31 PROCEDURE — 96372 PR INJECTION,THERAP/PROPH/DIAG2ST, IM OR SUBCUT: ICD-10-PCS | Mod: HCNC,S$GLB,, | Performed by: FAMILY MEDICINE

## 2020-01-31 PROCEDURE — 99999 PR PBB SHADOW E&M-EST. PATIENT-LVL III: ICD-10-PCS | Mod: PBBFAC,HCNC,, | Performed by: FAMILY MEDICINE

## 2020-01-31 PROCEDURE — 1101F PT FALLS ASSESS-DOCD LE1/YR: CPT | Mod: HCNC,CPTII,S$GLB, | Performed by: FAMILY MEDICINE

## 2020-01-31 PROCEDURE — 99499 RISK ADDL DX/OHS AUDIT: ICD-10-PCS | Mod: HCNC,S$GLB,, | Performed by: FAMILY MEDICINE

## 2020-01-31 PROCEDURE — 99999 PR PBB SHADOW E&M-EST. PATIENT-LVL III: CPT | Mod: PBBFAC,HCNC,, | Performed by: FAMILY MEDICINE

## 2020-01-31 PROCEDURE — 1101F PR PT FALLS ASSESS DOC 0-1 FALLS W/OUT INJ PAST YR: ICD-10-PCS | Mod: HCNC,CPTII,S$GLB, | Performed by: FAMILY MEDICINE

## 2020-01-31 RX ORDER — TRIAMCINOLONE ACETONIDE 40 MG/ML
40 INJECTION, SUSPENSION INTRA-ARTICULAR; INTRAMUSCULAR
Status: COMPLETED | OUTPATIENT
Start: 2020-01-31 | End: 2020-01-31

## 2020-01-31 RX ORDER — BETAMETHASONE SODIUM PHOSPHATE AND BETAMETHASONE ACETATE 3; 3 MG/ML; MG/ML
6 INJECTION, SUSPENSION INTRA-ARTICULAR; INTRALESIONAL; INTRAMUSCULAR; SOFT TISSUE
Status: COMPLETED | OUTPATIENT
Start: 2020-01-31 | End: 2020-01-31

## 2020-01-31 RX ADMIN — BETAMETHASONE SODIUM PHOSPHATE AND BETAMETHASONE ACETATE 6 MG: 3; 3 INJECTION, SUSPENSION INTRA-ARTICULAR; INTRALESIONAL; INTRAMUSCULAR; SOFT TISSUE at 10:01

## 2020-01-31 RX ADMIN — TRIAMCINOLONE ACETONIDE 40 MG: 40 INJECTION, SUSPENSION INTRA-ARTICULAR; INTRAMUSCULAR at 10:01

## 2020-01-31 NOTE — PATIENT INSTRUCTIONS
Back Exercises: Hip Lift    To start, lie on your back with your knees bent and feet flat on the floor. Dont press your neck or lower back to the floor. Breathe deeply. You should feel comfortable and relaxed in this position:  · Tighten your abdomen and buttocks.  · Slowly raise your hips upward. Be careful not to arch your back.  · Hold for 5 seconds. Lower your hips to the floor.  · Repeat 10 times.  For your safety, check with your healthcare provider before starting an exercise program.   Date Last Reviewed: 8/16/2015  © 8425-0469 [employer]. [address] [place] [address]. All rights reserved. This information is not intended as a substitute for professional medical care. Always follow your healthcare professional's instructions.

## 2020-01-31 NOTE — PROGRESS NOTES
CHIEF COMPLAINT:  This is a 74-year-old male complaining of right hip pain.    SUBJECTIVE:  The patient complains of onset of pain in right upper buttock 1 week ago which he describes as sharp and burning in quality.  He rates the pain 8/10 on the pain scale.  Pain is not aggravated by walking the but occasionally by flexing the hip.  Patient states that sometimes he can take a deep breath and elicit the pain. He denies radiation of pain into his lower extremity.  Denies numbness, tingling or weakness in limb.  He had minimal relief with Toradol injection that was given to him at urgent care this week.  He was given a prescription for nabumetone and baclofen which has been ineffective.  He has been applying moist heat which helps temporarily.  He denies history of injury or increased activity.  The patient has history of similar pain in right buttock in 2014 and 2015.  X-rays of hip and lumbar spine are significant only for lumbar spine degenerative disc disease. Patient has tortuous aorta as seen on chest x-ray in May 20, 2016.    ROS:  GENERAL: Patient denies fever, chills, night sweats.  Patient denies weight gain or loss. Patient denies anorexia, fatigue, weakness or swollen glands.  SKIN: Patient denies rash.  LUNGS: Patient denies cough, wheeze or hemoptysis.  CARDIOVASCULAR: Patient denies chest pain, shortness of breath, palpitations, syncope or lower extremity edema.  GENITOURINARY: Patient denies dysuria, frequency, hematuria, nocturia, urgency or incontinence.  MUSCULOSKELETAL: Patient denies joint swelling, redness or warmth.  Positive for joint pain and lower back pain.  NEUROLOGIC: Patient denies headache, vertigo, numbness, tingling, weakness in limb, dysarthria, dysphagia or abnormality of gait.     OBJECTIVE:   GENERAL: Well-developed well-nourished, obese, white male alert and oriented x3 in no acute distress.  Memory, judgment and cognition without deficit.  SKIN: Clear without rash.  Normal color  and tone.  HEENT:  Eyes:  Clear conjunctivae.  No scleral icterus.  NECK: Supple, normal range of motion.  No lymphadenopathy.  No palpable tenderness.    LUNGS:  Clear to auscultation.  Normal respiratory effort.  CARDIOVASCULAR: Regular rhythm, normal S1, S2 without murmur, gallop or rub.  BACK: No CVA or spinal tenderness.   Tenderness to palpation right SI joint.  Normal range of motion flexion and extension.  EXTREMITIES: Without cyanosis, clubbing or edema.  Distal pulses 2+ and equal.  Normal range of motion in lower extremities.  No joint effusion, erythema or warmth.  NEUROLOGIC:   Motor strength equal bilaterally.  Sensation normal to touch.  Deep tendon reflexes 2+ and equal.  Gait without abnormality.  Negative SLR.  Negative Isaías.    ASSESSMENT:  1. Right buttock pain    2. Sacroiliitis    3. DDD (degenerative disc disease), lumbar    4. Tortuous aorta      PLAN:   1.  Kenalog 40 mg IM plus Celestone 6 mg IM.  2.  Apply moist heat and/or ice q.i.d. for 15-20 minutes.  3.  Hip and back exercises discussed.  4.  Discontinue Relafen.  5.  Try Mobic 15 mg daily with food.  6.  Consider physical therapy if no improvement.  7.  Follow up if no improvement or worsening symptoms.    This visit was 25 min, greater than 50% of which was spent in counseling.    This note is generated with speech recognition software and is subject to transcription error and sound alike phrases that may be missed by proofreading.

## 2020-02-09 RX ORDER — TAMSULOSIN HYDROCHLORIDE 0.4 MG/1
0.4 CAPSULE ORAL NIGHTLY
Qty: 90 CAPSULE | Refills: 3 | Status: SHIPPED | OUTPATIENT
Start: 2020-02-09 | End: 2021-01-20

## 2020-02-27 RX ORDER — MONTELUKAST SODIUM 10 MG/1
TABLET ORAL
Qty: 90 TABLET | Refills: 3 | Status: SHIPPED | OUTPATIENT
Start: 2020-02-27 | End: 2021-04-28 | Stop reason: SDUPTHER

## 2020-06-17 ENCOUNTER — PATIENT MESSAGE (OUTPATIENT)
Dept: FAMILY MEDICINE | Facility: CLINIC | Age: 75
End: 2020-06-17

## 2020-06-17 DIAGNOSIS — M25.561 CHRONIC PAIN OF RIGHT KNEE: Primary | ICD-10-CM

## 2020-06-17 DIAGNOSIS — G89.29 CHRONIC PAIN OF RIGHT KNEE: Primary | ICD-10-CM

## 2020-07-08 ENCOUNTER — OFFICE VISIT (OUTPATIENT)
Dept: ORTHOPEDICS | Facility: CLINIC | Age: 75
End: 2020-07-08
Payer: MEDICARE

## 2020-07-08 ENCOUNTER — HOSPITAL ENCOUNTER (OUTPATIENT)
Dept: RADIOLOGY | Facility: HOSPITAL | Age: 75
Discharge: HOME | End: 2020-07-08
Attending: PHYSICAL MEDICINE & REHABILITATION
Payer: MEDICARE

## 2020-07-08 VITALS
HEART RATE: 78 BPM | WEIGHT: 239 LBS | DIASTOLIC BLOOD PRESSURE: 74 MMHG | BODY MASS INDEX: 32.37 KG/M2 | HEIGHT: 72 IN | SYSTOLIC BLOOD PRESSURE: 120 MMHG

## 2020-07-08 DIAGNOSIS — G89.29 CHRONIC PAIN OF RIGHT KNEE: ICD-10-CM

## 2020-07-08 DIAGNOSIS — G89.29 RIGHT FLANK PAIN, CHRONIC: Primary | ICD-10-CM

## 2020-07-08 DIAGNOSIS — M17.0 PRIMARY OSTEOARTHRITIS OF BOTH KNEES: ICD-10-CM

## 2020-07-08 DIAGNOSIS — R10.9 RIGHT FLANK PAIN, CHRONIC: Primary | ICD-10-CM

## 2020-07-08 DIAGNOSIS — M25.561 CHRONIC PAIN OF RIGHT KNEE: ICD-10-CM

## 2020-07-08 PROCEDURE — 73562 X-RAY EXAM OF KNEE 3: CPT | Mod: 26,59,HCNC,LT | Performed by: RADIOLOGY

## 2020-07-08 PROCEDURE — 1125F AMNT PAIN NOTED PAIN PRSNT: CPT | Mod: HCNC,S$GLB,, | Performed by: PHYSICAL MEDICINE & REHABILITATION

## 2020-07-08 PROCEDURE — 73562 X-RAY EXAM OF KNEE 3: CPT | Mod: TC,HCNC,LT

## 2020-07-08 PROCEDURE — 1125F PR PAIN SEVERITY QUANTIFIED, PAIN PRESENT: ICD-10-PCS | Mod: HCNC,S$GLB,, | Performed by: PHYSICAL MEDICINE & REHABILITATION

## 2020-07-08 PROCEDURE — 99999 PR PBB SHADOW E&M-EST. PATIENT-LVL IV: CPT | Mod: PBBFAC,HCNC,, | Performed by: PHYSICAL MEDICINE & REHABILITATION

## 2020-07-08 PROCEDURE — 99999 PR PBB SHADOW E&M-EST. PATIENT-LVL IV: ICD-10-PCS | Mod: PBBFAC,HCNC,, | Performed by: PHYSICAL MEDICINE & REHABILITATION

## 2020-07-08 PROCEDURE — 73564 X-RAY EXAM KNEE 4 OR MORE: CPT | Mod: 26,HCNC,RT, | Performed by: RADIOLOGY

## 2020-07-08 PROCEDURE — 73562 XR KNEE ORTHO RIGHT WITH FLEXION: ICD-10-PCS | Mod: 26,59,HCNC,LT | Performed by: RADIOLOGY

## 2020-07-08 PROCEDURE — 99204 OFFICE O/P NEW MOD 45 MIN: CPT | Mod: HCNC,S$GLB,, | Performed by: PHYSICAL MEDICINE & REHABILITATION

## 2020-07-08 PROCEDURE — 1159F PR MEDICATION LIST DOCUMENTED IN MEDICAL RECORD: ICD-10-PCS | Mod: HCNC,S$GLB,, | Performed by: PHYSICAL MEDICINE & REHABILITATION

## 2020-07-08 PROCEDURE — 1159F MED LIST DOCD IN RCRD: CPT | Mod: HCNC,S$GLB,, | Performed by: PHYSICAL MEDICINE & REHABILITATION

## 2020-07-08 PROCEDURE — 1101F PT FALLS ASSESS-DOCD LE1/YR: CPT | Mod: HCNC,CPTII,S$GLB, | Performed by: PHYSICAL MEDICINE & REHABILITATION

## 2020-07-08 PROCEDURE — 99204 PR OFFICE/OUTPT VISIT, NEW, LEVL IV, 45-59 MIN: ICD-10-PCS | Mod: HCNC,S$GLB,, | Performed by: PHYSICAL MEDICINE & REHABILITATION

## 2020-07-08 PROCEDURE — 1101F PR PT FALLS ASSESS DOC 0-1 FALLS W/OUT INJ PAST YR: ICD-10-PCS | Mod: HCNC,CPTII,S$GLB, | Performed by: PHYSICAL MEDICINE & REHABILITATION

## 2020-07-08 PROCEDURE — 73564 XR KNEE ORTHO RIGHT WITH FLEXION: ICD-10-PCS | Mod: 26,HCNC,RT, | Performed by: RADIOLOGY

## 2020-07-08 NOTE — PROGRESS NOTES
SPORTS MEDICINE / PM&R NEW PATIENT H & P:    Referring Physician: Tori Marino MD    Chief Complaint   Patient presents with    Right Knee - Pain       HPI: This is a 74 y.o.  male being seen in clinic today for evaluation of Pain of the Right Knee   The problem first began 3 years. He feels aching pain on his right knee . The symptoms are worsening. He has tried rest without improvement. He has not tried physical therapy.  He had injection in 2017. He attends a cycling class, he has been doing this class for about 15 years. He also rides his bike around his neighborhood. His bigger complaint is actually right flank pain several years duration. Started while playing golf and worsens with golf or rotating spine. No significant pain in the low back or referred pain down the leg.    History obtained from patient.    Past family, medical, social, surgical history, and vital signs reviewed in chart.    Review of Systems   Constitutional: Negative for chills, fever and weight loss.   HENT: Negative for hearing loss and sore throat.    Eyes: Negative for blurred vision, photophobia and pain.   Respiratory: Negative for shortness of breath.    Cardiovascular: Negative for chest pain.   Gastrointestinal: Negative for abdominal pain.   Genitourinary: Negative for dysuria.   Skin: Negative for rash.   Neurological: Negative for tingling and headaches.   Endo/Heme/Allergies: Does not bruise/bleed easily.   Psychiatric/Behavioral: Negative for depression.       General    Nursing note and vitals reviewed.  Constitutional: He is oriented to person, place, and time. He appears well-developed and well-nourished.   HENT:   Head: Normocephalic and atraumatic.   Eyes: Conjunctivae and EOM are normal. Pupils are equal, round, and reactive to light.   Neck: Neck supple.   Cardiovascular: Intact distal pulses.    Pulmonary/Chest: Effort normal. No respiratory distress.   Abdominal: He exhibits no distension.   Neurological: He is  alert and oriented to person, place, and time.   Psychiatric: He has a normal mood and affect.     General Musculoskeletal Exam   Gait: antalgic     Right Ankle/Foot Exam     Tests   Heel Walk: able to perform  Tiptoe Walk: able to perform  Double Heel Rise: unable to perform double heel rise    Left Ankle/Foot Exam     Tests   Heel Walk: able to perform  Tiptoe Walk: able to perform  Double Heel Rise: able to perform    Right Knee Exam     Inspection   Erythema: absent  Swelling: absent  Effusion: absent    Tenderness   The patient is tender to palpation of the medial joint line, lateral joint line and patella.    Crepitus   The patient has crepitus of the patella.    Range of Motion   The patient has normal right knee ROM.    Tests   Meniscus   Wolf:  Medial - negative Lateral - negative  Ligament Examination Lachman: normal (-1 to 2mm) PCL-Posterior Drawer: normal (0 to 2mm)     MCL - Valgus: normal (0 to 2mm)  LCL - Varus: normal  Patella   Patellar apprehension: negative  Patellar Tracking: normal  Patellar Grind: positive    Other   Sensation: normal    Left Knee Exam     Inspection   Erythema: absent  Swelling: absent  Effusion: absent    Tenderness   The patient tender to palpation of the medial joint line.    Crepitus   The patient has crepitus of the patella.    Range of Motion   The patient has normal left knee ROM.    Tests   Meniscus   Wolf:  Medial - negative Lateral - negative  Stability Lachman: normal (-1 to 2mm) PCL-Posterior Drawer: normal (0 to 2mm)  MCL - Valgus: normal (0 to 2mm)  LCL - Varus: normal (0 to 2mm)  Patella   Patellar apprehension: negative  Patellar Tracking: normal    Other   Sensation: normal    Right Hip Exam   Right hip exam is normal.     Tests   Pain w/ forced internal rotation (VIRGINIE): absent  Left Hip Exam   Left hip exam is normal.    Tests   Pain w/ forced internal rotation (VIRGINIE): absent      Back (L-Spine & T-Spine) / Neck (C-Spine) Exam     Tenderness  Posterior midline palpation reveals tenderness of the Lower L-Spine, Upper L-Spine and Lower T-Spine. Right paramedian tenderness of the Lower L-Spine.     Back (L-Spine & T-Spine) Range of Motion   Extension: normal   Flexion: normal   Lateral bend right: abnormal   Lateral bend left: abnormal   Rotation right: abnormal   Rotation left: abnormal     Spinal Sensation   Right Side Sensation  L-Spine Level: normal  Left Side Sensation  L-Spine Level: normal    Back (L-Spine & T-Spine) Tests   Right Side Tests  Straight leg raise:      Sitting SLR: > 70 degrees    Squat Test: able to perform  Left Side Tests  Straight leg raise:     Sitting SLR: > 70 degrees    Squat: able to perform    Other He has no scoliosis .    Comments:  Tender along right lower rib.      Muscle Strength   Right Lower Extremity   Hip Abduction: 4/5   Hip Adduction: 5/5   Hip Flexion: 5/5   Hip Extensors: 5/5  Quadriceps:  4/5   Hamstrin/5   Anterior tibial:  5/5/5  Gastrocsoleus:  5/5/5  EHL:  5/5  Left Lower Extremity   Hip Abduction: 4/5   Hip Adduction: 5/5   Hip Flexion: 5/5   Hip Extensors: 5/5  Quadriceps:  4/5   Hamstrin/5   Anterior tibial:  5/5/5   Gastrocsoleus:  5/5/5  EHL:  5/5    Reflexes     Left Side  Quadriceps:  1+  Achilles:  0  Ankle Clonus:  absent    Right Side   Quadriceps:  1+  Achilles:  0  Ankle Clonus:  absent    Vascular Exam     Right Pulses  Dorsalis Pedis:      2+          Left Pulses  Dorsalis Pedis:      2+              IMPRESSION/PLAN: Clay is a 74 y.o.  male with:    1. Right flank pain, chronic  - Ambulatory referral/consult to Physical/Occupational Therapy; Future    2. Chronic pain of right knee  - Ambulatory referral/consult to Orthopedics    3. Primary osteoarthritis of both knees       The findings were discussed with Clay in detail. The flank pain is most consistent with oblique muscle strain. For this, and the knee, we should try physical therapy and [facility] would be convenient for him. We  discussed the value in strengthening the hips and thighs to help the knees. We discussed injection options as well, and he'll consider H.A. injections to help preserve cartilage and function. We'll see how therapy goes and discuss injections again at next visit. All of his questions were answered. He was provided this plan in writing. He will follow-up with me in 4 - 6 weeks.     Judy Francisco M.D.  Sports Medicine

## 2020-07-08 NOTE — LETTER
July 8, 2020      Tori Marino MD  [address] [place] [address]           [facility]  [address]  [place] [address]  Phone: [phone]  Fax: [phone]          Patient: Clay Marcos   MR Number: [id number]   YOB: 1945   Date of Visit: 7/8/2020       Dear Dr. Tori Marino:    Thank you for referring Clay Marcos to me for evaluation. Attached you will find relevant portions of my assessment and plan of care.    If you have questions, please do not hesitate to call me. I look forward to following Clay Marcos along with you.    Sincerely,    Judy Francisco MD    Enclosure  CC:  No Recipients    If you would like to receive this communication electronically, please contact [email or web address] or [phone] to request more information on [facility] [employer] Link access.    For providers and/or their staff who would like to refer a patient to [facility], please contact us through our one-stop-shop provider referral line, [facility] , at [phone].    If you feel you have received this communication in error or would no longer like to receive these types of communications, please e-mail [email or web address]

## 2020-07-17 ENCOUNTER — CLINICAL SUPPORT (OUTPATIENT)
Dept: REHABILITATION | Facility: HOSPITAL | Age: 75
End: 2020-07-17
Payer: MEDICARE

## 2020-07-17 DIAGNOSIS — M25.561 CHRONIC PAIN OF RIGHT KNEE: Primary | ICD-10-CM

## 2020-07-17 DIAGNOSIS — G89.29 CHRONIC PAIN OF RIGHT KNEE: Primary | ICD-10-CM

## 2020-07-17 DIAGNOSIS — G89.29 RIGHT FLANK PAIN, CHRONIC: ICD-10-CM

## 2020-07-17 DIAGNOSIS — R10.9 RIGHT FLANK PAIN, CHRONIC: ICD-10-CM

## 2020-07-17 PROCEDURE — 97161 PT EVAL LOW COMPLEX 20 MIN: CPT | Mod: HCNC | Performed by: PHYSICAL THERAPIST

## 2020-07-17 NOTE — PLAN OF CARE
"[facility]  Physical Therapy Initial Evaluation    Name: Clay Marcos  Clinic Number: [id number]    Therapy Diagnosis:   Encounter Diagnosis   Name Primary?    Right flank pain, chronic      Physician: Judy Francisco MD    Physician Orders: PT Eval and Treat  Medical Diagnosis from Referral: right flank pain; right knee pain  Evaluation Date: 7/17/2020  Authorization Period Expiration: 7/20/2021  Plan of Care Expiration: 10/15/2020  Visit # / Visits authorized: 1/1    Precautions: Standard    Time In: 8:30 am  Time Out: 9:17 am  Total Billable Time: 47 minutes    SUBJECTIVE     Date of onset: About three years ago with recent flare up  History of current condition - Clay is a 74 y.o. male whom reports knee pain that started about 3 years ago. Patient states the knee has gotten "achier and achier" over the years. He states he went to have it checked out and had some imaging done (x-ray) which showed that he has some OA and joint narrowing in the R knee. Patient reports exercising often, including cycling 3-4 times per week and taking several different exercise classes including weight training and MMA classes. He states that none of this bothers the knee except for some of the kicking in MMA classes. Patient states that the knee is not so much painful as it is stiff and reports that the most trouble he has with it is stiffness when he stands up after sitting for a while or when getting up in the morning as well as when ascending and descending stairs. Patient also reports R flank pain in addition to the R knee pain. Patient's biggest limitations are not being able to do yoga anymore as he is unable to get up off the floor, and he doesn't understand why he can't do that anymore when he cycles several times a week and strength trains. He also states he can't jog, run, or hop but that he doesn't want to work toward those activities as goals.       Medical History:   Past Medical History: "   Diagnosis Date    Basal cell carcinoma     Scalp    BPH with urinary obstruction     Calcaneal spur of left foot     Colon polyps     DDD (degenerative disc disease), lumbar     Diverticulosis of colon     Dyslipidemia     Dysmetabolic syndrome     Fracture of rib of left side     History of tobacco use     Internal and external hemorrhoids without complication     MVP (mitral valve prolapse)     Obesity     Osteoarthritis of both thumbs 9/15/2015    Pharyngoesophageal dysphagia     Pneumonia 08/2016    Reflux esophagitis        Surgical History:   Clay Marcos  has a past surgical history that includes Hemorrhoid surgery; Excision basal cell carcinoma; Anal fistulotomy; Tonsillectomy; Sinus surgery (01/2017); and Cataract extraction w/  intraocular lens implant (Right, 09/2017).    Medications:   Clay has a current medication list which includes the following prescription(s): baclofen, montelukast, multivitamin, and tamsulosin.    Allergies:   Review of patient's allergies indicates:  No Known Allergies     Imaging: x-ray on right knee on 7/8/2020  FINDINGS:  Tricompartmental degenerative changes most severe in the medial and patellofemoral compartments with spurring. No fracture or dislocation or joint effusion.    Mild degenerative changes also noted in the contralateral left knee.    Prior Therapy: N/A  Social History: Pt lives with his spouse  Occupation: Pt is retired. 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  Prior Level of Function: Very physically active and independent in all ADLs and recreational activities with no pain or functional limitation  Current Level of Function: able to walk and stand without; cannot run, jump, hop due to pain; unable to do yoga due to not being able to get up off the floor    Pain:  Current 2/10, worst 5/10, best 1/10   Location: right knee, right flank  Description: Aching, not shooting  Aggravating Factors: Morning; ascending and descending stairs; playing golf  (muscular flank pain afterward)  Easing Factors: heating pad on back while watching TV    Dominant Extremity: Right    Pts goals: Pt reported goals are to have less pain in his knee when he does certain activities, to be able to do yoga again    OBJECTIVE   (x = not tested due to pain and/or inability to obtain test position)    RANGE OF MOTION:    Lumbar ROM Right  (spine)  7/17/2020 Left    7/17/2020 Pain/Dysfunction with Movement Goal   Lumbar Flexion (60) Hands to shin --- Minimal catching/pain    Lumbar Extension (30) 10 --- Minimal increase in pain    Lumbar Side Bending (25) Hand to upper thigh Hand to mid thigh     Lumbar Rotation 50% limitation 75% limitation       Knee ROM Right  7/17/2020 Left  7/17/2020 Pain/Dysfunction with Movement Goal   Knee Flexion (135) 128 137  137   Knee Extension (0) -4 0  0       STRENGTH:    U/E MMT Right  7/17/2020 Left  7/17/2020 Goal   Shoulder Flexion 4/5 4+/5 5/5 B   Shoulder Abduction 4+/5 5/5 5/5 B   Serratus Anterior 4/5 4/5 5/5 B   Middle Trapezius   3+/5 3+/5 4+/5 B   Lower Trapezius 3+/5 3+/5 4+/5 B   Elbow Flexion  5/5 5/5 5/5 B   Elbow Extension 4/5 5/5 5/5 B       L/E MMT Right  7/17/2020 Left  7/17/2020 Goal   Hip Flexion  4+/5 4+/5 5/5 B    Hip Extension  4/5 4/5 5/5 B   Hip Abduction  4+/5 4+/5 5/5 B   Knee Extension 4+/5 5/5 5/5 B   Knee Flexion 4+/5 5/5 5/5 B   Ankle DF 4+/5 4+/5 5/5 B   Ankle PF 4+/5   4+/5 5/5 B           MUSCLE LENGTH:     Muscle Tested  Right  7/17/2020 Left   7/17/2020 Goal   Quadratus Lumborum decreased decreased Normal B   Hip Flexors  decreased decreased Normal B   Quadriceps decreased decreased Normal B   Hamstrings  decreased decreased Normal B   Gastrocnemius  decreased decreased Normal B   Soleus  decreased decreased Normal B     JOINT MOBILITY:     Joint Motion Tested Right  (spine)  7/17/2020 Left   7/17/2020 Goal   Patellar Mobility  Hypomobile Normal Normal B   Tibiofemoral Joint Hypomobile Normal Normal B   Thoracic Spine  Hypomobile --- Normal B   Lumbar Spine Hypomobile --- Normal B       SPECIAL TESTS:     Right  (spine)  7/17/2020 Left   7/17/2020 Goal   ASIS Compression Negative Negative Negative B    ASIS Distraction Negative Negative Negative B    VIRGINIE Positive Negative Negative B        Sensation: Sensation is intact to light touch    Palpation: Increased tone and tenderness noted with palpation of right quadratus lumborum , lumbar paraspinals , piriformis and iliospoas. Increased tenderness noted with palpation of right ASIS.     Posture: Pt presents with postural abnormalities which include: ER of L LE, slight flexion of R knee, increased APT, elevated shoulders B, protracted scapulae    Gait Analysis: The patient ambulated with the following assistive device: none; the pt presents with the following gait abnormalities: increased NICOLLE, decreased pelvic/trunk rotation and decreased reciprocal arm swing, ER of L LE    Movement Analysis: Squat quality = moderate; some chest dropping and forward weight shift; no pain    Balance:  Able to hold for 10s bilaterally; more instability on R LE compared to L      FUNCTION:     CMS Impairment/Limitation/Restriction for FOTO Knee Survey    Therapist reviewed FOTO scores for Clay Marcos on 7/17/2020.   FOTO documents entered into [facility] - see Media section.    Limitation Score: 35%         TREATMENT   Treatment Time In: 9:12 am  Treatment Time Out: 9:17 am  Total Treatment time separate from Evaluation: 5 minutes    Patient Education Provided    Education/Self-Care provided: (5 minutes)   Patient educated on the impairments noted above and the effects of physical therapy intervention to improve overall condition and QOL.    Explained to patient that even though he works out a lot and his muscles may be strong, he can be functionally weak. Also explained how core weakness can affect the rest of the body.    ASSESSMENT   Clay is a 74 y.o. male referred to outpatient Physical Therapy with  "a medical diagnosis of right flank pain and right knee pain. Pt presents with impairments including: decreased ROM, decreased strength, decreased joint mobility, decreased muscle length, postural abnormalities and gait abnormalities.    Pt prognosis is Excellent.   Pt will benefit from skilled outpatient Physical Therapy to address the deficits stated above and in the chart below, provide pt/family education, and to maximize pt's level of independence.     Plan of care discussed with patient: Yes  Pt's spiritual, cultural and educational needs considered and patient is agreeable to the plan of care and goals as stated below:     Anticipated Barriers for therapy: none    Medical Necessity is demonstrated by the following  History  Co-morbidities and personal factors that may impact the plan of care Co-morbidities:   none    Personal Factors:   no deficits     low   Examination  Body Structures and Functions, activity limitations and participation restrictions that may impact the plan of care Body Regions:   back  lower extremities    Body Systems:    ROM  strength  gait  transitions  motor control    Participation Restrictions:   See above in "Current Level of Function"     Activity limitations:   Learning and applying knowledge  no deficits    General Tasks and Commands  no deficits    Communication  no deficits    Mobility  getting up from off the floor    Self care  no deficits    Domestic Life  no deficits    Interactions/Relationships  no deficits    Life Areas  no deficits    Community and Social Life  community life  recreation and leisure         low   Clinical Presentation stable and uncomplicated low   Decision Making/ Complexity Score: low       GOALS:    Short Term Goals:  6 weeks    1. Pain: Pt will demonstrate improved pain by reports of less than or equal to 3/10 worst pain on the verbal rating scale in order to progress toward maximal functional ability and improve QOL.    2. Function: Patient will " demonstrate improved function as indicated by a functional limitation score of less than or equal to 32 out of 100 on FOTO.    3. Mobility: Patient will improve AROM to 50% of stated goals, listed in objective measures above, in order to progress towards independence with functional activities.     4. Strength: Patient will improve strength to 50% of stated goals, listed in objective measures above, in order to progress towards independence with functional activities.     5. Gait: Patient will demonstrate improved gait mechanics in order to improve functional mobility, improve quality of life, and decrease risk of further injury or fall.     6. HEP: Patient will demonstrate independence with HEP in order to progress toward functional independence.    7. Improve postural awareness.      Long Term Goals:  12 weeks    1. Pain: Pt will demonstrate improved pain by reports of less than or equal to 2/10 worst pain on the verbal rating scale in order to progress toward maximal functional ability and improve QOL.      2. Function: Patient will demonstrate improved function as indicated by a functional limitation score of less than or equal to 29 out of 100 on FOTO.    3. Mobility: Patient will improve AROM to stated goals, listed in objective measures above, in order to return to maximal functional potential and improve quality of life.    4. Strength: Patient will improve strength to stated goals, listed in objective measures above, in order to improve functional independence and quality of life.    5. Gait: Patient will demonstrate normalized gait mechanics with minimal compensation in order to return to PLOF.    6. Patient will return to normal ADL's, IADL's, community involvement, recreational activities, and work-related activities with less than or equal to 1/10 pain and maximal function.         PLAN   Plan of care Certification: 7/17/2020 to 10/15/2020.    Outpatient Physical Therapy 2 times weekly for 12 weeks to  include any combination of the following interventions: virtual visits, dry needling, modalities, electrical stimulation (IFC, Pre-Mod, Attended with Functional Dry Needling), Cervical/Lumbar Traction, Gait Training, Manual Therapy, Neuromuscular Re-ed, Patient Education, Self Care, Therapeutic Activites and Therapeutic Exercise     Thank you for this referral.    These services are reasonable and necessary for the conditions set forth above while under my care.    Leana Martínez, SPT  Sruthi Urbina, PT, DPT

## 2020-07-20 ENCOUNTER — CLINICAL SUPPORT (OUTPATIENT)
Dept: REHABILITATION | Facility: HOSPITAL | Age: 75
End: 2020-07-20
Payer: MEDICARE

## 2020-07-20 DIAGNOSIS — G89.29 RIGHT FLANK PAIN, CHRONIC: ICD-10-CM

## 2020-07-20 DIAGNOSIS — G89.29 CHRONIC PAIN OF RIGHT KNEE: ICD-10-CM

## 2020-07-20 DIAGNOSIS — M25.561 CHRONIC PAIN OF RIGHT KNEE: ICD-10-CM

## 2020-07-20 DIAGNOSIS — R10.9 RIGHT FLANK PAIN, CHRONIC: ICD-10-CM

## 2020-07-20 PROCEDURE — 97110 THERAPEUTIC EXERCISES: CPT | Mod: HCNC,CQ

## 2020-07-20 NOTE — PROGRESS NOTES
Physical Therapy Daily Treatment Note     Name: Clay Marcos  [facility] Number: [id number]    Therapy Diagnosis: No diagnosis found.  Physician: Judy Francisco MD    Visit Date: 7/20/2020    Physician Orders: PT Eval and Treat  Medical Diagnosis from Referral: right flank pain; right knee pain  Evaluation Date: 7/17/2020  Authorization Period Expiration: 7/20/2021  Plan of Care Expiration: 10/15/2020  Visit # / Visits authorized: 1/20 (2)     Precautions: Standard    Time In: 7:00 AM  Time Out: 7:45 AM  Total Billable Time: 40 minutes    SUBJECTIVE     Today, pt reports: Patient states that he has been pretty active and has 2 x-rays with the more recent x-ray showing more medial right knee joint line narrowing.   He was not compliant with home exercise program.  Response to previous treatment: Felt good after evaluation.   Functional change: Unable to     Pre-Treatment Pain: 2/10  Post-Treatment Pain: 1/10  Location: right knee and right flank  TREATMENT     Clay received therapeutic exercises to develop strength, endurance, ROM, flexibility and posture for 40 minutes including:    Exercise 7/20/2020   Upright bike 5 minutes   Straight leg raise 2x10   Side lying hip abduction 2x10   Bridge with posterior pelvic tilt 2x10   hamstring stretch bilaterally 3x30 seconds   Posterior pelvic tilt with march 3 minutes   Double knee to chest with physioball    Lower trunk rotations      Cues were provided for core contraction with all strengthening exercises and to move through range of motion with slow controlled movements.     Home Exercises Provided and Patient Education Provided     Education/Self-Care provided: (0 minutes)   Patient educated on biomechanical justification for therapeutic exercise and importance of compliance with HEP in order to improve overall impairments and QOL    Patient educated on the importance of improved core and lower extremity strength in order to improve alignment of the spine and lower  extremities with static positions and dynamic movement.    Patient educated on the importance of strong core and lower extremity musculature in order to improve both static and dynamic balance, improve gait mechanics, reduce fall risk and improve household and community mobility.       Written Home Exercises Provided: yes. Via my chart larisa today.  Exercises were reviewed and Clay was able to demonstrate them prior to the end of the session.  Clay demonstrated good  understanding of the education provided.     See EMR under Patient Instructions for exercises provided 7/20/2020.    ASSESSMENT   Pt tolerated manual therapy well with reports of decreased pain following intervention.  Pt demonstrated good understanding of exercises and required minimal cueing to maintain abdominal contraction with all strengthening exercises.       Clay is progressing well towards his goals.   Pt prognosis is Excellent.     Pt will continue to benefit from skilled outpatient physical therapy to address the deficits listed in the problem list box on initial evaluation, provide pt/family education and to maximize pt's level of independence in the home and community environment.     Pt's spiritual, cultural and educational needs considered and pt agreeable to plan of care and goals.     Anticipated barriers to physical therapy: None    Goals:     Short Term Goals:  6 weeks     1. Pain: Pt will demonstrate improved pain by reports of less than or equal to 3/10 worst pain on the verbal rating scale in order to progress toward maximal functional ability and improve QOL.     2. Function: Patient will demonstrate improved function as indicated by a functional limitation score of less than or equal to 32 out of 100 on FOTO.     3. Mobility: Patient will improve AROM to 50% of stated goals, listed in objective measures above, in order to progress towards independence with functional activities.      4. Strength: Patient will improve strength to  50% of stated goals, listed in objective measures above, in order to progress towards independence with functional activities.      5. Gait: Patient will demonstrate improved gait mechanics in order to improve functional mobility, improve quality of life, and decrease risk of further injury or fall.      6. HEP: Patient will demonstrate independence with HEP in order to progress toward functional independence.     7. Improve postural awareness.        Long Term Goals:  12 weeks     1. Pain: Pt will demonstrate improved pain by reports of less than or equal to 2/10 worst pain on the verbal rating scale in order to progress toward maximal functional ability and improve QOL.       2. Function: Patient will demonstrate improved function as indicated by a functional limitation score of less than or equal to 29 out of 100 on FOTO.     3. Mobility: Patient will improve AROM to stated goals, listed in objective measures above, in order to return to maximal functional potential and improve quality of life.     4. Strength: Patient will improve strength to stated goals, listed in objective measures above, in order to improve functional independence and quality of life.     5. Gait: Patient will demonstrate normalized gait mechanics with minimal compensation in order to return to PLOF.     6. Patient will return to normal ADL's, IADL's, community involvement, recreational activities, and work-related activities with less than or equal to 1/10 pain and maximal function.           PLAN   Continue Plan of Care to strengthen core for patient to be able to return to all functional activities with no pain.     Damien Izaguirre, PTA

## 2020-07-23 ENCOUNTER — CLINICAL SUPPORT (OUTPATIENT)
Dept: REHABILITATION | Facility: HOSPITAL | Age: 75
End: 2020-07-23
Payer: MEDICARE

## 2020-07-23 ENCOUNTER — DOCUMENTATION ONLY (OUTPATIENT)
Dept: REHABILITATION | Facility: HOSPITAL | Age: 75
End: 2020-07-23

## 2020-07-23 DIAGNOSIS — G89.29 RIGHT FLANK PAIN, CHRONIC: ICD-10-CM

## 2020-07-23 DIAGNOSIS — R10.9 RIGHT FLANK PAIN, CHRONIC: ICD-10-CM

## 2020-07-23 DIAGNOSIS — M25.561 CHRONIC PAIN OF RIGHT KNEE: ICD-10-CM

## 2020-07-23 DIAGNOSIS — J45.20 MILD INTERMITTENT ASTHMA WITHOUT COMPLICATION: Primary | ICD-10-CM

## 2020-07-23 DIAGNOSIS — G89.29 CHRONIC PAIN OF RIGHT KNEE: ICD-10-CM

## 2020-07-23 PROCEDURE — 97110 THERAPEUTIC EXERCISES: CPT | Mod: HCNC

## 2020-07-23 PROCEDURE — 97140 MANUAL THERAPY 1/> REGIONS: CPT | Mod: HCNC

## 2020-07-23 NOTE — PROGRESS NOTES
"  Physical Therapy Daily Treatment Note     Name: Clay Marcos  Clinic Number: [id number]    Therapy Diagnosis:   Encounter Diagnoses   Name Primary?    Right flank pain, chronic     Chronic pain of right knee      Physician: Judy Francisco MD    Visit Date: 7/23/2020    Physician Orders: PT Eval and Treat  Medical Diagnosis from Referral: right flank pain; right knee pain  Evaluation Date: 7/17/2020  Authorization Period Expiration: 7/20/2021  Plan of Care Expiration: 10/15/2020  Visit # / Visits authorized: 2/20 (3)     Precautions: Standard    Time In: 8:17 AM  Time Out: 9:04 AM  Total Billable Time: 47 minutes    SUBJECTIVE     Today, pt reports: Patient states that his knee has been feeling about the same and that he had some knee stiffness and pain when he went to exercise classes yesterday.    He was not compliant with home exercise program.  Response to previous treatment: Felt good following last treatment.  Functional change: N/A    Pre-Treatment Pain: 4/10  Post-Treatment Pain: 2/10  Location: right knee and right flank  TREATMENT     Clay received therapeutic exercises to develop strength, endurance, ROM, flexibility and posture for 38 minutes including:    Exercise 7/23/2020   Upright bike 3 minutes   Straight leg raise 12x, 2#   SAQ 10x, 3#   Side lying hip abduction 10x   7 way hip 2 reps each   PPT 10x, 5s holds   Bridge with posterior pelvic tilt 10x DL, 10x SL each   Table top position Holds, 30"  Arm raises with core stabilization, 10x   Prone hip extension 10x   Prone HS curls 10x, 3#   Quadruped activities Alternating arm raises 10x  Alternating bird dog 10x   Physioball activities Marching 20x  Alternating opposite arm and leg raises, 10x   Hamstring stretch bilaterally 3x30 seconds - deferred   Posterior pelvic tilt with march 3 minutes - deferred   Double knee to chest with physioball Deferred   Lower trunk rotations Deferred     Cues were provided for core contraction with all strengthening " exercises and to move through range of motion with slow controlled movements.     Clay received the following manual therapy techniques: myofascial release and soft tissue mobilization were applied to the right calf musculature, right hip adductors and medial hamstrings, and right QL: for 9 minutes    Home Exercises Provided and Patient Education Provided     Education/Self-Care provided: (0 minutes)   Patient educated on biomechanical justification for therapeutic exercise and importance of compliance with HEP in order to improve overall impairments and QOL    Patient educated on the importance of improved core and lower extremity strength in order to improve alignment of the spine and lower extremities with static positions and dynamic movement.    Patient educated on the importance of strong core and lower extremity musculature in order to improve both static and dynamic balance, improve gait mechanics, reduce fall risk and improve household and community mobility.     Written Home Exercises Provided: yes. Via my chart larisa today.  Exercises were reviewed and Clay was able to demonstrate them prior to the end of the session.  Clay demonstrated good  understanding of the education provided.     See EMR under Patient Instructions for exercises provided 7/20/2020.    ASSESSMENT   Pt tolerated treatment well today. He demonstrated good motivation and awareness of form throughout the session. Noted some core instability/weakness and weakness in hip abduction. Patient performed well with activities in quadruped and quickly corrected rotational deviations during bird dogs after it was pointed out to him. Patient also performed well with physioball activities, although some difficulty was noted. Tenderness noted with palpation to R LE hip adductors and to the R QL. Continue to progress with lower extremity and core strengthening.     Clay is progressing well towards his goals.   Pt prognosis is Excellent.     Pt will  continue to benefit from skilled outpatient physical therapy to address the deficits listed in the problem list box on initial evaluation, provide pt/family education and to maximize pt's level of independence in the home and community environment.     Pt's spiritual, cultural and educational needs considered and pt agreeable to plan of care and goals.     Anticipated barriers to physical therapy: None    Goals:     Short Term Goals:  6 weeks     1. Pain: Pt will demonstrate improved pain by reports of less than or equal to 3/10 worst pain on the verbal rating scale in order to progress toward maximal functional ability and improve QOL.     2. Function: Patient will demonstrate improved function as indicated by a functional limitation score of less than or equal to 32 out of 100 on FOTO.     3. Mobility: Patient will improve AROM to 50% of stated goals, listed in objective measures above, in order to progress towards independence with functional activities.      4. Strength: Patient will improve strength to 50% of stated goals, listed in objective measures above, in order to progress towards independence with functional activities.      5. Gait: Patient will demonstrate improved gait mechanics in order to improve functional mobility, improve quality of life, and decrease risk of further injury or fall.      6. HEP: Patient will demonstrate independence with HEP in order to progress toward functional independence.     7. Improve postural awareness.        Long Term Goals:  12 weeks     1. Pain: Pt will demonstrate improved pain by reports of less than or equal to 2/10 worst pain on the verbal rating scale in order to progress toward maximal functional ability and improve QOL.       2. Function: Patient will demonstrate improved function as indicated by a functional limitation score of less than or equal to 29 out of 100 on FOTO.     3. Mobility: Patient will improve AROM to stated goals, listed in objective measures  above, in order to return to maximal functional potential and improve quality of life.     4. Strength: Patient will improve strength to stated goals, listed in objective measures above, in order to improve functional independence and quality of life.     5. Gait: Patient will demonstrate normalized gait mechanics with minimal compensation in order to return to PLOF.     6. Patient will return to normal ADL's, IADL's, community involvement, recreational activities, and work-related activities with less than or equal to 1/10 pain and maximal function.           PLAN   Continue Plan of Care to strengthen core for patient to be able to return to all functional activities with no pain.     Yanna Brennan, PT

## 2020-07-23 NOTE — PROGRESS NOTES
PT/PTA met face to face to discuss pt's treatment plan and progress towards established goals. Pt will be seen by a physical therapist minimally every 6th visit or every 30 days.        Damien Izaguirre PTA

## 2020-07-27 ENCOUNTER — CLINICAL SUPPORT (OUTPATIENT)
Dept: REHABILITATION | Facility: HOSPITAL | Age: 75
End: 2020-07-27
Payer: MEDICARE

## 2020-07-27 DIAGNOSIS — M25.561 CHRONIC PAIN OF RIGHT KNEE: ICD-10-CM

## 2020-07-27 DIAGNOSIS — R10.9 RIGHT FLANK PAIN, CHRONIC: ICD-10-CM

## 2020-07-27 DIAGNOSIS — G89.29 RIGHT FLANK PAIN, CHRONIC: ICD-10-CM

## 2020-07-27 DIAGNOSIS — G89.29 CHRONIC PAIN OF RIGHT KNEE: ICD-10-CM

## 2020-07-27 PROCEDURE — 97140 MANUAL THERAPY 1/> REGIONS: CPT | Mod: HCNC | Performed by: PHYSICAL THERAPIST

## 2020-07-27 PROCEDURE — 97110 THERAPEUTIC EXERCISES: CPT | Mod: HCNC | Performed by: PHYSICAL THERAPIST

## 2020-07-27 NOTE — PROGRESS NOTES
"  Physical Therapy Daily Treatment Note     Name: Clay Marcos  Clinic Number: [id number]    Therapy Diagnosis:   No diagnosis found.  Physician: Judy Francisco MD    Visit Date: 7/27/2020    Physician Orders: PT Eval and Treat  Medical Diagnosis from Referral: right flank pain; right knee pain  Evaluation Date: 7/17/2020  Authorization Period Expiration: 7/20/2021  Plan of Care Expiration: 10/15/2020  Visit # / Visits authorized: 3/20 (4)     Precautions: Standard    Time In: 9:03 AM  Time Out: 9:47 AM  Total Billable Time: 44 minutes    SUBJECTIVE     Today, pt reports: Patient reports that he is feeling somewhat better during exercises since starting therapy. He states that he has been paying more attention to bracing his core during workouts, especially with certain movements. He states that he has been working on core bracing at home too.    He was compliant with home exercise program.  Response to previous treatment: Felt good following last treatment.  Functional change: N/A    Pre-Treatment Pain: 3/10  Post-Treatment Pain: 2/10  Location: right knee and right flank  TREATMENT     Clay received therapeutic exercises to develop strength, endurance, ROM, flexibility and posture for 27 minutes including:    Exercise 7/27/2020   Upright bike 5 minutes   Straight leg raise 12x, 2# - deferred   SAQ 10x, 3# - deferred   Side lying hip abduction 10x B   7 way hip 2 reps each - deferred   PPT 10x, 5s holds - deferred   Bridge with posterior pelvic tilt 15x DL, 15x SL each   Table top position Holds, 30"  Arm raises with core stabilization, 10x  Deferred   Prone hip extension 10x - deferred   Prone HS curls 10x, 3# - deferred   Quadruped activities Alternating arm raises 10x - deferred  Alternating bird dog 10x - deferred  Upper trunk rotation - deferred   Physioball activities Marching 20x  Alternating opposite arm and leg raises, 10x   Hamstring stretch bilaterally 3x30 seconds - deferred   Posterior pelvic tilt with " march 3 minutes - deferred   Double knee to chest with physioball Deferred   Lower trunk rotations with swiss ball 20x   Open book stretch 10x B     Cues were provided for core contraction with all strengthening exercises and to move through range of motion with slow controlled movements.     Clay received the following manual therapy techniques: myofascial release and soft tissue mobilization were applied to the right calf musculature, right hip adductors and medial hamstrings, and right QL: for 12 minutes    Home Exercises Provided and Patient Education Provided     Education/Self-Care provided: (5 minutes)   Patient educated on biomechanical justification for therapeutic exercise and importance of compliance with HEP in order to improve overall impairments and QOL    Patient educated on the importance of improved core and lower extremity strength in order to improve alignment of the spine and lower extremities with static positions and dynamic movement.    Patient educated on the importance of strong core and lower extremity musculature in order to improve both static and dynamic balance, improve gait mechanics, reduce fall risk and improve household and community mobility.     Written Home Exercises Provided: yes. Via my chart larisa today.  Exercises were reviewed and Clay was able to demonstrate them prior to the end of the session.  Clay demonstrated good  understanding of the education provided.     See EMR under Patient Instructions for exercises provided 7/20/2020.    ASSESSMENT   Patient tolerated treatment well today, demonstrating improved core stability and good understanding of proper form. Noted tightness in R QL may be due to hip hiking to substitute for weakness in hip musculature. Decreased soft tissue adhesions noted throughout R calf musculature today. Continue to progress with lower extremity and core strengthening.     Clay is progressing well towards his goals.   Pt prognosis is Excellent.      Pt will continue to benefit from skilled outpatient physical therapy to address the deficits listed in the problem list box on initial evaluation, provide pt/family education and to maximize pt's level of independence in the home and community environment.     Pt's spiritual, cultural and educational needs considered and pt agreeable to plan of care and goals.     Anticipated barriers to physical therapy: None    Goals:     Short Term Goals:  6 weeks     1. Pain: Pt will demonstrate improved pain by reports of less than or equal to 3/10 worst pain on the verbal rating scale in order to progress toward maximal functional ability and improve QOL.     2. Function: Patient will demonstrate improved function as indicated by a functional limitation score of less than or equal to 32 out of 100 on FOTO.     3. Mobility: Patient will improve AROM to 50% of stated goals, listed in objective measures above, in order to progress towards independence with functional activities.      4. Strength: Patient will improve strength to 50% of stated goals, listed in objective measures above, in order to progress towards independence with functional activities.      5. Gait: Patient will demonstrate improved gait mechanics in order to improve functional mobility, improve quality of life, and decrease risk of further injury or fall.      6. HEP: Patient will demonstrate independence with HEP in order to progress toward functional independence.     7. Improve postural awareness.        Long Term Goals:  12 weeks     1. Pain: Pt will demonstrate improved pain by reports of less than or equal to 2/10 worst pain on the verbal rating scale in order to progress toward maximal functional ability and improve QOL.       2. Function: Patient will demonstrate improved function as indicated by a functional limitation score of less than or equal to 29 out of 100 on FOTO.     3. Mobility: Patient will improve AROM to stated goals, listed in  objective measures above, in order to return to maximal functional potential and improve quality of life.     4. Strength: Patient will improve strength to stated goals, listed in objective measures above, in order to improve functional independence and quality of life.     5. Gait: Patient will demonstrate normalized gait mechanics with minimal compensation in order to return to PLOF.     6. Patient will return to normal ADL's, IADL's, community involvement, recreational activities, and work-related activities with less than or equal to 1/10 pain and maximal function.           PLAN   Continue Plan of Care to strengthen core for patient to be able to return to all functional activities with no pain.     Leana Martínez, SPT

## 2020-07-30 ENCOUNTER — CLINICAL SUPPORT (OUTPATIENT)
Dept: REHABILITATION | Facility: HOSPITAL | Age: 75
End: 2020-07-30
Payer: MEDICARE

## 2020-07-30 DIAGNOSIS — G89.29 CHRONIC PAIN OF RIGHT KNEE: ICD-10-CM

## 2020-07-30 DIAGNOSIS — M25.561 CHRONIC PAIN OF RIGHT KNEE: ICD-10-CM

## 2020-07-30 DIAGNOSIS — G89.29 RIGHT FLANK PAIN, CHRONIC: ICD-10-CM

## 2020-07-30 DIAGNOSIS — R10.9 RIGHT FLANK PAIN, CHRONIC: ICD-10-CM

## 2020-07-30 PROCEDURE — 97140 MANUAL THERAPY 1/> REGIONS: CPT | Mod: HCNC,CQ

## 2020-07-30 PROCEDURE — 97110 THERAPEUTIC EXERCISES: CPT | Mod: HCNC,CQ

## 2020-07-30 NOTE — PROGRESS NOTES
"  Physical Therapy Daily Treatment Note     Name: Clay Marcos  Clinic Number: [id number]    Therapy Diagnosis:   Encounter Diagnoses   Name Primary?    Right flank pain, chronic     Chronic pain of right knee      Physician: Judy Francisco MD    Visit Date: 7/30/2020    Physician Orders: PT Eval and Treat  Medical Diagnosis from Referral: right flank pain; right knee pain  Evaluation Date: 7/17/2020  Authorization Period Expiration: 7/20/2021  Plan of Care Expiration: 10/15/2020  Visit # / Visits authorized: 4/20 (5)     Precautions: Standard    Time In: 8:45 AM  Time Out: 9:00 AM  Total Billable Time: 40 minutes    SUBJECTIVE     Today, pt reports: Patient reports that he has been trying to engage core with all functional movements. Reports that he still has a catching sensation in right knee with quick movements.   He was compliant with home exercise program.  Response to previous treatment: Felt good after last treatment.  Functional change: Unable to perform some exercises involving a squatting movement, unable to play golf because of trunk rotational pain    Pre-Treatment Pain: 2/10 (right knee) 0/10 trunk  Post-Treatment Pain: 3/10 (right trunk pain) 1/10 right knee  Location: right knee   TREATMENT     Clay received therapeutic exercises to develop strength, endurance, ROM, flexibility and posture for 29 minutes including:    Exercise 7/30/2020   Upright bike 5 minutes   Straight leg raise 12x, 2# - deferred   SAQ 10x, 3# - deferred   Side lying hip abduction 10x B deferred   7 way hip 2 reps each - deferred   PPT 10x, 5s holds - deferred   Bridge with posterior pelvic tilt 15x DL, 15x SL each deferred   Table top position Holds, 30"  Arm raises with core stabilization, 10x  Deferred   Prone hip extension 10x - deferred   Prone HS curls 10x, 3# - deferred   Quadruped activities Alternating arm raises 10x - deferred  Alternating bird dog 10x - deferred  Upper trunk rotation - deferred   Physioball activities " Marching 20x  Alternating opposite arm and leg raises, 10x deferred   Hamstring stretch bilaterally 3x30 seconds - deferred   Posterior pelvic tilt with march 3 minutes - deferred   Double knee to chest with physioball Deferred   Lower trunk rotations with swiss ball 20x deferred   Open book stretch 10x B deferred   payloff press 2x10 30 pounds both directions   payloff acrh 2x10 30 pounds both directions   Wood chops 2x10 3 pounds each directin   Unilateral standing 3 pound weight moving weight overhead with head following weight 2x10 alternating weight in each hand with each set   Quadratus lumborum stretch Figure 4 position 5x15 seconds     Cues were provided for core contraction with all strengthening exercises and to move through range of motion with slow controlled movements.     Clay received the following manual therapy techniques: myofascial release and soft tissue mobilization were applied to the right calf musculature,  right QL in left side lying positioned over prone positioner: for 10 minutes    Home Exercises Provided and Patient Education Provided     Education/Self-Care provided: (5 minutes)   Patient educated on biomechanical justification for therapeutic exercise and importance of compliance with HEP in order to improve overall impairments and QOL    Patient educated on the importance of improved core and lower extremity strength in order to improve alignment of the spine and lower extremities with static positions and dynamic movement.    Patient educated on the importance of strong core and lower extremity musculature in order to improve both static and dynamic balance, improve gait mechanics, reduce fall risk and improve household and community mobility.     Written Home Exercises Provided: yes. Via my chart larisa today.  Exercises were reviewed and Clay was able to demonstrate them prior to the end of the session.  Clay demonstrated good  understanding of the education provided.     See EMR  under Patient Instructions for exercises provided 7/20/2020.    ASSESSMENT   Patient tolerated treatment well today, demonstrating improved core stability and good understanding of proper form. Noted tightness in R QL may be due to hip hiking to substitute for weakness in hip musculature. Decreased soft tissue adhesions noted throughout R calf musculature today. Continue to progress with lower extremity and core strengthening.     Clay is progressing well towards his goals.   Pt prognosis is Excellent.     Pt will continue to benefit from skilled outpatient physical therapy to address the deficits listed in the problem list box on initial evaluation, provide pt/family education and to maximize pt's level of independence in the home and community environment.     Pt's spiritual, cultural and educational needs considered and pt agreeable to plan of care and goals.     Anticipated barriers to physical therapy: None    Goals:     Short Term Goals:  6 weeks     1. Pain: Pt will demonstrate improved pain by reports of less than or equal to 3/10 worst pain on the verbal rating scale in order to progress toward maximal functional ability and improve QOL.     2. Function: Patient will demonstrate improved function as indicated by a functional limitation score of less than or equal to 32 out of 100 on FOTO.     3. Mobility: Patient will improve AROM to 50% of stated goals, listed in objective measures above, in order to progress towards independence with functional activities.      4. Strength: Patient will improve strength to 50% of stated goals, listed in objective measures above, in order to progress towards independence with functional activities.      5. Gait: Patient will demonstrate improved gait mechanics in order to improve functional mobility, improve quality of life, and decrease risk of further injury or fall.      6. HEP: Patient will demonstrate independence with HEP in order to progress toward functional  independence.     7. Improve postural awareness.        Long Term Goals:  12 weeks     1. Pain: Pt will demonstrate improved pain by reports of less than or equal to 2/10 worst pain on the verbal rating scale in order to progress toward maximal functional ability and improve QOL.       2. Function: Patient will demonstrate improved function as indicated by a functional limitation score of less than or equal to 29 out of 100 on FOTO.     3. Mobility: Patient will improve AROM to stated goals, listed in objective measures above, in order to return to maximal functional potential and improve quality of life.     4. Strength: Patient will improve strength to stated goals, listed in objective measures above, in order to improve functional independence and quality of life.     5. Gait: Patient will demonstrate normalized gait mechanics with minimal compensation in order to return to PLOF.     6. Patient will return to normal ADL's, IADL's, community involvement, recreational activities, and work-related activities with less than or equal to 1/10 pain and maximal function.           PLAN   Continue Plan of Care to strengthen core for patient to be able to return to all functional activities with no pain.     Damien Izaguirre, PTA

## 2020-08-02 ENCOUNTER — LAB VISIT (OUTPATIENT)
Dept: URGENT CARE | Facility: CLINIC | Age: 75
End: 2020-08-02
Payer: MEDICARE

## 2020-08-02 VITALS — TEMPERATURE: 97 F | OXYGEN SATURATION: 97 % | HEART RATE: 67 BPM

## 2020-08-02 DIAGNOSIS — J45.20 MILD INTERMITTENT ASTHMA WITHOUT COMPLICATION: ICD-10-CM

## 2020-08-02 PROCEDURE — U0003 INFECTIOUS AGENT DETECTION BY NUCLEIC ACID (DNA OR RNA); SEVERE ACUTE RESPIRATORY SYNDROME CORONAVIRUS 2 (SARS-COV-2) (CORONAVIRUS DISEASE [COVID-19]), AMPLIFIED PROBE TECHNIQUE, MAKING USE OF HIGH THROUGHPUT TECHNOLOGIES AS DESCRIBED BY CMS-2020-01-R: HCPCS | Mod: HCNC

## 2020-08-03 ENCOUNTER — CLINICAL SUPPORT (OUTPATIENT)
Dept: REHABILITATION | Facility: HOSPITAL | Age: 75
End: 2020-08-03
Payer: MEDICARE

## 2020-08-03 DIAGNOSIS — G89.29 RIGHT FLANK PAIN, CHRONIC: ICD-10-CM

## 2020-08-03 DIAGNOSIS — R10.9 RIGHT FLANK PAIN, CHRONIC: ICD-10-CM

## 2020-08-03 DIAGNOSIS — G89.29 CHRONIC PAIN OF RIGHT KNEE: ICD-10-CM

## 2020-08-03 DIAGNOSIS — M25.561 CHRONIC PAIN OF RIGHT KNEE: ICD-10-CM

## 2020-08-03 LAB — SARS-COV-2 RNA RESP QL NAA+PROBE: NOT DETECTED

## 2020-08-03 PROCEDURE — 97140 MANUAL THERAPY 1/> REGIONS: CPT | Mod: HCNC | Performed by: PHYSICAL THERAPIST

## 2020-08-03 PROCEDURE — 97110 THERAPEUTIC EXERCISES: CPT | Mod: HCNC | Performed by: PHYSICAL THERAPIST

## 2020-08-03 NOTE — PROGRESS NOTES
"  Physical Therapy Daily Treatment Note     Name: Clay Marcos  Clinic Number: [id number]    Therapy Diagnosis:   Encounter Diagnoses   Name Primary?    Right flank pain, chronic     Chronic pain of right knee      Physician: Judy Francisco MD    Visit Date: 8/3/2020    Physician Orders: PT Eval and Treat  Medical Diagnosis from Referral: right flank pain; right knee pain  Evaluation Date: 7/17/2020  Authorization Period Expiration: 7/20/2021  Plan of Care Expiration: 10/15/2020  Visit # / Visits authorized: 5/20 (6)     Precautions: Standard    Time In: 7:50 AM  Time Out: 8:35 AM   Total Billable Time: 40 minutes    SUBJECTIVE     Today, pt reports: Patient reports that he felt good following previous visit on Thursday, and then he moved some furniture on Friday morning. However, he woke up Saturday in a lot of pain, feeling like he was back at square one. Today he is not feeling nearly as bad. He states that his knee is a little achy and his back it "settled" today. Patient also admits that he has left shoulder pain that started about 2-3 months ago. He reports that he will be talking to his doctor about it at his next appointment. He thinks he might have done something to it while doing triceps dips at one of his exercise classes.     He was compliant with home exercise program.  Response to previous treatment: Felt good after last treatment.  Functional change: Unable to perform some exercises involving a squatting movement, unable to play golf because of trunk rotational pain    Pre-Treatment Pain: 2/10 (right knee) 2/10 trunk  Post-Treatment Pain: 2/10 (right knee pain) 1/10 trunk  Location: right knee   TREATMENT     Clay received therapeutic exercises to develop strength, endurance, ROM, flexibility and posture for 30 minutes including:    Exercise 8/3/2020   Upright bike 5 minutes   Straight leg raise 2 x 10   SAQ 10x, 3# - deferred   Side lying hip abduction 2 x 10   7 way hip 2 reps each - deferred   PPT " "10x, 5s holds - deferred   Bridge with posterior pelvic tilt 15x DL, 15x SL each deferred   Table top position Holds, 30"  Arm raises with core stabilization, 10x  Deferred   Prone hip extension 10x - deferred   Prone HS curls 10x, 3# - deferred   Quadruped activities Alternating arm raises 10x - deferred  Alternating bird dog 10x   Upper trunk rotation - deferred   Physioball activities Marching 20x  Alternating opposite arm and leg raises, 10x deferred   Hamstring stretch bilaterally 3x30 seconds - deferred   Posterior pelvic tilt with march 3 minutes - deferred   Double knee to chest with physioball Deferred   Lower trunk rotations with swiss ball 20x deferred   Open book stretch 10x B    payloff press 2x10 30 pounds both directions -deferred   payloff acrh 2x10 30 pounds both directions - deferred   Wood chops 2x10 3 pounds each direction - deferred   Unilateral standing 3 pound weight moving weight overhead with head following weight 2x10 alternating weight in each hand with each set - deferred   Quadratus lumborum stretch Seated 5x15 seconds     Cues were provided for core contraction with all strengthening exercises and to move through range of motion with slow controlled movements.     Clay received the following manual therapy techniques: myofascial release and soft tissue mobilization were applied to the right QL and lumbosacral region: for 10 minutes    Home Exercises Provided and Patient Education Provided     Education/Self-Care provided:   Patient educated on biomechanical justification for therapeutic exercise and importance of compliance with HEP in order to improve overall impairments and QOL    Patient educated on the importance of improved core and lower extremity strength in order to improve alignment of the spine and lower extremities with static positions and dynamic movement.    Patient educated on the importance of strong core and lower extremity musculature in order to improve both static " and dynamic balance, improve gait mechanics, reduce fall risk and improve household and community mobility.     Written Home Exercises Provided: yes. Via my chart larisa today.  Exercises were reviewed and Clay was able to demonstrate them prior to the end of the session.  Clay demonstrated good  understanding of the education provided.     See EMR under Patient Instructions for exercises provided 7/20/2020.    ASSESSMENT     Patient tolerated treatment well. He completed exercises without difficulty or complaint today. Patient still lacks core stabilization, but this is improving each visits as evidenced through his exercises and progressions. Good relief with manual therapy today. Patient progressing well overall towards goals.     Clay is progressing well towards his goals.   Pt prognosis is Excellent.     Pt will continue to benefit from skilled outpatient physical therapy to address the deficits listed in the problem list box on initial evaluation, provide pt/family education and to maximize pt's level of independence in the home and community environment.     Pt's spiritual, cultural and educational needs considered and pt agreeable to plan of care and goals.     Anticipated barriers to physical therapy: None    Goals:     Short Term Goals:  6 weeks     1. Pain: Pt will demonstrate improved pain by reports of less than or equal to 3/10 worst pain on the verbal rating scale in order to progress toward maximal functional ability and improve QOL.     2. Function: Patient will demonstrate improved function as indicated by a functional limitation score of less than or equal to 32 out of 100 on FOTO.     3. Mobility: Patient will improve AROM to 50% of stated goals, listed in objective measures above, in order to progress towards independence with functional activities.      4. Strength: Patient will improve strength to 50% of stated goals, listed in objective measures above, in order to progress towards independence  with functional activities.      5. Gait: Patient will demonstrate improved gait mechanics in order to improve functional mobility, improve quality of life, and decrease risk of further injury or fall.      6. HEP: Patient will demonstrate independence with HEP in order to progress toward functional independence.     7. Improve postural awareness.        Long Term Goals:  12 weeks     1. Pain: Pt will demonstrate improved pain by reports of less than or equal to 2/10 worst pain on the verbal rating scale in order to progress toward maximal functional ability and improve QOL.       2. Function: Patient will demonstrate improved function as indicated by a functional limitation score of less than or equal to 29 out of 100 on FOTO.     3. Mobility: Patient will improve AROM to stated goals, listed in objective measures above, in order to return to maximal functional potential and improve quality of life.     4. Strength: Patient will improve strength to stated goals, listed in objective measures above, in order to improve functional independence and quality of life.     5. Gait: Patient will demonstrate normalized gait mechanics with minimal compensation in order to return to PLOF.     6. Patient will return to normal ADL's, IADL's, community involvement, recreational activities, and work-related activities with less than or equal to 1/10 pain and maximal function.           PLAN   Continue Plan of Care to strengthen core for patient to be able to return to all functional activities with no pain.     INITIAL EVAL (7/17/2020): Outpatient Physical Therapy 2 times weekly for 12 weeks to include any combination of the following interventions: virtual visits, dry needling, modalities, electrical stimulation (IFC, Pre-Mod, Attended with Functional Dry Needling), Cervical/Lumbar Traction, Gait Training, Manual Therapy, Neuromuscular Re-ed, Patient Education, Self Care, Therapeutic Activites and Therapeutic Exercise    Sruthi  Ciara, PT

## 2020-08-05 ENCOUNTER — CLINICAL SUPPORT (OUTPATIENT)
Dept: PULMONOLOGY | Facility: CLINIC | Age: 75
End: 2020-08-05
Payer: MEDICARE

## 2020-08-05 ENCOUNTER — HOSPITAL ENCOUNTER (OUTPATIENT)
Dept: RADIOLOGY | Facility: HOSPITAL | Age: 75
Discharge: HOME | End: 2020-08-05
Attending: INTERNAL MEDICINE
Payer: MEDICARE

## 2020-08-05 ENCOUNTER — OFFICE VISIT (OUTPATIENT)
Dept: PULMONOLOGY | Facility: CLINIC | Age: 75
End: 2020-08-05
Payer: MEDICARE

## 2020-08-05 VITALS
BODY MASS INDEX: 33.32 KG/M2 | HEART RATE: 66 BPM | HEIGHT: 72 IN | WEIGHT: 246 LBS | RESPIRATION RATE: 17 BRPM | OXYGEN SATURATION: 98 % | DIASTOLIC BLOOD PRESSURE: 80 MMHG | SYSTOLIC BLOOD PRESSURE: 122 MMHG

## 2020-08-05 DIAGNOSIS — E66.9 OBESITY (BMI 30-39.9): Chronic | ICD-10-CM

## 2020-08-05 DIAGNOSIS — R91.8 MULTIPLE LUNG NODULES ON CT: Chronic | ICD-10-CM

## 2020-08-05 DIAGNOSIS — J45.20 MILD INTERMITTENT ASTHMA WITHOUT COMPLICATION: Chronic | ICD-10-CM

## 2020-08-05 DIAGNOSIS — R91.8 MULTIPLE LUNG NODULES ON CT: ICD-10-CM

## 2020-08-05 DIAGNOSIS — G47.39 TREATMENT-EMERGENT CENTRAL SLEEP APNEA: ICD-10-CM

## 2020-08-05 DIAGNOSIS — J45.20 MILD INTERMITTENT ASTHMA WITHOUT COMPLICATION: Primary | Chronic | ICD-10-CM

## 2020-08-05 LAB
BRPFT: NORMAL
FEF 25 75 LLN: 0.92
FEF 25 75 PRE REF: 50 %
FEF 25 75 REF: 2.28
FEV1 FVC LLN: 61
FEV1 FVC PRE REF: 83.5 %
FEV1 FVC REF: 75
FEV1 LLN: 2.24
FEV1 PRE REF: 89.9 %
FEV1 REF: 3.17
FVC LLN: 3.12
FVC PRE REF: 106.8 %
FVC REF: 4.27
PEF LLN: 5.78
PEF PRE REF: 111.8 %
PEF REF: 8.21
PRE FEF 25 75: 1.14 L/S (ref 0.92–3.64)
PRE FET 100: 16.06 SEC
PRE FEV1 FVC: 62.52 % (ref 60.77–89.04)
PRE FEV1: 2.85 L (ref 2.24–4.11)
PRE FVC: 4.56 L (ref 3.12–5.42)
PRE PEF: 9.18 L/S (ref 5.78–10.63)

## 2020-08-05 PROCEDURE — 1159F MED LIST DOCD IN RCRD: CPT | Mod: HCNC,S$GLB,, | Performed by: INTERNAL MEDICINE

## 2020-08-05 PROCEDURE — 94010 BREATHING CAPACITY TEST: CPT | Mod: HCNC,S$GLB,, | Performed by: INTERNAL MEDICINE

## 2020-08-05 PROCEDURE — 1101F PT FALLS ASSESS-DOCD LE1/YR: CPT | Mod: HCNC,CPTII,S$GLB, | Performed by: INTERNAL MEDICINE

## 2020-08-05 PROCEDURE — 94010 BREATHING CAPACITY TEST: ICD-10-PCS | Mod: HCNC,S$GLB,, | Performed by: INTERNAL MEDICINE

## 2020-08-05 PROCEDURE — 99999 PR PBB SHADOW E&M-EST. PATIENT-LVL III: ICD-10-PCS | Mod: PBBFAC,HCNC,, | Performed by: INTERNAL MEDICINE

## 2020-08-05 PROCEDURE — 99999 PR PBB SHADOW E&M-EST. PATIENT-LVL III: CPT | Mod: PBBFAC,HCNC,, | Performed by: INTERNAL MEDICINE

## 2020-08-05 PROCEDURE — 1159F PR MEDICATION LIST DOCUMENTED IN MEDICAL RECORD: ICD-10-PCS | Mod: HCNC,S$GLB,, | Performed by: INTERNAL MEDICINE

## 2020-08-05 PROCEDURE — 99213 OFFICE O/P EST LOW 20 MIN: CPT | Mod: 25,HCNC,S$GLB, | Performed by: INTERNAL MEDICINE

## 2020-08-05 PROCEDURE — 71250 CT THORAX DX C-: CPT | Mod: TC,HCNC

## 2020-08-05 PROCEDURE — 1101F PR PT FALLS ASSESS DOC 0-1 FALLS W/OUT INJ PAST YR: ICD-10-PCS | Mod: HCNC,CPTII,S$GLB, | Performed by: INTERNAL MEDICINE

## 2020-08-05 PROCEDURE — 99213 PR OFFICE/OUTPT VISIT, EST, LEVL III, 20-29 MIN: ICD-10-PCS | Mod: 25,HCNC,S$GLB, | Performed by: INTERNAL MEDICINE

## 2020-08-05 NOTE — PATIENT INSTRUCTIONS
Lung Anatomy  Your lungs take air in to give your body oxygen, which the body needs to work. Your lungs, like all the tissues in your body, are made up of billions of tiny specialized cells. Old lung cells die and are replaced by new, identical lung cells. This natural process helps ensure healthy lungs.    Date Last Reviewed: 11/1/2016  © 3199-9741 [employer]. [address] [place] [address], [place] [address]. All rights reserved. This information is not intended as a substitute for professional medical care. Always follow your healthcare professional's instructions.

## 2020-08-05 NOTE — PROGRESS NOTES
Subjective:      Patient ID: Clay Marcos is a 74 y.o. male.    Patient Active Problem List   Diagnosis    Dysmetabolic syndrome    Tortuous aorta    MVP (mitral valve prolapse)    Dyslipidemia    Paresthesia of left leg    DDD (degenerative disc disease), lumbar    Multiple lung nodules on CT    Obesity (BMI 30-39.9)    GERD (gastroesophageal reflux disease)    Hernia of abdominal wall - left lateral    Mild intermittent asthma without complication    Treatment-emergent central sleep apnea    BPH with urinary obstruction    Right flank pain, chronic    Chronic pain of right knee     Problem list has been reviewed.    Chief Complaint: Asthma    HPI    Spirometry and CT chest reviewed with patient who voiced understanding.     Patients reports no dyspnea    The patient does not have currently have symptoms / an exacerbation.        No recent change in breathing.     His current respiratory therapy regimen is SINGULAIR  which provides relief. He seldom uses albuterol. He is  adherent with his regimen.     Asthma is well controlled.     Answers for HPI/ROS submitted by the patient on 8/4/2020   Asthma  In the past 4 weeks, how much of the time did your asthma keep you from getting as much done at work, school, or at home?: none of the time  During the past 4 weeks, how often have you had shortness of breath?: not at all  During the past 4 weeks, how often did your asthma symptoms (Wheezing, coughing, shortness of breath, chest tightness or pain) wake you up at night or earlier that usual in the morning?: not at all  During the past 4 weeks, how often have you used your rescue inhaler or nebulizer medication (such as albuterol)?: not at all  How would you rate your asthma control during the past 4 weeks?: completely controlled   : 25       Current Disease Severity  no daytime asthma symptoms.   no nighttime asthma symptoms.   Frequency B-agonist use:less than or equal to 2 days per week.   Number of  urgent/emergent visit in last year: 0  Current limitations in activity from asthma: none.   Number of days of school or work missed in the last month: not applicable.     Asthma Classification (General Symptom Frequency):  Mild Intermittent (< 2 x wk)  Mild Persistent (> 2 x wk, < daily)  Moderate Persistent (daily; almost daily inhaler)  Severe Persistent (continual; limited activities)    He is on ASV:  ASV settings 20 cm max pressure, 12/8 max/min EPAP, age over 3 max/min pressure support, rate auto. He definitely thinks PAP is beneficial to his health and he wants to continue with PAP therapy.  He states that he is fine and has no specific pulmonary complaints.    Compliance Summary  5/7/2020 - 8/4/2020 (90 days)  Days with Device Usage 89 days  Days without Device Usage 1 day  Percent Days with Device Usage 98.9%  Cumulative Usage 17 days 15 hrs. 7 mins. 30 secs.  Maximum Usage (1 Day) 8 hrs. 19 mins. 54 secs.  Average Usage (All Days) 4 hrs. 42 mins. 5 secs.  Average Usage (Days Used) 4 hrs. 45 mins. 15 secs.  Minimum Usage (1 Day) 11 mins. 45 secs.  Percent of Days with Usage >= 4 Hours 64.4%  Percent of Days with Usage < 4 Hours 35.6%  Date Range  Total Blower Time 17 days 15 hrs. 7 mins. 54 secs.  autoSV Summary  90% of the time device EPAP was <= 9.1 cmH2O  Average Percent of Night in Periodic Breathing 0.1%  Average Time in Large Leak Per Day 0 secs.  Average Breath Rate 18.2 bpm  Average Minute Vent 10.6  Average AHI 0.5  Average Percent Night in Large Leak 0.0%  90% of the time device Pressure Support was <= 3.7 cmH2O  Average Pressure Support 3.0 cmH2O  Average EPAP 8.4 cmH2O      [place] Sleepiness Scale   EPWORTH SLEEPINESS SCALE 8/5/2020 8/12/2019 8/14/2018 12/14/2017 11/2/2017 8/15/2017 4/1/2017   Sitting and reading 2 0 2 1 1 1 0   Watching TV 1 1 2 1 1 1 3   Sitting, inactive in a public place (e.g. a theatre or a meeting) 0 0 0 1 0 0 1   As a passenger in a car for an hour without a break 0 0 1  0 0 0 2   Lying down to rest in the afternoon when circumstances permit 2 0 0 0 0 0 2   Sitting and talking to someone 0 0 0 0 0 0 0   Sitting quietly after a lunch without alcohol 1 1 2 0 1 0 0   In a car, while stopped for a few minutes in traffic 0 0 0 0 0 0 0   Total score 6 2 7 3 3 2 8       Immunization status reviewed and up to date.    A full  review of systems, past , family  and social histories was performed except as mentioned in the note above, these are non contributory to the main issues discussed  today    Previous Report Reviewed: lab reports, office notes and radiology reports     The following portions of the patient's history were reviewed and updated as appropriate: He  has a past medical history of Basal cell carcinoma, BPH with urinary obstruction, Calcaneal spur of left foot, Colon polyps, DDD (degenerative disc disease), lumbar, Diverticulosis of colon, Dyslipidemia, Dysmetabolic syndrome, Fracture of rib of left side, History of tobacco use, Internal and external hemorrhoids without complication, MVP (mitral valve prolapse), Obesity, Osteoarthritis of both thumbs (9/15/2015), Pharyngoesophageal dysphagia, Pneumonia (08/2016), and Reflux esophagitis.  He  has a past surgical history that includes Hemorrhoid surgery; Excision basal cell carcinoma; Anal fistulotomy; Tonsillectomy; Sinus surgery (01/2017); and Cataract extraction w/  intraocular lens implant (Right, 09/2017).  His family history includes Breast cancer in his mother; Coronary artery disease in his brother, other, sister, and sister; Dementia in his sister; Heart attack in his brother, other, sister, and sister; Heart attacks under age 50 in his father; Hyperlipidemia in his brother; Lymphoma in his sister; Vision loss in his other.  He  reports that he quit smoking about 42 years ago. His smoking use included cigarettes. He has a 12.00 pack-year smoking history. He has never used smokeless tobacco. He reports current alcohol use  of about 15.0 standard drinks of alcohol per week. He reports that he does not use drugs.  He has a current medication list which includes the following prescription(s): montelukast, multivitamin, tamsulosin, and baclofen.  He has No Known Allergies..    Review of Systems   Constitutional: Negative for fever, chills, fatigue and night sweats.   HENT: Negative for nosebleeds, postnasal drip, rhinorrhea, sore throat and congestion.    Eyes: Negative for itching.   Respiratory: Positive for apnea, snoring, cough and shortness of breath. Negative for hemoptysis, choking, chest tightness and orthopnea.    Cardiovascular: Negative for chest pain, palpitations and leg swelling.   Genitourinary: Negative for difficulty urinating and hematuria.   Endocrine: Negative for cold intolerance and heat intolerance.    Musculoskeletal: Positive for back pain. Negative for arthralgias, gait problem and joint swelling.   Gastrointestinal: Negative for nausea, vomiting, abdominal pain and abdominal distention.   Neurological: Negative for dizziness, syncope, light-headedness and headaches.   Hematological: No excessive bruising.   Psychiatric/Behavioral: Negative for confusion. The patient is not nervous/anxious.    All other systems reviewed and are negative.     Objective:   /80   Pulse 66   Resp 17   Ht 6' (1.829 m)   Wt 111.6 kg (246 lb)   SpO2 98%   BMI 33.36 kg/m²   Body mass index is 33.36 kg/m².    Physical Exam  Vitals signs and nursing note reviewed.   Constitutional:       Appearance: He is well-developed.   HENT:      Head: Normocephalic and atraumatic.   Eyes:      Pupils: Pupils are equal, round, and reactive to light.   Neck:      Musculoskeletal: Normal range of motion and neck supple.   Cardiovascular:      Rate and Rhythm: Normal rate and regular rhythm.   Pulmonary:      Effort: Pulmonary effort is normal.      Breath sounds: Normal breath sounds.   Abdominal:      General: Bowel sounds are normal.       Palpations: Abdomen is soft.   Musculoskeletal: Normal range of motion.   Skin:     General: Skin is warm and dry.   Neurological:      Mental Status: He is alert and oriented to person, place, and time.   Psychiatric:         Behavior: Behavior normal.         Personal Diagnostic Review:    Pulmonary function tests: 08/05/20: FEV1: 2.85 (89.9 % predicted), FVC:  4.56 (106.8 % predicted), FEV1/FVC: 63%.  Normal Spirometry     CT Chest With Contrast: 08/05/20:     Base of Neck: No significant abnormality.     Thoracic soft tissues: Normal.     Aorta: Left-sided aortic arch.  No aneurysm and no significant atherosclerosis     Heart: Normal in size.  Coronary atherosclerotic disease.  Trace pericardial fluid.     Pulmonary vasculature: Pulmonary arteries distribute normally.     Shantell/Mediastinum: Scattered normal sized mediastinal nodes.  Appearance is similar to the prior.  No pathologic lymphadenopathy.     Airways: Lobular thickening along the right lateral wall of the distal trachea, likely reflects retained secretions.  Trachea and proximal airways are otherwise patent.     Lungs/Pleura: There is a stable 3.0 x 2.1 cm nodule at the left lung base, previously 3.1 x 2.0 cm.  There is a stable 0.9 cm nodule at the right lung base, previously 0.9 cm.  There is unchanged pleural thickening and atelectasis involving the left lung base, likely the sequelae of prior trauma or surgery as there is a displaced adjacent 9th rib.  There are stable nodular opacities along the right minor fissure, similar to the prior.  Chronic scarring at the lung apices.  Stable nodule in the left upper lobe (series 3, image 246).  No new nodule or mass visualized.  No large consolidation or effusion.  No pneumothorax.     Esophagus: Normal.     Upper Abdomen: No abnormality of the partially imaged upper abdomen.     Bones: Ankylosis of the midthoracic vertebral bodies.  No acute or concerning osseous abnormalities.          Assessment / plan:        Discussed diagnosis, its evaluation, treatment and usual course. All questions answered.    Problem List Items Addressed This Visit        Pulmonary    Multiple lung nodules on CT (Chronic)    Current Assessment & Plan     Stable and controlled. Annual imaging surveillance with CXR.         Relevant Orders    X-Ray Chest PA And Lateral    Mild intermittent asthma without complication - Primary    Current Assessment & Plan     Asthma ROS: taking medications as instructed, no medication side effects noted, no significant ongoing wheezing or shortness of breath, using bronchodilator MDI less than twice a week.   New concerns: NONE  Exam: appears well, vitals normal, no respiratory distress, acyanotic, normal RR, chest clear, no wheezing, crepitations, rhonchi, normal symmetric air entry.   Assessment:  Asthma well controlled.    Plan: SINGULAIR. Current regimen is effective. Continue current regimen. Spirometry in 12 months.          Relevant Orders    Spirometry without Bronchodilator       Endocrine    Obesity (BMI 30-39.9)    Current Assessment & Plan     General weight loss/lifestyle modification strategies discussed (elicit support from others; identify saboteurs; non-food rewards, etc).  Diet interventions: low calorie (1000 kCal/d) deficit diet.            Other    Treatment-emergent central sleep apnea    Current Assessment & Plan     Continue ASV . ([facility] -  [facility])     ASV settings 20 cm max pressure, 12/8 max/min EPAP, age over 3 max/min pressure support, rate set to auto.        Discussed therapeutic goals for positive airway pressure therapy: Ideal is usage 100% of nights for 6 - 8 hours per night. Minimum usage is 70% of night for at least 4 hours per night used. Pateint expressed understanding. All Questions answered.    Complaince download in 1 year.     CPAP supplies renewed.          Relevant Orders    CPAP/BIPAP SUPPLIES        TIME SPENT WITH PATIENT: Time spent: 25 minutes in face to face   discussion concerning diagnosis, prognosis, review of lab and test results, benefits of treatment as well as management of disease, counseling of patient and coordination of care between various health  care providers . Greater than half the time spent was used for coordination of care and counseling of patient.        Follow up in about 1 year (around 8/5/2021) for Obesity, Asthma, Lung Nodule.

## 2020-08-05 NOTE — ASSESSMENT & PLAN NOTE
Asthma ROS: taking medications as instructed, no medication side effects noted, no significant ongoing wheezing or shortness of breath, using bronchodilator MDI less than twice a week.   New concerns: NONE  Exam: appears well, vitals normal, no respiratory distress, acyanotic, normal RR, chest clear, no wheezing, crepitations, rhonchi, normal symmetric air entry.   Assessment:  Asthma well controlled.    Plan: SINGULAIR. Current regimen is effective. Continue current regimen. Spirometry in 12 months.

## 2020-08-06 ENCOUNTER — CLINICAL SUPPORT (OUTPATIENT)
Dept: REHABILITATION | Facility: HOSPITAL | Age: 75
End: 2020-08-06
Payer: MEDICARE

## 2020-08-06 DIAGNOSIS — M25.561 CHRONIC PAIN OF RIGHT KNEE: ICD-10-CM

## 2020-08-06 DIAGNOSIS — G89.29 RIGHT FLANK PAIN, CHRONIC: ICD-10-CM

## 2020-08-06 DIAGNOSIS — G89.29 CHRONIC PAIN OF RIGHT KNEE: ICD-10-CM

## 2020-08-06 DIAGNOSIS — R10.9 RIGHT FLANK PAIN, CHRONIC: ICD-10-CM

## 2020-08-06 PROCEDURE — 97140 MANUAL THERAPY 1/> REGIONS: CPT | Mod: HCNC | Performed by: PHYSICAL THERAPIST

## 2020-08-06 PROCEDURE — 97110 THERAPEUTIC EXERCISES: CPT | Mod: HCNC | Performed by: PHYSICAL THERAPIST

## 2020-08-06 NOTE — PROGRESS NOTES
"  Physical Therapy Daily Treatment Note     Name: Clay Marcos  Clinic Number: [id number]    Therapy Diagnosis:   Encounter Diagnoses   Name Primary?    Right flank pain, chronic     Chronic pain of right knee      Physician: Judy Francisco MD    Visit Date: 8/6/2020    Physician Orders: PT Eval and Treat  Medical Diagnosis from Referral: right flank pain; right knee pain  Evaluation Date: 7/17/2020  Authorization Period Expiration: 7/20/2021  Plan of Care Expiration: 10/15/2020  Visit # / Visits authorized: 6/20 (7)     Precautions: Standard    Time In: 7:48 AM  Time Out: 8:35 AM   Total Billable Time: 40 minutes    SUBJECTIVE     Today, pt reports: Patient reports feeling pretty good today. He states that his knee is just a little sore, and he doesn't feel much at all today along the right flank.     He was compliant with home exercise program.  Response to previous treatment: Felt good after last treatment.  Functional change: Unable to perform some exercises involving a squatting movement, unable to play golf because of trunk rotational pain    Pre-Treatment Pain: 3-4/10 (right knee) 1/10 trunk  Post-Treatment Pain: 2/10 (right knee pain) 1/10 trunk  Location: right knee   TREATMENT     Clay received therapeutic exercises to develop strength, endurance, ROM, flexibility and posture for 32 minutes including:    Exercise 8/6/2020   Upright bike 5 minutes   Straight leg raise 2 x 10   SAQ 10x, 3# - deferred   Side lying hip abduction 2 x 10   7 way hip 2 reps each - deferred   PPT 10x, 5s holds - deferred   Bridge with posterior pelvic tilt 15x DL, 15x SL each deferred   Table top position 5" holds, 8x   Prone hip extension 10x - deferred   Prone HS curls 10x, 3# - deferred   Quadruped activities Alternating arm raises 10x - deferred  Alternating bird dog 10x   Trunk rotation - thread the needle (thoracic rotation)   Physioball activities Marching 20x  Alternating opposite arm and leg raises, 10x deferred "   Hamstring stretch bilaterally 3x30 seconds - deferred   Posterior pelvic tilt with march 3 minutes - deferred   Double knee to chest with physioball Deferred   Lower trunk rotations with swiss ball 20x deferred   Open book stretch 10x B    payloff press 2x10 30 pounds both directions -deferred   payloff acrh 2x10 30 pounds both directions - deferred   Wood chops 2x10 3 pounds each direction    Unilateral standing 3 pound weight moving weight overhead with head following weight 2x10 alternating weight in each hand with each set - deferred   Quadratus lumborum stretch Seated 5x15 seconds     Cues were provided for core contraction with all strengthening exercises and to move through range of motion with slow controlled movements.     Clay received the following manual therapy techniques: myofascial release and soft tissue mobilization were applied to the right QL and lumbosacral region: for 8 minutes    Home Exercises Provided and Patient Education Provided     Education/Self-Care provided:   Patient educated on biomechanical justification for therapeutic exercise and importance of compliance with HEP in order to improve overall impairments and QOL    Patient educated on the importance of improved core and lower extremity strength in order to improve alignment of the spine and lower extremities with static positions and dynamic movement.    Patient educated on the importance of strong core and lower extremity musculature in order to improve both static and dynamic balance, improve gait mechanics, reduce fall risk and improve household and community mobility.     Written Home Exercises Provided: yes. Via my chart larisa today.  Exercises were reviewed and Clay was able to demonstrate them prior to the end of the session.  Clay demonstrated good  understanding of the education provided.     See EMR under Patient Instructions for exercises provided 7/20/2020.    ASSESSMENT     Patient did well with treatment again  today. He demonstrated less difficulty with exercises, and his LE strength and core stability continue to improve. Improving oblique strength noted with chops.Patient progressing well towards goals.     Clay is progressing well towards his goals.   Pt prognosis is Excellent.     Pt will continue to benefit from skilled outpatient physical therapy to address the deficits listed in the problem list box on initial evaluation, provide pt/family education and to maximize pt's level of independence in the home and community environment.     Pt's spiritual, cultural and educational needs considered and pt agreeable to plan of care and goals.     Anticipated barriers to physical therapy: None    Goals:     Short Term Goals:  6 weeks     1. Pain: Pt will demonstrate improved pain by reports of less than or equal to 3/10 worst pain on the verbal rating scale in order to progress toward maximal functional ability and improve QOL.     2. Function: Patient will demonstrate improved function as indicated by a functional limitation score of less than or equal to 32 out of 100 on FOTO.     3. Mobility: Patient will improve AROM to 50% of stated goals, listed in objective measures above, in order to progress towards independence with functional activities.      4. Strength: Patient will improve strength to 50% of stated goals, listed in objective measures above, in order to progress towards independence with functional activities.      5. Gait: Patient will demonstrate improved gait mechanics in order to improve functional mobility, improve quality of life, and decrease risk of further injury or fall.      6. HEP: Patient will demonstrate independence with HEP in order to progress toward functional independence.     7. Improve postural awareness.        Long Term Goals:  12 weeks     1. Pain: Pt will demonstrate improved pain by reports of less than or equal to 2/10 worst pain on the verbal rating scale in order to progress toward  maximal functional ability and improve QOL.       2. Function: Patient will demonstrate improved function as indicated by a functional limitation score of less than or equal to 29 out of 100 on FOTO.     3. Mobility: Patient will improve AROM to stated goals, listed in objective measures above, in order to return to maximal functional potential and improve quality of life.     4. Strength: Patient will improve strength to stated goals, listed in objective measures above, in order to improve functional independence and quality of life.     5. Gait: Patient will demonstrate normalized gait mechanics with minimal compensation in order to return to PLOF.     6. Patient will return to normal ADL's, IADL's, community involvement, recreational activities, and work-related activities with less than or equal to 1/10 pain and maximal function.           PLAN   Continue Plan of Care to strengthen core for patient to be able to return to all functional activities with no pain.     INITIAL EVAL (7/17/2020): Outpatient Physical Therapy 2 times weekly for 12 weeks to include any combination of the following interventions: virtual visits, dry needling, modalities, electrical stimulation (IFC, Pre-Mod, Attended with Functional Dry Needling), Cervical/Lumbar Traction, Gait Training, Manual Therapy, Neuromuscular Re-ed, Patient Education, Self Care, Therapeutic Activites and Therapeutic Exercise    Sruthi Urbina, PT

## 2020-08-10 ENCOUNTER — CLINICAL SUPPORT (OUTPATIENT)
Dept: REHABILITATION | Facility: HOSPITAL | Age: 75
End: 2020-08-10
Payer: MEDICARE

## 2020-08-10 DIAGNOSIS — G89.29 RIGHT FLANK PAIN, CHRONIC: ICD-10-CM

## 2020-08-10 DIAGNOSIS — M25.561 CHRONIC PAIN OF RIGHT KNEE: ICD-10-CM

## 2020-08-10 DIAGNOSIS — R10.9 RIGHT FLANK PAIN, CHRONIC: ICD-10-CM

## 2020-08-10 DIAGNOSIS — G89.29 CHRONIC PAIN OF RIGHT KNEE: ICD-10-CM

## 2020-08-10 PROCEDURE — 97140 MANUAL THERAPY 1/> REGIONS: CPT | Mod: HCNC | Performed by: PHYSICAL THERAPIST

## 2020-08-10 PROCEDURE — 97110 THERAPEUTIC EXERCISES: CPT | Mod: HCNC | Performed by: PHYSICAL THERAPIST

## 2020-08-10 NOTE — PROGRESS NOTES
"  Physical Therapy Daily Treatment Note     Name: Clay Marcos  Clinic Number: [id number]    Therapy Diagnosis:   Encounter Diagnoses   Name Primary?    Right flank pain, chronic     Chronic pain of right knee      Physician: Judy Francisco MD    Visit Date: 8/10/2020    Physician Orders: PT Eval and Treat  Medical Diagnosis from Referral: right flank pain; right knee pain  Evaluation Date: 7/17/2020  Authorization Period Expiration: 7/20/2021  Plan of Care Expiration: 10/15/2020  Visit # / Visits authorized: 7/20 (8)     Precautions: Standard    Time In: 7:48 AM  Time Out: 8:34 AM   Total Billable Time: 40 minutes    SUBJECTIVE     Today, pt reports: feeling okay overall. He states that his right knee kind of feels about the same, but his side feels better. He notices less pain during his workout class when turning to get up from the workout bench. However, he notices pain in his knee when doing lunges in the workout class.     He was compliant with home exercise program.  Response to previous treatment: Felt good after last treatment.  Functional change: Unable to perform some exercises involving a squatting movement, unable to play golf because of trunk rotational pain    Pre-Treatment Pain: 2/10 (right knee) 1/10 trunk  Post-Treatment Pain: 2/10 (right knee pain) 1/10 trunk  Location: right knee   TREATMENT     Clay received therapeutic exercises to develop strength, endurance, ROM, flexibility and posture for 32 minutes including:    Exercise 8/10/2020   Upright bike 5 minutes   Straight leg raise 2 x 10   SAQ 10x, 3# - deferred   Side lying hip abduction 2 x 10   7 way hip 2 reps each - deferred   PPT 10x, 5s holds - deferred   Bridge with posterior pelvic tilt 15x DL, 15x SL each deferred   Table top position 5" holds, 8x   Prone hip extension 10x - deferred   Prone HS curls 10x, 3# - deferred   Quadruped activities Alternating arm raises 10x - deferred  Alternating bird dog 10x   Trunk rotation - thread the " "needle (thoracic rotation)   Physioball activities Marching 20x  Alternating opposite arm and leg raises, 10x deferred   Hamstring stretch bilaterally 3x30 seconds - deferred   Posterior pelvic tilt with march 3 minutes - deferred   Double knee to chest with physioball Deferred   Lower trunk rotations with swiss ball 20x deferred   Open book stretch 10x B - deferred   payloff press 2x10 30 pounds both directions -deferred   payloff acrh 2x10 30 pounds both directions - deferred   Wood chops 2x10 3 pounds each direction - deferred   Unilateral standing 3 pound weight moving weight overhead with head following weight 2x10 alternating weight in each hand with each set - deferred   Quadratus lumborum stretch Seated 5x15 seconds - deferred   [place] 2 x 10 each LE   Step downs 4"step, 2 x 10     Cues were provided for core contraction with all strengthening exercises and to move through range of motion with slow controlled movements.     Clay received the following manual therapy techniques: myofascial release and soft tissue mobilization were applied to the right knee area grossly, IASTM to right ITB and distal quad: for 8 minutes    Home Exercises Provided and Patient Education Provided     Education/Self-Care provided:   Patient educated on biomechanical justification for therapeutic exercise and importance of compliance with HEP in order to improve overall impairments and QOL    Patient educated on the importance of improved core and lower extremity strength in order to improve alignment of the spine and lower extremities with static positions and dynamic movement.    Patient educated on the importance of strong core and lower extremity musculature in order to improve both static and dynamic balance, improve gait mechanics, reduce fall risk and improve household and community mobility.     Written Home Exercises Provided: yes. Via my chart larisa today.  Exercises were reviewed and Clay was able to demonstrate them " prior to the end of the session.  Clay demonstrated good  understanding of the education provided.     See EMR under Patient Instructions for exercises provided 7/20/2020.    ASSESSMENT     Patient tolerated treatment well. He completed exercises without complaint. He demonstrated some difficulty with step downs due to decreased muscular endurance, but he performed activity with good overall form. Patient would still benefit from additional core and hip strength, stability, and muscular endurance. He is progressing well overall towards goals.     Clay is progressing well towards his goals.   Pt prognosis is Excellent.     Pt will continue to benefit from skilled outpatient physical therapy to address the deficits listed in the problem list box on initial evaluation, provide pt/family education and to maximize pt's level of independence in the home and community environment.     Pt's spiritual, cultural and educational needs considered and pt agreeable to plan of care and goals.     Anticipated barriers to physical therapy: None    Goals:     Short Term Goals:  6 weeks     1. Pain: Pt will demonstrate improved pain by reports of less than or equal to 3/10 worst pain on the verbal rating scale in order to progress toward maximal functional ability and improve QOL.     2. Function: Patient will demonstrate improved function as indicated by a functional limitation score of less than or equal to 32 out of 100 on FOTO.     3. Mobility: Patient will improve AROM to 50% of stated goals, listed in objective measures above, in order to progress towards independence with functional activities.      4. Strength: Patient will improve strength to 50% of stated goals, listed in objective measures above, in order to progress towards independence with functional activities.      5. Gait: Patient will demonstrate improved gait mechanics in order to improve functional mobility, improve quality of life, and decrease risk of further  injury or fall.      6. HEP: Patient will demonstrate independence with HEP in order to progress toward functional independence.     7. Improve postural awareness.        Long Term Goals:  12 weeks     1. Pain: Pt will demonstrate improved pain by reports of less than or equal to 2/10 worst pain on the verbal rating scale in order to progress toward maximal functional ability and improve QOL.       2. Function: Patient will demonstrate improved function as indicated by a functional limitation score of less than or equal to 29 out of 100 on FOTO.     3. Mobility: Patient will improve AROM to stated goals, listed in objective measures above, in order to return to maximal functional potential and improve quality of life.     4. Strength: Patient will improve strength to stated goals, listed in objective measures above, in order to improve functional independence and quality of life.     5. Gait: Patient will demonstrate normalized gait mechanics with minimal compensation in order to return to PLOF.     6. Patient will return to normal ADL's, IADL's, community involvement, recreational activities, and work-related activities with less than or equal to 1/10 pain and maximal function.           PLAN   Continue Plan of Care to strengthen core for patient to be able to return to all functional activities with no pain.     INITIAL EVAL (7/17/2020): Outpatient Physical Therapy 2 times weekly for 12 weeks to include any combination of the following interventions: virtual visits, dry needling, modalities, electrical stimulation (IFC, Pre-Mod, Attended with Functional Dry Needling), Cervical/Lumbar Traction, Gait Training, Manual Therapy, Neuromuscular Re-ed, Patient Education, Self Care, Therapeutic Activites and Therapeutic Exercise    Sruthi Urbina, PT

## 2020-08-13 ENCOUNTER — CLINICAL SUPPORT (OUTPATIENT)
Dept: REHABILITATION | Facility: HOSPITAL | Age: 75
End: 2020-08-13
Payer: MEDICARE

## 2020-08-13 DIAGNOSIS — G89.29 CHRONIC PAIN OF RIGHT KNEE: ICD-10-CM

## 2020-08-13 DIAGNOSIS — G89.29 RIGHT FLANK PAIN, CHRONIC: ICD-10-CM

## 2020-08-13 DIAGNOSIS — M25.561 CHRONIC PAIN OF RIGHT KNEE: ICD-10-CM

## 2020-08-13 DIAGNOSIS — R10.9 RIGHT FLANK PAIN, CHRONIC: ICD-10-CM

## 2020-08-13 PROCEDURE — 97140 MANUAL THERAPY 1/> REGIONS: CPT | Mod: HCNC | Performed by: PHYSICAL THERAPIST

## 2020-08-13 PROCEDURE — 97110 THERAPEUTIC EXERCISES: CPT | Mod: HCNC | Performed by: PHYSICAL THERAPIST

## 2020-08-13 NOTE — PROGRESS NOTES
"  Physical Therapy Daily Treatment Note     Name: Clay Marcos  Clinic Number: [id number]    Therapy Diagnosis:   Encounter Diagnoses   Name Primary?    Right flank pain, chronic     Chronic pain of right knee      Physician: Judy Francisco MD    Visit Date: 8/13/2020    Physician Orders: PT Eval and Treat  Medical Diagnosis from Referral: right flank pain; right knee pain  Evaluation Date: 7/17/2020  Authorization Period Expiration: 7/20/2021  Plan of Care Expiration: 10/15/2020  Visit # / Visits authorized: 8/20 (9)     Precautions: Standard    Time In: 7:45 AM  Time Out: 8:35 AM   Total Billable Time: 40 minutes    SUBJECTIVE     Today, pt reports: feeling pretty good. He states that his back/side region is doing much better, but his knee pain still comes and comes. Patient reports that he feels a catching at times in his knee. He states that it actually doesn't bother him during his cycling class, body pump, or mixed martial arts class, but more so after he gets back from it and sits for a while then goes to get back up.     He was compliant with home exercise program.  Response to previous treatment: Felt good after last treatment.  Functional change: Unable to perform some exercises involving a squatting movement, unable to play golf because of trunk rotational pain    Pre-Treatment Pain: 2/10 (right knee) 1/10 trunk  Post-Treatment Pain: 2/10 (right knee pain) 1/10 trunk  Location: right knee     TREATMENT         Clay received therapeutic exercises to develop strength, endurance, ROM, flexibility and posture for 32 minutes including:    Exercise 8/13/2020   Upright bike 5 minutes   Straight leg raise 2 x 10   SAQ 10x, 3# - deferred   Side lying hip abduction 2 x 10   7 way hip 2 reps each - deferred   PPT 10x, 5s holds - deferred   Bridge with posterior pelvic tilt 15x DL, 15x SL each deferred   Table top position 5" holds, 8x   Prone hip extension 10x - deferred   Prone HS curls 10x, 3# - deferred " "  Quadruped activities Alternating arm raises 10x - deferred  Alternating bird dog 10x   Trunk rotation - thread the needle (thoracic rotation)   Physioball activities Marching 20x  Alternating opposite arm and leg raises, 10x deferred   Hamstring stretch bilaterally 3x30 seconds - deferred   Posterior pelvic tilt with march 3 minutes - deferred   Double knee to chest with physioball Deferred   Lower trunk rotations with swiss ball 20x deferred   Open book stretch 10x B - deferred   payloff press 2x10 30 pounds both directions -deferred   payloff acrh 2x10 30 pounds both directions - deferred   Wood chops 2x10 3 pounds each direction - deferred   Unilateral standing 3 pound weight moving weight overhead with head following weight 2x10 alternating weight in each hand with each set - deferred   Quadratus lumborum stretch Seated 5x15 seconds - deferred   [place] 2 x 10 each LE   Step downs 4"step, 2 x 10     Cues were provided for core contraction with all strengthening exercises and to move through range of motion with slow controlled movements.     Clay received the following manual therapy techniques: myofascial release and soft tissue mobilization were applied to the right knee area grossly, IASTM to right ITB and distal quad: for 8 minutes    Home Exercises Provided and Patient Education Provided     Education/Self-Care provided:   Patient educated on biomechanical justification for therapeutic exercise and importance of compliance with HEP in order to improve overall impairments and QOL    Patient educated on the importance of improved core and lower extremity strength in order to improve alignment of the spine and lower extremities with static positions and dynamic movement.    Patient educated on the importance of strong core and lower extremity musculature in order to improve both static and dynamic balance, improve gait mechanics, reduce fall risk and improve household and community mobility.     Written " Home Exercises Provided: yes. Via my chart larisa today.  Exercises were reviewed and Clay was able to demonstrate them prior to the end of the session.  Clay demonstrated good  understanding of the education provided.     See EMR under Patient Instructions for exercises provided 7/20/2020.    ASSESSMENT     Patient did well with treatment today. He completed exercises without difficulty or complaint. His strength is improving, but patient does still require verbal and tactile cues to maintain proper form with exercises. Patient would benefit from continued core stabilization.     Clay is progressing well towards his goals.   Pt prognosis is Excellent.     Pt will continue to benefit from skilled outpatient physical therapy to address the deficits listed in the problem list box on initial evaluation, provide pt/family education and to maximize pt's level of independence in the home and community environment.     Pt's spiritual, cultural and educational needs considered and pt agreeable to plan of care and goals.     Anticipated barriers to physical therapy: None    Goals:     Short Term Goals:  6 weeks     1. Pain: Pt will demonstrate improved pain by reports of less than or equal to 3/10 worst pain on the verbal rating scale in order to progress toward maximal functional ability and improve QOL.     2. Function: Patient will demonstrate improved function as indicated by a functional limitation score of less than or equal to 32 out of 100 on FOTO.     3. Mobility: Patient will improve AROM to 50% of stated goals, listed in objective measures above, in order to progress towards independence with functional activities.      4. Strength: Patient will improve strength to 50% of stated goals, listed in objective measures above, in order to progress towards independence with functional activities.      5. Gait: Patient will demonstrate improved gait mechanics in order to improve functional mobility, improve quality of life,  and decrease risk of further injury or fall.      6. HEP: Patient will demonstrate independence with HEP in order to progress toward functional independence.     7. Improve postural awareness.        Long Term Goals:  12 weeks     1. Pain: Pt will demonstrate improved pain by reports of less than or equal to 2/10 worst pain on the verbal rating scale in order to progress toward maximal functional ability and improve QOL.       2. Function: Patient will demonstrate improved function as indicated by a functional limitation score of less than or equal to 29 out of 100 on FOTO.     3. Mobility: Patient will improve AROM to stated goals, listed in objective measures above, in order to return to maximal functional potential and improve quality of life.     4. Strength: Patient will improve strength to stated goals, listed in objective measures above, in order to improve functional independence and quality of life.     5. Gait: Patient will demonstrate normalized gait mechanics with minimal compensation in order to return to PLOF.     6. Patient will return to normal ADL's, IADL's, community involvement, recreational activities, and work-related activities with less than or equal to 1/10 pain and maximal function.           PLAN   Continue Plan of Care to strengthen core for patient to be able to return to all functional activities with no pain.     INITIAL EVAL (7/17/2020): Outpatient Physical Therapy 2 times weekly for 12 weeks to include any combination of the following interventions: virtual visits, dry needling, modalities, electrical stimulation (IFC, Pre-Mod, Attended with Functional Dry Needling), Cervical/Lumbar Traction, Gait Training, Manual Therapy, Neuromuscular Re-ed, Patient Education, Self Care, Therapeutic Activites and Therapeutic Exercise    Sruthi Urbina, PT

## 2020-08-17 ENCOUNTER — CLINICAL SUPPORT (OUTPATIENT)
Dept: REHABILITATION | Facility: HOSPITAL | Age: 75
End: 2020-08-17
Payer: MEDICARE

## 2020-08-17 DIAGNOSIS — R10.9 RIGHT FLANK PAIN, CHRONIC: ICD-10-CM

## 2020-08-17 DIAGNOSIS — G89.29 CHRONIC PAIN OF RIGHT KNEE: ICD-10-CM

## 2020-08-17 DIAGNOSIS — G89.29 RIGHT FLANK PAIN, CHRONIC: ICD-10-CM

## 2020-08-17 DIAGNOSIS — M25.561 CHRONIC PAIN OF RIGHT KNEE: ICD-10-CM

## 2020-08-17 PROCEDURE — 97140 MANUAL THERAPY 1/> REGIONS: CPT | Mod: HCNC | Performed by: PHYSICAL THERAPIST

## 2020-08-17 PROCEDURE — 97110 THERAPEUTIC EXERCISES: CPT | Mod: HCNC | Performed by: PHYSICAL THERAPIST

## 2020-08-17 NOTE — PROGRESS NOTES
"  Physical Therapy Daily Treatment Note     Name: Clay Marcos  Clinic Number: [id number]    Therapy Diagnosis:   Encounter Diagnoses   Name Primary?    Right flank pain, chronic     Chronic pain of right knee      Physician: Juyd Francisco MD    Visit Date: 8/17/2020    Physician Orders: PT Eval and Treat  Medical Diagnosis from Referral: right flank pain; right knee pain  Evaluation Date: 7/17/2020  Authorization Period Expiration: 7/20/2021  Plan of Care Expiration: 10/15/2020  Visit # / Visits authorized: 9/20 (10)     Precautions: Standard    Time In: 7:45 AM  Time Out: 8:35 AM   Total Billable Time: 40 minutes    SUBJECTIVE     Today, pt reports: that his right flank pain continues to feel much better overall. He states, "The knee is the knee". Patient reports that he follows up with his MD on Wednesday of this week.     He was compliant with home exercise program.  Response to previous treatment: Felt good after last treatment.  Functional change: Unable to perform some exercises involving a squatting movement, unable to play golf because of trunk rotational pain    Pre-Treatment Pain: 2/10 (right knee) 1/10 trunk  Post-Treatment Pain: 2/10 (right knee pain) 1/10 trunk  Location: right knee     TREATMENT       Clay received therapeutic exercises to develop strength, endurance, ROM, flexibility and posture for 32 minutes including:    Exercise 8/17/2020   Upright bike for ROM and strength  5 minutes   Straight leg raise 3 x 10, 2#   SAQ 10x, 3# - deferred   Side lying hip abduction 2 x 10   PPT 10x, 5s holds - deferred   Bridge with posterior pelvic tilt 15x DL, 15x SL each deferred   Table top position 5" holds, 8x, followed by table top LTR with unilateral 3# weight - 5x to each side   Prone hip extension 10x - deferred   Prone HS curls 10x, 3# - deferred   Quadruped activities Alternating bird dog 10x   Trunk rotation - thread the needle (thoracic rotation)   Hamstring stretch bilaterally 3x30 seconds - " "deferred   Posterior pelvic tilt with march 3 minutes - deferred   Double knee to chest with physioball Deferred   Open book stretch 10x B - deferred   payloff press 2x10 30 pounds both directions -deferred   payloff acrh 2x10 30 pounds both directions - deferred   Wood chops 2x10 3 pounds each direction - deferred   Unilateral standing 3 pound weight moving weight overhead with head following weight 2x10 alternating weight in each hand with each set - deferred   Quadratus lumborum stretch Seated 5x15 seconds - deferred   [place] 2 x 10 each LE   Step downs 4"step, 2 x 10 - deferred   Obliques crunch 10x each side, green theraband held from behind by PT             Cues were provided for core contraction with all strengthening exercises and to move through range of motion with slow controlled movements.     Clay received the following manual therapy techniques: myofascial release and soft tissue mobilization were applied to the right knee area grossly, IASTM to right ITB and distal quad: for 8 minutes    Home Exercises Provided and Patient Education Provided     Education/Self-Care provided:   Patient educated on biomechanical justification for therapeutic exercise and importance of compliance with HEP in order to improve overall impairments and QOL    Patient educated on the importance of improved core and lower extremity strength in order to improve alignment of the spine and lower extremities with static positions and dynamic movement.    Patient educated on the importance of strong core and lower extremity musculature in order to improve both static and dynamic balance, improve gait mechanics, reduce fall risk and improve household and community mobility.     Written Home Exercises Provided: yes. Via my chart larisa today.  Exercises were reviewed and Clay was able to demonstrate them prior to the end of the session.  Clay demonstrated good  understanding of the education provided.     See EMR under Patient " Instructions for exercises provided 7/20/2020.    ASSESSMENT     Patient tolerated treatment well. Core strength still limited but improving. Flank pain minimal, but patient is still limited by right knee pain. Good relief with manual therapy reported. Patient is progressing well overall towards goals.     Clay is progressing well towards his goals.   Pt prognosis is Excellent.     Pt will continue to benefit from skilled outpatient physical therapy to address the deficits listed in the problem list box on initial evaluation, provide pt/family education and to maximize pt's level of independence in the home and community environment.     Pt's spiritual, cultural and educational needs considered and pt agreeable to plan of care and goals.     Anticipated barriers to physical therapy: None    Goals:     Short Term Goals:  6 weeks     1. Pain: Pt will demonstrate improved pain by reports of less than or equal to 3/10 worst pain on the verbal rating scale in order to progress toward maximal functional ability and improve QOL.     2. Function: Patient will demonstrate improved function as indicated by a functional limitation score of less than or equal to 32 out of 100 on FOTO.     3. Mobility: Patient will improve AROM to 50% of stated goals, listed in objective measures above, in order to progress towards independence with functional activities.      4. Strength: Patient will improve strength to 50% of stated goals, listed in objective measures above, in order to progress towards independence with functional activities.      5. Gait: Patient will demonstrate improved gait mechanics in order to improve functional mobility, improve quality of life, and decrease risk of further injury or fall.      6. HEP: Patient will demonstrate independence with HEP in order to progress toward functional independence.     7. Improve postural awareness.        Long Term Goals:  12 weeks     1. Pain: Pt will demonstrate improved pain by  reports of less than or equal to 2/10 worst pain on the verbal rating scale in order to progress toward maximal functional ability and improve QOL.       2. Function: Patient will demonstrate improved function as indicated by a functional limitation score of less than or equal to 29 out of 100 on FOTO.     3. Mobility: Patient will improve AROM to stated goals, listed in objective measures above, in order to return to maximal functional potential and improve quality of life.     4. Strength: Patient will improve strength to stated goals, listed in objective measures above, in order to improve functional independence and quality of life.     5. Gait: Patient will demonstrate normalized gait mechanics with minimal compensation in order to return to PLOF.     6. Patient will return to normal ADL's, IADL's, community involvement, recreational activities, and work-related activities with less than or equal to 1/10 pain and maximal function.           PLAN   Continue Plan of Care to strengthen core for patient to be able to return to all functional activities with no pain.     INITIAL EVAL (7/17/2020): Outpatient Physical Therapy 2 times weekly for 12 weeks to include any combination of the following interventions: virtual visits, dry needling, modalities, electrical stimulation (IFC, Pre-Mod, Attended with Functional Dry Needling), Cervical/Lumbar Traction, Gait Training, Manual Therapy, Neuromuscular Re-ed, Patient Education, Self Care, Therapeutic Activites and Therapeutic Exercise    Sruthi Urbina, PT

## 2020-08-19 ENCOUNTER — OFFICE VISIT (OUTPATIENT)
Dept: ORTHOPEDICS | Facility: CLINIC | Age: 75
End: 2020-08-19
Payer: MEDICARE

## 2020-08-19 VITALS
BODY MASS INDEX: 32.61 KG/M2 | SYSTOLIC BLOOD PRESSURE: 124 MMHG | WEIGHT: 246.06 LBS | HEART RATE: 80 BPM | HEIGHT: 73 IN | DIASTOLIC BLOOD PRESSURE: 74 MMHG

## 2020-08-19 DIAGNOSIS — R10.9 RIGHT FLANK PAIN, CHRONIC: ICD-10-CM

## 2020-08-19 DIAGNOSIS — M25.561 CHRONIC PAIN OF RIGHT KNEE: ICD-10-CM

## 2020-08-19 DIAGNOSIS — G89.29 CHRONIC PAIN OF RIGHT KNEE: ICD-10-CM

## 2020-08-19 DIAGNOSIS — M17.11 PRIMARY OSTEOARTHRITIS OF RIGHT KNEE: Primary | ICD-10-CM

## 2020-08-19 DIAGNOSIS — G89.29 RIGHT FLANK PAIN, CHRONIC: ICD-10-CM

## 2020-08-19 PROCEDURE — 99999 PR PBB SHADOW E&M-EST. PATIENT-LVL III: CPT | Mod: PBBFAC,HCNC,, | Performed by: PHYSICAL MEDICINE & REHABILITATION

## 2020-08-19 PROCEDURE — 1159F MED LIST DOCD IN RCRD: CPT | Mod: HCNC,S$GLB,, | Performed by: PHYSICAL MEDICINE & REHABILITATION

## 2020-08-19 PROCEDURE — 99214 PR OFFICE/OUTPT VISIT, EST, LEVL IV, 30-39 MIN: ICD-10-PCS | Mod: HCNC,S$GLB,, | Performed by: PHYSICAL MEDICINE & REHABILITATION

## 2020-08-19 PROCEDURE — 1101F PT FALLS ASSESS-DOCD LE1/YR: CPT | Mod: HCNC,CPTII,S$GLB, | Performed by: PHYSICAL MEDICINE & REHABILITATION

## 2020-08-19 PROCEDURE — 1125F PR PAIN SEVERITY QUANTIFIED, PAIN PRESENT: ICD-10-PCS | Mod: HCNC,S$GLB,, | Performed by: PHYSICAL MEDICINE & REHABILITATION

## 2020-08-19 PROCEDURE — 1125F AMNT PAIN NOTED PAIN PRSNT: CPT | Mod: HCNC,S$GLB,, | Performed by: PHYSICAL MEDICINE & REHABILITATION

## 2020-08-19 PROCEDURE — 99999 PR PBB SHADOW E&M-EST. PATIENT-LVL III: ICD-10-PCS | Mod: PBBFAC,HCNC,, | Performed by: PHYSICAL MEDICINE & REHABILITATION

## 2020-08-19 PROCEDURE — 99214 OFFICE O/P EST MOD 30 MIN: CPT | Mod: HCNC,S$GLB,, | Performed by: PHYSICAL MEDICINE & REHABILITATION

## 2020-08-19 PROCEDURE — 1159F PR MEDICATION LIST DOCUMENTED IN MEDICAL RECORD: ICD-10-PCS | Mod: HCNC,S$GLB,, | Performed by: PHYSICAL MEDICINE & REHABILITATION

## 2020-08-19 PROCEDURE — 1101F PR PT FALLS ASSESS DOC 0-1 FALLS W/OUT INJ PAST YR: ICD-10-PCS | Mod: HCNC,CPTII,S$GLB, | Performed by: PHYSICAL MEDICINE & REHABILITATION

## 2020-08-19 NOTE — PROGRESS NOTES
SPORTS MEDICINE / PM&R Follow Up Visit :    Referring Physician: No ref. provider found    Chief Complaint   Patient presents with    Right Knee - Pain       HPI: This is a 74 y.o.  male being seen in clinic today for evaluation of Pain of the Right Knee   Since last visit, the symptoms show no change with the knee, but the lower back has improved at least 75%.  He has been to therapy at [facility].     History obtained from patient.    Past family, medical, social, surgical history, and vital signs reviewed in chart.    Review of Systems   Constitutional: Negative for chills, fever and weight loss.   HENT: Negative for hearing loss and sore throat.    Eyes: Negative for blurred vision, photophobia and pain.   Respiratory: Negative for shortness of breath.    Cardiovascular: Negative for chest pain.   Gastrointestinal: Negative for abdominal pain.   Genitourinary: Negative for dysuria.   Skin: Negative for rash.   Neurological: Negative for tingling and headaches.   Endo/Heme/Allergies: Does not bruise/bleed easily.   Psychiatric/Behavioral: Negative for depression.       General    Nursing note and vitals reviewed.  Constitutional: He is oriented to person, place, and time. He appears well-developed and well-nourished.   HENT:   Head: Normocephalic and atraumatic.   Eyes: Conjunctivae and EOM are normal. Pupils are equal, round, and reactive to light.   Neck: Neck supple.   Cardiovascular: Intact distal pulses.    Pulmonary/Chest: Effort normal. No respiratory distress.   Abdominal: He exhibits no distension.   Neurological: He is alert and oriented to person, place, and time.   Psychiatric: He has a normal mood and affect.     General Musculoskeletal Exam   Gait: antalgic     Right Ankle/Foot Exam     Tests   Heel Walk: able to perform  Tiptoe Walk: able to perform  Double Heel Rise: unable to perform double heel rise    Left Ankle/Foot Exam     Tests   Heel Walk: able to perform  Tiptoe Walk: able to  perform  Double Heel Rise: able to perform    Right Knee Exam     Inspection   Erythema: absent  Swelling: absent  Effusion: absent    Tenderness   The patient is tender to palpation of the medial joint line, lateral joint line and patella.    Crepitus   The patient has crepitus of the patella.    Range of Motion   The patient has normal right knee ROM.    Tests   Meniscus   Wolf:  Medial - negative Lateral - negative  Ligament Examination Lachman: normal (-1 to 2mm) PCL-Posterior Drawer: normal (0 to 2mm)     MCL - Valgus: normal (0 to 2mm)  LCL - Varus: normal  Patella   Patellar apprehension: negative  Patellar Tracking: normal  Patellar Grind: positive    Other   Sensation: normal    Left Knee Exam     Inspection   Erythema: absent  Swelling: absent  Effusion: absent    Tenderness   The patient tender to palpation of the medial joint line.    Crepitus   The patient has crepitus of the patella.    Range of Motion   The patient has normal left knee ROM.    Tests   Meniscus   Wolf:  Medial - negative Lateral - negative  Stability Lachman: normal (-1 to 2mm) PCL-Posterior Drawer: normal (0 to 2mm)  MCL - Valgus: normal (0 to 2mm)  LCL - Varus: normal (0 to 2mm)  Patella   Patellar apprehension: negative  Patellar Tracking: normal    Other   Sensation: normal    Right Hip Exam   Right hip exam is normal.     Tests   Pain w/ forced internal rotation (VIRGINIE): absent  Left Hip Exam   Left hip exam is normal.    Tests   Pain w/ forced internal rotation (VIRGINIE): absent      Back (L-Spine & T-Spine) / Neck (C-Spine) Exam     Tenderness Posterior midline palpation reveals tenderness of the Lower L-Spine, Upper L-Spine and Lower T-Spine. Right paramedian tenderness of the Lower L-Spine.     Back (L-Spine & T-Spine) Range of Motion   Extension: normal   Flexion: normal   Lateral bend right: abnormal   Lateral bend left: abnormal   Rotation right: abnormal   Rotation left: abnormal     Spinal Sensation   Right Side  Sensation  L-Spine Level: normal  Left Side Sensation  L-Spine Level: normal    Back (L-Spine & T-Spine) Tests   Right Side Tests  Straight leg raise:      Sitting SLR: > 70 degrees    Squat Test: able to perform  Left Side Tests  Straight leg raise:     Sitting SLR: > 70 degrees    Squat: able to perform    Other He has no scoliosis .    Comments:  Tender along right lower rib.      Muscle Strength   Right Lower Extremity   Hip Abduction: 4/5   Hip Adduction: 5/5   Hip Flexion: 5/5   Hip Extensors: 5/5  Quadriceps:  4/5   Hamstrin/5   Anterior tibial:  5/5/5  Gastrocsoleus:  5/5/5  EHL:  5/5  Left Lower Extremity   Hip Abduction: 4/5   Hip Adduction: 5/5   Hip Flexion: 5/5   Hip Extensors: 5/5  Quadriceps:  4/5   Hamstrin/5   Anterior tibial:  5/5/5   Gastrocsoleus:  5/5/5  EHL:  5/5    Reflexes     Left Side  Achilles:  0  Ankle Clonus:  absent  Quadriceps:  1+    Right Side   Achilles:  0  Ankle Clonus:  absent  Quadriceps:  1+    Vascular Exam     Right Pulses  Dorsalis Pedis:      2+          Left Pulses  Dorsalis Pedis:      2+            Narrative & Impression     EXAMINATION:  XR KNEE ORTHO RIGHT WITH FLEXION     CLINICAL HISTORY:  Bilateral primary osteoarthritis of knee     TECHNIQUE:  AP standing as well as PA flexion standing and Merchant views of both knees were performed.  A lateral view of the right knee is also performed.     COMPARISON:  None.     FINDINGS:  Tricompartmental degenerative changes most severe in the medial and patellofemoral compartments with spurring.  No fracture or dislocation or joint effusion.     Mild degenerative changes also noted in the contralateral left knee.     Impression:     As above        Electronically signed by: Gil Quintero MD  Date:                                            2020  Time:                                           15:27            IMPRESSION/PLAN: This is a 74 y.o.  male with:    Primary osteoarthritis of right knee  -     Prior  authorization Order    Chronic pain of right knee    Right flank pain, chronic        The findings were discussed with Clay in detail. We reviewed his x-rays showing right knee Kellgren-Angelo grade 3 changes at a minimum. Since he hasn't had improvement with therapy, I think it's time for more aggressive treatment. We reviewed options and decided that viscosupplementation would be the best option. I'll order the medication and plan to see him back in four weeks to perform the injection. He can continue therapy for the back as that is helping. We discussed his exercises at his gym as well. I think he'd benefit from working with a  to work on technique and developing a regimen. He'll consider that.  He was provided with this plan in writing. All of his questions were answered. He will follow up with me in 4 weeks for injection.     Judy Francisco M.D.  Sports Medicine

## 2020-08-20 ENCOUNTER — CLINICAL SUPPORT (OUTPATIENT)
Dept: REHABILITATION | Facility: HOSPITAL | Age: 75
End: 2020-08-20
Payer: MEDICARE

## 2020-08-20 DIAGNOSIS — M25.561 CHRONIC PAIN OF RIGHT KNEE: ICD-10-CM

## 2020-08-20 DIAGNOSIS — R10.9 RIGHT FLANK PAIN, CHRONIC: ICD-10-CM

## 2020-08-20 DIAGNOSIS — G89.29 RIGHT FLANK PAIN, CHRONIC: ICD-10-CM

## 2020-08-20 DIAGNOSIS — G89.29 CHRONIC PAIN OF RIGHT KNEE: ICD-10-CM

## 2020-08-20 PROCEDURE — 97110 THERAPEUTIC EXERCISES: CPT | Mod: HCNC | Performed by: PHYSICAL THERAPIST

## 2020-08-20 PROCEDURE — 97140 MANUAL THERAPY 1/> REGIONS: CPT | Mod: HCNC | Performed by: PHYSICAL THERAPIST

## 2020-08-20 NOTE — PROGRESS NOTES
Physical Therapy Daily Treatment Note     Name: Clay Marcos  [facility] Number: [id number]    Therapy Diagnosis:   Encounter Diagnoses   Name Primary?    Right flank pain, chronic     Chronic pain of right knee      Physician: Judy Francisco MD    Visit Date: 8/20/2020    Physician Orders: PT Eval and Treat  Medical Diagnosis from Referral: right flank pain; right knee pain  Evaluation Date: 7/17/2020  Authorization Period Expiration: 7/20/2021  Plan of Care Expiration: 10/15/2020  Visit # / Visits authorized: 10/20 (11)     Precautions: Standard    Time In: 7:48 AM  Time Out: 8:35 AM   Total Billable Time: 40 minutes    SUBJECTIVE     Today, pt reports: that his MD appointment went well. He plans to receive a series of gel injections for the right knee. Patient states that his right flank pain continues to improve though. He is okay with finishing out his remaining scheduled visits and then progressing to DC with HEP.      He was compliant with home exercise program.  Response to previous treatment: Felt good after last treatment.  Functional change: Unable to perform some exercises involving a squatting movement, unable to play golf because of trunk rotational pain.    Pre-Treatment Pain: 2/10 (right knee) 1/10 trunk  Post-Treatment Pain: 2/10 (right knee pain) 1/10 trunk  Location: right knee     OBJECTIVE   (x = not tested due to pain and/or inability to obtain test position)     RANGE OF MOTION:       Knee ROM Right  8/20/2020 Left  7/17/2020 Pain/Dysfunction with Movement Goal   Knee Flexion (135) 130 137   137   Knee Extension (0) -2 0   0         STRENGTH:     U/E MMT Right   8/20/2020  Left  8/20/2020  Goal   Shoulder Flexion 4+/5 4+/5 5/5 B   Shoulder Abduction 4+/5 5/5 5/5 B   Serratus Anterior 4+/5 4+/5 5/5 B   Middle Trapezius    4-/5 4-/5 4+/5 B   Lower Trapezius 4-/5 4-/5 4+/5 B   Elbow Flexion  5/5 5/5 5/5 B   Elbow Extension 5/5 5/5 5/5 B         L/E MMT Right   8/20/2020  Left  8/20/2020  Goal   Hip  Flexion  5/5 5/5 5/5 B    Hip Extension  4+/5 4+/5 5/5 B   Hip Abduction  4+/5 4+/5 5/5 B   Knee Extension 4+/5 5/5 5/5 B   Knee Flexion 5/5 5/5 5/5 B   Ankle DF 5/5 5/5 5/5 B   Ankle PF 5/5    5/5 5/5 B               MUSCLE LENGTH:      Muscle Tested  Right  7/17/2020 Left   7/17/2020 Goal   Quadratus Lumborum decreased decreased Normal B   Hip Flexors  decreased decreased Normal B   Quadriceps decreased decreased Normal B   Hamstrings  decreased decreased Normal B   Gastrocnemius  decreased decreased Normal B   Soleus  decreased decreased Normal B   **Although still decreased, muscle length has improved as compared to previous visits.     JOINT MOBILITY:      Joint Motion Tested Right   (spine)  8/20/2020  Left  8/20/2020  Goal   Patellar Mobility  Hypomobile Normal Normal B   Tibiofemoral Joint Hypomobile Normal Normal B   Thoracic Spine Hypomobile --- Normal B   Lumbar Spine Hypomobile --- Normal B         SPECIAL TESTS:       Right  (spine)  7/17/2020 Left   7/17/2020 Goal   ASIS Compression Negative Negative Negative B    ASIS Distraction Negative Negative Negative B    VIRGINIE Positive Negative Negative B          Sensation: Sensation is intact to light touch     Palpation: Increased tone and tenderness noted with palpation of right quadratus lumborum , lumbar paraspinals , piriformis and iliopsoas, but decreased as compared to previous visits.      Posture: Pt presents with postural abnormalities which include: ER of L LE, slight flexion of R knee, increased APT, elevated shoulders B, protracted scapulae     Gait Analysis: The patient ambulated with the following assistive device: none; the pt presents with the following gait abnormalities: increased NICOLLE, decreased pelvic/trunk rotation and decreased reciprocal arm swing, ER of L LE. However, he now presents with improved pelvic/trunk rotation and reciprocal arm swing. Patient does not require as wide of a NICOLLE for balance.        TREATMENT       Clay received  "therapeutic exercises to develop strength, endurance, ROM, flexibility and posture for 32 minutes including:    Exercise 8/20/2020   Upright bike for ROM and strength  5 minutes   Straight leg raise 3 x 10, 2#   SAQ 10x, 3# - deferred   Side lying hip abduction 2 x 10   PPT 10x, 5s holds - deferred   Bridge with posterior pelvic tilt 15x DL, 15x SL each deferred   Table top position 5" holds, 8x, followed by table top LTR with unilateral 3# weight - 5x to each side   Prone hip extension 10x - deferred   Prone HS curls 10x, 3# - deferred   Quadruped activities Alternating bird dog 10x   Trunk rotation - thread the needle (thoracic rotation)   Hamstring stretch bilaterally 3x30 seconds - deferred   Posterior pelvic tilt with march 3 minutes - deferred   Double knee to chest with physioball Deferred   Open book stretch 10x B - deferred   payloff press 2x10 30 pounds both directions -deferred   payloff acrh 2x10 30 pounds both directions - deferred   Wood chops 2x10 3 pounds each direction - deferred   Unilateral standing 3 pound weight moving weight overhead with head following weight 2x10 alternating weight in each hand with each set - deferred   Quadratus lumborum stretch Seated 5x15 seconds - deferred   [place] 2 x 10 each LE   Step downs 4"step, 2 x 10 - deferred   Obliques crunch 10x each side, green theraband held from behind by PT             Cues were provided for core contraction with all strengthening exercises and to move through range of motion with slow controlled movements.     Clay received the following manual therapy techniques: myofascial release and soft tissue mobilization were applied to the right knee area grossly, IASTM to right ITB and distal quad: for 8 minutes    Home Exercises Provided and Patient Education Provided     Education/Self-Care provided:   Patient educated on biomechanical justification for therapeutic exercise and importance of compliance with HEP in order to improve overall " impairments and QOL    Patient educated on the importance of improved core and lower extremity strength in order to improve alignment of the spine and lower extremities with static positions and dynamic movement.    Patient educated on the importance of strong core and lower extremity musculature in order to improve both static and dynamic balance, improve gait mechanics, reduce fall risk and improve household and community mobility.     Written Home Exercises Provided: yes. Via my chart larisa today.  Exercises were reviewed and Clay was able to demonstrate them prior to the end of the session.  Clay demonstrated good  understanding of the education provided.     See EMR under Patient Instructions for exercises provided 7/20/2020.    ASSESSMENT     Patient tolerated treatment well again this visit. He has made good overall progress with PT, demonstrating improvements in ROM, strength, gait, and core stabilization. His postural awareness has improved as well. Patient remains very motivated, but PT also advised him not to over do it with gym and gym classes at times. He expressed understanding. Patient would benefit from continued core stabilization and LE strengthening, but making sure he is able to focus on proper form while completing these activities.     Clay is progressing well towards his goals.   Pt prognosis is Excellent.     Pt will continue to benefit from skilled outpatient physical therapy to address the deficits listed in the problem list box on initial evaluation, provide pt/family education and to maximize pt's level of independence in the home and community environment.     Pt's spiritual, cultural and educational needs considered and pt agreeable to plan of care and goals.     Anticipated barriers to physical therapy: None    Goals:     Short Term Goals:  6 weeks  Status   1. Pain: Pt will demonstrate improved pain by reports of less than or equal to 3/10 worst pain on the verbal rating scale in  order to progress toward maximal functional ability and improve QOL.  Met   2. Function: Patient will demonstrate improved function as indicated by a functional limitation score of less than or equal to 32 out of 100 on FOTO.  Ongoing   3. Mobility: Patient will improve AROM to 50% of stated goals, listed in objective measures above, in order to progress towards independence with functional activities.   Met   4. Strength: Patient will improve strength to 50% of stated goals, listed in objective measures above, in order to progress towards independence with functional activities.   Met   5. Gait: Patient will demonstrate improved gait mechanics in order to improve functional mobility, improve quality of life, and decrease risk of further injury or fall.   Met   6. HEP: Patient will demonstrate independence with HEP in order to progress toward functional independence.  Met   7. Improve postural awareness.  Met      Long Term Goals:  12 weeks  Status   1. Pain: Pt will demonstrate improved pain by reports of less than or equal to 2/10 worst pain on the verbal rating scale in order to progress toward maximal functional ability and improve QOL.    Met   2. Function: Patient will demonstrate improved function as indicated by a functional limitation score of less than or equal to 29 out of 100 on FOTO.  Ongoing   3. Mobility: Patient will improve AROM to stated goals, listed in objective measures above, in order to return to maximal functional potential and improve quality of life.  Ongoing   4. Strength: Patient will improve strength to stated goals, listed in objective measures above, in order to improve functional independence and quality of life.  Partially met   5. Gait: Patient will demonstrate normalized gait mechanics with minimal compensation in order to return to PLOF.  Partially Met   6. Patient will return to normal ADL's, IADL's, community involvement, recreational activities, and work-related activities with  less than or equal to 1/10 pain and maximal function.  Partially Met         PLAN   Continue Plan of Care to strengthen core for patient to be able to return to all functional activities with no pain.     8/20/2020: It is my recommendation that patient continue with PT for the remainder of his approved PT visits at his current frequency of 2 times per week. His treatment plan will remained the same, and he will be progressed appropriately towards DC.     INITIAL EVAL (7/17/2020): Outpatient Physical Therapy 2 times weekly for 12 weeks to include any combination of the following interventions: virtual visits, dry needling, modalities, electrical stimulation (IFC, Pre-Mod, Attended with Functional Dry Needling), Cervical/Lumbar Traction, Gait Training, Manual Therapy, Neuromuscular Re-ed, Patient Education, Self Care, Therapeutic Activites and Therapeutic Exercise    Sruthi Urbina, PT

## 2020-08-24 ENCOUNTER — CLINICAL SUPPORT (OUTPATIENT)
Dept: REHABILITATION | Facility: HOSPITAL | Age: 75
End: 2020-08-24
Payer: MEDICARE

## 2020-08-24 DIAGNOSIS — R10.9 RIGHT FLANK PAIN, CHRONIC: ICD-10-CM

## 2020-08-24 DIAGNOSIS — M25.561 CHRONIC PAIN OF RIGHT KNEE: ICD-10-CM

## 2020-08-24 DIAGNOSIS — G89.29 CHRONIC PAIN OF RIGHT KNEE: ICD-10-CM

## 2020-08-24 DIAGNOSIS — G89.29 RIGHT FLANK PAIN, CHRONIC: ICD-10-CM

## 2020-08-24 PROCEDURE — 97140 MANUAL THERAPY 1/> REGIONS: CPT | Mod: HCNC | Performed by: PHYSICAL THERAPIST

## 2020-08-24 PROCEDURE — 97110 THERAPEUTIC EXERCISES: CPT | Mod: HCNC | Performed by: PHYSICAL THERAPIST

## 2020-08-27 NOTE — PROGRESS NOTES
"  Physical Therapy Daily Treatment Note     Name: Clay Marcos  Clinic Number: [id number]    Therapy Diagnosis:   Encounter Diagnoses   Name Primary?    Right flank pain, chronic     Chronic pain of right knee      Physician: Judy Francisco MD    Visit Date: 8/24/2020    Physician Orders: PT Eval and Treat  Medical Diagnosis from Referral: right flank pain; right knee pain  Evaluation Date: 7/17/2020  Authorization Period Expiration: 7/20/2021  Plan of Care Expiration: 10/15/2020  Visit # / Visits authorized: 11/20 (12)     Precautions: Standard    Time In: 7:48 AM  Time Out: 8:35 AM   Total Billable Time: 40 minutes    SUBJECTIVE     Today, pt reports: feeling about the same overall, still better in the side but the knee still somewhat painful.    He was compliant with home exercise program.  Response to previous treatment: Felt good after last treatment.  Functional change: Patient is better able to perform daily activities with less pain and limitation.     Pre-Treatment Pain: 2/10 (right knee) 1/10 trunk  Post-Treatment Pain: 2/10 (right knee pain) 1/10 trunk  Location: right knee       TREATMENT       Clay received therapeutic exercises to develop strength, endurance, ROM, flexibility and posture for 32 minutes including:    Exercise 8/24/2020   Upright bike for ROM and strength  5 minutes   Straight leg raise 3 x 10, 2#   SAQ 10x, 3# - deferred   Side lying hip abduction 2 x 10   PPT 10x, 5s holds - deferred   Bridge with posterior pelvic tilt 15x DL, 15x SL each deferred   Table top position 5" holds, 8x, followed by table top LTR with unilateral 3# weight - 5x to each side   Prone hip extension 10x - deferred   Prone HS curls 10x, 3# - deferred   Quadruped activities Alternating bird dog 10x   Trunk rotation - thread the needle (thoracic rotation)   Hamstring stretch bilaterally 3x30 seconds - deferred   Posterior pelvic tilt with march 3 minutes - deferred   Double knee to chest with physioball Deferred " "  Open book stretch 10x B - deferred   payloff press 2x10 30 pounds both directions -deferred   payloff acrh 2x10 30 pounds both directions - deferred   Wood chops 2x10 3 pounds each direction - deferred   Unilateral standing 3 pound weight moving weight overhead with head following weight 2x10 alternating weight in each hand with each set - deferred   Quadratus lumborum stretch Seated 5x15 seconds - deferred   [place] 2 x 10 each LE   Step downs 4"step, 2 x 10 - deferred   Obliques crunch 10x each side, green theraband held from behind by PT             Cues were provided for core contraction with all strengthening exercises and to move through range of motion with slow controlled movements.     Clay received the following manual therapy techniques: myofascial release and soft tissue mobilization were applied to the right knee area grossly, IASTM to right ITB and distal quad: for 8 minutes    Home Exercises Provided and Patient Education Provided     Education/Self-Care provided:   Patient educated on biomechanical justification for therapeutic exercise and importance of compliance with HEP in order to improve overall impairments and QOL    Patient educated on the importance of improved core and lower extremity strength in order to improve alignment of the spine and lower extremities with static positions and dynamic movement.    Patient educated on the importance of strong core and lower extremity musculature in order to improve both static and dynamic balance, improve gait mechanics, reduce fall risk and improve household and community mobility.     Written Home Exercises Provided: yes. Via my chart larisa today.  Exercises were reviewed and Clay was able to demonstrate them prior to the end of the session.  Clay demonstrated good  understanding of the education provided.     See EMR under Patient Instructions for exercises provided 7/20/2020.    ASSESSMENT     Patient did well with treatment today. He completed " exercises without difficulty or complaint. Less verbal and tactile cues required today to maintain proper form with exercises. Good relief with manual therapy. Patient is progressing well towards goals.      Clay is progressing well towards his goals.   Pt prognosis is Excellent.     Pt will continue to benefit from skilled outpatient physical therapy to address the deficits listed in the problem list box on initial evaluation, provide pt/family education and to maximize pt's level of independence in the home and community environment.     Pt's spiritual, cultural and educational needs considered and pt agreeable to plan of care and goals.     Anticipated barriers to physical therapy: None    Goals:     Short Term Goals:  6 weeks  Status   1. Pain: Pt will demonstrate improved pain by reports of less than or equal to 3/10 worst pain on the verbal rating scale in order to progress toward maximal functional ability and improve QOL.  Met   2. Function: Patient will demonstrate improved function as indicated by a functional limitation score of less than or equal to 32 out of 100 on FOTO.  Ongoing   3. Mobility: Patient will improve AROM to 50% of stated goals, listed in objective measures above, in order to progress towards independence with functional activities.   Met   4. Strength: Patient will improve strength to 50% of stated goals, listed in objective measures above, in order to progress towards independence with functional activities.   Met   5. Gait: Patient will demonstrate improved gait mechanics in order to improve functional mobility, improve quality of life, and decrease risk of further injury or fall.   Met   6. HEP: Patient will demonstrate independence with HEP in order to progress toward functional independence.  Met   7. Improve postural awareness.  Met      Long Term Goals:  12 weeks  Status   1. Pain: Pt will demonstrate improved pain by reports of less than or equal to 2/10 worst pain on the verbal  rating scale in order to progress toward maximal functional ability and improve QOL.    Met   2. Function: Patient will demonstrate improved function as indicated by a functional limitation score of less than or equal to 29 out of 100 on FOTO.  Ongoing   3. Mobility: Patient will improve AROM to stated goals, listed in objective measures above, in order to return to maximal functional potential and improve quality of life.  Ongoing   4. Strength: Patient will improve strength to stated goals, listed in objective measures above, in order to improve functional independence and quality of life.  Partially met   5. Gait: Patient will demonstrate normalized gait mechanics with minimal compensation in order to return to PLOF.  Partially Met   6. Patient will return to normal ADL's, IADL's, community involvement, recreational activities, and work-related activities with less than or equal to 1/10 pain and maximal function.  Partially Met         PLAN   Continue Plan of Care to strengthen core for patient to be able to return to all functional activities with no pain.     8/20/2020: It is my recommendation that patient continue with PT for the remainder of his approved PT visits at his current frequency of 2 times per week. His treatment plan will remained the same, and he will be progressed appropriately towards DC.     INITIAL EVAL (7/17/2020): Outpatient Physical Therapy 2 times weekly for 12 weeks to include any combination of the following interventions: virtual visits, dry needling, modalities, electrical stimulation (IFC, Pre-Mod, Attended with Functional Dry Needling), Cervical/Lumbar Traction, Gait Training, Manual Therapy, Neuromuscular Re-ed, Patient Education, Self Care, Therapeutic Activites and Therapeutic Exercise    Sruthi Urbina, PT

## 2020-08-27 NOTE — PLAN OF CARE
Physical Therapy Daily Treatment Note     Name: Clay Marcos  [facility] Number: [id number]    Therapy Diagnosis:   Encounter Diagnoses   Name Primary?    Right flank pain, chronic     Chronic pain of right knee      Physician: Judy Francisco MD    Visit Date: 8/20/2020    Physician Orders: PT Eval and Treat  Medical Diagnosis from Referral: right flank pain; right knee pain  Evaluation Date: 7/17/2020  Authorization Period Expiration: 7/20/2021  Plan of Care Expiration: 10/15/2020  Visit # / Visits authorized: 10/20 (11)     Precautions: Standard    Time In: 7:48 AM  Time Out: 8:35 AM   Total Billable Time: 40 minutes    SUBJECTIVE     Today, pt reports: that his MD appointment went well. He plans to receive a series of gel injections for the right knee. Patient states that his right flank pain continues to improve though. He is okay with finishing out his remaining scheduled visits and then progressing to DC with HEP.      He was compliant with home exercise program.  Response to previous treatment: Felt good after last treatment.  Functional change: Unable to perform some exercises involving a squatting movement, unable to play golf because of trunk rotational pain.    Pre-Treatment Pain: 2/10 (right knee) 1/10 trunk  Post-Treatment Pain: 2/10 (right knee pain) 1/10 trunk  Location: right knee     OBJECTIVE   (x = not tested due to pain and/or inability to obtain test position)     RANGE OF MOTION:       Knee ROM Right  8/20/2020 Left  7/17/2020 Pain/Dysfunction with Movement Goal   Knee Flexion (135) 130 137   137   Knee Extension (0) -2 0   0         STRENGTH:     U/E MMT Right   8/20/2020  Left  8/20/2020  Goal   Shoulder Flexion 4+/5 4+/5 5/5 B   Shoulder Abduction 4+/5 5/5 5/5 B   Serratus Anterior 4+/5 4+/5 5/5 B   Middle Trapezius    4-/5 4-/5 4+/5 B   Lower Trapezius 4-/5 4-/5 4+/5 B   Elbow Flexion  5/5 5/5 5/5 B   Elbow Extension 5/5 5/5 5/5 B         L/E MMT Right   8/20/2020  Left  8/20/2020  Goal   Hip  Flexion  5/5 5/5 5/5 B    Hip Extension  4+/5 4+/5 5/5 B   Hip Abduction  4+/5 4+/5 5/5 B   Knee Extension 4+/5 5/5 5/5 B   Knee Flexion 5/5 5/5 5/5 B   Ankle DF 5/5 5/5 5/5 B   Ankle PF 5/5    5/5 5/5 B               MUSCLE LENGTH:      Muscle Tested  Right  7/17/2020 Left   7/17/2020 Goal   Quadratus Lumborum decreased decreased Normal B   Hip Flexors  decreased decreased Normal B   Quadriceps decreased decreased Normal B   Hamstrings  decreased decreased Normal B   Gastrocnemius  decreased decreased Normal B   Soleus  decreased decreased Normal B   **Although still decreased, muscle length has improved as compared to previous visits.     JOINT MOBILITY:      Joint Motion Tested Right   (spine)  8/20/2020  Left  8/20/2020  Goal   Patellar Mobility  Hypomobile Normal Normal B   Tibiofemoral Joint Hypomobile Normal Normal B   Thoracic Spine Hypomobile --- Normal B   Lumbar Spine Hypomobile --- Normal B         SPECIAL TESTS:       Right  (spine)  7/17/2020 Left   7/17/2020 Goal   ASIS Compression Negative Negative Negative B    ASIS Distraction Negative Negative Negative B    VIRGINIE Positive Negative Negative B          Sensation: Sensation is intact to light touch     Palpation: Increased tone and tenderness noted with palpation of right quadratus lumborum , lumbar paraspinals , piriformis and iliopsoas, but decreased as compared to previous visits.      Posture: Pt presents with postural abnormalities which include: ER of L LE, slight flexion of R knee, increased APT, elevated shoulders B, protracted scapulae     Gait Analysis: The patient ambulated with the following assistive device: none; the pt presents with the following gait abnormalities: increased NICOLLE, decreased pelvic/trunk rotation and decreased reciprocal arm swing, ER of L LE. However, he now presents with improved pelvic/trunk rotation and reciprocal arm swing. Patient does not require as wide of a NICOLLE for balance.        TREATMENT       Clay received  "therapeutic exercises to develop strength, endurance, ROM, flexibility and posture for 32 minutes including:    Exercise 8/20/2020   Upright bike for ROM and strength  5 minutes   Straight leg raise 3 x 10, 2#   SAQ 10x, 3# - deferred   Side lying hip abduction 2 x 10   PPT 10x, 5s holds - deferred   Bridge with posterior pelvic tilt 15x DL, 15x SL each deferred   Table top position 5" holds, 8x, followed by table top LTR with unilateral 3# weight - 5x to each side   Prone hip extension 10x - deferred   Prone HS curls 10x, 3# - deferred   Quadruped activities Alternating bird dog 10x   Trunk rotation - thread the needle (thoracic rotation)   Hamstring stretch bilaterally 3x30 seconds - deferred   Posterior pelvic tilt with march 3 minutes - deferred   Double knee to chest with physioball Deferred   Open book stretch 10x B - deferred   payloff press 2x10 30 pounds both directions -deferred   payloff acrh 2x10 30 pounds both directions - deferred   Wood chops 2x10 3 pounds each direction - deferred   Unilateral standing 3 pound weight moving weight overhead with head following weight 2x10 alternating weight in each hand with each set - deferred   Quadratus lumborum stretch Seated 5x15 seconds - deferred   Nubieber 2 x 10 each LE   Step downs 4"step, 2 x 10 - deferred   Obliques crunch 10x each side, green theraband held from behind by PT             Cues were provided for core contraction with all strengthening exercises and to move through range of motion with slow controlled movements.     Clay received the following manual therapy techniques: myofascial release and soft tissue mobilization were applied to the right knee area grossly, IASTM to right ITB and distal quad: for 8 minutes    Home Exercises Provided and Patient Education Provided     Education/Self-Care provided:   Patient educated on biomechanical justification for therapeutic exercise and importance of compliance with HEP in order to improve overall " impairments and QOL    Patient educated on the importance of improved core and lower extremity strength in order to improve alignment of the spine and lower extremities with static positions and dynamic movement.    Patient educated on the importance of strong core and lower extremity musculature in order to improve both static and dynamic balance, improve gait mechanics, reduce fall risk and improve household and community mobility.     Written Home Exercises Provided: yes. Via my chart larisa today.  Exercises were reviewed and Clay was able to demonstrate them prior to the end of the session.  Clay demonstrated good  understanding of the education provided.     See EMR under Patient Instructions for exercises provided 7/20/2020.    ASSESSMENT     Patient tolerated treatment well again this visit. He has made good overall progress with PT, demonstrating improvements in ROM, strength, gait, and core stabilization. His postural awareness has improved as well. Patient remains very motivated, but PT also advised him not to over do it with gym and gym classes at times. He expressed understanding. Patient would benefit from continued core stabilization and LE strengthening, but making sure he is able to focus on proper form while completing these activities.     Clay is progressing well towards his goals.   Pt prognosis is Excellent.     Pt will continue to benefit from skilled outpatient physical therapy to address the deficits listed in the problem list box on initial evaluation, provide pt/family education and to maximize pt's level of independence in the home and community environment.     Pt's spiritual, cultural and educational needs considered and pt agreeable to plan of care and goals.     Anticipated barriers to physical therapy: None    Goals:     Short Term Goals:  6 weeks  Status   1. Pain: Pt will demonstrate improved pain by reports of less than or equal to 3/10 worst pain on the verbal rating scale in  order to progress toward maximal functional ability and improve QOL.  Met   2. Function: Patient will demonstrate improved function as indicated by a functional limitation score of less than or equal to 32 out of 100 on FOTO.  Ongoing   3. Mobility: Patient will improve AROM to 50% of stated goals, listed in objective measures above, in order to progress towards independence with functional activities.   Met   4. Strength: Patient will improve strength to 50% of stated goals, listed in objective measures above, in order to progress towards independence with functional activities.   Met   5. Gait: Patient will demonstrate improved gait mechanics in order to improve functional mobility, improve quality of life, and decrease risk of further injury or fall.   Met   6. HEP: Patient will demonstrate independence with HEP in order to progress toward functional independence.  Met   7. Improve postural awareness.  Met      Long Term Goals:  12 weeks  Status   1. Pain: Pt will demonstrate improved pain by reports of less than or equal to 2/10 worst pain on the verbal rating scale in order to progress toward maximal functional ability and improve QOL.    Met   2. Function: Patient will demonstrate improved function as indicated by a functional limitation score of less than or equal to 29 out of 100 on FOTO.  Ongoing   3. Mobility: Patient will improve AROM to stated goals, listed in objective measures above, in order to return to maximal functional potential and improve quality of life.  Ongoing   4. Strength: Patient will improve strength to stated goals, listed in objective measures above, in order to improve functional independence and quality of life.  Partially met   5. Gait: Patient will demonstrate normalized gait mechanics with minimal compensation in order to return to PLOF.  Partially Met   6. Patient will return to normal ADL's, IADL's, community involvement, recreational activities, and work-related activities with  less than or equal to 1/10 pain and maximal function.  Partially Met         PLAN   Continue Plan of Care to strengthen core for patient to be able to return to all functional activities with no pain.     8/20/2020: It is my recommendation that patient continue with PT for the remainder of his approved PT visits at his current frequency of 2 times per week. His treatment plan will remained the same, and he will be progressed appropriately towards DC.     INITIAL EVAL (7/17/2020): Outpatient Physical Therapy 2 times weekly for 12 weeks to include any combination of the following interventions: virtual visits, dry needling, modalities, electrical stimulation (IFC, Pre-Mod, Attended with Functional Dry Needling), Cervical/Lumbar Traction, Gait Training, Manual Therapy, Neuromuscular Re-ed, Patient Education, Self Care, Therapeutic Activites and Therapeutic Exercise    Sruthi Urbina, PT

## 2020-08-31 ENCOUNTER — PES CALL (OUTPATIENT)
Dept: ADMINISTRATIVE | Facility: CLINIC | Age: 75
End: 2020-08-31

## 2020-09-04 DIAGNOSIS — M17.11 PRIMARY OSTEOARTHRITIS OF RIGHT KNEE: Primary | ICD-10-CM

## 2020-09-09 ENCOUNTER — OFFICE VISIT (OUTPATIENT)
Dept: INTERNAL MEDICINE | Facility: CLINIC | Age: 75
End: 2020-09-09
Payer: MEDICARE

## 2020-09-09 VITALS
DIASTOLIC BLOOD PRESSURE: 78 MMHG | OXYGEN SATURATION: 98 % | BODY MASS INDEX: 33.04 KG/M2 | SYSTOLIC BLOOD PRESSURE: 124 MMHG | WEIGHT: 249.31 LBS | TEMPERATURE: 97 F | HEIGHT: 73 IN | HEART RATE: 95 BPM

## 2020-09-09 DIAGNOSIS — M51.36 DDD (DEGENERATIVE DISC DISEASE), LUMBAR: ICD-10-CM

## 2020-09-09 DIAGNOSIS — M25.561 CHRONIC PAIN OF RIGHT KNEE: ICD-10-CM

## 2020-09-09 DIAGNOSIS — R10.9 RIGHT FLANK PAIN, CHRONIC: ICD-10-CM

## 2020-09-09 DIAGNOSIS — N40.1 BPH WITH URINARY OBSTRUCTION: ICD-10-CM

## 2020-09-09 DIAGNOSIS — N13.8 BPH WITH URINARY OBSTRUCTION: ICD-10-CM

## 2020-09-09 DIAGNOSIS — E66.9 OBESITY (BMI 30-39.9): ICD-10-CM

## 2020-09-09 DIAGNOSIS — I77.1 TORTUOUS AORTA: ICD-10-CM

## 2020-09-09 DIAGNOSIS — G89.29 RIGHT FLANK PAIN, CHRONIC: ICD-10-CM

## 2020-09-09 DIAGNOSIS — J45.20 MILD INTERMITTENT ASTHMA WITHOUT COMPLICATION: ICD-10-CM

## 2020-09-09 DIAGNOSIS — G47.39 TREATMENT-EMERGENT CENTRAL SLEEP APNEA: ICD-10-CM

## 2020-09-09 DIAGNOSIS — K21.00 GASTROESOPHAGEAL REFLUX DISEASE WITH ESOPHAGITIS: ICD-10-CM

## 2020-09-09 DIAGNOSIS — R91.8 MULTIPLE LUNG NODULES ON CT: Chronic | ICD-10-CM

## 2020-09-09 DIAGNOSIS — G89.29 CHRONIC PAIN OF RIGHT KNEE: ICD-10-CM

## 2020-09-09 DIAGNOSIS — I34.1 MVP (MITRAL VALVE PROLAPSE): ICD-10-CM

## 2020-09-09 DIAGNOSIS — K43.9 HERNIA OF ABDOMINAL WALL: ICD-10-CM

## 2020-09-09 DIAGNOSIS — Z00.00 ENCOUNTER FOR PREVENTIVE HEALTH EXAMINATION: Primary | ICD-10-CM

## 2020-09-09 PROCEDURE — 99999 PR PBB SHADOW E&M-EST. PATIENT-LVL IV: CPT | Mod: PBBFAC,HCNC,, | Performed by: NURSE PRACTITIONER

## 2020-09-09 PROCEDURE — G0439 PR MEDICARE ANNUAL WELLNESS SUBSEQUENT VISIT: ICD-10-PCS | Mod: HCNC,S$GLB,, | Performed by: NURSE PRACTITIONER

## 2020-09-09 PROCEDURE — G0439 PPPS, SUBSEQ VISIT: HCPCS | Mod: HCNC,S$GLB,, | Performed by: NURSE PRACTITIONER

## 2020-09-09 PROCEDURE — 99999 PR PBB SHADOW E&M-EST. PATIENT-LVL IV: ICD-10-PCS | Mod: PBBFAC,HCNC,, | Performed by: NURSE PRACTITIONER

## 2020-09-09 NOTE — PROGRESS NOTES
Clay Marcos presented for a  Medicare AWV and comprehensive Health Risk Assessment today. The following components were reviewed and updated:    · Medical history  · Family History  · Social history  · Allergies and Current Medications  · Health Risk Assessment  · Health Maintenance  · Care Team     ** See Completed Assessments for Annual Wellness Visit within the encounter summary.**       The following assessments were completed:  · Living Situation  · CAGE  · Depression Screening  · Timed Get Up and Go  · Whisper Test  · Cognitive Function Screening  · Nutrition Screening  · ADL Screening  · PAQ Screening    There were no vitals filed for this visit.  There is no height or weight on file to calculate BMI.  Physical Exam  Vitals signs reviewed.   Cardiovascular:      Rate and Rhythm: Normal rate and regular rhythm.      Pulses: Normal pulses.      Heart sounds: Normal heart sounds.   Pulmonary:      Effort: Pulmonary effort is normal.      Breath sounds: Normal breath sounds.   Musculoskeletal: Normal range of motion.   Skin:     General: Skin is warm and dry.   Neurological:      General: No focal deficit present.      Mental Status: He is alert.   Psychiatric:         Mood and Affect: Mood normal.         Behavior: Behavior normal.           Diagnoses and health risks identified today and associated recommendations/orders:    There are no diagnoses linked to this encounter.       1. Encounter for preventive health examination  Due for annual exam Dec 2020.     2. DDD (degenerative disc disease), lumbar  Stable.  Will continue current treatment plan.      3. Mild intermittent asthma without complication  Stable. Has routine pulmonary visits. Will continue current treatment plan.      4. Multiple lung nodules on CT - probable hematoma LLL  Stable CT chest 08/2018.  Has routine pulmonary visits.Will continue current treatment plan.      6. MVP (mitral valve prolapse)  Stable.  Will continue current treatment plan.       7. Tortuous aorta  Stable.  Will continue current treatment plan.      8. BPH with urinary obstruction  Stable.  Will continue current treatment plan.      9. Dysmetabolic syndrome  Stable.  Will continue current treatment plan.      10. Obesity (BMI 30-39.9)  Stable.  Currently exercises daily and following a diet.      11. Gastroesophageal reflux disease with esophagitis  Stable symptoms since weigh loss.  Continue current treatment plan.      12. Hernia of abdominal wall - left lateral  Stable.  Will continue current treatment plan.     14. Treatment-emergent central sleep apnea  Stable.  Using CPAP.  Will continue current treatment plan.     15. History of basal cell carcinoma (BCC) of skin  Stable.  Has dermatology visits twice a year.          Provided Clay with a 5-10 year written screening schedule and personal prevention plan. Recommendations were developed using the USPSTF age appropriate recommendations. Education, counseling, and referrals were provided as needed. After Visit Summary printed and given to patient which includes a list of additional screenings\tests needed.    Follow up with PCP and specialists as scheduled  HRA follow up in 1 year    Chandni Olmedo NP

## 2020-09-09 NOTE — PATIENT INSTRUCTIONS
Counseling and Referral of Other Preventative  (Italic type indicates deductible and co-insurance are waived)    Patient Name: Clay Marcos  Today's Date: 9/9/2020    Health Maintenance       Date Due Completion Date    Hepatitis C Screening 1945 ---    PROSTATE-SPECIFIC ANTIGEN 12/11/2019 12/11/2018    Influenza Vaccine (1) 08/01/2020 10/18/2019    Shingles Vaccine (2 of 3) 12/05/2020 (Originally 5/30/2011) 4/4/2011    Colorectal Cancer Screening 04/25/2022 4/25/2012    TETANUS VACCINE 08/14/2022 8/14/2012    Lipid Panel 12/09/2024 12/9/2019        No orders of the defined types were placed in this encounter.    The following information is provided to all patients.  This information is to help you find resources for any of the problems found today that may be affecting your health:                Living healthy guide: www.[facility].[place].gov      Understanding Diabetes: www.diabetes.org      Eating healthy: www.cdc.gov/healthyweight      [facility] home safety checklist: www.cdc.gov/steadi/patient.html      Agency on Aging: www.goea.[place].[facility]      Alcoholics anonymous (AA): www.aa.org      Physical Activity: www.darrick.nih.gov/wx0yuag      Tobacco use: www.quitwithusla.org

## 2020-09-16 ENCOUNTER — OFFICE VISIT (OUTPATIENT)
Dept: ORTHOPEDICS | Facility: CLINIC | Age: 75
End: 2020-09-16
Payer: MEDICARE

## 2020-09-16 VITALS
BODY MASS INDEX: 33 KG/M2 | SYSTOLIC BLOOD PRESSURE: 129 MMHG | WEIGHT: 249 LBS | HEART RATE: 73 BPM | HEIGHT: 73 IN | DIASTOLIC BLOOD PRESSURE: 81 MMHG

## 2020-09-16 DIAGNOSIS — M25.561 CHRONIC PAIN OF RIGHT KNEE: ICD-10-CM

## 2020-09-16 DIAGNOSIS — M17.11 PRIMARY OSTEOARTHRITIS OF RIGHT KNEE: Primary | ICD-10-CM

## 2020-09-16 DIAGNOSIS — G89.29 CHRONIC PAIN OF RIGHT KNEE: ICD-10-CM

## 2020-09-16 PROCEDURE — 99499 UNLISTED E&M SERVICE: CPT | Mod: HCNC,S$GLB,, | Performed by: PHYSICAL MEDICINE & REHABILITATION

## 2020-09-16 PROCEDURE — 20610 DRAIN/INJ JOINT/BURSA W/O US: CPT | Mod: HCNC,RT,S$GLB, | Performed by: PHYSICAL MEDICINE & REHABILITATION

## 2020-09-16 PROCEDURE — 99499 NO LOS: ICD-10-PCS | Mod: HCNC,S$GLB,, | Performed by: PHYSICAL MEDICINE & REHABILITATION

## 2020-09-16 PROCEDURE — 99999 PR PBB SHADOW E&M-EST. PATIENT-LVL III: ICD-10-PCS | Mod: PBBFAC,HCNC,, | Performed by: PHYSICAL MEDICINE & REHABILITATION

## 2020-09-16 PROCEDURE — 99999 PR PBB SHADOW E&M-EST. PATIENT-LVL III: CPT | Mod: PBBFAC,HCNC,, | Performed by: PHYSICAL MEDICINE & REHABILITATION

## 2020-09-16 PROCEDURE — 20610 LARGE JOINT ASPIRATION/INJECTION: R KNEE: ICD-10-PCS | Mod: HCNC,RT,S$GLB, | Performed by: PHYSICAL MEDICINE & REHABILITATION

## 2020-09-17 NOTE — PROCEDURES
Large Joint Aspiration/Injection: R knee    Date/Time: 9/16/2020 1:00 PM  Performed by: Judy Francisco MD  Authorized by: Judy Francisco MD     Consent Done?:  Yes (Verbal)  Indications:  Joint swelling and pain  Site marked: the procedure site was marked    Timeout: prior to procedure the correct patient, procedure, and site was verified    Prep: patient was prepped and draped in usual sterile fashion      Local anesthesia used?: Yes    Local anesthetic:  Topical anesthetic    Details:  Needle Size:  22 G  Ultrasonic Guidance for needle placement?: No    Approach:  Superior  Location:  Knee  Site:  R knee  Medications:  20 mg sodium hyaluronate (EUFLEXXA) 10 mg/mL(mw 2.4 -3.6 million)  Patient tolerance:  Patient tolerated the procedure well with no immediate complications

## 2020-09-22 ENCOUNTER — PATIENT OUTREACH (OUTPATIENT)
Dept: ADMINISTRATIVE | Facility: OTHER | Age: 75
End: 2020-09-22

## 2020-09-23 ENCOUNTER — OFFICE VISIT (OUTPATIENT)
Dept: ORTHOPEDICS | Facility: CLINIC | Age: 75
End: 2020-09-23
Payer: MEDICARE

## 2020-09-23 VITALS
WEIGHT: 249 LBS | BODY MASS INDEX: 33 KG/M2 | HEIGHT: 73 IN | HEART RATE: 94 BPM | SYSTOLIC BLOOD PRESSURE: 132 MMHG | DIASTOLIC BLOOD PRESSURE: 82 MMHG

## 2020-09-23 DIAGNOSIS — M25.561 CHRONIC PAIN OF RIGHT KNEE: ICD-10-CM

## 2020-09-23 DIAGNOSIS — M17.11 PRIMARY OSTEOARTHRITIS OF RIGHT KNEE: Primary | ICD-10-CM

## 2020-09-23 DIAGNOSIS — G89.29 CHRONIC PAIN OF RIGHT KNEE: ICD-10-CM

## 2020-09-23 PROCEDURE — 99999 PR PBB SHADOW E&M-EST. PATIENT-LVL III: CPT | Mod: PBBFAC,HCNC,, | Performed by: PHYSICAL MEDICINE & REHABILITATION

## 2020-09-23 PROCEDURE — 20610 DRAIN/INJ JOINT/BURSA W/O US: CPT | Mod: HCNC,RT,S$GLB, | Performed by: PHYSICAL MEDICINE & REHABILITATION

## 2020-09-23 PROCEDURE — 99999 PR PBB SHADOW E&M-EST. PATIENT-LVL III: ICD-10-PCS | Mod: PBBFAC,HCNC,, | Performed by: PHYSICAL MEDICINE & REHABILITATION

## 2020-09-23 PROCEDURE — 20610 LARGE JOINT ASPIRATION/INJECTION: R KNEE: ICD-10-PCS | Mod: HCNC,RT,S$GLB, | Performed by: PHYSICAL MEDICINE & REHABILITATION

## 2020-09-23 PROCEDURE — 99499 NO LOS: ICD-10-PCS | Mod: HCNC,S$GLB,, | Performed by: PHYSICAL MEDICINE & REHABILITATION

## 2020-09-23 PROCEDURE — 99499 UNLISTED E&M SERVICE: CPT | Mod: HCNC,S$GLB,, | Performed by: PHYSICAL MEDICINE & REHABILITATION

## 2020-09-23 NOTE — PROCEDURES
Large Joint Aspiration/Injection: R knee    Date/Time: 9/23/2020 1:00 PM  Performed by: Judy Francisco MD  Authorized by: Judy Francisco MD     Consent Done?:  Yes (Verbal)  Indications:  Joint swelling and pain  Site marked: the procedure site was marked    Timeout: prior to procedure the correct patient, procedure, and site was verified    Prep: patient was prepped and draped in usual sterile fashion      Local anesthesia used?: Yes    Local anesthetic:  Topical anesthetic    Details:  Needle Size:  22 G  Ultrasonic Guidance for needle placement?: No    Approach:  Superior  Location:  Knee  Site:  R knee  Medications:  20 mg sodium hyaluronate (EUFLEXXA) 10 mg/mL(mw 2.4 -3.6 million)  Patient tolerance:  Patient tolerated the procedure well with no immediate complications

## 2020-09-30 ENCOUNTER — PATIENT MESSAGE (OUTPATIENT)
Dept: ORTHOPEDICS | Facility: CLINIC | Age: 75
End: 2020-09-30

## 2020-09-30 ENCOUNTER — OFFICE VISIT (OUTPATIENT)
Dept: ORTHOPEDICS | Facility: CLINIC | Age: 75
End: 2020-09-30
Payer: MEDICARE

## 2020-09-30 VITALS
SYSTOLIC BLOOD PRESSURE: 109 MMHG | WEIGHT: 249 LBS | DIASTOLIC BLOOD PRESSURE: 66 MMHG | HEART RATE: 68 BPM | HEIGHT: 73 IN | BODY MASS INDEX: 33 KG/M2

## 2020-09-30 DIAGNOSIS — M17.11 PRIMARY OSTEOARTHRITIS OF RIGHT KNEE: Primary | ICD-10-CM

## 2020-09-30 DIAGNOSIS — G89.29 CHRONIC PAIN OF RIGHT KNEE: ICD-10-CM

## 2020-09-30 DIAGNOSIS — M25.561 CHRONIC PAIN OF RIGHT KNEE: ICD-10-CM

## 2020-09-30 PROCEDURE — 99999 PR PBB SHADOW E&M-EST. PATIENT-LVL III: CPT | Mod: PBBFAC,HCNC,, | Performed by: PHYSICAL MEDICINE & REHABILITATION

## 2020-09-30 PROCEDURE — 99499 UNLISTED E&M SERVICE: CPT | Mod: HCNC,S$GLB,, | Performed by: PHYSICAL MEDICINE & REHABILITATION

## 2020-09-30 PROCEDURE — 20610 DRAIN/INJ JOINT/BURSA W/O US: CPT | Mod: HCNC,RT,S$GLB, | Performed by: PHYSICAL MEDICINE & REHABILITATION

## 2020-09-30 PROCEDURE — 99999 PR PBB SHADOW E&M-EST. PATIENT-LVL III: ICD-10-PCS | Mod: PBBFAC,HCNC,, | Performed by: PHYSICAL MEDICINE & REHABILITATION

## 2020-09-30 PROCEDURE — 20610 LARGE JOINT ASPIRATION/INJECTION: R KNEE: ICD-10-PCS | Mod: HCNC,RT,S$GLB, | Performed by: PHYSICAL MEDICINE & REHABILITATION

## 2020-09-30 PROCEDURE — 99499 NO LOS: ICD-10-PCS | Mod: HCNC,S$GLB,, | Performed by: PHYSICAL MEDICINE & REHABILITATION

## 2020-10-01 NOTE — PROCEDURES
Large Joint Aspiration/Injection: R knee    Date/Time: 9/30/2020 1:00 PM  Performed by: Judy Francisco MD  Authorized by: Judy Francisco MD     Consent Done?:  Yes (Verbal)  Indications:  Joint swelling and pain  Site marked: the procedure site was marked    Timeout: prior to procedure the correct patient, procedure, and site was verified    Prep: patient was prepped and draped in usual sterile fashion      Local anesthesia used?: Yes    Local anesthetic:  Topical anesthetic    Details:  Needle Size:  22 G  Ultrasonic Guidance for needle placement?: No    Approach:  Superior  Location:  Knee  Site:  R knee  Medications:  20 mg sodium hyaluronate (EUFLEXXA) 10 mg/mL(mw 2.4 -3.6 million)  Patient tolerance:  Patient tolerated the procedure well with no immediate complications

## 2020-11-29 ENCOUNTER — PATIENT MESSAGE (OUTPATIENT)
Dept: FAMILY MEDICINE | Facility: CLINIC | Age: 75
End: 2020-11-29

## 2020-11-29 DIAGNOSIS — Z20.822 SUSPECTED COVID-19 VIRUS INFECTION: Primary | ICD-10-CM

## 2020-11-30 ENCOUNTER — LAB VISIT (OUTPATIENT)
Dept: INTERNAL MEDICINE | Facility: CLINIC | Age: 75
End: 2020-11-30
Payer: MEDICARE

## 2020-11-30 DIAGNOSIS — Z20.822 SUSPECTED COVID-19 VIRUS INFECTION: ICD-10-CM

## 2020-11-30 PROCEDURE — U0003 INFECTIOUS AGENT DETECTION BY NUCLEIC ACID (DNA OR RNA); SEVERE ACUTE RESPIRATORY SYNDROME CORONAVIRUS 2 (SARS-COV-2) (CORONAVIRUS DISEASE [COVID-19]), AMPLIFIED PROBE TECHNIQUE, MAKING USE OF HIGH THROUGHPUT TECHNOLOGIES AS DESCRIBED BY CMS-2020-01-R: HCPCS | Mod: HCNC

## 2020-12-01 LAB — SARS-COV-2 RNA RESP QL NAA+PROBE: NOT DETECTED

## 2020-12-15 ENCOUNTER — OFFICE VISIT (OUTPATIENT)
Dept: FAMILY MEDICINE | Facility: CLINIC | Age: 75
End: 2020-12-15
Payer: MEDICARE

## 2020-12-15 VITALS
OXYGEN SATURATION: 97 % | HEART RATE: 85 BPM | TEMPERATURE: 97 F | DIASTOLIC BLOOD PRESSURE: 80 MMHG | WEIGHT: 233.94 LBS | SYSTOLIC BLOOD PRESSURE: 132 MMHG | BODY MASS INDEX: 31 KG/M2 | HEIGHT: 73 IN

## 2020-12-15 DIAGNOSIS — N13.8 BPH WITH URINARY OBSTRUCTION: ICD-10-CM

## 2020-12-15 DIAGNOSIS — Z13.6 ENCOUNTER FOR LIPID SCREENING FOR CARDIOVASCULAR DISEASE: ICD-10-CM

## 2020-12-15 DIAGNOSIS — Z13.220 ENCOUNTER FOR LIPID SCREENING FOR CARDIOVASCULAR DISEASE: ICD-10-CM

## 2020-12-15 DIAGNOSIS — J45.20 MILD INTERMITTENT ASTHMA WITHOUT COMPLICATION: ICD-10-CM

## 2020-12-15 DIAGNOSIS — N40.1 BPH WITH URINARY OBSTRUCTION: ICD-10-CM

## 2020-12-15 DIAGNOSIS — G47.39 TREATMENT-EMERGENT CENTRAL SLEEP APNEA: ICD-10-CM

## 2020-12-15 DIAGNOSIS — Z12.5 PROSTATE CANCER SCREENING: ICD-10-CM

## 2020-12-15 DIAGNOSIS — E66.9 OBESITY (BMI 30-39.9): ICD-10-CM

## 2020-12-15 DIAGNOSIS — Z00.00 PREVENTATIVE HEALTH CARE: Primary | ICD-10-CM

## 2020-12-15 PROCEDURE — 1101F PT FALLS ASSESS-DOCD LE1/YR: CPT | Mod: HCNC,CPTII,S$GLB, | Performed by: FAMILY MEDICINE

## 2020-12-15 PROCEDURE — 1101F PR PT FALLS ASSESS DOC 0-1 FALLS W/OUT INJ PAST YR: ICD-10-PCS | Mod: HCNC,CPTII,S$GLB, | Performed by: FAMILY MEDICINE

## 2020-12-15 PROCEDURE — 3288F FALL RISK ASSESSMENT DOCD: CPT | Mod: HCNC,CPTII,S$GLB, | Performed by: FAMILY MEDICINE

## 2020-12-15 PROCEDURE — 99397 PR PREVENTIVE VISIT,EST,65 & OVER: ICD-10-PCS | Mod: HCNC,S$GLB,, | Performed by: FAMILY MEDICINE

## 2020-12-15 PROCEDURE — 1126F PR PAIN SEVERITY QUANTIFIED, NO PAIN PRESENT: ICD-10-PCS | Mod: HCNC,S$GLB,, | Performed by: FAMILY MEDICINE

## 2020-12-15 PROCEDURE — 99397 PER PM REEVAL EST PAT 65+ YR: CPT | Mod: HCNC,S$GLB,, | Performed by: FAMILY MEDICINE

## 2020-12-15 PROCEDURE — 99999 PR PBB SHADOW E&M-EST. PATIENT-LVL III: CPT | Mod: PBBFAC,HCNC,, | Performed by: FAMILY MEDICINE

## 2020-12-15 PROCEDURE — 3288F PR FALLS RISK ASSESSMENT DOCUMENTED: ICD-10-PCS | Mod: HCNC,CPTII,S$GLB, | Performed by: FAMILY MEDICINE

## 2020-12-15 PROCEDURE — 1126F AMNT PAIN NOTED NONE PRSNT: CPT | Mod: HCNC,S$GLB,, | Performed by: FAMILY MEDICINE

## 2020-12-15 PROCEDURE — 99999 PR PBB SHADOW E&M-EST. PATIENT-LVL III: ICD-10-PCS | Mod: PBBFAC,HCNC,, | Performed by: FAMILY MEDICINE

## 2020-12-15 NOTE — PROGRESS NOTES
CHIEF COMPLAINT:  This is a 75-year-old male here for preventive health exam.     SUBJECTIVE: Patient is doing well without complaints except for right knee pain for which he sees a sports medicine physician. He has had 3 injections of Euflexxa. Patient has BPH with urinary symptoms for which he takes Flomax 0.4 mg daily.  He continues to see pulmonary for mild asthma, central sleep apnea and follow up of multiple pulmonary nodules. He uses a type of CPAP.  He takes Singulair 10 mg daily.     Eye exam October 2019. Colonoscopy April 2012 due again April 2022. Pneumovax March 2012. Prevnar August 2015. Zostavax April 2011. TD booster August 2012. Flu vaccine October 2020.      ROS:  GENERAL: Patient denies fever, chills, night sweats. Patient denies weight loss. Patient denies anorexia, fatigue, weakness or swollen glands.  SKIN: Patient denies rash or hair loss.  HEENT: Patient denies sore throat, ear pain, hearing loss, nasal congestion, or runny nose. Patient denies visual disturbance, eye irritation or discharge.  LUNGS: Patient denies cough, wheeze or hemoptysis.  CARDIOVASCULAR: Patient denies chest pain, shortness of breath, palpitations, syncope or lower extremity edema.  GI: Patient denies abdominal pain, nausea, vomiting, diarrhea, constipation, blood in stool or melena.  GENITOURINARY: Patient denies dysuria, frequency, hematuria, nocturia, urgency or incontinence.  MUSCULOSKELETAL: Patient denies joint pain, swelling, redness or warmth.  NEUROLOGIC: Patient denies headache, vertigo, paresthesias, weakness in limb, dysarthria, dysphagia or abnormality of gait.  PSYCHIATRIC: Patient denies anxiety, depression, or memory loss.     OBJECTIVE:   GENERAL: Well-developed well-nourished, obese, white male alert and oriented x3, in no acute distress. Memory, judgment and cognition without deficit. Weight gain of 10 pounds in the last year.  SKIN: Clear without rash. Normal color and tone.   HEENT: Eyes: Clear  conjunctivae. Pupils equal reactive to light and accommodation. Ears: Clear TMs clear canals. Nose: Without congestion. Pharynx: Without injection or exudates.  NECK: Supple, normal range of motion. No masses, nodes or enlarged thyroid. No JVD. Carotids 2+ and equal. No bruits.  LUNGS: Clear to auscultation. Normal respiratory effort.  CARDIOVASCULAR: Regular rhythm, normal S1, S2 without murmur, gallop or rub.  BACK: No CVA or spinal tenderness.  ABDOMEN: Normal appearance. Active bowel sounds. Soft, nontender without mass or organomegaly. No rebound or guarding.  EXTREMITIES: Without cyanosis, clubbing or edema. Distal pulses 2+ and equal. Normal range of motion in all extremities. No joint effusion, erythema or warmth. Onychomycosis toenails.  NEUROLOGIC: Cranial nerves II through XII without deficit. Motor strength equal bilaterally. Sensation normal to touch. Deep tendon reflexes 2+ and equal. Gait without abnormality. No tremor. Negative cerebellar signs.   RUSTY: Minimally enlarged prostate, smooth, nontender. No masses. Anorectal exam: No masses, nontender. Heme negative stool x2.    ASSESSMENT:  1. Preventative health care    2. BPH with urinary obstruction    3. Mild intermittent asthma without complication    4. Obesity (BMI 30-39.9)    5. Treatment-emergent central sleep apnea    6. Prostate cancer screening    7. Encounter for lipid screening for cardiovascular disease        PLAN:  1.  Weight reduction. Exercise regularly.  2.  Age-appropriate counseling.  3.  Fasting lab including PSA.  4.  Refill Flomax 0.4 mg as needed.  5.  Consider Shingrix.  6.  Follow-up annually.    This note is generated with speech recognition software and is subject to transcription error and sound alike phrases that may be missed by proofreading.

## 2020-12-21 ENCOUNTER — LAB VISIT (OUTPATIENT)
Dept: LAB | Facility: HOSPITAL | Age: 75
End: 2020-12-21
Payer: MEDICARE

## 2020-12-21 DIAGNOSIS — G47.39 TREATMENT-EMERGENT CENTRAL SLEEP APNEA: ICD-10-CM

## 2020-12-21 DIAGNOSIS — Z00.00 PREVENTATIVE HEALTH CARE: ICD-10-CM

## 2020-12-21 DIAGNOSIS — Z13.6 ENCOUNTER FOR LIPID SCREENING FOR CARDIOVASCULAR DISEASE: ICD-10-CM

## 2020-12-21 DIAGNOSIS — J45.20 MILD INTERMITTENT ASTHMA WITHOUT COMPLICATION: ICD-10-CM

## 2020-12-21 DIAGNOSIS — Z13.220 ENCOUNTER FOR LIPID SCREENING FOR CARDIOVASCULAR DISEASE: ICD-10-CM

## 2020-12-21 DIAGNOSIS — Z12.5 PROSTATE CANCER SCREENING: ICD-10-CM

## 2020-12-21 LAB
ALBUMIN SERPL BCP-MCNC: 3.7 G/DL (ref 3.5–5.2)
ALP SERPL-CCNC: 109 U/L (ref 55–135)
ALT SERPL W/O P-5'-P-CCNC: 16 U/L (ref 10–44)
ANION GAP SERPL CALC-SCNC: 9 MMOL/L (ref 8–16)
AST SERPL-CCNC: 17 U/L (ref 10–40)
BASOPHILS # BLD AUTO: 0.04 K/UL (ref 0–0.2)
BASOPHILS NFR BLD: 0.5 % (ref 0–1.9)
BILIRUB SERPL-MCNC: 0.4 MG/DL (ref 0.1–1)
BUN SERPL-MCNC: 23 MG/DL (ref 8–23)
CALCIUM SERPL-MCNC: 8.9 MG/DL (ref 8.7–10.5)
CHLORIDE SERPL-SCNC: 105 MMOL/L (ref 95–110)
CHOLEST SERPL-MCNC: 157 MG/DL (ref 120–199)
CHOLEST/HDLC SERPL: 3.2 {RATIO} (ref 2–5)
CO2 SERPL-SCNC: 27 MMOL/L (ref 23–29)
COMPLEXED PSA SERPL-MCNC: 1.1 NG/ML (ref 0–4)
CREAT SERPL-MCNC: 1 MG/DL (ref 0.5–1.4)
DIFFERENTIAL METHOD: ABNORMAL
EOSINOPHIL # BLD AUTO: 0.8 K/UL (ref 0–0.5)
EOSINOPHIL NFR BLD: 11.1 % (ref 0–8)
ERYTHROCYTE [DISTWIDTH] IN BLOOD BY AUTOMATED COUNT: 13.3 % (ref 11.5–14.5)
EST. GFR  (AFRICAN AMERICAN): >60 ML/MIN/1.73 M^2
EST. GFR  (NON AFRICAN AMERICAN): >60 ML/MIN/1.73 M^2
GLUCOSE SERPL-MCNC: 107 MG/DL (ref 70–110)
HCT VFR BLD AUTO: 42.6 % (ref 40–54)
HDLC SERPL-MCNC: 49 MG/DL (ref 40–75)
HDLC SERPL: 31.2 % (ref 20–50)
HGB BLD-MCNC: 14.3 G/DL (ref 14–18)
IMM GRANULOCYTES # BLD AUTO: 0.01 K/UL (ref 0–0.04)
IMM GRANULOCYTES NFR BLD AUTO: 0.1 % (ref 0–0.5)
LDLC SERPL CALC-MCNC: 91 MG/DL (ref 63–159)
LYMPHOCYTES # BLD AUTO: 1.3 K/UL (ref 1–4.8)
LYMPHOCYTES NFR BLD: 17 % (ref 18–48)
MCH RBC QN AUTO: 32.4 PG (ref 27–31)
MCHC RBC AUTO-ENTMCNC: 33.6 G/DL (ref 32–36)
MCV RBC AUTO: 96 FL (ref 82–98)
MONOCYTES # BLD AUTO: 0.8 K/UL (ref 0.3–1)
MONOCYTES NFR BLD: 10.2 % (ref 4–15)
NEUTROPHILS # BLD AUTO: 4.5 K/UL (ref 1.8–7.7)
NEUTROPHILS NFR BLD: 61.1 % (ref 38–73)
NONHDLC SERPL-MCNC: 108 MG/DL
NRBC BLD-RTO: 0 /100 WBC
PLATELET # BLD AUTO: 240 K/UL (ref 150–350)
PMV BLD AUTO: 9.5 FL (ref 9.2–12.9)
POTASSIUM SERPL-SCNC: 4.4 MMOL/L (ref 3.5–5.1)
PROT SERPL-MCNC: 7.2 G/DL (ref 6–8.4)
RBC # BLD AUTO: 4.42 M/UL (ref 4.6–6.2)
SODIUM SERPL-SCNC: 141 MMOL/L (ref 136–145)
TRIGL SERPL-MCNC: 85 MG/DL (ref 30–150)
TSH SERPL DL<=0.005 MIU/L-ACNC: 2.01 UIU/ML (ref 0.4–4)
WBC # BLD AUTO: 7.36 K/UL (ref 3.9–12.7)

## 2020-12-21 PROCEDURE — 85025 COMPLETE CBC W/AUTO DIFF WBC: CPT | Mod: HCNC

## 2020-12-21 PROCEDURE — 36415 COLL VENOUS BLD VENIPUNCTURE: CPT | Mod: HCNC

## 2020-12-21 PROCEDURE — 80053 COMPREHEN METABOLIC PANEL: CPT | Mod: HCNC

## 2020-12-21 PROCEDURE — 86803 HEPATITIS C AB TEST: CPT | Mod: HCNC

## 2020-12-21 PROCEDURE — 84443 ASSAY THYROID STIM HORMONE: CPT | Mod: HCNC

## 2020-12-21 PROCEDURE — 84153 ASSAY OF PSA TOTAL: CPT | Mod: HCNC

## 2020-12-21 PROCEDURE — 80061 LIPID PANEL: CPT | Mod: HCNC

## 2020-12-23 LAB — HCV AB SERPL QL IA: ABNORMAL

## 2020-12-24 ENCOUNTER — PATIENT MESSAGE (OUTPATIENT)
Dept: FAMILY MEDICINE | Facility: CLINIC | Age: 75
End: 2020-12-24

## 2020-12-24 DIAGNOSIS — R68.89 ABNORMALITY ON SCREENING TEST: Primary | ICD-10-CM

## 2021-01-14 ENCOUNTER — LAB VISIT (OUTPATIENT)
Dept: LAB | Facility: HOSPITAL | Age: 76
End: 2021-01-14
Attending: FAMILY MEDICINE
Payer: MEDICARE

## 2021-01-14 DIAGNOSIS — R68.89 ABNORMALITY ON SCREENING TEST: ICD-10-CM

## 2021-01-14 PROCEDURE — 87522 HEPATITIS C REVRS TRNSCRPJ: CPT

## 2021-01-14 PROCEDURE — 36415 COLL VENOUS BLD VENIPUNCTURE: CPT | Mod: PO

## 2021-01-19 LAB
HCV RNA SERPL NAA+PROBE-LOG IU: <1.08 LOG (10) IU/ML
HCV RNA SERPL QL NAA+PROBE: NOT DETECTED IU/ML
HCV RNA SPEC NAA+PROBE-ACNC: <12 IU/ML

## 2021-02-17 ENCOUNTER — TELEPHONE (OUTPATIENT)
Dept: ORTHOPEDICS | Facility: CLINIC | Age: 76
End: 2021-02-17

## 2021-02-17 ENCOUNTER — PATIENT MESSAGE (OUTPATIENT)
Dept: ORTHOPEDICS | Facility: CLINIC | Age: 76
End: 2021-02-17

## 2021-02-18 ENCOUNTER — TELEPHONE (OUTPATIENT)
Dept: ORTHOPEDICS | Facility: CLINIC | Age: 76
End: 2021-02-18

## 2021-02-22 ENCOUNTER — HOSPITAL ENCOUNTER (OUTPATIENT)
Dept: RADIOLOGY | Facility: HOSPITAL | Age: 76
Discharge: HOME | End: 2021-02-22
Attending: PHYSICAL MEDICINE & REHABILITATION
Payer: MEDICARE

## 2021-02-22 ENCOUNTER — OFFICE VISIT (OUTPATIENT)
Dept: ORTHOPEDICS | Facility: CLINIC | Age: 76
End: 2021-02-22
Payer: MEDICARE

## 2021-02-22 VITALS — HEIGHT: 73 IN | WEIGHT: 233 LBS | BODY MASS INDEX: 30.88 KG/M2

## 2021-02-22 DIAGNOSIS — M25.561 RIGHT KNEE PAIN, UNSPECIFIED CHRONICITY: ICD-10-CM

## 2021-02-22 DIAGNOSIS — G89.29 CHRONIC PAIN OF RIGHT KNEE: ICD-10-CM

## 2021-02-22 DIAGNOSIS — M25.561 RIGHT KNEE PAIN, UNSPECIFIED CHRONICITY: Primary | ICD-10-CM

## 2021-02-22 DIAGNOSIS — M79.18 MYOFASCIAL PAIN: ICD-10-CM

## 2021-02-22 DIAGNOSIS — M17.0 PRIMARY OSTEOARTHRITIS OF BOTH KNEES: Primary | ICD-10-CM

## 2021-02-22 DIAGNOSIS — G89.29 RIGHT FLANK PAIN, CHRONIC: ICD-10-CM

## 2021-02-22 DIAGNOSIS — M25.512 ACUTE PAIN OF LEFT SHOULDER: ICD-10-CM

## 2021-02-22 DIAGNOSIS — M25.561 CHRONIC PAIN OF RIGHT KNEE: ICD-10-CM

## 2021-02-22 DIAGNOSIS — R10.9 RIGHT FLANK PAIN, CHRONIC: ICD-10-CM

## 2021-02-22 PROCEDURE — 99999 PR PBB SHADOW E&M-EST. PATIENT-LVL III: CPT | Mod: PBBFAC,,, | Performed by: PHYSICAL MEDICINE & REHABILITATION

## 2021-02-22 PROCEDURE — 73564 X-RAY EXAM KNEE 4 OR MORE: CPT | Mod: 26,RT,, | Performed by: RADIOLOGY

## 2021-02-22 PROCEDURE — 99999 PR PBB SHADOW E&M-EST. PATIENT-LVL III: ICD-10-PCS | Mod: PBBFAC,,, | Performed by: PHYSICAL MEDICINE & REHABILITATION

## 2021-02-22 PROCEDURE — 73564 XR KNEE ORTHO RIGHT WITH FLEXION: ICD-10-PCS | Mod: 26,RT,, | Performed by: RADIOLOGY

## 2021-02-22 PROCEDURE — 73562 XR KNEE ORTHO RIGHT WITH FLEXION: ICD-10-PCS | Mod: 26,LT,, | Performed by: RADIOLOGY

## 2021-02-22 PROCEDURE — 3288F PR FALLS RISK ASSESSMENT DOCUMENTED: ICD-10-PCS | Mod: CPTII,S$GLB,, | Performed by: PHYSICAL MEDICINE & REHABILITATION

## 2021-02-22 PROCEDURE — 99214 PR OFFICE/OUTPT VISIT, EST, LEVL IV, 30-39 MIN: ICD-10-PCS | Mod: S$GLB,,, | Performed by: PHYSICAL MEDICINE & REHABILITATION

## 2021-02-22 PROCEDURE — 3288F FALL RISK ASSESSMENT DOCD: CPT | Mod: CPTII,S$GLB,, | Performed by: PHYSICAL MEDICINE & REHABILITATION

## 2021-02-22 PROCEDURE — 73564 X-RAY EXAM KNEE 4 OR MORE: CPT | Mod: TC,RT

## 2021-02-22 PROCEDURE — 73562 X-RAY EXAM OF KNEE 3: CPT | Mod: 26,LT,, | Performed by: RADIOLOGY

## 2021-02-22 PROCEDURE — 1101F PR PT FALLS ASSESS DOC 0-1 FALLS W/OUT INJ PAST YR: ICD-10-PCS | Mod: CPTII,S$GLB,, | Performed by: PHYSICAL MEDICINE & REHABILITATION

## 2021-02-22 PROCEDURE — 1125F PR PAIN SEVERITY QUANTIFIED, PAIN PRESENT: ICD-10-PCS | Mod: S$GLB,,, | Performed by: PHYSICAL MEDICINE & REHABILITATION

## 2021-02-22 PROCEDURE — 1159F MED LIST DOCD IN RCRD: CPT | Mod: S$GLB,,, | Performed by: PHYSICAL MEDICINE & REHABILITATION

## 2021-02-22 PROCEDURE — 1159F PR MEDICATION LIST DOCUMENTED IN MEDICAL RECORD: ICD-10-PCS | Mod: S$GLB,,, | Performed by: PHYSICAL MEDICINE & REHABILITATION

## 2021-02-22 PROCEDURE — 1101F PT FALLS ASSESS-DOCD LE1/YR: CPT | Mod: CPTII,S$GLB,, | Performed by: PHYSICAL MEDICINE & REHABILITATION

## 2021-02-22 PROCEDURE — 1125F AMNT PAIN NOTED PAIN PRSNT: CPT | Mod: S$GLB,,, | Performed by: PHYSICAL MEDICINE & REHABILITATION

## 2021-02-22 PROCEDURE — 99214 OFFICE O/P EST MOD 30 MIN: CPT | Mod: S$GLB,,, | Performed by: PHYSICAL MEDICINE & REHABILITATION

## 2021-03-11 ENCOUNTER — CLINICAL SUPPORT (OUTPATIENT)
Dept: REHABILITATION | Facility: HOSPITAL | Age: 76
End: 2021-03-11
Payer: MEDICARE

## 2021-03-11 DIAGNOSIS — G89.29 RIGHT FLANK PAIN, CHRONIC: ICD-10-CM

## 2021-03-11 DIAGNOSIS — M17.0 PRIMARY OSTEOARTHRITIS OF BOTH KNEES: ICD-10-CM

## 2021-03-11 DIAGNOSIS — M25.561 CHRONIC PAIN OF RIGHT KNEE: ICD-10-CM

## 2021-03-11 DIAGNOSIS — M25.512 ACUTE PAIN OF LEFT SHOULDER: ICD-10-CM

## 2021-03-11 DIAGNOSIS — M79.18 MYOFASCIAL PAIN: ICD-10-CM

## 2021-03-11 DIAGNOSIS — G89.29 CHRONIC PAIN OF RIGHT KNEE: ICD-10-CM

## 2021-03-11 DIAGNOSIS — R10.9 RIGHT FLANK PAIN, CHRONIC: ICD-10-CM

## 2021-03-11 PROCEDURE — 97110 THERAPEUTIC EXERCISES: CPT | Performed by: PHYSICAL THERAPIST

## 2021-03-11 PROCEDURE — 97162 PT EVAL MOD COMPLEX 30 MIN: CPT | Performed by: PHYSICAL THERAPIST

## 2021-03-16 ENCOUNTER — CLINICAL SUPPORT (OUTPATIENT)
Dept: REHABILITATION | Facility: HOSPITAL | Age: 76
End: 2021-03-16
Payer: MEDICARE

## 2021-03-16 DIAGNOSIS — M25.561 CHRONIC PAIN OF RIGHT KNEE: ICD-10-CM

## 2021-03-16 DIAGNOSIS — M25.512 ACUTE PAIN OF LEFT SHOULDER: ICD-10-CM

## 2021-03-16 DIAGNOSIS — G89.29 CHRONIC PAIN OF RIGHT KNEE: ICD-10-CM

## 2021-03-16 PROCEDURE — 97140 MANUAL THERAPY 1/> REGIONS: CPT | Performed by: PHYSICAL THERAPIST

## 2021-03-16 PROCEDURE — 97110 THERAPEUTIC EXERCISES: CPT | Performed by: PHYSICAL THERAPIST

## 2021-03-18 ENCOUNTER — CLINICAL SUPPORT (OUTPATIENT)
Dept: REHABILITATION | Facility: HOSPITAL | Age: 76
End: 2021-03-18
Payer: MEDICARE

## 2021-03-18 DIAGNOSIS — G89.29 CHRONIC PAIN OF RIGHT KNEE: ICD-10-CM

## 2021-03-18 DIAGNOSIS — M25.512 ACUTE PAIN OF LEFT SHOULDER: ICD-10-CM

## 2021-03-18 DIAGNOSIS — M25.561 CHRONIC PAIN OF RIGHT KNEE: ICD-10-CM

## 2021-03-18 PROCEDURE — 97110 THERAPEUTIC EXERCISES: CPT | Performed by: PHYSICAL THERAPIST

## 2021-03-18 PROCEDURE — 97140 MANUAL THERAPY 1/> REGIONS: CPT | Performed by: PHYSICAL THERAPIST

## 2021-03-22 ENCOUNTER — OFFICE VISIT (OUTPATIENT)
Dept: ORTHOPEDICS | Facility: CLINIC | Age: 76
End: 2021-03-22
Payer: MEDICARE

## 2021-03-22 VITALS — WEIGHT: 233 LBS | BODY MASS INDEX: 30.88 KG/M2 | HEIGHT: 73 IN

## 2021-03-22 DIAGNOSIS — M25.561 CHRONIC PAIN OF RIGHT KNEE: ICD-10-CM

## 2021-03-22 DIAGNOSIS — M17.11 PRIMARY OSTEOARTHRITIS OF RIGHT KNEE: Primary | ICD-10-CM

## 2021-03-22 DIAGNOSIS — G89.29 CHRONIC PAIN OF RIGHT KNEE: ICD-10-CM

## 2021-03-22 PROCEDURE — 99999 PR PBB SHADOW E&M-EST. PATIENT-LVL III: CPT | Mod: PBBFAC,,, | Performed by: PHYSICAL MEDICINE & REHABILITATION

## 2021-03-22 PROCEDURE — 1125F AMNT PAIN NOTED PAIN PRSNT: CPT | Mod: S$GLB,,, | Performed by: PHYSICAL MEDICINE & REHABILITATION

## 2021-03-22 PROCEDURE — 1125F PR PAIN SEVERITY QUANTIFIED, PAIN PRESENT: ICD-10-PCS | Mod: S$GLB,,, | Performed by: PHYSICAL MEDICINE & REHABILITATION

## 2021-03-22 PROCEDURE — 3288F FALL RISK ASSESSMENT DOCD: CPT | Mod: CPTII,S$GLB,, | Performed by: PHYSICAL MEDICINE & REHABILITATION

## 2021-03-22 PROCEDURE — 1101F PR PT FALLS ASSESS DOC 0-1 FALLS W/OUT INJ PAST YR: ICD-10-PCS | Mod: CPTII,S$GLB,, | Performed by: PHYSICAL MEDICINE & REHABILITATION

## 2021-03-22 PROCEDURE — 99499 NO LOS: ICD-10-PCS | Mod: S$GLB,,, | Performed by: PHYSICAL MEDICINE & REHABILITATION

## 2021-03-22 PROCEDURE — 3288F PR FALLS RISK ASSESSMENT DOCUMENTED: ICD-10-PCS | Mod: CPTII,S$GLB,, | Performed by: PHYSICAL MEDICINE & REHABILITATION

## 2021-03-22 PROCEDURE — 99999 PR PBB SHADOW E&M-EST. PATIENT-LVL III: ICD-10-PCS | Mod: PBBFAC,,, | Performed by: PHYSICAL MEDICINE & REHABILITATION

## 2021-03-22 PROCEDURE — 1101F PT FALLS ASSESS-DOCD LE1/YR: CPT | Mod: CPTII,S$GLB,, | Performed by: PHYSICAL MEDICINE & REHABILITATION

## 2021-03-22 PROCEDURE — 20610 DRAIN/INJ JOINT/BURSA W/O US: CPT | Mod: RT,S$GLB,, | Performed by: PHYSICAL MEDICINE & REHABILITATION

## 2021-03-22 PROCEDURE — 99499 UNLISTED E&M SERVICE: CPT | Mod: S$GLB,,, | Performed by: PHYSICAL MEDICINE & REHABILITATION

## 2021-03-22 PROCEDURE — 20610 LARGE JOINT ASPIRATION/INJECTION: R KNEE: ICD-10-PCS | Mod: RT,S$GLB,, | Performed by: PHYSICAL MEDICINE & REHABILITATION

## 2021-03-25 ENCOUNTER — CLINICAL SUPPORT (OUTPATIENT)
Dept: REHABILITATION | Facility: HOSPITAL | Age: 76
End: 2021-03-25
Payer: MEDICARE

## 2021-03-25 DIAGNOSIS — M25.561 CHRONIC PAIN OF RIGHT KNEE: ICD-10-CM

## 2021-03-25 DIAGNOSIS — M25.512 ACUTE PAIN OF LEFT SHOULDER: ICD-10-CM

## 2021-03-25 DIAGNOSIS — G89.29 CHRONIC PAIN OF RIGHT KNEE: ICD-10-CM

## 2021-03-25 PROCEDURE — 97110 THERAPEUTIC EXERCISES: CPT | Performed by: PHYSICAL THERAPIST

## 2021-03-25 PROCEDURE — 97140 MANUAL THERAPY 1/> REGIONS: CPT | Performed by: PHYSICAL THERAPIST

## 2021-03-29 ENCOUNTER — OFFICE VISIT (OUTPATIENT)
Dept: ORTHOPEDICS | Facility: CLINIC | Age: 76
End: 2021-03-29
Payer: MEDICARE

## 2021-03-29 VITALS — WEIGHT: 233 LBS | HEIGHT: 73 IN | BODY MASS INDEX: 30.88 KG/M2

## 2021-03-29 DIAGNOSIS — M25.561 CHRONIC PAIN OF RIGHT KNEE: ICD-10-CM

## 2021-03-29 DIAGNOSIS — M17.11 PRIMARY OSTEOARTHRITIS OF RIGHT KNEE: Primary | ICD-10-CM

## 2021-03-29 DIAGNOSIS — G89.29 CHRONIC PAIN OF RIGHT KNEE: ICD-10-CM

## 2021-03-29 PROCEDURE — 99999 PR PBB SHADOW E&M-EST. PATIENT-LVL III: ICD-10-PCS | Mod: PBBFAC,,, | Performed by: PHYSICAL MEDICINE & REHABILITATION

## 2021-03-29 PROCEDURE — 1125F AMNT PAIN NOTED PAIN PRSNT: CPT | Mod: S$GLB,,, | Performed by: PHYSICAL MEDICINE & REHABILITATION

## 2021-03-29 PROCEDURE — 99499 UNLISTED E&M SERVICE: CPT | Mod: S$GLB,,, | Performed by: PHYSICAL MEDICINE & REHABILITATION

## 2021-03-29 PROCEDURE — 3288F FALL RISK ASSESSMENT DOCD: CPT | Mod: CPTII,S$GLB,, | Performed by: PHYSICAL MEDICINE & REHABILITATION

## 2021-03-29 PROCEDURE — 1101F PT FALLS ASSESS-DOCD LE1/YR: CPT | Mod: CPTII,S$GLB,, | Performed by: PHYSICAL MEDICINE & REHABILITATION

## 2021-03-29 PROCEDURE — 3288F PR FALLS RISK ASSESSMENT DOCUMENTED: ICD-10-PCS | Mod: CPTII,S$GLB,, | Performed by: PHYSICAL MEDICINE & REHABILITATION

## 2021-03-29 PROCEDURE — 20610 LARGE JOINT ASPIRATION/INJECTION: R KNEE: ICD-10-PCS | Mod: RT,S$GLB,, | Performed by: PHYSICAL MEDICINE & REHABILITATION

## 2021-03-29 PROCEDURE — 99499 NO LOS: ICD-10-PCS | Mod: S$GLB,,, | Performed by: PHYSICAL MEDICINE & REHABILITATION

## 2021-03-29 PROCEDURE — 1101F PR PT FALLS ASSESS DOC 0-1 FALLS W/OUT INJ PAST YR: ICD-10-PCS | Mod: CPTII,S$GLB,, | Performed by: PHYSICAL MEDICINE & REHABILITATION

## 2021-03-29 PROCEDURE — 20610 DRAIN/INJ JOINT/BURSA W/O US: CPT | Mod: RT,S$GLB,, | Performed by: PHYSICAL MEDICINE & REHABILITATION

## 2021-03-29 PROCEDURE — 99999 PR PBB SHADOW E&M-EST. PATIENT-LVL III: CPT | Mod: PBBFAC,,, | Performed by: PHYSICAL MEDICINE & REHABILITATION

## 2021-03-29 PROCEDURE — 1125F PR PAIN SEVERITY QUANTIFIED, PAIN PRESENT: ICD-10-PCS | Mod: S$GLB,,, | Performed by: PHYSICAL MEDICINE & REHABILITATION

## 2021-03-30 ENCOUNTER — CLINICAL SUPPORT (OUTPATIENT)
Dept: REHABILITATION | Facility: HOSPITAL | Age: 76
End: 2021-03-30
Payer: MEDICARE

## 2021-03-30 DIAGNOSIS — M25.512 ACUTE PAIN OF LEFT SHOULDER: ICD-10-CM

## 2021-03-30 DIAGNOSIS — M25.561 CHRONIC PAIN OF RIGHT KNEE: ICD-10-CM

## 2021-03-30 DIAGNOSIS — G89.29 CHRONIC PAIN OF RIGHT KNEE: ICD-10-CM

## 2021-03-30 PROCEDURE — 97140 MANUAL THERAPY 1/> REGIONS: CPT | Performed by: PHYSICAL THERAPIST

## 2021-03-30 PROCEDURE — 97110 THERAPEUTIC EXERCISES: CPT | Performed by: PHYSICAL THERAPIST

## 2021-04-06 ENCOUNTER — CLINICAL SUPPORT (OUTPATIENT)
Dept: REHABILITATION | Facility: HOSPITAL | Age: 76
End: 2021-04-06
Payer: MEDICARE

## 2021-04-06 DIAGNOSIS — M25.561 CHRONIC PAIN OF RIGHT KNEE: ICD-10-CM

## 2021-04-06 DIAGNOSIS — M25.512 ACUTE PAIN OF LEFT SHOULDER: ICD-10-CM

## 2021-04-06 DIAGNOSIS — G89.29 CHRONIC PAIN OF RIGHT KNEE: ICD-10-CM

## 2021-04-06 PROCEDURE — 97110 THERAPEUTIC EXERCISES: CPT | Performed by: PHYSICAL THERAPIST

## 2021-04-06 PROCEDURE — 97140 MANUAL THERAPY 1/> REGIONS: CPT | Performed by: PHYSICAL THERAPIST

## 2021-04-07 ENCOUNTER — OFFICE VISIT (OUTPATIENT)
Dept: ORTHOPEDICS | Facility: CLINIC | Age: 76
End: 2021-04-07
Payer: MEDICARE

## 2021-04-07 VITALS — BODY MASS INDEX: 30.88 KG/M2 | HEIGHT: 73 IN | WEIGHT: 233 LBS

## 2021-04-07 DIAGNOSIS — M17.11 PRIMARY OSTEOARTHRITIS OF RIGHT KNEE: Primary | ICD-10-CM

## 2021-04-07 DIAGNOSIS — G89.29 CHRONIC PAIN OF RIGHT KNEE: ICD-10-CM

## 2021-04-07 DIAGNOSIS — M25.561 CHRONIC PAIN OF RIGHT KNEE: ICD-10-CM

## 2021-04-07 PROCEDURE — 99999 PR PBB SHADOW E&M-EST. PATIENT-LVL III: ICD-10-PCS | Mod: PBBFAC,,, | Performed by: PHYSICAL MEDICINE & REHABILITATION

## 2021-04-07 PROCEDURE — 1125F AMNT PAIN NOTED PAIN PRSNT: CPT | Mod: S$GLB,,, | Performed by: PHYSICAL MEDICINE & REHABILITATION

## 2021-04-07 PROCEDURE — 1101F PT FALLS ASSESS-DOCD LE1/YR: CPT | Mod: CPTII,S$GLB,, | Performed by: PHYSICAL MEDICINE & REHABILITATION

## 2021-04-07 PROCEDURE — 20610 LARGE JOINT ASPIRATION/INJECTION: R KNEE: ICD-10-PCS | Mod: RT,S$GLB,, | Performed by: PHYSICAL MEDICINE & REHABILITATION

## 2021-04-07 PROCEDURE — 3288F PR FALLS RISK ASSESSMENT DOCUMENTED: ICD-10-PCS | Mod: CPTII,S$GLB,, | Performed by: PHYSICAL MEDICINE & REHABILITATION

## 2021-04-07 PROCEDURE — 99499 UNLISTED E&M SERVICE: CPT | Mod: S$GLB,,, | Performed by: PHYSICAL MEDICINE & REHABILITATION

## 2021-04-07 PROCEDURE — 99999 PR PBB SHADOW E&M-EST. PATIENT-LVL III: CPT | Mod: PBBFAC,,, | Performed by: PHYSICAL MEDICINE & REHABILITATION

## 2021-04-07 PROCEDURE — 1125F PR PAIN SEVERITY QUANTIFIED, PAIN PRESENT: ICD-10-PCS | Mod: S$GLB,,, | Performed by: PHYSICAL MEDICINE & REHABILITATION

## 2021-04-07 PROCEDURE — 99499 NO LOS: ICD-10-PCS | Mod: S$GLB,,, | Performed by: PHYSICAL MEDICINE & REHABILITATION

## 2021-04-07 PROCEDURE — 20610 DRAIN/INJ JOINT/BURSA W/O US: CPT | Mod: RT,S$GLB,, | Performed by: PHYSICAL MEDICINE & REHABILITATION

## 2021-04-07 PROCEDURE — 1101F PR PT FALLS ASSESS DOC 0-1 FALLS W/OUT INJ PAST YR: ICD-10-PCS | Mod: CPTII,S$GLB,, | Performed by: PHYSICAL MEDICINE & REHABILITATION

## 2021-04-07 PROCEDURE — 3288F FALL RISK ASSESSMENT DOCD: CPT | Mod: CPTII,S$GLB,, | Performed by: PHYSICAL MEDICINE & REHABILITATION

## 2021-04-08 ENCOUNTER — CLINICAL SUPPORT (OUTPATIENT)
Dept: REHABILITATION | Facility: HOSPITAL | Age: 76
End: 2021-04-08
Payer: MEDICARE

## 2021-04-08 DIAGNOSIS — M25.512 ACUTE PAIN OF LEFT SHOULDER: ICD-10-CM

## 2021-04-08 DIAGNOSIS — G89.29 CHRONIC PAIN OF RIGHT KNEE: ICD-10-CM

## 2021-04-08 DIAGNOSIS — M25.561 CHRONIC PAIN OF RIGHT KNEE: ICD-10-CM

## 2021-04-08 PROCEDURE — 97140 MANUAL THERAPY 1/> REGIONS: CPT | Performed by: PHYSICAL THERAPIST

## 2021-04-08 PROCEDURE — 97110 THERAPEUTIC EXERCISES: CPT | Performed by: PHYSICAL THERAPIST

## 2021-04-13 ENCOUNTER — CLINICAL SUPPORT (OUTPATIENT)
Dept: REHABILITATION | Facility: HOSPITAL | Age: 76
End: 2021-04-13
Payer: MEDICARE

## 2021-04-13 DIAGNOSIS — G89.29 CHRONIC PAIN OF RIGHT KNEE: ICD-10-CM

## 2021-04-13 DIAGNOSIS — M25.561 CHRONIC PAIN OF RIGHT KNEE: ICD-10-CM

## 2021-04-13 DIAGNOSIS — M25.512 ACUTE PAIN OF LEFT SHOULDER: ICD-10-CM

## 2021-04-13 PROCEDURE — 97110 THERAPEUTIC EXERCISES: CPT | Performed by: PHYSICAL THERAPIST

## 2021-04-13 PROCEDURE — 97140 MANUAL THERAPY 1/> REGIONS: CPT | Performed by: PHYSICAL THERAPIST

## 2021-04-18 PROBLEM — M25.512 ACUTE PAIN OF LEFT SHOULDER: Status: RESOLVED | Noted: 2021-03-16 | Resolved: 2021-04-18

## 2021-04-18 PROBLEM — M25.561 CHRONIC PAIN OF RIGHT KNEE: Status: RESOLVED | Noted: 2020-07-17 | Resolved: 2021-04-18

## 2021-04-18 PROBLEM — G89.29 CHRONIC PAIN OF RIGHT KNEE: Status: RESOLVED | Noted: 2020-07-17 | Resolved: 2021-04-18

## 2021-04-23 ENCOUNTER — OFFICE VISIT (OUTPATIENT)
Dept: OTOLARYNGOLOGY | Facility: CLINIC | Age: 76
End: 2021-04-23
Payer: MEDICARE

## 2021-04-23 VITALS
WEIGHT: 238.75 LBS | TEMPERATURE: 97 F | BODY MASS INDEX: 31.64 KG/M2 | DIASTOLIC BLOOD PRESSURE: 72 MMHG | HEART RATE: 76 BPM | SYSTOLIC BLOOD PRESSURE: 127 MMHG | HEIGHT: 73 IN

## 2021-04-23 DIAGNOSIS — K21.9 LARYNGOPHARYNGEAL REFLUX (LPR): Primary | ICD-10-CM

## 2021-04-23 PROCEDURE — 31575 PR LARYNGOSCOPY, FLEXIBLE; DIAGNOSTIC: ICD-10-PCS | Mod: S$GLB,,, | Performed by: OTOLARYNGOLOGY

## 2021-04-23 PROCEDURE — 31575 DIAGNOSTIC LARYNGOSCOPY: CPT | Mod: S$GLB,,, | Performed by: OTOLARYNGOLOGY

## 2021-04-23 PROCEDURE — 99213 OFFICE O/P EST LOW 20 MIN: CPT | Mod: 25,S$GLB,, | Performed by: OTOLARYNGOLOGY

## 2021-04-23 PROCEDURE — 1159F MED LIST DOCD IN RCRD: CPT | Mod: S$GLB,,, | Performed by: OTOLARYNGOLOGY

## 2021-04-23 PROCEDURE — 99999 PR PBB SHADOW E&M-EST. PATIENT-LVL III: ICD-10-PCS | Mod: PBBFAC,,, | Performed by: OTOLARYNGOLOGY

## 2021-04-23 PROCEDURE — 99999 PR PBB SHADOW E&M-EST. PATIENT-LVL III: CPT | Mod: PBBFAC,,, | Performed by: OTOLARYNGOLOGY

## 2021-04-23 PROCEDURE — 99213 PR OFFICE/OUTPT VISIT, EST, LEVL III, 20-29 MIN: ICD-10-PCS | Mod: 25,S$GLB,, | Performed by: OTOLARYNGOLOGY

## 2021-04-23 PROCEDURE — 1159F PR MEDICATION LIST DOCUMENTED IN MEDICAL RECORD: ICD-10-PCS | Mod: S$GLB,,, | Performed by: OTOLARYNGOLOGY

## 2021-04-28 ENCOUNTER — TELEPHONE (OUTPATIENT)
Dept: PULMONOLOGY | Facility: CLINIC | Age: 76
End: 2021-04-28

## 2021-04-28 DIAGNOSIS — J45.20 MILD INTERMITTENT ASTHMA WITHOUT COMPLICATION: Primary | ICD-10-CM

## 2021-04-28 RX ORDER — MONTELUKAST SODIUM 10 MG/1
10 TABLET ORAL NIGHTLY
Qty: 90 TABLET | Refills: 3 | Status: SHIPPED | OUTPATIENT
Start: 2021-04-28 | End: 2021-05-06 | Stop reason: SDUPTHER

## 2021-05-03 ENCOUNTER — OFFICE VISIT (OUTPATIENT)
Dept: FAMILY MEDICINE | Facility: CLINIC | Age: 76
End: 2021-05-03
Payer: MEDICARE

## 2021-05-03 ENCOUNTER — HOSPITAL ENCOUNTER (OUTPATIENT)
Dept: RADIOLOGY | Facility: HOSPITAL | Age: 76
Discharge: HOME | End: 2021-05-03
Attending: FAMILY MEDICINE
Payer: MEDICARE

## 2021-05-03 VITALS
BODY MASS INDEX: 31.68 KG/M2 | HEART RATE: 95 BPM | WEIGHT: 239 LBS | OXYGEN SATURATION: 95 % | HEIGHT: 73 IN | TEMPERATURE: 98 F | SYSTOLIC BLOOD PRESSURE: 110 MMHG | DIASTOLIC BLOOD PRESSURE: 60 MMHG

## 2021-05-03 DIAGNOSIS — R05.3 PERSISTENT COUGH FOR 3 WEEKS OR LONGER: ICD-10-CM

## 2021-05-03 DIAGNOSIS — J45.20 MILD INTERMITTENT ASTHMA WITHOUT COMPLICATION: ICD-10-CM

## 2021-05-03 DIAGNOSIS — R05.3 PERSISTENT COUGH FOR 3 WEEKS OR LONGER: Primary | ICD-10-CM

## 2021-05-03 DIAGNOSIS — R10.13 DYSPEPSIA: ICD-10-CM

## 2021-05-03 PROCEDURE — 99214 OFFICE O/P EST MOD 30 MIN: CPT | Mod: S$GLB,,, | Performed by: FAMILY MEDICINE

## 2021-05-03 PROCEDURE — 71046 XR CHEST PA AND LATERAL: ICD-10-PCS | Mod: 26,,, | Performed by: RADIOLOGY

## 2021-05-03 PROCEDURE — 1159F MED LIST DOCD IN RCRD: CPT | Mod: S$GLB,,, | Performed by: FAMILY MEDICINE

## 2021-05-03 PROCEDURE — 1159F PR MEDICATION LIST DOCUMENTED IN MEDICAL RECORD: ICD-10-PCS | Mod: S$GLB,,, | Performed by: FAMILY MEDICINE

## 2021-05-03 PROCEDURE — 71046 X-RAY EXAM CHEST 2 VIEWS: CPT | Mod: TC,FY,PO

## 2021-05-03 PROCEDURE — 3288F PR FALLS RISK ASSESSMENT DOCUMENTED: ICD-10-PCS | Mod: CPTII,S$GLB,, | Performed by: FAMILY MEDICINE

## 2021-05-03 PROCEDURE — 1126F PR PAIN SEVERITY QUANTIFIED, NO PAIN PRESENT: ICD-10-PCS | Mod: S$GLB,,, | Performed by: FAMILY MEDICINE

## 2021-05-03 PROCEDURE — 71046 X-RAY EXAM CHEST 2 VIEWS: CPT | Mod: 26,,, | Performed by: RADIOLOGY

## 2021-05-03 PROCEDURE — 1101F PT FALLS ASSESS-DOCD LE1/YR: CPT | Mod: CPTII,S$GLB,, | Performed by: FAMILY MEDICINE

## 2021-05-03 PROCEDURE — 99999 PR PBB SHADOW E&M-EST. PATIENT-LVL III: CPT | Mod: PBBFAC,,, | Performed by: FAMILY MEDICINE

## 2021-05-03 PROCEDURE — 3288F FALL RISK ASSESSMENT DOCD: CPT | Mod: CPTII,S$GLB,, | Performed by: FAMILY MEDICINE

## 2021-05-03 PROCEDURE — 1101F PR PT FALLS ASSESS DOC 0-1 FALLS W/OUT INJ PAST YR: ICD-10-PCS | Mod: CPTII,S$GLB,, | Performed by: FAMILY MEDICINE

## 2021-05-03 PROCEDURE — 99999 PR PBB SHADOW E&M-EST. PATIENT-LVL III: ICD-10-PCS | Mod: PBBFAC,,, | Performed by: FAMILY MEDICINE

## 2021-05-03 PROCEDURE — 99214 PR OFFICE/OUTPT VISIT, EST, LEVL IV, 30-39 MIN: ICD-10-PCS | Mod: S$GLB,,, | Performed by: FAMILY MEDICINE

## 2021-05-03 PROCEDURE — 1126F AMNT PAIN NOTED NONE PRSNT: CPT | Mod: S$GLB,,, | Performed by: FAMILY MEDICINE

## 2021-05-03 RX ORDER — HYDROXYZINE HYDROCHLORIDE 25 MG/1
TABLET, FILM COATED ORAL
COMMUNITY
Start: 2021-04-13 | End: 2023-03-02

## 2021-05-03 RX ORDER — METHYLPREDNISOLONE 4 MG/1
TABLET ORAL
Qty: 1 PACKAGE | Refills: 0 | Status: SHIPPED | OUTPATIENT
Start: 2021-05-03 | End: 2021-09-21

## 2021-05-03 RX ORDER — FLUOROURACIL 50 MG/G
CREAM TOPICAL
COMMUNITY
Start: 2021-04-23 | End: 2023-03-02

## 2021-05-06 RX ORDER — MONTELUKAST SODIUM 10 MG/1
10 TABLET ORAL NIGHTLY
Qty: 90 TABLET | Refills: 3 | Status: SHIPPED | OUTPATIENT
Start: 2021-05-06 | End: 2021-08-18 | Stop reason: SDUPTHER

## 2021-06-09 ENCOUNTER — PES CALL (OUTPATIENT)
Dept: ADMINISTRATIVE | Facility: CLINIC | Age: 76
End: 2021-06-09

## 2021-06-16 ENCOUNTER — PES CALL (OUTPATIENT)
Dept: ADMINISTRATIVE | Facility: CLINIC | Age: 76
End: 2021-06-16

## 2021-07-27 ENCOUNTER — TELEPHONE (OUTPATIENT)
Dept: ORTHOPEDICS | Facility: CLINIC | Age: 76
End: 2021-07-27

## 2021-09-21 ENCOUNTER — PATIENT MESSAGE (OUTPATIENT)
Dept: FAMILY MEDICINE | Facility: CLINIC | Age: 76
End: 2021-09-21

## 2021-09-21 DIAGNOSIS — Z13.220 ENCOUNTER FOR LIPID SCREENING FOR CARDIOVASCULAR DISEASE: ICD-10-CM

## 2021-09-21 DIAGNOSIS — Z13.6 ENCOUNTER FOR LIPID SCREENING FOR CARDIOVASCULAR DISEASE: ICD-10-CM

## 2021-09-21 DIAGNOSIS — G47.39 TREATMENT-EMERGENT CENTRAL SLEEP APNEA: ICD-10-CM

## 2021-09-21 DIAGNOSIS — I77.1 TORTUOUS AORTA: Primary | ICD-10-CM

## 2021-09-22 ENCOUNTER — PATIENT MESSAGE (OUTPATIENT)
Dept: FAMILY MEDICINE | Facility: CLINIC | Age: 76
End: 2021-09-22

## 2021-09-23 ENCOUNTER — LAB VISIT (OUTPATIENT)
Dept: LAB | Facility: HOSPITAL | Age: 76
End: 2021-09-23
Attending: FAMILY MEDICINE
Payer: MEDICARE

## 2021-09-23 DIAGNOSIS — Z13.220 ENCOUNTER FOR LIPID SCREENING FOR CARDIOVASCULAR DISEASE: ICD-10-CM

## 2021-09-23 DIAGNOSIS — Z13.6 ENCOUNTER FOR LIPID SCREENING FOR CARDIOVASCULAR DISEASE: ICD-10-CM

## 2021-09-23 DIAGNOSIS — I77.1 TORTUOUS AORTA: ICD-10-CM

## 2021-09-23 DIAGNOSIS — G47.39 TREATMENT-EMERGENT CENTRAL SLEEP APNEA: ICD-10-CM

## 2021-09-23 LAB
ALBUMIN SERPL BCP-MCNC: 3.9 G/DL (ref 3.5–5.2)
ALP SERPL-CCNC: 109 U/L (ref 55–135)
ALT SERPL W/O P-5'-P-CCNC: 18 U/L (ref 10–44)
ANION GAP SERPL CALC-SCNC: 10 MMOL/L (ref 8–16)
AST SERPL-CCNC: 22 U/L (ref 10–40)
BASOPHILS # BLD AUTO: 0.04 K/UL (ref 0–0.2)
BASOPHILS NFR BLD: 0.6 % (ref 0–1.9)
BILIRUB SERPL-MCNC: 0.6 MG/DL (ref 0.1–1)
BUN SERPL-MCNC: 17 MG/DL (ref 8–23)
CALCIUM SERPL-MCNC: 9.8 MG/DL (ref 8.7–10.5)
CHLORIDE SERPL-SCNC: 104 MMOL/L (ref 95–110)
CHOLEST SERPL-MCNC: 158 MG/DL (ref 120–199)
CHOLEST/HDLC SERPL: 3.6 {RATIO} (ref 2–5)
CO2 SERPL-SCNC: 23 MMOL/L (ref 23–29)
CREAT SERPL-MCNC: 1 MG/DL (ref 0.5–1.4)
DIFFERENTIAL METHOD: ABNORMAL
EOSINOPHIL # BLD AUTO: 0.5 K/UL (ref 0–0.5)
EOSINOPHIL NFR BLD: 6.9 % (ref 0–8)
ERYTHROCYTE [DISTWIDTH] IN BLOOD BY AUTOMATED COUNT: 14.2 % (ref 11.5–14.5)
EST. GFR  (AFRICAN AMERICAN): >60 ML/MIN/1.73 M^2
EST. GFR  (NON AFRICAN AMERICAN): >60 ML/MIN/1.73 M^2
GLUCOSE SERPL-MCNC: 101 MG/DL (ref 70–110)
HCT VFR BLD AUTO: 43.1 % (ref 40–54)
HDLC SERPL-MCNC: 44 MG/DL (ref 40–75)
HDLC SERPL: 27.8 % (ref 20–50)
HGB BLD-MCNC: 15.2 G/DL (ref 14–18)
IMM GRANULOCYTES # BLD AUTO: 0.01 K/UL (ref 0–0.04)
IMM GRANULOCYTES NFR BLD AUTO: 0.1 % (ref 0–0.5)
LDLC SERPL CALC-MCNC: 95.4 MG/DL (ref 63–159)
LYMPHOCYTES # BLD AUTO: 1.3 K/UL (ref 1–4.8)
LYMPHOCYTES NFR BLD: 19.4 % (ref 18–48)
MCH RBC QN AUTO: 34.2 PG (ref 27–31)
MCHC RBC AUTO-ENTMCNC: 35.3 G/DL (ref 32–36)
MCV RBC AUTO: 97 FL (ref 82–98)
MONOCYTES # BLD AUTO: 0.8 K/UL (ref 0.3–1)
MONOCYTES NFR BLD: 10.9 % (ref 4–15)
NEUTROPHILS # BLD AUTO: 4.3 K/UL (ref 1.8–7.7)
NEUTROPHILS NFR BLD: 62.1 % (ref 38–73)
NONHDLC SERPL-MCNC: 114 MG/DL
NRBC BLD-RTO: 0 /100 WBC
PLATELET # BLD AUTO: 216 K/UL (ref 150–450)
PMV BLD AUTO: 10.8 FL (ref 9.2–12.9)
POTASSIUM SERPL-SCNC: 5.2 MMOL/L (ref 3.5–5.1)
PROT SERPL-MCNC: 7.4 G/DL (ref 6–8.4)
RBC # BLD AUTO: 4.45 M/UL (ref 4.6–6.2)
SODIUM SERPL-SCNC: 137 MMOL/L (ref 136–145)
TRIGL SERPL-MCNC: 93 MG/DL (ref 30–150)
TSH SERPL DL<=0.005 MIU/L-ACNC: 2.38 UIU/ML (ref 0.4–4)
WBC # BLD AUTO: 6.85 K/UL (ref 3.9–12.7)

## 2021-09-23 PROCEDURE — 36415 COLL VENOUS BLD VENIPUNCTURE: CPT | Mod: PO | Performed by: FAMILY MEDICINE

## 2021-09-23 PROCEDURE — 80061 LIPID PANEL: CPT | Performed by: FAMILY MEDICINE

## 2021-09-23 PROCEDURE — 84443 ASSAY THYROID STIM HORMONE: CPT | Performed by: FAMILY MEDICINE

## 2021-09-23 PROCEDURE — 85025 COMPLETE CBC W/AUTO DIFF WBC: CPT | Performed by: FAMILY MEDICINE

## 2021-09-23 PROCEDURE — 80053 COMPREHEN METABOLIC PANEL: CPT | Performed by: FAMILY MEDICINE

## 2021-09-28 ENCOUNTER — PATIENT MESSAGE (OUTPATIENT)
Dept: FAMILY MEDICINE | Facility: CLINIC | Age: 76
End: 2021-09-28

## 2021-09-28 ENCOUNTER — LAB VISIT (OUTPATIENT)
Dept: PRIMARY CARE CLINIC | Facility: OTHER | Age: 76
End: 2021-09-28
Attending: INTERNAL MEDICINE
Payer: MEDICARE

## 2021-09-28 DIAGNOSIS — R05.9 COUGH: ICD-10-CM

## 2021-09-28 PROCEDURE — U0003 INFECTIOUS AGENT DETECTION BY NUCLEIC ACID (DNA OR RNA); SEVERE ACUTE RESPIRATORY SYNDROME CORONAVIRUS 2 (SARS-COV-2) (CORONAVIRUS DISEASE [COVID-19]), AMPLIFIED PROBE TECHNIQUE, MAKING USE OF HIGH THROUGHPUT TECHNOLOGIES AS DESCRIBED BY CMS-2020-01-R: HCPCS | Mod: HCNC | Performed by: INTERNAL MEDICINE

## 2021-09-29 DIAGNOSIS — U07.1 COVID-19 VIRUS DETECTED: ICD-10-CM

## 2021-09-29 LAB
SARS-COV-2 RNA RESP QL NAA+PROBE: DETECTED
SARS-COV-2- CYCLE NUMBER: 20

## 2021-09-30 ENCOUNTER — PATIENT MESSAGE (OUTPATIENT)
Dept: FAMILY MEDICINE | Facility: CLINIC | Age: 76
End: 2021-09-30

## 2021-09-30 ENCOUNTER — INFUSION (OUTPATIENT)
Dept: INFECTIOUS DISEASES | Facility: HOSPITAL | Age: 76
End: 2021-09-30
Attending: NURSE PRACTITIONER
Payer: MEDICARE

## 2021-09-30 VITALS
HEART RATE: 72 BPM | RESPIRATION RATE: 16 BRPM | OXYGEN SATURATION: 97 % | DIASTOLIC BLOOD PRESSURE: 70 MMHG | SYSTOLIC BLOOD PRESSURE: 110 MMHG | TEMPERATURE: 99 F

## 2021-09-30 DIAGNOSIS — U07.1 COVID-19: Primary | ICD-10-CM

## 2021-09-30 PROCEDURE — M0243 CASIRIVI AND IMDEVI INFUSION: HCPCS | Performed by: INTERNAL MEDICINE

## 2021-09-30 PROCEDURE — 63600175 PHARM REV CODE 636 W HCPCS: Mod: HCNC | Performed by: INTERNAL MEDICINE

## 2021-09-30 PROCEDURE — 25000003 PHARM REV CODE 250: Mod: HCNC | Performed by: INTERNAL MEDICINE

## 2021-09-30 RX ORDER — ONDANSETRON 4 MG/1
4 TABLET, ORALLY DISINTEGRATING ORAL ONCE AS NEEDED
Status: DISCONTINUED | OUTPATIENT
Start: 2021-09-30 | End: 2021-10-26

## 2021-09-30 RX ORDER — ACETAMINOPHEN 325 MG/1
650 TABLET ORAL ONCE AS NEEDED
Status: DISCONTINUED | OUTPATIENT
Start: 2021-09-30 | End: 2021-10-26

## 2021-09-30 RX ORDER — EPINEPHRINE 0.3 MG/.3ML
0.3 INJECTION SUBCUTANEOUS
Status: DISCONTINUED | OUTPATIENT
Start: 2021-09-30 | End: 2021-10-26

## 2021-09-30 RX ORDER — SODIUM CHLORIDE 0.9 % (FLUSH) 0.9 %
10 SYRINGE (ML) INJECTION
Status: DISCONTINUED | OUTPATIENT
Start: 2021-09-30 | End: 2021-10-26

## 2021-09-30 RX ORDER — DIPHENHYDRAMINE HYDROCHLORIDE 50 MG/ML
25 INJECTION INTRAMUSCULAR; INTRAVENOUS ONCE AS NEEDED
Status: DISCONTINUED | OUTPATIENT
Start: 2021-09-30 | End: 2021-10-26

## 2021-09-30 RX ORDER — ALBUTEROL SULFATE 90 UG/1
2 AEROSOL, METERED RESPIRATORY (INHALATION)
Status: DISCONTINUED | OUTPATIENT
Start: 2021-09-30 | End: 2021-10-26

## 2021-09-30 RX ADMIN — CASIRIVIMAB AND IMDEVIMAB 600 MG: 600; 600 INJECTION, SOLUTION, CONCENTRATE INTRAVENOUS at 11:09

## 2021-10-26 ENCOUNTER — HOSPITAL ENCOUNTER (OUTPATIENT)
Dept: RADIOLOGY | Facility: HOSPITAL | Age: 76
Discharge: HOME | End: 2021-10-26
Attending: REGISTERED NURSE
Payer: MEDICARE

## 2021-10-26 ENCOUNTER — OFFICE VISIT (OUTPATIENT)
Dept: FAMILY MEDICINE | Facility: CLINIC | Age: 76
End: 2021-10-26
Payer: MEDICARE

## 2021-10-26 VITALS
TEMPERATURE: 97 F | WEIGHT: 246.38 LBS | OXYGEN SATURATION: 99 % | BODY MASS INDEX: 32.65 KG/M2 | SYSTOLIC BLOOD PRESSURE: 118 MMHG | DIASTOLIC BLOOD PRESSURE: 74 MMHG | HEIGHT: 73 IN | HEART RATE: 81 BPM

## 2021-10-26 DIAGNOSIS — D69.2 OTHER NONTHROMBOCYTOPENIC PURPURA: ICD-10-CM

## 2021-10-26 DIAGNOSIS — Z01.818 PREOP EXAMINATION: ICD-10-CM

## 2021-10-26 DIAGNOSIS — M17.11 OSTEOARTHRITIS OF RIGHT KNEE, UNSPECIFIED OSTEOARTHRITIS TYPE: ICD-10-CM

## 2021-10-26 DIAGNOSIS — M46.1 SACROILIITIS: ICD-10-CM

## 2021-10-26 DIAGNOSIS — I77.1 TORTUOUS AORTA: ICD-10-CM

## 2021-10-26 DIAGNOSIS — Z01.818 PREOP EXAMINATION: Primary | ICD-10-CM

## 2021-10-26 PROCEDURE — 93005 EKG 12-LEAD: ICD-10-PCS | Mod: HCNC,S$GLB,, | Performed by: REGISTERED NURSE

## 2021-10-26 PROCEDURE — G0008 FLU VACCINE - QUADRIVALENT - ADJUVANTED: ICD-10-PCS | Mod: HCNC,S$GLB,, | Performed by: REGISTERED NURSE

## 2021-10-26 PROCEDURE — 71046 X-RAY EXAM CHEST 2 VIEWS: CPT | Mod: 26,HCNC,, | Performed by: RADIOLOGY

## 2021-10-26 PROCEDURE — 99214 PR OFFICE/OUTPT VISIT, EST, LEVL IV, 30-39 MIN: ICD-10-PCS | Mod: 25,HCNC,S$GLB, | Performed by: REGISTERED NURSE

## 2021-10-26 PROCEDURE — 3288F PR FALLS RISK ASSESSMENT DOCUMENTED: ICD-10-PCS | Mod: HCNC,CPTII,S$GLB, | Performed by: REGISTERED NURSE

## 2021-10-26 PROCEDURE — 1101F PR PT FALLS ASSESS DOC 0-1 FALLS W/OUT INJ PAST YR: ICD-10-PCS | Mod: HCNC,CPTII,S$GLB, | Performed by: REGISTERED NURSE

## 2021-10-26 PROCEDURE — 87081 CULTURE SCREEN ONLY: CPT | Mod: HCNC | Performed by: REGISTERED NURSE

## 2021-10-26 PROCEDURE — 1159F MED LIST DOCD IN RCRD: CPT | Mod: HCNC,CPTII,S$GLB, | Performed by: REGISTERED NURSE

## 2021-10-26 PROCEDURE — 93010 EKG 12-LEAD: ICD-10-PCS | Mod: HCNC,S$GLB,, | Performed by: INTERNAL MEDICINE

## 2021-10-26 PROCEDURE — 71046 X-RAY EXAM CHEST 2 VIEWS: CPT | Mod: TC,HCNC,FY,PO

## 2021-10-26 PROCEDURE — 99499 UNLISTED E&M SERVICE: CPT | Mod: S$GLB,,, | Performed by: REGISTERED NURSE

## 2021-10-26 PROCEDURE — G0008 ADMIN INFLUENZA VIRUS VAC: HCPCS | Mod: HCNC,S$GLB,, | Performed by: REGISTERED NURSE

## 2021-10-26 PROCEDURE — 1126F PR PAIN SEVERITY QUANTIFIED, NO PAIN PRESENT: ICD-10-PCS | Mod: HCNC,CPTII,S$GLB, | Performed by: REGISTERED NURSE

## 2021-10-26 PROCEDURE — 1101F PT FALLS ASSESS-DOCD LE1/YR: CPT | Mod: HCNC,CPTII,S$GLB, | Performed by: REGISTERED NURSE

## 2021-10-26 PROCEDURE — 99999 PR PBB SHADOW E&M-EST. PATIENT-LVL IV: ICD-10-PCS | Mod: PBBFAC,HCNC,, | Performed by: REGISTERED NURSE

## 2021-10-26 PROCEDURE — 99999 PR PBB SHADOW E&M-EST. PATIENT-LVL IV: CPT | Mod: PBBFAC,HCNC,, | Performed by: REGISTERED NURSE

## 2021-10-26 PROCEDURE — 1159F PR MEDICATION LIST DOCUMENTED IN MEDICAL RECORD: ICD-10-PCS | Mod: HCNC,CPTII,S$GLB, | Performed by: REGISTERED NURSE

## 2021-10-26 PROCEDURE — 3074F PR MOST RECENT SYSTOLIC BLOOD PRESSURE < 130 MM HG: ICD-10-PCS | Mod: HCNC,CPTII,S$GLB, | Performed by: REGISTERED NURSE

## 2021-10-26 PROCEDURE — 3074F SYST BP LT 130 MM HG: CPT | Mod: HCNC,CPTII,S$GLB, | Performed by: REGISTERED NURSE

## 2021-10-26 PROCEDURE — 93005 ELECTROCARDIOGRAM TRACING: CPT | Mod: HCNC,S$GLB,, | Performed by: REGISTERED NURSE

## 2021-10-26 PROCEDURE — 71046 XR CHEST PA AND LATERAL: ICD-10-PCS | Mod: 26,HCNC,, | Performed by: RADIOLOGY

## 2021-10-26 PROCEDURE — 3078F PR MOST RECENT DIASTOLIC BLOOD PRESSURE < 80 MM HG: ICD-10-PCS | Mod: HCNC,CPTII,S$GLB, | Performed by: REGISTERED NURSE

## 2021-10-26 PROCEDURE — 3078F DIAST BP <80 MM HG: CPT | Mod: HCNC,CPTII,S$GLB, | Performed by: REGISTERED NURSE

## 2021-10-26 PROCEDURE — 99499 RISK ADDL DX/OHS AUDIT: ICD-10-PCS | Mod: S$GLB,,, | Performed by: REGISTERED NURSE

## 2021-10-26 PROCEDURE — 99214 OFFICE O/P EST MOD 30 MIN: CPT | Mod: 25,HCNC,S$GLB, | Performed by: REGISTERED NURSE

## 2021-10-26 PROCEDURE — 1126F AMNT PAIN NOTED NONE PRSNT: CPT | Mod: HCNC,CPTII,S$GLB, | Performed by: REGISTERED NURSE

## 2021-10-26 PROCEDURE — 90694 VACC AIIV4 NO PRSRV 0.5ML IM: CPT | Mod: HCNC,S$GLB,, | Performed by: REGISTERED NURSE

## 2021-10-26 PROCEDURE — 93010 ELECTROCARDIOGRAM REPORT: CPT | Mod: HCNC,S$GLB,, | Performed by: INTERNAL MEDICINE

## 2021-10-26 PROCEDURE — 90694 FLU VACCINE - QUADRIVALENT - ADJUVANTED: ICD-10-PCS | Mod: HCNC,S$GLB,, | Performed by: REGISTERED NURSE

## 2021-10-26 PROCEDURE — 3288F FALL RISK ASSESSMENT DOCD: CPT | Mod: HCNC,CPTII,S$GLB, | Performed by: REGISTERED NURSE

## 2021-10-26 RX ORDER — CELECOXIB 200 MG/1
CAPSULE ORAL
COMMUNITY
Start: 2021-07-29

## 2021-10-28 LAB — MRSA SPEC QL CULT: NORMAL

## 2021-10-31 ENCOUNTER — TELEPHONE (OUTPATIENT)
Dept: FAMILY MEDICINE | Facility: CLINIC | Age: 76
End: 2021-10-31
Payer: MEDICARE

## 2021-11-02 ENCOUNTER — TELEPHONE (OUTPATIENT)
Dept: FAMILY MEDICINE | Facility: CLINIC | Age: 76
End: 2021-11-02
Payer: MEDICARE

## 2021-11-30 DIAGNOSIS — Z96.651 S/P TOTAL KNEE ARTHROPLASTY, RIGHT: Primary | ICD-10-CM

## 2021-12-02 ENCOUNTER — CLINICAL SUPPORT (OUTPATIENT)
Dept: REHABILITATION | Facility: HOSPITAL | Age: 76
End: 2021-12-02
Payer: MEDICARE

## 2021-12-02 DIAGNOSIS — M25.60 DECREASED RANGE OF MOTION: ICD-10-CM

## 2021-12-02 DIAGNOSIS — R53.1 DECREASED STRENGTH, ENDURANCE, AND MOBILITY: ICD-10-CM

## 2021-12-02 DIAGNOSIS — R68.89 DECREASED STRENGTH, ENDURANCE, AND MOBILITY: ICD-10-CM

## 2021-12-02 DIAGNOSIS — Z96.651 S/P TOTAL KNEE ARTHROPLASTY, RIGHT: ICD-10-CM

## 2021-12-02 DIAGNOSIS — Z74.09 DECREASED STRENGTH, ENDURANCE, AND MOBILITY: ICD-10-CM

## 2021-12-02 PROCEDURE — 97113 AQUATIC THERAPY/EXERCISES: CPT | Mod: HCNC

## 2021-12-02 PROCEDURE — 97162 PT EVAL MOD COMPLEX 30 MIN: CPT | Mod: HCNC

## 2021-12-03 ENCOUNTER — CLINICAL SUPPORT (OUTPATIENT)
Dept: REHABILITATION | Facility: HOSPITAL | Age: 76
End: 2021-12-03
Payer: MEDICARE

## 2021-12-03 DIAGNOSIS — Z74.09 DECREASED STRENGTH, ENDURANCE, AND MOBILITY: ICD-10-CM

## 2021-12-03 DIAGNOSIS — R53.1 DECREASED STRENGTH, ENDURANCE, AND MOBILITY: ICD-10-CM

## 2021-12-03 DIAGNOSIS — R68.89 DECREASED STRENGTH, ENDURANCE, AND MOBILITY: ICD-10-CM

## 2021-12-03 DIAGNOSIS — M25.60 DECREASED RANGE OF MOTION: ICD-10-CM

## 2021-12-03 PROBLEM — G89.29 RIGHT FLANK PAIN, CHRONIC: Status: RESOLVED | Noted: 2020-07-17 | Resolved: 2021-12-03

## 2021-12-03 PROBLEM — R10.9 RIGHT FLANK PAIN, CHRONIC: Status: RESOLVED | Noted: 2020-07-17 | Resolved: 2021-12-03

## 2021-12-03 PROCEDURE — 97140 MANUAL THERAPY 1/> REGIONS: CPT | Mod: HCNC

## 2021-12-03 PROCEDURE — 97110 THERAPEUTIC EXERCISES: CPT | Mod: HCNC

## 2021-12-06 ENCOUNTER — DOCUMENTATION ONLY (OUTPATIENT)
Dept: REHABILITATION | Facility: HOSPITAL | Age: 76
End: 2021-12-06
Payer: MEDICARE

## 2021-12-07 ENCOUNTER — CLINICAL SUPPORT (OUTPATIENT)
Dept: REHABILITATION | Facility: HOSPITAL | Age: 76
End: 2021-12-07
Payer: MEDICARE

## 2021-12-07 DIAGNOSIS — Z74.09 DECREASED STRENGTH, ENDURANCE, AND MOBILITY: ICD-10-CM

## 2021-12-07 DIAGNOSIS — R68.89 DECREASED STRENGTH, ENDURANCE, AND MOBILITY: ICD-10-CM

## 2021-12-07 DIAGNOSIS — R53.1 DECREASED STRENGTH, ENDURANCE, AND MOBILITY: ICD-10-CM

## 2021-12-07 DIAGNOSIS — M25.60 DECREASED RANGE OF MOTION: ICD-10-CM

## 2021-12-07 PROCEDURE — 97110 THERAPEUTIC EXERCISES: CPT | Mod: HCNC,CQ

## 2021-12-07 PROCEDURE — 97140 MANUAL THERAPY 1/> REGIONS: CPT | Mod: HCNC,CQ

## 2021-12-10 ENCOUNTER — CLINICAL SUPPORT (OUTPATIENT)
Dept: REHABILITATION | Facility: HOSPITAL | Age: 76
End: 2021-12-10
Payer: MEDICARE

## 2021-12-10 DIAGNOSIS — R53.1 DECREASED STRENGTH, ENDURANCE, AND MOBILITY: ICD-10-CM

## 2021-12-10 DIAGNOSIS — M25.60 DECREASED RANGE OF MOTION: ICD-10-CM

## 2021-12-10 DIAGNOSIS — R68.89 DECREASED STRENGTH, ENDURANCE, AND MOBILITY: ICD-10-CM

## 2021-12-10 DIAGNOSIS — Z74.09 DECREASED STRENGTH, ENDURANCE, AND MOBILITY: ICD-10-CM

## 2021-12-10 PROCEDURE — 97110 THERAPEUTIC EXERCISES: CPT | Mod: HCNC,CQ

## 2021-12-13 ENCOUNTER — CLINICAL SUPPORT (OUTPATIENT)
Dept: REHABILITATION | Facility: HOSPITAL | Age: 76
End: 2021-12-13
Payer: MEDICARE

## 2021-12-13 DIAGNOSIS — R53.1 DECREASED STRENGTH, ENDURANCE, AND MOBILITY: ICD-10-CM

## 2021-12-13 DIAGNOSIS — R68.89 DECREASED STRENGTH, ENDURANCE, AND MOBILITY: ICD-10-CM

## 2021-12-13 DIAGNOSIS — M25.60 DECREASED RANGE OF MOTION: ICD-10-CM

## 2021-12-13 DIAGNOSIS — Z74.09 DECREASED STRENGTH, ENDURANCE, AND MOBILITY: ICD-10-CM

## 2021-12-13 PROCEDURE — 97110 THERAPEUTIC EXERCISES: CPT | Mod: HCNC,CQ

## 2021-12-16 ENCOUNTER — CLINICAL SUPPORT (OUTPATIENT)
Dept: REHABILITATION | Facility: HOSPITAL | Age: 76
End: 2021-12-16
Payer: MEDICARE

## 2021-12-16 DIAGNOSIS — R68.89 DECREASED STRENGTH, ENDURANCE, AND MOBILITY: ICD-10-CM

## 2021-12-16 DIAGNOSIS — M25.60 DECREASED RANGE OF MOTION: ICD-10-CM

## 2021-12-16 DIAGNOSIS — Z74.09 DECREASED STRENGTH, ENDURANCE, AND MOBILITY: ICD-10-CM

## 2021-12-16 DIAGNOSIS — R53.1 DECREASED STRENGTH, ENDURANCE, AND MOBILITY: ICD-10-CM

## 2021-12-16 PROCEDURE — 97110 THERAPEUTIC EXERCISES: CPT | Mod: KX,HCNC

## 2021-12-17 ENCOUNTER — CLINICAL SUPPORT (OUTPATIENT)
Dept: REHABILITATION | Facility: HOSPITAL | Age: 76
End: 2021-12-17
Payer: MEDICARE

## 2021-12-17 DIAGNOSIS — Z74.09 DECREASED STRENGTH, ENDURANCE, AND MOBILITY: ICD-10-CM

## 2021-12-17 DIAGNOSIS — R68.89 DECREASED STRENGTH, ENDURANCE, AND MOBILITY: ICD-10-CM

## 2021-12-17 DIAGNOSIS — R53.1 DECREASED STRENGTH, ENDURANCE, AND MOBILITY: ICD-10-CM

## 2021-12-17 DIAGNOSIS — M25.60 DECREASED RANGE OF MOTION: ICD-10-CM

## 2021-12-17 PROCEDURE — 97110 THERAPEUTIC EXERCISES: CPT | Mod: HCNC | Performed by: PHYSICAL THERAPIST

## 2021-12-21 ENCOUNTER — CLINICAL SUPPORT (OUTPATIENT)
Dept: REHABILITATION | Facility: HOSPITAL | Age: 76
End: 2021-12-21
Payer: MEDICARE

## 2021-12-21 DIAGNOSIS — M25.60 DECREASED RANGE OF MOTION: ICD-10-CM

## 2021-12-21 DIAGNOSIS — R68.89 DECREASED STRENGTH, ENDURANCE, AND MOBILITY: ICD-10-CM

## 2021-12-21 DIAGNOSIS — R53.1 DECREASED STRENGTH, ENDURANCE, AND MOBILITY: ICD-10-CM

## 2021-12-21 DIAGNOSIS — Z74.09 DECREASED STRENGTH, ENDURANCE, AND MOBILITY: ICD-10-CM

## 2021-12-21 PROCEDURE — 97110 THERAPEUTIC EXERCISES: CPT | Mod: HCNC | Performed by: PHYSICAL THERAPIST

## 2021-12-21 PROCEDURE — 97530 THERAPEUTIC ACTIVITIES: CPT | Mod: HCNC | Performed by: PHYSICAL THERAPIST

## 2021-12-23 ENCOUNTER — DOCUMENTATION ONLY (OUTPATIENT)
Dept: REHABILITATION | Facility: HOSPITAL | Age: 76
End: 2021-12-23
Payer: MEDICARE

## 2021-12-23 ENCOUNTER — CLINICAL SUPPORT (OUTPATIENT)
Dept: REHABILITATION | Facility: HOSPITAL | Age: 76
End: 2021-12-23
Payer: MEDICARE

## 2021-12-23 DIAGNOSIS — R53.1 DECREASED STRENGTH, ENDURANCE, AND MOBILITY: ICD-10-CM

## 2021-12-23 DIAGNOSIS — Z74.09 DECREASED STRENGTH, ENDURANCE, AND MOBILITY: ICD-10-CM

## 2021-12-23 DIAGNOSIS — M25.60 DECREASED RANGE OF MOTION: ICD-10-CM

## 2021-12-23 DIAGNOSIS — R68.89 DECREASED STRENGTH, ENDURANCE, AND MOBILITY: ICD-10-CM

## 2021-12-23 PROCEDURE — 97112 NEUROMUSCULAR REEDUCATION: CPT | Mod: HCNC,CQ

## 2021-12-23 PROCEDURE — 97110 THERAPEUTIC EXERCISES: CPT | Mod: HCNC,CQ

## 2022-01-03 ENCOUNTER — CLINICAL SUPPORT (OUTPATIENT)
Dept: REHABILITATION | Facility: HOSPITAL | Age: 77
End: 2022-01-03
Payer: MEDICARE

## 2022-01-03 DIAGNOSIS — M25.60 DECREASED RANGE OF MOTION: ICD-10-CM

## 2022-01-03 DIAGNOSIS — R53.1 DECREASED STRENGTH, ENDURANCE, AND MOBILITY: ICD-10-CM

## 2022-01-03 DIAGNOSIS — Z74.09 DECREASED STRENGTH, ENDURANCE, AND MOBILITY: ICD-10-CM

## 2022-01-03 DIAGNOSIS — R68.89 DECREASED STRENGTH, ENDURANCE, AND MOBILITY: ICD-10-CM

## 2022-01-03 PROCEDURE — 97140 MANUAL THERAPY 1/> REGIONS: CPT | Mod: HCNC

## 2022-01-03 PROCEDURE — 97112 NEUROMUSCULAR REEDUCATION: CPT | Mod: HCNC

## 2022-01-03 PROCEDURE — 97110 THERAPEUTIC EXERCISES: CPT | Mod: HCNC

## 2022-01-04 NOTE — PROGRESS NOTES
[facility]   Physical Therapy Daily Treatment Note     Name: Clay Marcos  [facility] Number: [id number]    Therapy Diagnosis:   Encounter Diagnoses   Name Primary?    Decreased range of motion     Decreased strength, endurance, and mobility      Physician: Referring, Unknown; Jason Floyd PA-C; Jason Park MD    Visit Date: 1/3/2022    Physician Orders: PT Eval and Treat; right knee  Medical Diagnosis from Referral: S/P total knee arthroplasty, right  Evaluation Date: 12/2/2021  Authorization Period Expiration: 12/31/2021  Plan of Care Expiration: 1/27/22  Progress Note Due: 1/2/22  Visit # / Visits authorized: 9/30 (+1 eval)  FOTO: 1/3   PTA Visit #: 0/5     Precautions: Fall, surgery date:11/16/2021    Time In: 1345  Time Out: 1435  Total Billable Time: 50 minutes      SUBJECTIVE     Pt reports: he feels a little stiffness and achy but not too much. Soreness at night preventing him from getting into a comfortable position. Saw orthopedist before this session which patient states that his doctor is pleased with his progress and cleared him to drive. Will start taking Gabapentin, 1 pill/night to assist with decreasing pain.     He was compliant with home exerhcise program.    Response to previous treatment:Felt achy after last treatment      Functional change: decreased sit-->stand after sitting about 1 hour due to right knee stiffness, pain is not waking patient up during the night, decreased walking tolerance. Descending stairs with no pain and feeling more confident about descending stairs.     Pain: 2/10,   Location: right knee       OBJECTIVE     Objective Measures updated at progress report unless specified.       TREATMENT     Clay received the following manual therapy techniques: Myofacial release and Soft tissue Mobilization were applied to the: quads, hamstring muscles, adductors, along with patellar mobilizations for 15 minutes.    therapeutic exercises to develop ROM and  "flexibility for 15 minutes including:      Intervention 12/2/2021 Parameters   Recumbent Bike x 7 minutes full revolutions initially level 2 (improved)   Standing gastroc stretch  3x/1 min   Standing knee flexion stretch on steps  10x10s holds   Supine knee extension stretch  30 seconds 3 x with over pressure from PTA    Long sitting gastroc stretch   1 minute x 2    Standing TKE with ball  15x/5s   Tailgaiters x 2 minutes   Sitting bilateral quadriceps sets  15x/10 second hold each contractions   SLR,   sidelying abduction,   extension      2 x 10   2 x 8   2 x 8     Adduction with ball    3 min, 10sec holds   Prone TKE    10x/2 sets, 3-4 seconds     Prone hamstring curl   10x   Prone knee flexion stretch  2 minutes    Prone quad stretch  3 x 20" holds   Supine heel slides  x 118 deg today slide board active range   X 5     Knee extension stretch x 3 minutes, 10 second hold   Standing quad stretch with foot on mat x X 1 minute   Shuttle - single leg squats x 2 cords - 15 reps switching x 3 minutes   Plan: Shuttle Squats (double and single leg), SLS work      THERAPEUTIC ACTIVITIES to improve dynamic and functional performance for 0 minutes including     Intervention Performed Today    Sit to stands  3 x 10   Stair climbing  Up and down 3x   Standing hip 3-way for SLS  10x each LE                              Plan for Next Visit:        NEUROMUSCULAR RE-EDUCATION ACTIVITIES to improve Balance, Coordination, Kinesthetic, Sense and Proprioception for 15 minutes was included:     Intervention Performed Today    Unilateral standing loaded  In parallel bars 10 x 5 second hold   Standing heel raises x 3 x 10 hands on parallel bars as needed   Standing toe raises x 3 x 10 hands on parallel bars as needed   Unilateral standing     Side to side steps  3 minutes                    Plan for Next Visit:      PATIENT EDUCATION AND HOME EXERCISES     Home Exercises Provided and Patient Education Provided     Education provided: "   - Continue with home exercise program   -apply moist heat to right knee after session upon arriving home with instructions for safe use.   -to start driving in neighborhood before getting on interstate    Written Home Exercises Provided: Patient instructed to cont prior HEP. Exercises were reviewed and Clay was able to demonstrate them prior to the end of the session.  Clay demonstrated good  understanding of the education provided. See EMR under Patient Instructions for exercises provided during therapy sessions      ASSESSMENT     Patient reports that he experienced pain and tightness last week which he felt was related to a  few prone exercises. Over the last week, he had access to a hot tub and attempted to get into the hot tub to decrease some of the lower back pain.  When exiting the hot tub, he reports that he experienced significant pain at the right knee region when applying significant weight to the leg and lifting the other leg out the tub.  There is some swelling but that does appear normal for this stage of the knee replacement process.  No current redness noted.  Did decrease on some of the prone exercises due to continued complaint of lower back pain.  However, would like to eventually return to those activities maybe with a modification of a pillow beneath the stomach/trunk.  Due to the knee pain, had to decrease on some of the closed chain exercise.  Most of the closed chain activities just seemed to irritate the patient today.  Therefore, more open chain activities performed.  As pain decreases, getting back into the closed chain activities would be optimal.  Also performed a bit of soft tissue massage at the knee and there was some tenderness along the lateral patellar border.    Clay is progressing well towards his goals.   Pt prognosis is Good.     Pt will continue to benefit from skilled outpatient physical therapy to address the deficits listed in the problem list box on initial evaluation,  provide pt/family education and to maximize pt's level of independence in the home and community environment.     Pt's spiritual, cultural and educational needs considered and pt agreeable to plan of care and goals.     Anticipated barriers to physical therapy: Arthritis, BMI over 30, Sleep dysfunction    Goals:   Reviewed: 1/3/2022   Short Term Goals: In 4 weeks:  1.Patient to be educated on HEP.  2.Patient to demo increased R knee PROM to 0-120, in order to assist with normalizing gait pattern.  3.Patient to increase strength right  LE by 1/2 grade, in order to improve endurance and increase ability to ambulate for increased time..  4.Patient to have decreased pain to 5/10 at worst, to improve QOL.  5.Patient to increase right  LE balance to 20 seconds, in order to decrease fall risk.  6.Patient to improve score on the FOTO, to improve QOL.     Long Term Goals: In 8 weeks  1. Patient to perform daily activities including: Walking a mile; Performing heavy activities around your home; With any of your usual work, housework, or school activities; Performing light activities around your home without increased symptoms.  3. Patient to demonstrate increased bilateral  LE strength to 5/5, in order to improve endurance and increase ability to ambulate for increased time.  4. Patient to have decreased pain to 2/10 at worst, to improve QOL.  5. Patient to improve score on the FOTO to 30% or less, to improve QOL.  6. Patient to tolerate 30 second sit to stand with long term goal's made, to decrease fall risk/improve endurance.       PLAN     Plan of care Certification: 12/2/2021 to 1/27/22.     Continue to progress with range of motion and strength of right knee allowing patient to be able to return to all functional activities with no pain.     Outpatient Physical Therapy 3 times weekly for 4 weeks, then 2x per week for 4 weeks to include the following interventions: Electrical Stimulation PRN, Gait Training, Manual  Therapy, Moist Heat/ Ice, Neuromuscular Re-ed, Patient Education, Self Care, Therapeutic Activites and Therapeutic Exercise.     Zane Ward, PT

## 2022-01-05 ENCOUNTER — CLINICAL SUPPORT (OUTPATIENT)
Dept: REHABILITATION | Facility: HOSPITAL | Age: 77
End: 2022-01-05
Payer: MEDICARE

## 2022-01-05 DIAGNOSIS — R53.1 DECREASED STRENGTH, ENDURANCE, AND MOBILITY: ICD-10-CM

## 2022-01-05 DIAGNOSIS — M25.60 DECREASED RANGE OF MOTION: ICD-10-CM

## 2022-01-05 DIAGNOSIS — Z74.09 DECREASED STRENGTH, ENDURANCE, AND MOBILITY: ICD-10-CM

## 2022-01-05 DIAGNOSIS — R68.89 DECREASED STRENGTH, ENDURANCE, AND MOBILITY: ICD-10-CM

## 2022-01-05 PROCEDURE — 97110 THERAPEUTIC EXERCISES: CPT | Mod: HCNC | Performed by: PHYSICAL THERAPIST

## 2022-01-05 PROCEDURE — 97140 MANUAL THERAPY 1/> REGIONS: CPT | Mod: HCNC | Performed by: PHYSICAL THERAPIST

## 2022-01-05 NOTE — PROGRESS NOTES
[facility]   Physical Therapy ProgressTreatment Note     Name: Clay Marcos  Clinic Number: [id number]    Therapy Diagnosis:   Encounter Diagnoses   Name Primary?    Decreased range of motion     Decreased strength, endurance, and mobility      Physician: Referring, Unknown; Jason Floyd PA-C; Jason Park MD    Visit Date: 1/5/2022    Physician Orders: PT Eval and Treat; right knee  Medical Diagnosis from Referral: S/P total knee arthroplasty, right  Evaluation Date: 12/2/2021  Authorization Period Expiration: 12/31/2021  Plan of Care Expiration: 1/27/22  Progress Note Due: 2/2/2022  Visit # / Visits authorized: 10/30 (+1 eval)  FOTO: 2/3   PTA Visit #: 0/5     Precautions: Fall, surgery date:11/16/2021    Time In: 1245  Time Out: 140  Total Billable Time: 55 minutes      SUBJECTIVE     Pt reports: better than previous visit where he still had pain and discomfort from putting full weight on right leg getting out of hot tub. Pt feels knee is still swollen, right side low back has started acting up again as well. .     He was compliant with home exerhcise program.    Response to previous treatment:Felt achy after last treatment      Functional change: decreased sit-->stand after sitting about 1 hour due to right knee stiffness, pain is not waking patient up during the night, decreased walking tolerance. Descending stairs with no pain and feeling more confident about descending stairs.     Pain: 2/10,   Location: right knee       OBJECTIVE     CMS Impairment/Limitation/Restriction for FOTO Knee Survey     Therapist reviewed FOTO scores for Clay Marcos on 1/5/2022.   FOTO documents entered into EPIC - see Media section.     Limitation Score: 43%            Gait: Patient ambulates with no AD, somewhat antalgic gait with right knee discomfort                  Strength:                                    L/E MMT Right Left Pain/Dysfunction with Movement   Hip Flexion 3+/5 3+/5 + trunk  "shift   Hip Extension NT NT     Gluteus medius NT NT     Hip ER 4/5 4/5 + trunk shift   Knee Flexion 4/5 4+/5 + trunk shift   Knee Extension 3+/5 4+/5 + trunk shift   Ankle DF 5/5 5/5     Ankle PF NT NT                                  Range of Motion:                Knee Right Left Pain/Dysfunction with Movement   AROM/PROM         flexion 124 / 125  NT     extension -2/0  NT Pain with supine extension, unable to hold.   extension (sitting)  NT  0              TREATMENT     Clay received the following manual therapy techniques: Myofacial release and Soft tissue Mobilization were applied to the: quads, hamstring muscles, adductors, along with patellar mobilizations for 15 minutes.    therapeutic exercises to develop ROM and flexibility for 40 minutes including assessment:      Intervention 12/2/2021 Parameters   Recumbent Bike x 7 minutes full revolutions initially level 2 (improved)   Standing gastroc stretch x 3x/1 min   Standing knee flexion stretch on steps  10x10s holds   Supine knee extension stretch  30 seconds 3 x with over pressure from PTA    Long sitting gastroc stretch   1 minute x 2    Standing TKE with ball  15x/5s   Tailgaiters  2 minutes   Sitting bilateral quadriceps sets  15x/10 second hold each contractions   SLR,   sidelying abduction,   extension      2 x 10   2 x 8   2 x 8     Adduction with ball    3 min, 10sec holds   Prone TKE    10x/2 sets, 3-4 seconds     Prone hamstring curl   10x   Prone knee flexion stretch  2 minutes    Prone quad stretch  3 x 20" holds   Supine heel slides  x 124 deg today slide board active range   25x, 3" hold     Knee extension stretch  3 minutes, 10 second hold   Standing quad stretch with foot on mat  X 1 minute   LTR, piriformis stretch x 10" hold, 10x each   Step downs x 4" box, 2 HHA, 2x5   Shuttle - double leg squats x 5 cords, 40x   Shuttle - single leg squats x 3 cords - 15 reps   Plan: SLS work      THERAPEUTIC ACTIVITIES to improve dynamic and functional " performance for 0 minutes including     Intervention Performed Today    Sit to stands  3 x 10   Stair climbing  Up and down 3x   Standing hip 3-way for SLS  10x each LE                              Plan for Next Visit:        NEUROMUSCULAR RE-EDUCATION ACTIVITIES to improve Balance, Coordination, Kinesthetic, Sense and Proprioception for 0 minutes was included:     Intervention Performed Today    Unilateral standing loaded  In parallel bars 10 x 5 second hold   Standing heel raises  3 x 10 hands on parallel bars as needed   Standing toe raises  3 x 10 hands on parallel bars as needed   Unilateral standing     Side to side steps  3 minutes                    Plan for Next Visit:      PATIENT EDUCATION AND HOME EXERCISES     Home Exercises Provided and Patient Education Provided     Education provided:   - Continue with home exercise program   -apply moist heat to right knee after session upon arriving home with instructions for safe use.   -to start driving in neighborhood before getting on interstate    Written Home Exercises Provided: Patient instructed to cont prior HEP. Exercises were reviewed and Clay was able to demonstrate them prior to the end of the session.  Clay demonstrated good  understanding of the education provided. See EMR under Patient Instructions for exercises provided during therapy sessions      ASSESSMENT     Patient reports knee is improving with each week, increasing ROM overall and improving in strength. Pt feels swelling along full right leg along with patellar discomfort with end range flexion. Pt will continue to benefit from further flexion ROM, increase in quadriceps and hip strengthening along with functional progression to weight bearing activity for return to activity at home.     Clay is progressing well towards his goals.   Pt prognosis is Good.     Pt will continue to benefit from skilled outpatient physical therapy to address the deficits listed in the problem list box on initial  evaluation, provide pt/family education and to maximize pt's level of independence in the home and community environment.     Pt's spiritual, cultural and educational needs considered and pt agreeable to plan of care and goals.     Anticipated barriers to physical therapy: Arthritis, BMI over 30, Sleep dysfunction    Goals:   Reviewed: 1/5/2022   Short Term Goals: In 4 weeks:  1.Patient to be educated on HEP. MET  2.Patient to demo increased R knee PROM to 0-120, in order to assist with normalizing gait pattern. MET  3.Patient to increase strength right  LE by 1/2 grade, in order to improve endurance and increase ability to ambulate for increased time. MET  4.Patient to have decreased pain to 5/10 at worst, to improve QOL. MET  5.Patient to increase right  LE balance to 20 seconds, in order to decrease fall risk. Progressing  6.Patient to improve score on the FOTO, to improve QOL. MET     Long Term Goals: In 8 weeks Progressing, not met  1. Patient to perform daily activities including: Walking a mile; Performing heavy activities around your home; With any of your usual work, housework, or school activities; Performing light activities around your home without increased symptoms.  3. Patient to demonstrate increased bilateral  LE strength to 5/5, in order to improve endurance and increase ability to ambulate for increased time.  4. Patient to have decreased pain to 2/10 at worst, to improve QOL.  5. Patient to improve score on the FOTO to 30% or less, to improve QOL.  6. Patient to tolerate 30 second sit to stand with long term goal's made, to decrease fall risk/improve endurance.       PLAN     Plan of care Certification: 12/2/2021 to 1/27/22.     Continue to progress with range of motion and strength of right knee allowing patient to be able to return to all functional activities with no pain.     Outpatient Physical Therapy 3 times weekly for 4 weeks, then 2x per week for 4 weeks to include the following  interventions: Electrical Stimulation PRN, Gait Training, Manual Therapy, Moist Heat/ Ice, Neuromuscular Re-ed, Patient Education, Self Care, Therapeutic Activites and Therapeutic Exercise.     Ehsan Dumont, PT

## 2022-01-18 ENCOUNTER — DOCUMENTATION ONLY (OUTPATIENT)
Dept: REHABILITATION | Facility: HOSPITAL | Age: 77
End: 2022-01-18
Payer: MEDICARE

## 2022-01-18 DIAGNOSIS — M25.60 DECREASED RANGE OF MOTION: Primary | ICD-10-CM

## 2022-01-18 DIAGNOSIS — R68.89 DECREASED STRENGTH, ENDURANCE, AND MOBILITY: ICD-10-CM

## 2022-01-18 DIAGNOSIS — R53.1 DECREASED STRENGTH, ENDURANCE, AND MOBILITY: ICD-10-CM

## 2022-01-18 DIAGNOSIS — Z74.09 DECREASED STRENGTH, ENDURANCE, AND MOBILITY: ICD-10-CM

## 2022-01-18 NOTE — PROGRESS NOTES
[facility]  Physical Therapy Discharge Note    Name: Clay Marcos  [facility] Number: [id number]    Therapy Diagnosis:   Encounter Diagnoses   Name Primary?    Decreased range of motion Yes    Decreased strength, endurance, and mobility      Physician: No ref. provider found    Physician Orders: PT Eval and Treat; right knee  Medical Diagnosis from Referral: S/P total knee arthroplasty, right  Evaluation Date: 12/2/2021      Date of Last visit: 1/5/22  Total Visits Received: 11    ASSESSMENT      Patient was contacted by PTA after several cancellations, he reports that he was diagnosed with an avulsion fracture of patella and had a surgical repair 1/13/22.  He will follow up with MD later this week and conatct us regarding future Physical Therapy appointment's.    Discharge reason: Other:  See above    Discharge FOTO Score: n/a    Goals: Short Term Goals: In 4 weeks:  1.Patient to be educated on HEP. MET  2.Patient to demo increased R knee PROM to 0-120, in order to assist with normalizing gait pattern. MET  3.Patient to increase strength right  LE by 1/2 grade, in order to improve endurance and increase ability to ambulate for increased time. MET  4.Patient to have decreased pain to 5/10 at worst, to improve QOL. MET  5.Patient to increase right  LE balance to 20 seconds, in order to decrease fall risk. Progressing  6.Patient to improve score on the FOTO, to improve QOL. MET     Long Term Goals: In 8 weeks Progressing, not met  1. Patient to perform daily activities including: Walking a mile; Performing heavy activities around your home; With any of your usual work, housework, or school activities; Performing light activities around your home without increased symptoms.  3. Patient to demonstrate increased bilateral  LE strength to 5/5, in order to improve endurance and increase ability to ambulate for increased time.  4. Patient to have decreased pain to 2/10 at worst, to improve QOL.  5.  Patient to improve score on the FOTO to 30% or less, to improve QOL.  6. Patient to tolerate 30 second sit to stand with long term goal's made, to decrease fall risk/improve endurance.     PLAN   This patient is discharged from Physical Therapy      Yanna Brennan PT

## 2022-01-18 NOTE — PROGRESS NOTES
Called patient to check on him due to high frequency of him canceling his physical therapy appointments.     Patient reports that he had been having increased pain following exercises at home which prompted him to call his orthopedist. At the recommendation of his orthopedist, Mr. Wliliamson had a x-ray which according to Mr. Williamson revealed that he had an avulsion fracture of his right patella.     Patient reports he has been a right knee immobilizer since and that he opted to have this issue surgically repaired on 1/13/2022. He will have a follow up visit with orthopedist on 1/20/2022 and will call rehab appointment after this follow up appointment to advise rehab staff the outcome of this visit.     Above was communicated with supervising physical therapist.

## 2022-01-22 NOTE — TELEPHONE ENCOUNTER
No new care gaps identified.  Powered by [employer]. Reference number: [id number].   1/22/2022 12:35:04 PM CST

## 2022-02-08 RX ORDER — TAMSULOSIN HYDROCHLORIDE 0.4 MG/1
CAPSULE ORAL
Qty: 90 CAPSULE | Refills: 0 | Status: SHIPPED | OUTPATIENT
Start: 2022-02-08 | End: 2022-04-20

## 2022-02-08 NOTE — TELEPHONE ENCOUNTER
Refill Authorization Note   Clay Marcos  is requesting a refill authorization.  Brief Assessment and Rationale for Refill:  Approve     Medication Therapy Plan:       Medication Reconciliation Completed: No   Comments:   --->Care Gap information included below if applicable.       Requested Prescriptions   Pending Prescriptions Disp Refills    tamsulosin (FLOMAX) 0.4 mg Cap [Pharmacy Med Name: TAMSULOSIN HYDROCHLORIDE 0.4 MG Capsule] 90 capsule 0     Sig: TAKE 1 CAPSULE EVERY EVENING       Urology: Alpha-Adrenergic Blocker Passed - 1/22/2022 12:34 PM        Passed - Patient is at least 18 years old        Passed - Prostate Cancer is not on problem list        Passed - BP > 90/50 mmH     BP Readings from Last 1 Encounters:   10/26/21 118/74               Passed - Valid encounter within last 15 months     Recent Visits  Date Type Provider Dept   05/03/21 Office Visit Tori Marino MD [facility]   12/15/20 Office Visit Tori Marino MD [facility]   Showing recent visits within past 720 days and meeting all other requirements  Future Appointments  No visits were found meeting these conditions.  Showing future appointments within next 150 days and meeting all other requirements                    Appointments  past 12m or future 3m with PCP    Date Provider   Last Visit   5/3/2021 Tori Marino MD   Next Visit   Visit date not found Tori Marino MD   ED visits in past 90 days: 0     Note composed:11:38 AM 02/08/2022

## 2022-02-10 DIAGNOSIS — Z87.81 STATUS POST OPEN REDUCTION AND INTERNAL FIXATION (ORIF) OF FRACTURE: ICD-10-CM

## 2022-02-10 DIAGNOSIS — Z98.890 STATUS POST OPEN REDUCTION AND INTERNAL FIXATION (ORIF) OF FRACTURE: ICD-10-CM

## 2022-02-10 DIAGNOSIS — Z96.651 S/P TOTAL KNEE ARTHROPLASTY, RIGHT: Primary | ICD-10-CM

## 2022-02-14 ENCOUNTER — PATIENT MESSAGE (OUTPATIENT)
Dept: PHYSICAL MEDICINE AND REHAB | Facility: CLINIC | Age: 77
End: 2022-02-14
Payer: MEDICARE

## 2022-02-24 ENCOUNTER — CLINICAL SUPPORT (OUTPATIENT)
Dept: REHABILITATION | Facility: HOSPITAL | Age: 77
End: 2022-02-24
Payer: MEDICARE

## 2022-02-24 DIAGNOSIS — Z74.09 DECREASED STRENGTH, ENDURANCE, AND MOBILITY: Primary | ICD-10-CM

## 2022-02-24 DIAGNOSIS — R68.89 DECREASED STRENGTH, ENDURANCE, AND MOBILITY: Primary | ICD-10-CM

## 2022-02-24 DIAGNOSIS — Z87.81 STATUS POST OPEN REDUCTION AND INTERNAL FIXATION (ORIF) OF FRACTURE: ICD-10-CM

## 2022-02-24 DIAGNOSIS — R26.89 FUNCTIONAL GAIT ABNORMALITY: ICD-10-CM

## 2022-02-24 DIAGNOSIS — Z98.890 STATUS POST OPEN REDUCTION AND INTERNAL FIXATION (ORIF) OF FRACTURE: ICD-10-CM

## 2022-02-24 DIAGNOSIS — Z96.651 S/P TOTAL KNEE ARTHROPLASTY, RIGHT: ICD-10-CM

## 2022-02-24 DIAGNOSIS — R53.1 DECREASED STRENGTH, ENDURANCE, AND MOBILITY: Primary | ICD-10-CM

## 2022-02-24 PROCEDURE — 97110 THERAPEUTIC EXERCISES: CPT | Mod: HCNC

## 2022-02-24 PROCEDURE — 97162 PT EVAL MOD COMPLEX 30 MIN: CPT | Mod: HCNC

## 2022-02-24 NOTE — PLAN OF CARE
[facility]   Physical Therapy Initial Evaluation   Date: 2/24/2022   Name: Clay Marcos  Clinic Number: [id number]    Therapy Diagnosis:   Encounter Diagnoses   Name Primary?    S/P total knee arthroplasty, right     Status post open reduction and internal fixation (ORIF) of fracture     Functional gait abnormality     Decreased strength, endurance, and mobility Yes     Physician: Heath Park MD    Physician Orders: PT Eval and Treat  Medical Diagnosis from Referral: S/P total knee arthroplasty right,  Status post open reduction and internal fixation (ORIF) of fracture   Evaluation Date: 2/24/2022  Authorization Period Expiration: 3/15/2022  Plan of Care Expiration: 5/24/2022  Progress Note Due: 3/24/2022  Visit # / Visits authorized: 1/ 1   FOTO: 1/3    Precautions: Standard and Partial weightbearing with TROM brace locked in extension, progress range of motion by 10-15 degrees per session, should be ambulating with rolling walker until more stable     Time In: 10:46AM  Time Out: 11:15AM  Total Appointment Time (timed & untimed codes): 29 minutes      SUBJECTIVE     Date of onset: 1/1/2022 approximately  History of current condition Patient's wife Nydia is present throughout the session. - Clay reports: that he is 6 weeks post op from ORIF on 1/13/2022 following patellar fracture. Patient was previously being seen for Physical Therapy here status post total knee replacement on 11/16/2021. Patient reports that he does not really know how he fractured his patella but guesses that it was an incident on New Years day when he was getting out of the hot tub as he felt a little more strain on his knee than usual. Patient noted not report any instances of falling or hitting his knee that he thinks would have caused this. Patient reports that immediately after ORIF he began walking normally as he says that he was not instructed otherwise and reports that the doctor was very upset with him  for doing this.      Falls: Patient reports no falls in years.     Imaging, X-Ray Knee Right 2/22/2021: Reviewed in chart    Prior Therapy: Patient has had previous Physical Therapy prior to fracture.   Social History: Patient lives with their spouse  Occupation: Patient is retired   Prior Level of Function: Patient was going to the gym 5 days per week including cycling and kickboxing prior to total knee replacement. Prior to fracture he had reached almost full knee mobility and was getting around well.   Current Level of Function: Patient is mainly walking short distances with rolling walker around home although reports to therapy with single point cane today despite doctors suggestion to utilize rolling walker until he becomes more stable. Patient is unable to drive at this point. Patient utilizes a urinal so that he does not have to make as many trips to the bathroom. Patient took his first shower today standing up with knee maintained in extension. Patient has a shower chair but did not utilize today. Patient is to maintain knee in extended position unless in controlled therapy environment.    Spoke with Dr. Park on the phone and he stressed that the patient should remain locked in knee extension, should be partial weightbearing with rolling walker until he becomes more stable and reduce risk of falling and we can begin initiating 10-15 degrees of knee flexion each session progressing only based on patient tolerance as he wants to protect the healing fracture.     Pain:  Current 0/10, worst 0/10, best 0/10   Location: right knee   Description: nothing  Aggravating Factors: nothing   Easing Factors: nothing    Patients goals: is to return to the gym as soon as possible. Patient would like to return to normal lifestyle. Patient has a cruise planned for April 24, 2022 so would like to be moving around well by then.        Medical History:   Past Medical History:   Diagnosis Date    Basal cell carcinoma      Scalp    BPH with urinary obstruction     Calcaneal spur of left foot     Colon polyps     DDD (degenerative disc disease), lumbar     Diverticulosis of colon     Dyslipidemia     Dysmetabolic syndrome     Fracture of rib of left side     History of tobacco use     Internal and external hemorrhoids without complication     MVP (mitral valve prolapse)     Obesity     Osteoarthritis of both thumbs 9/15/2015    Pharyngoesophageal dysphagia     Pneumonia 08/2016    Reflux esophagitis        Surgical History:   Clay Marcos  has a past surgical history that includes Hemorrhoid surgery; Excision basal cell carcinoma; Anal fistulotomy; Tonsillectomy; Sinus surgery (01/2017); and Cataract extraction w/  intraocular lens implant (Right, 09/2017).    Medications:   Clya has a current medication list which includes the following prescription(s): celecoxib, fluorouracil, hydroxyzine hcl, montelukast, multivitamin, and tamsulosin.    Allergies:   Review of patient's allergies indicates:  No Known Allergies       OBJECTIVE     Sensation:  Sensation is intact to light touch even along incision  Posture:  Pt presents with postural abnormalities which include: forward head, rounded shoulders  and knee locked in extension in TROM brace  Girth Measurements/Palpation:    Right    1 inch superior to patella: 48.5 cm   Mid patella: 48.5 cm   1 inch inferior to patella: 46 cm   Left   1 inch superior to patella: 43.5 cm   Mid patella: 43.5 cm   1 inch inferior to patella: 39.5 cm  Movement Analysis: Patient is able to perform straight leg raise with quad lag on descent first trial and without quad lag after cueing for second trial.    Gait Analysis: The patient ambulated with the following assistive device: straight cane although was instructed by his doctor yesterday that he should be utilizing rolling walker; the pt presents with the following gait abnormalities: sustained right knee extension in TROM brace throughout  all phases of gait cycle, left lateral trunk lean with right circumduction    (x = not tested due to pain and/or inability to obtain test position)    Range of Motion/Strength:     Hip Right  2/24/2022 Left  2/24/2022 Pain/Dysfunction with Movement Goal   AROM       Flexion (120)  70 with knee extended  120 Limited by hamstring tightness secondary to knee maintained in extension 120 No pain or limitations   Extension (30)  2 with knee extended  8 No pain 20 B No pain   Abduction (45)  25  30 Tightness in adductors B 40 B No pain   IR (45)  x  9  Assess when able   ER (45)  x  42  Assess when able     Knee Right  2/24/2022 Left  2/24/2022 Pain/Dysfunction with Movement Goal   AROM       Flexion (135)  Not tested   145 No pain 130 No pain   Extension (0)  4 flexion  0 No pain 0     L/E MMT Right Left Pain/Dysfunction with Movement Goal   Hip Flexion  3-/5 4-/5 With right knee extended 4+/5 B   Hip Extension  2+/5 3+/5 No pain 4+/5 B   Hip Abduction  3-/5 3+/5 No pain 4+/5 B   Hip Adduction 3/5 3+/5 No pain 4+/5 B   Knee Extension Not tested- visualized quad sets 4-/5 No pain 5/5 B   Knee Flexion Not tested 4-/5 No pain 5/5 B   Hip IR x x  4+/5 B   Hip ER x x  4+/5 B   Ankle DF 4-/5 4-/5 No pain 5/5 B   Ankle PF 4-/5 4-/5 No pain 5/5 B     MUSCLE LENGTH:     Muscle Tested  Right  2/24/2022 Left   2/24/2022 Goal   Hip Flexors  decreased decreased Normal B   Adductors decreased decreased    Hamstrings  decreased decreased Normal B   Gastrocnemius  decreased decreased Normal B     SPECIAL TESTS:    Deferred secondary to post operative state    Joint Mobility  Deferred today     Function:   CMS Impairment/Limitation/Restriction for FOTO Knee Survey     Therapist reviewed FOTO scores for Clay Marcos on 2/24/2022.   FOTO documents entered into [employer] - see Media section.    Limitation Score: 59%         TREATMENT     Treatment Time In: 11:05AM  Treatment Time Out: 11:15AM  Total Treatment time (time-based codes) separate  from Evaluation: 10 minutes    Clay received therapeutic exercises to develop strength, endurance, ROM, flexibility, posture and core stabilization for 10 minutes including:  · Educated patient on the importance of maintaining post operative precautions as described by his doctor including utilizing rolling walker right now until he becomes more stable to reduce risk of further injury  · Educated patient on the proper form and sequencing of all exercises provided in HEP today     PATIENT EDUCATION AND HOME EXERCISES     Home Exercises and Patient Education Provided    Education provided:    Patient educated on the impairments noted above and the effects of physical therapy intervention to improve overall condition and QOL.    Patient was educated on all the above exercise prior/during/after for proper posture, positioning, and execution for safe performance with home exercise program.    Patient educated on the importance of improved core and lower extremity strength in order to improve alignment of the spine and lower extremities with static positions and dynamic movement.    Patient educated on the importance of strong core and lower extremity musculature in order to improve both static and dynamic balance, improve gait mechanics, reduce fall risk and improve household and community mobility.     Written Home Exercises Provided: Patient instructed to cont prior HEP. Exercises were reviewed and Clay was able to demonstrate them prior to the end of the session.  Clay demonstrated good  understanding of the education provided. See EMR under Patient Instructions for exercises provided during therapy sessions.    ASSESSMENT     Clay is a 76 y.o. male referred to outpatient Physical Therapy with a medical diagnosis of Status post total knee arthroplasty right,  Status post open reduction and internal fixation (ORIF) of fracture . Patient presents with  decreased ROM, decreased strength, decreased muscle length,  impaired coordination, impaired balance, postural abnormalities, gait abnormalities and decreased overall function resulting in decreased quality of life as he is unable to consistent walk to the bathroom, requires wife to drive him around and has overall decreased functional independence since surgery with precautions in place and with weaknesses and mobility limitations present.     Pt prognosis is Fair.   Pt will benefit from skilled outpatient Physical Therapy to address the deficits stated above and in the chart below, provide pt/family education, and to maximize pt's level of independence.     Plan of care discussed with patient: Yes  Pt's spiritual, cultural and educational needs considered and patient is agreeable to the plan of care and goals as stated below:     Anticipated Barriers for therapy: adherence to treatment plan and overexerting himself due to high motivation to return to prior level of function    Medical Necessity is demonstrated by the following  History  Co-morbidities and personal factors that may impact the plan of care Co-morbidities:   DDD lumbar spine, mild intermittent asthma, history of R TKA    Personal Factors:   coping style  social background  lifestyle     high   Examination  Body Structures and Functions, activity limitations and participation restrictions that may impact the plan of care Body Regions:   lower extremities  trunk    Body Systems:    gross symmetry  ROM  strength  gross coordinated movement  balance  gait  transfers  transitions  motor control  motor learning  edema  scar formation  skin integrity    Participation Restrictions:   Impaired ability to perform the above described tasks    Activity limitations:   Learning and applying knowledge  no deficits    General Tasks and Commands  no deficits    Communication  no deficits    Mobility  lifting and carrying objects  walking  driving (bike, car, motorcycle)    Self care  washing oneself (bathing, drying, washing  hands)  caring for body parts (brushing teeth, shaving, grooming)  toileting  dressing    Domestic Life  shopping  cooking  doing house work (cleaning house, washing dishes, laundry)  assisting others    Interactions/Relationships  no deficits    Life Areas  no deficits    Community and Social Life  community life  recreation and leisure         moderate   Clinical Presentation evolving clinical presentation with changing clinical characteristics moderate   Decision Making/ Complexity Score: moderate     Goals:  Short Term Goals:  6 weeks 2/24/2022   1. Function: Patient will demonstrate improved function as indicated by a score of less than or equal to 47% impairment on FOTO.     2. Mobility: Patient will improve AROM to 50% of stated goals, listed in objective measures above, in order to progress towards independence with functional activities.     3. Strength: Patient will improve strength to 3+/5 in all muscle groups, listed in objective measures above, in order to progress towards independence with functional activities.     4. Gait: Patient will demonstrate improved gait mechanics including more functional knee flexion and extension (per MD approval) in order to improve functional mobility, improve quality of life, and decrease risk of further injury or fall.     5. HEP: Patient will demonstrate independence with HEP in order to progress toward functional independence.      Long Term Goals:  12 weeks/Ongoing 2/24/2022   1. Pain: Pt will demonstrate improved pain by reports of less than or equal to 1/10 worst pain on the verbal rating scale in order to progress toward maximal functional ability and improve QOL.      2. Function: Patient will demonstrate improved function as indicated by a score of less than or equal to 36% impairment on FOTO.     3. Mobility: Patient will improve AROM to stated goals, listed in objective measures above, in order to return to maximal functional potential and improve quality of  life.    4. Strength: Patient will improve strength to stated goals, listed in objective measures above, in order to improve functional independence and quality of life.    5. Gait: Patient will demonstrate normalized gait mechanics with minimal compensation in order to return to PLOF.    6. Patient will return to normal ADL's, IADL's, recreational activities, and work-related activities with less than or equal to 1/10 pain and maximal function.     7. Patient will be able to return to active lifestyle of working out 5 days per week without pain or instability to safely return back to prior level of function.       PLAN   Plan of care Certification: 2/24/2022 to 5/24/2022.    Outpatient Physical Therapy 3 times weekly for 12 weeks to include any combination of the following interventions: Virtual Therapy, Dry Needling, modalities, electrical stimulation (IFC, Pre-Mod, Attended with Functional Dry Needling), Aquatic Therapy, Cervical/Lumbar Traction, Electrical Stimulation unattended/attended, Gait Training, Manual Therapy, Moist Heat/ Ice, Neuromuscular Re-ed, Patient Education, Self Care, Therapeutic Activities and Therapeutic Exercise    Thank you for this referral.    These services are reasonable and necessary for the conditions set forth above while under my care.    I CERTIFY THE NEED FOR THESE SERVICES FURNISHED UNDER THIS PLAN OF TREATMENT AND WHILE UNDER MY CARE   Physician's comments:     Physician's Signature: ___________________________________________________         Kasia Quiñones PT, DPT

## 2022-02-25 PROBLEM — R26.89 FUNCTIONAL GAIT ABNORMALITY: Status: ACTIVE | Noted: 2022-02-25

## 2022-03-01 ENCOUNTER — CLINICAL SUPPORT (OUTPATIENT)
Dept: REHABILITATION | Facility: HOSPITAL | Age: 77
End: 2022-03-01
Payer: MEDICARE

## 2022-03-01 DIAGNOSIS — R68.89 DECREASED STRENGTH, ENDURANCE, AND MOBILITY: ICD-10-CM

## 2022-03-01 DIAGNOSIS — Z74.09 DECREASED STRENGTH, ENDURANCE, AND MOBILITY: ICD-10-CM

## 2022-03-01 DIAGNOSIS — R26.89 FUNCTIONAL GAIT ABNORMALITY: Primary | ICD-10-CM

## 2022-03-01 DIAGNOSIS — R53.1 DECREASED STRENGTH, ENDURANCE, AND MOBILITY: ICD-10-CM

## 2022-03-01 PROCEDURE — 97110 THERAPEUTIC EXERCISES: CPT

## 2022-03-01 PROCEDURE — 97140 MANUAL THERAPY 1/> REGIONS: CPT

## 2022-03-01 NOTE — PROGRESS NOTES
[facility]   Physical Therapy Treatment Note     Name: Clay Marcos  Clinic Number: [id number]    Therapy Diagnosis:   Encounter Diagnoses   Name Primary?    Functional gait abnormality Yes    Decreased strength, endurance, and mobility      Physician: Heath Park MD    Visit Date: 3/1/2022    Physician Orders: PT Eval and Treat  Medical Diagnosis from Referral: S/P total knee arthroplasty right,  Status post open reduction and internal fixation (ORIF) of fracture   Evaluation Date: 2/24/2022  Authorization Period Expiration: 3/15/2022  Plan of Care Expiration: 5/24/2022  Progress Note Due: 3/24/2022  Visit # / Visits authorized: 2/ 40     FOTO: 1/3    PTA Visit #: 0/5     Progress Note Due on 3/24/2022    Time In: 0903  Time Out: 0956  Total Billable Time: 45 minutes (denotes billable time)    Precautions: Standard and Partial weightbearing with TROM brace locked in extension, progress range of motion by 10-15 degrees per session, should be ambulating with rolling walker until more stable     SUBJECTIVE     Pt reports: to therapy ready for exercise. Patient reports that he has been trying to maintain his knee in extension although reports that the brace allows for a few degrees of flexion at all times as it is becoming loosened.   He was compliant with home exercise program.  Response to previous treatment: No pain noted, better understanding of importance of   Functional change: No noted functional change    Pain: 0/10  Location: right knee    OBJECTIVE     Objective Measures updated at progress report unless specified.     Treatment     Clay received therapeutic exercises to develop strength, endurance, ROM, flexibility, posture and core stabilization for (28) minutes including:     Intervention Performed Today    Quad Sets over Heel Prop x 4 minutes   PROM Knee Flexion controlled by PT x Obtained 20 degrees of flexion 5 minutes   Straight Leg Raises x 3x12 reps    Hamstring  Stretch in Sitting x 4x30 second holds    Gastroc Stretch in Supine with Strap x 2x1 minute holds                     Plan for Next Visit: Progress knee flexion by 10-15 degrees each session or as tolerated by the patient     Clay received the following manual therapy techniques: Myofacial release and Soft tissue Mobilization were applied to the: right knee for (12) minutes, including:    Manual Intervention Performed Today    Soft Tissue Mobilization x right knee grossly and right quadriceps    Joint Mobilizations     Mobilization with movement          Functional Dry Needling        Plan for Next Visit: Continue     Clay participated in neuromuscular re-education activities to improve: Coordination for (5) minutes. The following activities were included:    Intervention Performed Today    Car Transfer  x To allow him to sit in the front seat, provided demonstration first prior to him performed transfer                                        Plan for Next Visit:      Patient Education and Home Exercises     Home Exercises Provided and Patient Education Provided     Education provided:   - Educated patient on what I discussed with his MD over the phone after our initial evaluation including having to wear the brace for longer than he previously anticipated to prolong the surgical site and allow for adequate healing as well as slowly progressing knee flexion mobility in the clinic while maintaining knee extension locked in brace whenever outside of therapy  - Educated patient on the proper form and sequencing of all exercises provided in HEP today    Written Home Exercises Provided: Patient instructed to cont prior HEP. Exercises were reviewed and Clay was able to demonstrate them prior to the end of the session.  Clay demonstrated good  understanding of the education provided. See EMR under Patient Instructions for exercises provided during therapy sessions      ASSESSMENT     Patient tolerated treatment well today.  Worked on improving edema present at right knee as well as working on decreasing trigger points throughout quadriceps with manual therapy interventions performed today. Obtained 20 degrees of knee flexion today without any pain/discomfort reported from the patient. Patient noted with good quad activation with quad sets today and fair activation when transitioning to straight leg raises requiring some assistance from PT to support to decrease quad lag present. Patient has been sitting in the backseat of the car when his wife drives him around so provided patient with education on how he could safely transfer in and out of the care to allow him to sit in the front seat. Patient was able to transfer into the car rather easily after demonstration and feels confident that he can continue doing this on his own.     Clay Is progressing well towards his goals.   Pt prognosis is Fair.     Pt will continue to benefit from skilled outpatient physical therapy to address the deficits listed in the problem list box on initial evaluation, provide pt/family education and to maximize pt's level of independence in the home and community environment.     Pt's spiritual, cultural and educational needs considered and pt agreeable to plan of care and goals.     Anticipated barriers to physical therapy: adherence to treatment plan and overexerting himself due to high motivation to return to prior level of function    Goals:   Short Term Goals:  6 weeks 2/24/2022   1. Function: Patient will demonstrate improved function as indicated by a score of less than or equal to 47% impairment on FOTO.      2. Mobility: Patient will improve AROM to 50% of stated goals, listed in objective measures above, in order to progress towards independence with functional activities.      3. Strength: Patient will improve strength to 3+/5 in all muscle groups, listed in objective measures above, in order to progress towards independence with functional  activities.      4. Gait: Patient will demonstrate improved gait mechanics including more functional knee flexion and extension (per MD approval) in order to improve functional mobility, improve quality of life, and decrease risk of further injury or fall.      5. HEP: Patient will demonstrate independence with HEP in order to progress toward functional independence.        Long Term Goals:  12 weeks/Ongoing 2/24/2022   1. Pain: Pt will demonstrate improved pain by reports of less than or equal to 1/10 worst pain on the verbal rating scale in order to progress toward maximal functional ability and improve QOL.       2. Function: Patient will demonstrate improved function as indicated by a score of less than or equal to 36% impairment on FOTO.      3. Mobility: Patient will improve AROM to stated goals, listed in objective measures above, in order to return to maximal functional potential and improve quality of life.     4. Strength: Patient will improve strength to stated goals, listed in objective measures above, in order to improve functional independence and quality of life.     5. Gait: Patient will demonstrate normalized gait mechanics with minimal compensation in order to return to PLOF.     6. Patient will return to normal ADL's, IADL's, recreational activities, and work-related activities with less than or equal to 1/10 pain and maximal function.      7. Patient will be able to return to active lifestyle of working out 5 days per week without pain or instability to safely return back to prior level of function.         PLAN     Continue POC and frequency as planned. Continue to progress range of motion per MD restrictions and strength to tolerance taking into account protocol/restrictions.      These services are reasonable and necessary for the conditions set forth above while under my care.    Kasia Quiñones, PT, DPT

## 2022-03-02 ENCOUNTER — CLINICAL SUPPORT (OUTPATIENT)
Dept: REHABILITATION | Facility: HOSPITAL | Age: 77
End: 2022-03-02
Payer: MEDICARE

## 2022-03-02 DIAGNOSIS — R26.89 FUNCTIONAL GAIT ABNORMALITY: ICD-10-CM

## 2022-03-02 DIAGNOSIS — R68.89 DECREASED STRENGTH, ENDURANCE, AND MOBILITY: Primary | ICD-10-CM

## 2022-03-02 DIAGNOSIS — Z74.09 DECREASED STRENGTH, ENDURANCE, AND MOBILITY: Primary | ICD-10-CM

## 2022-03-02 DIAGNOSIS — R53.1 DECREASED STRENGTH, ENDURANCE, AND MOBILITY: Primary | ICD-10-CM

## 2022-03-02 PROCEDURE — 97110 THERAPEUTIC EXERCISES: CPT | Performed by: PHYSICAL THERAPIST

## 2022-03-02 PROCEDURE — 97140 MANUAL THERAPY 1/> REGIONS: CPT | Performed by: PHYSICAL THERAPIST

## 2022-03-02 NOTE — PROGRESS NOTES
[facility]   Physical Therapy Treatment Note     Name: Clay Marcos  Clinic Number: [id number]    Therapy Diagnosis:   Encounter Diagnoses   Name Primary?    Decreased strength, endurance, and mobility Yes    Functional gait abnormality      Physician: Heath Park MD    Visit Date: 3/2/2022    Physician Orders: PT Eval and Treat  Medical Diagnosis from Referral: S/P total knee arthroplasty right,  Status post open reduction and internal fixation (ORIF) of fracture   Evaluation Date: 2/24/2022  Authorization Period Expiration: 3/15/2022  Plan of Care Expiration: 5/24/2022  Progress Note Due: 3/24/2022  Visit # / Visits authorized: 4/40     FOTO: 1/3    PTA Visit #: 0/5     Progress Note Due on 3/24/2022    Time In: 0915  Time Out: 1000  Total Billable Time: 45 minutes (denotes billable time)    Precautions: Standard and Partial weightbearing with TROM brace locked in extension, progress range of motion by 10-15 degrees per session, should be ambulating with rolling walker until more stable     SUBJECTIVE     Pt reports: brace locked, no pain. Does have some tightness around quad tendon during flexion.      He was compliant with home exercise program.  Response to previous treatment: No pain noted, better understanding of importance of   Functional change: No noted functional change    Pain: 0/10  Location: right knee    OBJECTIVE     Objective Measures updated at progress report unless specified.     Treatment     Clay received therapeutic exercises to develop strength, endurance, ROM, flexibility, posture and core stabilization for (30) minutes including:     Intervention Performed Today    Quad Sets over Heel Prop x 4 minutes   PROM Knee Flexion controlled by PT x Obtained 64 degrees of flexion 5 minutes   Straight Leg Raises x 3x12 reps    Hamstring Stretch in Sitting x 4x30 second holds    Gastroc Stretch in Supine with Strap x 2x1 minute holds                     Plan for Next  Visit: Progress knee flexion by 10-15 degrees each session or as tolerated by the patient     Clay received the following manual therapy techniques: Myofacial release and Soft tissue Mobilization were applied to the: right knee for (15) minutes, including:    Manual Intervention Performed Today    Soft Tissue Mobilization x right knee grossly and right quadriceps    Joint Mobilizations     Mobilization with movement          Functional Dry Needling        Plan for Next Visit: Continue     Clay participated in neuromuscular re-education activities to improve: Coordination for () minutes. The following activities were included:    Intervention Performed Today    Car Transfer   To allow him to sit in the front seat, provided demonstration first prior to him performed transfer                                        Plan for Next Visit:      Patient Education and Home Exercises     Home Exercises Provided and Patient Education Provided     Education provided:   - Educated patient on what I discussed with his MD over the phone after our initial evaluation including having to wear the brace for longer than he previously anticipated to prolong the surgical site and allow for adequate healing as well as slowly progressing knee flexion mobility in the clinic while maintaining knee extension locked in brace whenever outside of therapy  - Educated patient on the proper form and sequencing of all exercises provided in HEP today    Written Home Exercises Provided: Patient instructed to cont prior HEP. Exercises were reviewed and Clay was able to demonstrate them prior to the end of the session.  Clay demonstrated good  understanding of the education provided. See EMR under Patient Instructions for exercises provided during therapy sessions      ASSESSMENT     Patient tolerated full session with no significant increase in pain. Pt does have some stiffness around quad tendon with knee flexion but no pain or stiffness along distal  end of patellar tendon. Pt will continue to benefit from graded exposure into flexion along with quad strengthening.     Clay Is progressing well towards his goals.   Pt prognosis is Fair.     Pt will continue to benefit from skilled outpatient physical therapy to address the deficits listed in the problem list box on initial evaluation, provide pt/family education and to maximize pt's level of independence in the home and community environment.     Pt's spiritual, cultural and educational needs considered and pt agreeable to plan of care and goals.     Anticipated barriers to physical therapy: adherence to treatment plan and overexerting himself due to high motivation to return to prior level of function    Goals:   Short Term Goals:  6 weeks 2/24/2022   1. Function: Patient will demonstrate improved function as indicated by a score of less than or equal to 47% impairment on FOTO.      2. Mobility: Patient will improve AROM to 50% of stated goals, listed in objective measures above, in order to progress towards independence with functional activities.      3. Strength: Patient will improve strength to 3+/5 in all muscle groups, listed in objective measures above, in order to progress towards independence with functional activities.      4. Gait: Patient will demonstrate improved gait mechanics including more functional knee flexion and extension (per MD approval) in order to improve functional mobility, improve quality of life, and decrease risk of further injury or fall.      5. HEP: Patient will demonstrate independence with HEP in order to progress toward functional independence.        Long Term Goals:  12 weeks/Ongoing 2/24/2022   1. Pain: Pt will demonstrate improved pain by reports of less than or equal to 1/10 worst pain on the verbal rating scale in order to progress toward maximal functional ability and improve QOL.       2. Function: Patient will demonstrate improved function as indicated by a score of  less than or equal to 36% impairment on FOTO.      3. Mobility: Patient will improve AROM to stated goals, listed in objective measures above, in order to return to maximal functional potential and improve quality of life.     4. Strength: Patient will improve strength to stated goals, listed in objective measures above, in order to improve functional independence and quality of life.     5. Gait: Patient will demonstrate normalized gait mechanics with minimal compensation in order to return to PLOF.     6. Patient will return to normal ADL's, IADL's, recreational activities, and work-related activities with less than or equal to 1/10 pain and maximal function.      7. Patient will be able to return to active lifestyle of working out 5 days per week without pain or instability to safely return back to prior level of function.         PLAN     Continue POC and frequency as planned. Continue to progress range of motion per MD restrictions and strength to tolerance taking into account protocol/restrictions.      These services are reasonable and necessary for the conditions set forth above while under my care.    Ehsan Dumont, PT, DPT

## 2022-03-03 ENCOUNTER — CLINICAL SUPPORT (OUTPATIENT)
Dept: REHABILITATION | Facility: HOSPITAL | Age: 77
End: 2022-03-03
Payer: MEDICARE

## 2022-03-03 DIAGNOSIS — R53.1 DECREASED STRENGTH, ENDURANCE, AND MOBILITY: Primary | ICD-10-CM

## 2022-03-03 DIAGNOSIS — R26.89 FUNCTIONAL GAIT ABNORMALITY: ICD-10-CM

## 2022-03-03 DIAGNOSIS — Z74.09 DECREASED STRENGTH, ENDURANCE, AND MOBILITY: Primary | ICD-10-CM

## 2022-03-03 DIAGNOSIS — R68.89 DECREASED STRENGTH, ENDURANCE, AND MOBILITY: Primary | ICD-10-CM

## 2022-03-03 PROCEDURE — 97140 MANUAL THERAPY 1/> REGIONS: CPT | Performed by: PHYSICAL THERAPIST

## 2022-03-03 PROCEDURE — 97110 THERAPEUTIC EXERCISES: CPT | Performed by: PHYSICAL THERAPIST

## 2022-03-03 NOTE — PROGRESS NOTES
[facility]   Physical Therapy Treatment Note     Name: Clay Marcos  Clinic Number: [id number]    Therapy Diagnosis:   Encounter Diagnoses   Name Primary?    Decreased strength, endurance, and mobility Yes    Functional gait abnormality      Physician: Heath Park MD    Visit Date: 3/3/2022    Physician Orders: PT Eval and Treat  Medical Diagnosis from Referral: S/P total knee arthroplasty right,  Status post open reduction and internal fixation (ORIF) of fracture   Evaluation Date: 2/24/2022  Authorization Period Expiration: 3/15/2022  Plan of Care Expiration: 5/24/2022  Progress Note Due: 3/24/2022  Visit # / Visits authorized: 5/40     FOTO: 1/3    PTA Visit #: 0/5     Progress Note Due on 3/24/2022    Time In: 100  Time Out: 145  Total Billable Time: 45 minutes (denotes billable time)    Precautions: Standard and Partial weightbearing with TROM brace locked in extension, progress range of motion by 10-15 degrees per session, should be ambulating with rolling walker until more stable     SUBJECTIVE     Pt reports: no right knee pain. Some quad tendon tightness. Has been working on quad strengthening.     He was compliant with home exercise program.  Response to previous treatment: No pain noted, better understanding of importance of   Functional change: No noted functional change    Pain: 0/10  Location: right knee    OBJECTIVE     Objective Measures updated at progress report unless specified.     Treatment     Clay received therapeutic exercises to develop strength, endurance, ROM, flexibility, posture and core stabilization for (30) minutes including:     Intervention Performed Today    Quad Sets over Heel Prop x 4 minutes   PROM Knee Flexion controlled by PT x Obtained 64 degrees of flexion 5 minutes   Straight Leg Raises x 3x12 reps    Hamstring Stretch in Sitting x 4x30 second holds    Gastroc Stretch in Supine with Strap x 2x1 minute holds    sidelying hip ABD x Brace on  25x   Heel raises x 20x   Hip 3 way standing x RTB 20x each     Plan for Next Visit: Progress knee flexion by 10-15 degrees each session or as tolerated by the patient     Clay received the following manual therapy techniques: Myofacial release and Soft tissue Mobilization were applied to the: right knee for (15) minutes, including:    Manual Intervention Performed Today    Soft Tissue Mobilization x right knee grossly and right quadriceps    Joint Mobilizations     Mobilization with movement          Functional Dry Needling        Plan for Next Visit: Continue     Clay participated in neuromuscular re-education activities to improve: Coordination for () minutes. The following activities were included:    Intervention Performed Today    Car Transfer   To allow him to sit in the front seat, provided demonstration first prior to him performed transfer                                        Plan for Next Visit:      Patient Education and Home Exercises     Home Exercises Provided and Patient Education Provided     Education provided:   - Educated patient on what I discussed with his MD over the phone after our initial evaluation including having to wear the brace for longer than he previously anticipated to prolong the surgical site and allow for adequate healing as well as slowly progressing knee flexion mobility in the clinic while maintaining knee extension locked in brace whenever outside of therapy  - Educated patient on the proper form and sequencing of all exercises provided in HEP today    Written Home Exercises Provided: Patient instructed to cont prior HEP. Exercises were reviewed and Clay was able to demonstrate them prior to the end of the session.  Clay demonstrated good  understanding of the education provided. See EMR under Patient Instructions for exercises provided during therapy sessions      ASSESSMENT     Patient tolerated rolling prone for manual treatments. No pain with passive flexion up to 60.  Pt educated to keep brace locked in extension. No pain with treatment today will continue to progress to comfortable flexion.     Clay Is progressing well towards his goals.   Pt prognosis is Fair.     Pt will continue to benefit from skilled outpatient physical therapy to address the deficits listed in the problem list box on initial evaluation, provide pt/family education and to maximize pt's level of independence in the home and community environment.     Pt's spiritual, cultural and educational needs considered and pt agreeable to plan of care and goals.     Anticipated barriers to physical therapy: adherence to treatment plan and overexerting himself due to high motivation to return to prior level of function    Goals:   Short Term Goals:  6 weeks 2/24/2022   1. Function: Patient will demonstrate improved function as indicated by a score of less than or equal to 47% impairment on FOTO.      2. Mobility: Patient will improve AROM to 50% of stated goals, listed in objective measures above, in order to progress towards independence with functional activities.      3. Strength: Patient will improve strength to 3+/5 in all muscle groups, listed in objective measures above, in order to progress towards independence with functional activities.      4. Gait: Patient will demonstrate improved gait mechanics including more functional knee flexion and extension (per MD approval) in order to improve functional mobility, improve quality of life, and decrease risk of further injury or fall.      5. HEP: Patient will demonstrate independence with HEP in order to progress toward functional independence.        Long Term Goals:  12 weeks/Ongoing 2/24/2022   1. Pain: Pt will demonstrate improved pain by reports of less than or equal to 1/10 worst pain on the verbal rating scale in order to progress toward maximal functional ability and improve QOL.       2. Function: Patient will demonstrate improved function as indicated by a  score of less than or equal to 36% impairment on FOTO.      3. Mobility: Patient will improve AROM to stated goals, listed in objective measures above, in order to return to maximal functional potential and improve quality of life.     4. Strength: Patient will improve strength to stated goals, listed in objective measures above, in order to improve functional independence and quality of life.     5. Gait: Patient will demonstrate normalized gait mechanics with minimal compensation in order to return to PLOF.     6. Patient will return to normal ADL's, IADL's, recreational activities, and work-related activities with less than or equal to 1/10 pain and maximal function.      7. Patient will be able to return to active lifestyle of working out 5 days per week without pain or instability to safely return back to prior level of function.         PLAN     Continue POC and frequency as planned. Continue to progress range of motion per MD restrictions and strength to tolerance taking into account protocol/restrictions.      These services are reasonable and necessary for the conditions set forth above while under my care.    Ehsan Dumont, PT, DPT

## 2022-03-08 ENCOUNTER — CLINICAL SUPPORT (OUTPATIENT)
Dept: REHABILITATION | Facility: HOSPITAL | Age: 77
End: 2022-03-08
Payer: MEDICARE

## 2022-03-08 DIAGNOSIS — R26.89 FUNCTIONAL GAIT ABNORMALITY: ICD-10-CM

## 2022-03-08 DIAGNOSIS — R68.89 DECREASED STRENGTH, ENDURANCE, AND MOBILITY: Primary | ICD-10-CM

## 2022-03-08 DIAGNOSIS — Z74.09 DECREASED STRENGTH, ENDURANCE, AND MOBILITY: Primary | ICD-10-CM

## 2022-03-08 DIAGNOSIS — R53.1 DECREASED STRENGTH, ENDURANCE, AND MOBILITY: Primary | ICD-10-CM

## 2022-03-08 PROCEDURE — 97110 THERAPEUTIC EXERCISES: CPT

## 2022-03-08 PROCEDURE — 97140 MANUAL THERAPY 1/> REGIONS: CPT

## 2022-03-08 NOTE — PROGRESS NOTES
[facility]   Physical Therapy Treatment Note     Name: Clay Marcos  Clinic Number: [id number]    Therapy Diagnosis:   Encounter Diagnoses   Name Primary?    Decreased strength, endurance, and mobility Yes    Functional gait abnormality      Physician: Referring, Unknown    Visit Date: 3/8/2022    Physician Orders: PT Eval and Treat  Medical Diagnosis from Referral: S/P total knee arthroplasty right,  Status post open reduction and internal fixation (ORIF) of fracture   Evaluation Date: 2/24/2022  Authorization Period Expiration: 3/15/2022  Plan of Care Expiration: 5/24/2022  Progress Note Due: 3/24/2022  Visit # / Visits authorized: 6/40     FOTO: 1/3    PTA Visit #: 0/5     Progress Note Due on 3/24/2022    Time In: 12:37 pm  Time Out: 1:30 pm  Total Billable Time: 45 minutes (denotes billable time)    Precautions: Standard and Partial weightbearing with TROM brace locked in extension, progress range of motion by 10-15 degrees per session, should be ambulating with rolling walker until more stable     SUBJECTIVE     Pt reports: no right knee pain. Has been elevating but not above heart, pitting edema noted.    He was compliant with home exercise program.  Response to previous treatment: No pain noted, better understanding of importance of   Functional change: No noted functional change    Pain: 0/10  Location: right knee    OBJECTIVE     Objective Measures updated at progress report unless specified.     Treatment     Clay received therapeutic exercises to develop strength, endurance, ROM, flexibility, posture and core stabilization for (30) minutes including:     Intervention Performed Today    Quad Sets over Heel Prop x 4 minutes   PROM Knee Flexion controlled by PT x Obtained 72 degrees of flexion 5 minutes   Straight Leg Raises x 3x12 reps (no brace, minimal assistance at last rep's)   Hamstring Stretch in Sitting  4x30 second holds    Gastroc Stretch in Supine with Strap x 2x1  minute holds    sidelying hip ABD x Brace on 25x; 25x on left    Heel raises x 20x   Hip 3 way standing (good foot on 6 in step) x RTB 10x each     Plan for Next Visit: Progress knee flexion by 10-15 degrees each session or as tolerated by the patient     Clay received the following manual therapy techniques: Myofacial release and Soft tissue Mobilization were applied to the: right knee for (15) minutes, including:    Manual Intervention Performed Today    Soft Tissue Mobilization x right knee grossly and right quadriceps    Joint Mobilizations x Very gentle patellar   Mobilization with movement          Functional Dry Needling        Plan for Next Visit: Continue     Clay participated in neuromuscular re-education activities to improve: Coordination for (0) minutes. The following activities were included:    Intervention Performed Today    Car Transfer   To allow him to sit in the front seat, provided demonstration first prior to him performed transfer                                        Plan for Next Visit:      Patient Education and Home Exercises     Home Exercises Provided and Patient Education Provided     Education provided:   - Educated patient on importance of not   - Educated patient on the proper form and sequencing of all exercises provided in HEP today    Written Home Exercises Provided: Patient instructed to cont prior HEP. Exercises were reviewed and Clay was able to demonstrate them prior to the end of the session.  Clay demonstrated good  understanding of the education provided. See EMR under Patient Instructions for exercises provided during therapy sessions.    ASSESSMENT     No pain with passive flexion up to 72. Patient educated to keep brace locked in extension. No pain with treatment today will continue to progress to comfortable flexion. Patient with some stiffness at end range of motion, and decreased mobility of patellar tendon and quadriceps muscle.  Patient with marked pitting edema  today and was educated on elevation of leg above heart to control edema.  Increased time to perform all therapeutic exercise with cues both manual and verbal.    Clay Is progressing well towards his goals.   Pt prognosis is Fair.     Pt will continue to benefit from skilled outpatient physical therapy to address the deficits listed in the problem list box on initial evaluation, provide pt/family education and to maximize pt's level of independence in the home and community environment.     Pt's spiritual, cultural and educational needs considered and pt agreeable to plan of care and goals.     Anticipated barriers to physical therapy: adherence to treatment plan and overexerting himself due to high motivation to return to prior level of function    Goals:   Short Term Goals:  6 weeks 2/24/2022   1. Function: Patient will demonstrate improved function as indicated by a score of less than or equal to 47% impairment on FOTO.      2. Mobility: Patient will improve AROM to 50% of stated goals, listed in objective measures above, in order to progress towards independence with functional activities.      3. Strength: Patient will improve strength to 3+/5 in all muscle groups, listed in objective measures above, in order to progress towards independence with functional activities.      4. Gait: Patient will demonstrate improved gait mechanics including more functional knee flexion and extension (per MD approval) in order to improve functional mobility, improve quality of life, and decrease risk of further injury or fall.      5. HEP: Patient will demonstrate independence with HEP in order to progress toward functional independence.        Long Term Goals:  12 weeks/Ongoing 2/24/2022   1. Pain: Pt will demonstrate improved pain by reports of less than or equal to 1/10 worst pain on the verbal rating scale in order to progress toward maximal functional ability and improve QOL.       2. Function: Patient will demonstrate  improved function as indicated by a score of less than or equal to 36% impairment on FOTO.      3. Mobility: Patient will improve AROM to stated goals, listed in objective measures above, in order to return to maximal functional potential and improve quality of life.     4. Strength: Patient will improve strength to stated goals, listed in objective measures above, in order to improve functional independence and quality of life.     5. Gait: Patient will demonstrate normalized gait mechanics with minimal compensation in order to return to PLOF.     6. Patient will return to normal ADL's, IADL's, recreational activities, and work-related activities with less than or equal to 1/10 pain and maximal function.      7. Patient will be able to return to active lifestyle of working out 5 days per week without pain or instability to safely return back to prior level of function.         PLAN     Continue POC and frequency as planned. Continue to progress range of motion per MD restrictions and strength to tolerance taking into account protocol/restrictions.      These services are reasonable and necessary for the conditions set forth above while under my care.    Yanna Brennan, PT

## 2022-03-09 ENCOUNTER — CLINICAL SUPPORT (OUTPATIENT)
Dept: REHABILITATION | Facility: HOSPITAL | Age: 77
End: 2022-03-09
Payer: MEDICARE

## 2022-03-09 DIAGNOSIS — R53.1 DECREASED STRENGTH, ENDURANCE, AND MOBILITY: Primary | ICD-10-CM

## 2022-03-09 DIAGNOSIS — R68.89 DECREASED STRENGTH, ENDURANCE, AND MOBILITY: Primary | ICD-10-CM

## 2022-03-09 DIAGNOSIS — Z74.09 DECREASED STRENGTH, ENDURANCE, AND MOBILITY: Primary | ICD-10-CM

## 2022-03-09 DIAGNOSIS — R26.89 FUNCTIONAL GAIT ABNORMALITY: ICD-10-CM

## 2022-03-09 PROCEDURE — 97110 THERAPEUTIC EXERCISES: CPT | Performed by: PHYSICAL THERAPIST

## 2022-03-09 PROCEDURE — 97140 MANUAL THERAPY 1/> REGIONS: CPT | Performed by: PHYSICAL THERAPIST

## 2022-03-09 NOTE — PROGRESS NOTES
[facility]   Physical Therapy Treatment Note     Name: Clay Marcos  Clinic Number: [id number]    Therapy Diagnosis:   Encounter Diagnoses   Name Primary?    Decreased strength, endurance, and mobility Yes    Functional gait abnormality      Physician: Heath Park MD    Visit Date: 3/9/2022    Physician Orders: PT Eval and Treat  Medical Diagnosis from Referral: S/P total knee arthroplasty right,  Status post open reduction and internal fixation (ORIF) of fracture   Evaluation Date: 2/24/2022  Authorization Period Expiration: 3/15/2022  Plan of Care Expiration: 5/24/2022  Progress Note Due: 3/24/2022  Visit # / Visits authorized: 7/40     FOTO: 1/3    PTA Visit #: 0/5     Progress Note Due on 3/24/2022    Time In: 1045 am  Time Out: 11:30 am  Total Billable Time: 45 minutes (denotes billable time)    Precautions: Standard and Partial weightbearing with TROM brace locked in extension, progress range of motion by 10-15 degrees per session, should be ambulating with rolling walker until more stable     SUBJECTIVE     Pt reports: knee feels good, some tightness on top of knee when bending    He was compliant with home exercise program.  Response to previous treatment: No pain noted, better understanding of importance of   Functional change: No noted functional change    Pain: 0/10  Location: right knee    OBJECTIVE     Objective Measures updated at progress report unless specified.     Treatment     Clay received therapeutic exercises to develop strength, endurance, ROM, flexibility, posture and core stabilization for (30) minutes including:     Intervention Performed Today    Quad Sets over Heel Prop x 4 minutes   PROM Knee Flexion controlled by PT x Obtained 90 degrees of flexion 5 minutes   Straight Leg Raises x 3x12 reps (no brace, minimal assistance at last rep's)   Hamstring Stretch in Sitting  4x30 second holds    Gastroc Stretch in Standing x 2x1 minute holds    sidelying hip  ABD x Brace on 25x; 25x on left    Heel raises x 20x   Hip 3 way standing (good foot on 6 in step) x RTB 10x each     Plan for Next Visit: Progress knee flexion by 10-15 degrees each session or as tolerated by the patient     Clay received the following manual therapy techniques: Myofacial release and Soft tissue Mobilization were applied to the: right knee for (15) minutes, including:    Manual Intervention Performed Today    Soft Tissue Mobilization x right knee grossly and right quadriceps    Joint Mobilizations x Very gentle patellar   Mobilization with movement          Functional Dry Needling        Plan for Next Visit: Continue     Clay participated in neuromuscular re-education activities to improve: Coordination for (0) minutes. The following activities were included:    Intervention Performed Today    Car Transfer   To allow him to sit in the front seat, provided demonstration first prior to him performed transfer                                        Plan for Next Visit:      Patient Education and Home Exercises     Home Exercises Provided and Patient Education Provided     Education provided:   - Educated patient on importance of not   - Educated patient on the proper form and sequencing of all exercises provided in HEP today    Written Home Exercises Provided: Patient instructed to cont prior HEP. Exercises were reviewed and Clay was able to demonstrate them prior to the end of the session.  Clay demonstrated good  understanding of the education provided. See EMR under Patient Instructions for exercises provided during therapy sessions.    ASSESSMENT     Pt tolerates full session with only minimal discomfort. Pt able to get up to 90 degrees of PROM today without significant pain. Pt does have tightness along quad tendon but no pulling along tibial tuberosity. Brace put back on following session..    Clay Is progressing well towards his goals.   Pt prognosis is Fair.     Pt will continue to benefit from  skilled outpatient physical therapy to address the deficits listed in the problem list box on initial evaluation, provide pt/family education and to maximize pt's level of independence in the home and community environment.     Pt's spiritual, cultural and educational needs considered and pt agreeable to plan of care and goals.     Anticipated barriers to physical therapy: adherence to treatment plan and overexerting himself due to high motivation to return to prior level of function    Goals:   Short Term Goals:  6 weeks 2/24/2022   1. Function: Patient will demonstrate improved function as indicated by a score of less than or equal to 47% impairment on FOTO.      2. Mobility: Patient will improve AROM to 50% of stated goals, listed in objective measures above, in order to progress towards independence with functional activities.      3. Strength: Patient will improve strength to 3+/5 in all muscle groups, listed in objective measures above, in order to progress towards independence with functional activities.      4. Gait: Patient will demonstrate improved gait mechanics including more functional knee flexion and extension (per MD approval) in order to improve functional mobility, improve quality of life, and decrease risk of further injury or fall.      5. HEP: Patient will demonstrate independence with HEP in order to progress toward functional independence.        Long Term Goals:  12 weeks/Ongoing 2/24/2022   1. Pain: Pt will demonstrate improved pain by reports of less than or equal to 1/10 worst pain on the verbal rating scale in order to progress toward maximal functional ability and improve QOL.       2. Function: Patient will demonstrate improved function as indicated by a score of less than or equal to 36% impairment on FOTO.      3. Mobility: Patient will improve AROM to stated goals, listed in objective measures above, in order to return to maximal functional potential and improve quality of life.      4. Strength: Patient will improve strength to stated goals, listed in objective measures above, in order to improve functional independence and quality of life.     5. Gait: Patient will demonstrate normalized gait mechanics with minimal compensation in order to return to PLOF.     6. Patient will return to normal ADL's, IADL's, recreational activities, and work-related activities with less than or equal to 1/10 pain and maximal function.      7. Patient will be able to return to active lifestyle of working out 5 days per week without pain or instability to safely return back to prior level of function.         PLAN     Continue POC and frequency as planned. Continue to progress range of motion per MD restrictions and strength to tolerance taking into account protocol/restrictions.      These services are reasonable and necessary for the conditions set forth above while under my care.    Ehsan Dumont, PT

## 2022-03-11 ENCOUNTER — CLINICAL SUPPORT (OUTPATIENT)
Dept: REHABILITATION | Facility: HOSPITAL | Age: 77
End: 2022-03-11
Payer: MEDICARE

## 2022-03-11 DIAGNOSIS — R68.89 DECREASED STRENGTH, ENDURANCE, AND MOBILITY: Primary | ICD-10-CM

## 2022-03-11 DIAGNOSIS — Z74.09 DECREASED STRENGTH, ENDURANCE, AND MOBILITY: Primary | ICD-10-CM

## 2022-03-11 DIAGNOSIS — R26.89 FUNCTIONAL GAIT ABNORMALITY: ICD-10-CM

## 2022-03-11 DIAGNOSIS — R53.1 DECREASED STRENGTH, ENDURANCE, AND MOBILITY: Primary | ICD-10-CM

## 2022-03-11 PROCEDURE — 97110 THERAPEUTIC EXERCISES: CPT

## 2022-03-11 PROCEDURE — 97140 MANUAL THERAPY 1/> REGIONS: CPT

## 2022-03-11 NOTE — PROGRESS NOTES
[facility]   Physical Therapy Treatment Note     Name: Clay Marcos  Clinic Number: [id number]    Therapy Diagnosis:   Encounter Diagnoses   Name Primary?    Decreased strength, endurance, and mobility Yes    Functional gait abnormality      Physician: Heath Park MD    Visit Date: 3/11/2022    Physician Orders: PT Eval and Treat  Medical Diagnosis from Referral: S/P total knee arthroplasty right,  Status post open reduction and internal fixation (ORIF) of fracture   Evaluation Date: 2/24/2022  Authorization Period Expiration: 3/15/2022  Plan of Care Expiration: 5/24/2022  Progress Note Due: 3/24/2022  Visit # / Visits authorized: 8/40     FOTO: 1/3    PTA Visit #: 0/5     Progress Note Due on 3/24/2022    Time In: 1:47 pm  Time Out: 2:31 pm  Total Billable Time: 44 minutes (denotes billable time)    Precautions: Standard and Partial weightbearing with TROM brace locked in extension, progress range of motion by 10-15 degrees per session, should be ambulating with rolling walker until more stable     SUBJECTIVE     Pt reports: that he has no knee pain today. Patient noted with no pitting edema today.     He was compliant with home exercise program.  Response to previous treatment: No pain noted, better understanding of importance of   Functional change: No noted functional change    Pain: 0/10  Location: right knee    OBJECTIVE     Objective Measures updated at progress report unless specified.     Treatment     Clay received therapeutic exercises to develop strength, endurance, ROM, flexibility, posture and core stabilization for (32) minutes including:     Intervention Performed Today    Quad Sets over Heel Prop x 3x12 reps    PROM Knee Flexion controlled by PT x Obtained 95 degrees of flexion 8 minutes   Straight Leg Raises x 3x12 reps (no brace, minimal assistance at last rep's)   Hamstring Stretch in Sitting  4x30 second holds    Gastroc Stretch in Supine with Strap x 2x1 minute  holds    sidelying hip ABD  Brace on 25x; 25x on left    Heel raises x 20x   Hip 3 way standing (good foot on 6 in step) x RTB 2x10 reps each R     Plan for Next Visit: Progress knee flexion by 10-15 degrees each session or as tolerated by the patient     Clay received the following manual therapy techniques: Myofacial release and Soft tissue Mobilization were applied to the: right knee for (12) minutes, including:    Manual Intervention Performed Today    Soft Tissue Mobilization x right knee grossly and right quadriceps    Joint Mobilizations  Very gentle patellar   Mobilization with movement          Functional Dry Needling        Plan for Next Visit: Continue     Clay participated in neuromuscular re-education activities to improve: Coordination for (0) minutes. The following activities were included:    Intervention Performed Today    Car Transfer   To allow him to sit in the front seat, provided demonstration first prior to him performed transfer                                        Plan for Next Visit:      Patient Education and Home Exercises     Home Exercises Provided and Patient Education Provided     Education provided:   - Educated patient on importance of not   - Educated patient on the proper form and sequencing of all exercises provided in HEP today    Written Home Exercises Provided: Patient instructed to cont prior HEP. Exercises were reviewed and Clay was able to demonstrate them prior to the end of the session.  Clay demonstrated good  understanding of the education provided. See EMR under Patient Instructions for exercises provided during therapy sessions.    ASSESSMENT     Was able to reach 95 degrees of knee flexion today with only reported of mild tightness in distal quadriceps but no pain reported in knee. Patient noted with more restrictions in distal quadriceps today so performing myofascial release of this region with good tolerance. Patient required cueing to re-activate quadriceps  between each repetition of straight leg raises to decrease quadriceps lag. Patient overall did really well with treatment today.     Clay Is progressing well towards his goals.   Pt prognosis is Fair.     Pt will continue to benefit from skilled outpatient physical therapy to address the deficits listed in the problem list box on initial evaluation, provide pt/family education and to maximize pt's level of independence in the home and community environment.     Pt's spiritual, cultural and educational needs considered and pt agreeable to plan of care and goals.     Anticipated barriers to physical therapy: adherence to treatment plan and overexerting himself due to high motivation to return to prior level of function    Goals:   Short Term Goals:  6 weeks 2/24/2022   1. Function: Patient will demonstrate improved function as indicated by a score of less than or equal to 47% impairment on FOTO.      2. Mobility: Patient will improve AROM to 50% of stated goals, listed in objective measures above, in order to progress towards independence with functional activities.      3. Strength: Patient will improve strength to 3+/5 in all muscle groups, listed in objective measures above, in order to progress towards independence with functional activities.      4. Gait: Patient will demonstrate improved gait mechanics including more functional knee flexion and extension (per MD approval) in order to improve functional mobility, improve quality of life, and decrease risk of further injury or fall.      5. HEP: Patient will demonstrate independence with HEP in order to progress toward functional independence.        Long Term Goals:  12 weeks/Ongoing 2/24/2022   1. Pain: Pt will demonstrate improved pain by reports of less than or equal to 1/10 worst pain on the verbal rating scale in order to progress toward maximal functional ability and improve QOL.       2. Function: Patient will demonstrate improved function as indicated by  a score of less than or equal to 36% impairment on FOTO.      3. Mobility: Patient will improve AROM to stated goals, listed in objective measures above, in order to return to maximal functional potential and improve quality of life.     4. Strength: Patient will improve strength to stated goals, listed in objective measures above, in order to improve functional independence and quality of life.     5. Gait: Patient will demonstrate normalized gait mechanics with minimal compensation in order to return to PLOF.     6. Patient will return to normal ADL's, IADL's, recreational activities, and work-related activities with less than or equal to 1/10 pain and maximal function.      7. Patient will be able to return to active lifestyle of working out 5 days per week without pain or instability to safely return back to prior level of function.         PLAN     Continue POC and frequency as planned. Continue to progress range of motion per MD restrictions and strength to tolerance taking into account protocol/restrictions.      These services are reasonable and necessary for the conditions set forth above while under my care.    Kasia Quiñones, PT, DPT

## 2022-03-14 ENCOUNTER — CLINICAL SUPPORT (OUTPATIENT)
Dept: REHABILITATION | Facility: HOSPITAL | Age: 77
End: 2022-03-14
Payer: MEDICARE

## 2022-03-14 DIAGNOSIS — R68.89 DECREASED STRENGTH, ENDURANCE, AND MOBILITY: Primary | ICD-10-CM

## 2022-03-14 DIAGNOSIS — R26.89 FUNCTIONAL GAIT ABNORMALITY: ICD-10-CM

## 2022-03-14 DIAGNOSIS — Z74.09 DECREASED STRENGTH, ENDURANCE, AND MOBILITY: Primary | ICD-10-CM

## 2022-03-14 DIAGNOSIS — R53.1 DECREASED STRENGTH, ENDURANCE, AND MOBILITY: Primary | ICD-10-CM

## 2022-03-14 PROCEDURE — 97140 MANUAL THERAPY 1/> REGIONS: CPT | Mod: KX | Performed by: PHYSICAL THERAPIST

## 2022-03-14 PROCEDURE — 97110 THERAPEUTIC EXERCISES: CPT | Mod: KX | Performed by: PHYSICAL THERAPIST

## 2022-03-14 NOTE — PROGRESS NOTES
[facility]   Physical Therapy Treatment Note     Name: Clay Marcos  Clinic Number: [id number]    Therapy Diagnosis:   Encounter Diagnoses   Name Primary?    Decreased strength, endurance, and mobility Yes    Functional gait abnormality      Physician: Heath Park MD    Visit Date: 3/14/2022    Physician Orders: PT Eval and Treat  Medical Diagnosis from Referral: S/P total knee arthroplasty right,  Status post open reduction and internal fixation (ORIF) of fracture   Evaluation Date: 2/24/2022  Authorization Period Expiration: 3/15/2022  Plan of Care Expiration: 5/24/2022  Progress Note Due: 3/24/2022  Visit # / Visits authorized: 9/40     FOTO: 1/3    PTA Visit #: 0/5     Progress Note Due on 3/24/2022    Time In: 1231 pm  Time Out: 116 pm  Total Billable Time: 45 minutes (denotes billable time)    Precautions: Standard and Partial weightbearing with TROM brace locked in extension, progress range of motion by 10-15 degrees per session, should be ambulating with rolling walker until more stable     SUBJECTIVE     Pt reports: no pain today. Feels some tightness in quad tendon but no distal tendon pain    He was compliant with home exercise program.  Response to previous treatment: No pain noted, better understanding of importance of   Functional change: No noted functional change    Pain: 0/10  Location: right knee    OBJECTIVE     Objective Measures updated at progress report unless specified.     Treatment     Clay received therapeutic exercises to develop strength, endurance, ROM, flexibility, posture and core stabilization for (30) minutes including:     Intervention Performed Today    Quad Sets over Heel Prop x 3x12 reps    PROM Knee Flexion controlled by PT x Obtained 95 degrees of flexion 8 minutes   Straight Leg Raises x 3x12 reps (no brace, minimal assistance at last rep's)   Hamstring Stretch in Sitting  4x30 second holds    Gastroc Stretch in Standing  x 30 sec, 4x    sidelying hip ABD  Brace on 25x; 25x on left    Heel raises x 20x   Hip 3 way standing (good foot on 6 in xstep) x GTB 2x10 reps each R     Plan for Next Visit: Progress knee flexion by 10-15 degrees each session or as tolerated by the patient     Clay received the following manual therapy techniques: Myofacial release and Soft tissue Mobilization were applied to the: right knee for (15) minutes, including:    Manual Intervention Performed Today    Soft Tissue Mobilization x right knee grossly and right quadriceps    Joint Mobilizations x Very gentle patellar   Mobilization with movement          Functional Dry Needling        Plan for Next Visit: Continue     Clay participated in neuromuscular re-education activities to improve: Coordination for (0) minutes. The following activities were included:    Intervention Performed Today    Car Transfer   To allow him to sit in the front seat, provided demonstration first prior to him performed transfer                                        Plan for Next Visit:      Patient Education and Home Exercises     Home Exercises Provided and Patient Education Provided     Education provided:   - Educated patient on importance of not   - Educated patient on the proper form and sequencing of all exercises provided in HEP today    Written Home Exercises Provided: Patient instructed to cont prior HEP. Exercises were reviewed and Clay was able to demonstrate them prior to the end of the session.  Clay demonstrated good  understanding of the education provided. See EMR under Patient Instructions for exercises provided during therapy sessions.    ASSESSMENT     Pt presents with improving flexion motion overall. No increase in acute pains. Pt will continue to benefit from quad strengthening and hip strengthening to improve knee stability.     Clay Is progressing well towards his goals.   Pt prognosis is Fair.     Pt will continue to benefit from skilled outpatient physical therapy to  address the deficits listed in the problem list box on initial evaluation, provide pt/family education and to maximize pt's level of independence in the home and community environment.     Pt's spiritual, cultural and educational needs considered and pt agreeable to plan of care and goals.     Anticipated barriers to physical therapy: adherence to treatment plan and overexerting himself due to high motivation to return to prior level of function    Goals:   Short Term Goals:  6 weeks 2/24/2022   1. Function: Patient will demonstrate improved function as indicated by a score of less than or equal to 47% impairment on FOTO.      2. Mobility: Patient will improve AROM to 50% of stated goals, listed in objective measures above, in order to progress towards independence with functional activities.      3. Strength: Patient will improve strength to 3+/5 in all muscle groups, listed in objective measures above, in order to progress towards independence with functional activities.      4. Gait: Patient will demonstrate improved gait mechanics including more functional knee flexion and extension (per MD approval) in order to improve functional mobility, improve quality of life, and decrease risk of further injury or fall.      5. HEP: Patient will demonstrate independence with HEP in order to progress toward functional independence.        Long Term Goals:  12 weeks/Ongoing 2/24/2022   1. Pain: Pt will demonstrate improved pain by reports of less than or equal to 1/10 worst pain on the verbal rating scale in order to progress toward maximal functional ability and improve QOL.       2. Function: Patient will demonstrate improved function as indicated by a score of less than or equal to 36% impairment on FOTO.      3. Mobility: Patient will improve AROM to stated goals, listed in objective measures above, in order to return to maximal functional potential and improve quality of life.     4. Strength: Patient will improve  strength to stated goals, listed in objective measures above, in order to improve functional independence and quality of life.     5. Gait: Patient will demonstrate normalized gait mechanics with minimal compensation in order to return to PLOF.     6. Patient will return to normal ADL's, IADL's, recreational activities, and work-related activities with less than or equal to 1/10 pain and maximal function.      7. Patient will be able to return to active lifestyle of working out 5 days per week without pain or instability to safely return back to prior level of function.         PLAN     Continue POC and frequency as planned. Continue to progress range of motion per MD restrictions and strength to tolerance taking into account protocol/restrictions.      These services are reasonable and necessary for the conditions set forth above while under my care.    Ehsan Dumont, PT, DPT

## 2022-03-16 ENCOUNTER — CLINICAL SUPPORT (OUTPATIENT)
Dept: REHABILITATION | Facility: HOSPITAL | Age: 77
End: 2022-03-16
Payer: MEDICARE

## 2022-03-16 DIAGNOSIS — R68.89 DECREASED STRENGTH, ENDURANCE, AND MOBILITY: Primary | ICD-10-CM

## 2022-03-16 DIAGNOSIS — R53.1 DECREASED STRENGTH, ENDURANCE, AND MOBILITY: Primary | ICD-10-CM

## 2022-03-16 DIAGNOSIS — Z74.09 DECREASED STRENGTH, ENDURANCE, AND MOBILITY: Primary | ICD-10-CM

## 2022-03-16 DIAGNOSIS — R26.89 FUNCTIONAL GAIT ABNORMALITY: ICD-10-CM

## 2022-03-16 PROCEDURE — 97110 THERAPEUTIC EXERCISES: CPT

## 2022-03-16 PROCEDURE — 97140 MANUAL THERAPY 1/> REGIONS: CPT

## 2022-03-16 NOTE — PROGRESS NOTES
[facility]   Physical Therapy Treatment Note     Name: Clay Marcos  Clinic Number: [id number]    Therapy Diagnosis:   Encounter Diagnoses   Name Primary?    Decreased strength, endurance, and mobility Yes    Functional gait abnormality      Physician: Heath Park MD    Visit Date: 3/16/2022    Physician Orders: PT Eval and Treat  Medical Diagnosis from Referral: S/P total knee arthroplasty right,  Status post open reduction and internal fixation (ORIF) of fracture   Evaluation Date: 2/24/2022  Authorization Period Expiration: 3/15/2022  Plan of Care Expiration: 5/24/2022  Progress Note Due: 3/24/2022  Visit # / Visits authorized: 10/40     FOTO: 1/3    PTA Visit #: 0/5     Progress Note Due on 3/24/2022    Time In: 1216  Time Out: 1304   Total Billable Time: 45 minutes (denotes billable time)    Precautions: Standard and Partial weightbearing with TROM brace locked in extension, progress range of motion by 10-15 degrees per session, should be ambulating with rolling walker until more stable     SUBJECTIVE     Pt reports: that he is having no pain today.     He was compliant with home exercise program.  Response to previous treatment: No pain noted, better understanding of importance of   Functional change: Improving pain free passive knee mobility    Pain: 0/10  Location: right knee    OBJECTIVE     Objective Measures updated at progress report unless specified.     Treatment     Clay received therapeutic exercises to develop strength, endurance, ROM, flexibility, posture and core stabilization for (30) minutes including:     Intervention Performed Today    Quad Sets over Heel Prop x 3x12 reps    PROM Knee Flexion controlled by PT x Obtained 95 degrees of flexion 8 minutes   Straight Leg Raises x 3x10 reps (no brace)   Hamstring Stretch in Sitting  4x30 second holds    Gastroc Stretch in Standing  x 30 sec, 3x   sidelying hip ABD  Brace on 25x; 25x on left    Heel raises x 30x    Hip 3 way standing (good foot on 6 in step) x GTB 2x10 reps each R   Standing TKE's  x Blue theraband x20 reps      Plan for Next Visit: Progress knee flexion by 10-15 degrees each session or as tolerated by the patient     Clay received the following manual therapy techniques: Myofacial release and Soft tissue Mobilization were applied to the: right knee for (15) minutes, including:    Manual Intervention Performed Today    Soft Tissue Mobilization x right knee grossly and right quadriceps with right LE hanging off mat   Joint Mobilizations x Very gentle patellar   Mobilization with movement          Functional Dry Needling        Plan for Next Visit: Continue     Clay participated in neuromuscular re-education activities to improve: Coordination for (0) minutes. The following activities were included:    Intervention Performed Today    Car Transfer   To allow him to sit in the front seat, provided demonstration first prior to him performed transfer                                        Plan for Next Visit:      Patient Education and Home Exercises     Home Exercises Provided and Patient Education Provided     Education provided:   - Educated patient on importance of not   - Educated patient on the proper form and sequencing of all exercises provided in HEP today    Written Home Exercises Provided: Patient instructed to cont prior HEP. Exercises were reviewed and Clay was able to demonstrate them prior to the end of the session.  Clay demonstrated good  understanding of the education provided. See EMR under Patient Instructions for exercises provided during therapy sessions.    ASSESSMENT     Patient is maintaining knee flexion at about 90 degrees, restricted mainly by distal quadriceps tightness. Performed manual therapy interventions in seated position to allow for controlled knee flexion and allow for more knee mobility. Patient tolerated really well. Continued to focus on quad and hip strengthening with  improving quadriceps engagement and control.     Clay Is progressing well towards his goals.   Pt prognosis is Fair.     Pt will continue to benefit from skilled outpatient physical therapy to address the deficits listed in the problem list box on initial evaluation, provide pt/family education and to maximize pt's level of independence in the home and community environment.     Pt's spiritual, cultural and educational needs considered and pt agreeable to plan of care and goals.     Anticipated barriers to physical therapy: adherence to treatment plan and overexerting himself due to high motivation to return to prior level of function    Goals:   Short Term Goals:  6 weeks 2/24/2022   1. Function: Patient will demonstrate improved function as indicated by a score of less than or equal to 47% impairment on FOTO.      2. Mobility: Patient will improve AROM to 50% of stated goals, listed in objective measures above, in order to progress towards independence with functional activities.      3. Strength: Patient will improve strength to 3+/5 in all muscle groups, listed in objective measures above, in order to progress towards independence with functional activities.      4. Gait: Patient will demonstrate improved gait mechanics including more functional knee flexion and extension (per MD approval) in order to improve functional mobility, improve quality of life, and decrease risk of further injury or fall.      5. HEP: Patient will demonstrate independence with HEP in order to progress toward functional independence.        Long Term Goals:  12 weeks/Ongoing 2/24/2022   1. Pain: Pt will demonstrate improved pain by reports of less than or equal to 1/10 worst pain on the verbal rating scale in order to progress toward maximal functional ability and improve QOL.       2. Function: Patient will demonstrate improved function as indicated by a score of less than or equal to 36% impairment on FOTO.      3. Mobility: Patient  will improve AROM to stated goals, listed in objective measures above, in order to return to maximal functional potential and improve quality of life.     4. Strength: Patient will improve strength to stated goals, listed in objective measures above, in order to improve functional independence and quality of life.     5. Gait: Patient will demonstrate normalized gait mechanics with minimal compensation in order to return to PLOF.     6. Patient will return to normal ADL's, IADL's, recreational activities, and work-related activities with less than or equal to 1/10 pain and maximal function.      7. Patient will be able to return to active lifestyle of working out 5 days per week without pain or instability to safely return back to prior level of function.         PLAN     Continue POC and frequency as planned. Continue to progress range of motion per MD restrictions and strength to tolerance taking into account protocol/restrictions.      These services are reasonable and necessary for the conditions set forth above while under my care.    Kasia Quiñones, PT, DPT

## 2022-03-18 ENCOUNTER — CLINICAL SUPPORT (OUTPATIENT)
Dept: REHABILITATION | Facility: HOSPITAL | Age: 77
End: 2022-03-18
Payer: MEDICARE

## 2022-03-18 DIAGNOSIS — Z74.09 DECREASED STRENGTH, ENDURANCE, AND MOBILITY: Primary | ICD-10-CM

## 2022-03-18 DIAGNOSIS — R68.89 DECREASED STRENGTH, ENDURANCE, AND MOBILITY: Primary | ICD-10-CM

## 2022-03-18 DIAGNOSIS — R53.1 DECREASED STRENGTH, ENDURANCE, AND MOBILITY: Primary | ICD-10-CM

## 2022-03-18 DIAGNOSIS — R26.89 FUNCTIONAL GAIT ABNORMALITY: ICD-10-CM

## 2022-03-18 PROCEDURE — 97140 MANUAL THERAPY 1/> REGIONS: CPT

## 2022-03-18 PROCEDURE — 97110 THERAPEUTIC EXERCISES: CPT

## 2022-03-18 NOTE — PROGRESS NOTES
[facility]   Physical Therapy Treatment Note     Name: Clay Marcos  Clinic Number: [id number]    Therapy Diagnosis:   Encounter Diagnoses   Name Primary?    Decreased strength, endurance, and mobility Yes    Functional gait abnormality      Physician: Heath Park MD    Visit Date: 3/18/2022    Physician Orders: PT Eval and Treat  Medical Diagnosis from Referral: S/P total knee arthroplasty right,  Status post open reduction and internal fixation (ORIF) of fracture   Evaluation Date: 2/24/2022  Authorization Period Expiration: 3/15/2022  Plan of Care Expiration: 5/24/2022  Progress Note Due: 3/24/2022  Visit # / Visits authorized: 11/40     FOTO: 1/3    PTA Visit #: 0/5     Progress Note Due on 3/24/2022    Time In: 1216  Time Out: 1302  Total Billable Time: 46 minutes (denotes billable time)    Precautions: Standard and Partial weightbearing with TROM brace locked in extension, progress range of motion by 10-15 degrees per session, should be ambulating with rolling walker until more stable     SUBJECTIVE     Pt reports: that he is continuing to have no pain in his right knee.     He was compliant with home exercise program.  Response to previous treatment: No pain noted, better understanding of importance of   Functional change: Improving pain free passive knee mobility    Pain: 0/10  Location: right knee    OBJECTIVE     Objective Measures updated at progress report unless specified.     Treatment     Clay received therapeutic exercises to develop strength, endurance, ROM, flexibility, posture and core stabilization for (31) minutes including:     Intervention Performed Today    Quad Sets over Heel Prop x 3x10 reps    PROM Knee Flexion controlled by PT x Obtained 95 degrees of flexion 8 minutes   Straight Leg Raises x 3x10 reps (no brace)   Hamstring Stretch in Sitting  4x30 second holds    Gastroc Stretch in Standing  x 45 sec x2   sidelying hip ABD  Brace on 25x; 25x on left     Heel raises x 30x   Hip 3 way standing (good foot on 6 in step) x GTB 2x10 reps each R   Standing TKE's  x Blue theraband x30 reps    Side Lying Hip 5 Ways (Added- no bicycles) x x10 reps each direction     Plan for Next Visit: Progress knee flexion by 10-15 degrees each session or as tolerated by the patient     Clay received the following manual therapy techniques: Myofacial release and Soft tissue Mobilization were applied to the: right knee for (15) minutes, including:    Manual Intervention Performed Today    Soft Tissue Mobilization x right knee grossly and right quadriceps with right LE hanging off mat   Joint Mobilizations x Very gentle patellar   Mobilization with movement          Functional Dry Needling        Plan for Next Visit: Continue     Clay participated in neuromuscular re-education activities to improve: Coordination for (0) minutes. The following activities were included:    Intervention Performed Today    Car Transfer   To allow him to sit in the front seat, provided demonstration first prior to him performed transfer                                        Plan for Next Visit:      Patient Education and Home Exercises     Home Exercises Provided and Patient Education Provided     Education provided:   - Educated patient on importance of not   - Educated patient on the proper form and sequencing of all exercises provided in HEP today    Written Home Exercises Provided: Patient instructed to cont prior HEP. Exercises were reviewed and Clay was able to demonstrate them prior to the end of the session.  Clay demonstrated good  understanding of the education provided. See EMR under Patient Instructions for exercises provided during therapy sessions.    ASSESSMENT     Patient did well today with slight improvements in knee flexion mobility. Continued adhesions noted in distal quadriceps which improved mildly with manual therapy interventions. Incorporated hip abduction series with quite a bit of  muscular fatigue noted with this. Patient will continue to benefit from skilled Physical Therapy to address remaining mobility and strength deficits.     Clay Is progressing well towards his goals.   Pt prognosis is Fair.     Pt will continue to benefit from skilled outpatient physical therapy to address the deficits listed in the problem list box on initial evaluation, provide pt/family education and to maximize pt's level of independence in the home and community environment.     Pt's spiritual, cultural and educational needs considered and pt agreeable to plan of care and goals.     Anticipated barriers to physical therapy: adherence to treatment plan and overexerting himself due to high motivation to return to prior level of function    Goals:   Short Term Goals:  6 weeks 2/24/2022   1. Function: Patient will demonstrate improved function as indicated by a score of less than or equal to 47% impairment on FOTO.      2. Mobility: Patient will improve AROM to 50% of stated goals, listed in objective measures above, in order to progress towards independence with functional activities.      3. Strength: Patient will improve strength to 3+/5 in all muscle groups, listed in objective measures above, in order to progress towards independence with functional activities.      4. Gait: Patient will demonstrate improved gait mechanics including more functional knee flexion and extension (per MD approval) in order to improve functional mobility, improve quality of life, and decrease risk of further injury or fall.      5. HEP: Patient will demonstrate independence with HEP in order to progress toward functional independence.        Long Term Goals:  12 weeks/Ongoing 2/24/2022   1. Pain: Pt will demonstrate improved pain by reports of less than or equal to 1/10 worst pain on the verbal rating scale in order to progress toward maximal functional ability and improve QOL.       2. Function: Patient will demonstrate improved  function as indicated by a score of less than or equal to 36% impairment on FOTO.      3. Mobility: Patient will improve AROM to stated goals, listed in objective measures above, in order to return to maximal functional potential and improve quality of life.     4. Strength: Patient will improve strength to stated goals, listed in objective measures above, in order to improve functional independence and quality of life.     5. Gait: Patient will demonstrate normalized gait mechanics with minimal compensation in order to return to PLOF.     6. Patient will return to normal ADL's, IADL's, recreational activities, and work-related activities with less than or equal to 1/10 pain and maximal function.      7. Patient will be able to return to active lifestyle of working out 5 days per week without pain or instability to safely return back to prior level of function.         PLAN     Continue POC and frequency as planned. Continue to progress range of motion per MD restrictions and strength to tolerance taking into account protocol/restrictions.      These services are reasonable and necessary for the conditions set forth above while under my care.    Kasia Quiñones, PT, DPT

## 2022-03-21 ENCOUNTER — CLINICAL SUPPORT (OUTPATIENT)
Dept: REHABILITATION | Facility: HOSPITAL | Age: 77
End: 2022-03-21
Payer: MEDICARE

## 2022-03-21 DIAGNOSIS — R53.1 DECREASED STRENGTH, ENDURANCE, AND MOBILITY: Primary | ICD-10-CM

## 2022-03-21 DIAGNOSIS — Z74.09 DECREASED STRENGTH, ENDURANCE, AND MOBILITY: Primary | ICD-10-CM

## 2022-03-21 DIAGNOSIS — R26.89 FUNCTIONAL GAIT ABNORMALITY: ICD-10-CM

## 2022-03-21 DIAGNOSIS — R68.89 DECREASED STRENGTH, ENDURANCE, AND MOBILITY: Primary | ICD-10-CM

## 2022-03-21 PROCEDURE — 97110 THERAPEUTIC EXERCISES: CPT

## 2022-03-21 PROCEDURE — 97140 MANUAL THERAPY 1/> REGIONS: CPT

## 2022-03-21 NOTE — PROGRESS NOTES
[facility]   Physical Therapy Treatment Note     Name: Clay Marcos  Clinic Number: [id number]    Therapy Diagnosis:   Encounter Diagnoses   Name Primary?    Decreased strength, endurance, and mobility Yes    Functional gait abnormality      Physician: Heath Park MD    Visit Date: 3/21/2022    Physician Orders: PT Eval and Treat  Medical Diagnosis from Referral: S/P total knee arthroplasty right,  Status post open reduction and internal fixation (ORIF) of fracture   Evaluation Date: 2/24/2022  Authorization Period Expiration: 3/15/2022  Plan of Care Expiration: 5/24/2022  Progress Note Due: 3/24/2022  Visit # / Visits authorized: 12/40     FOTO: 1/3    PTA Visit #: 0/5     Progress Note Due on 3/24/2022    Time In: 1246  Time Out: 1339  Total Billable Time: 53 minutes (denotes billable time)    Precautions: Standard and Partial weightbearing with TROM brace locked in extension, progress range of motion by 10-15 degrees per session    SUBJECTIVE     Pt reports: that he is continuing to have no pain in his right knee. Patient ambulates into clinic with single point cane today.     He was compliant with home exercise program.  Response to previous treatment: No pain noted, better understanding of importance of   Functional change: Improving pain free passive knee mobility, ambulating with more stability utilizing single point cane    Pain: 0/10  Location: right knee    OBJECTIVE     Objective Measures updated at progress report unless specified.     Treatment     Clay received therapeutic exercises to develop strength, endurance, ROM, flexibility, posture and core stabilization for (38) minutes including:     Intervention Performed Today    Quad Sets over Heel Prop  3x10 reps    PROM Knee Flexion controlled by PT x Obtained 95 degrees of flexion 8 minutes   Straight Leg Raises x 3x12 reps (no brace)   Hamstring Stretch in Sitting  4x30 second holds    Gastroc Stretch in Standing  x 45  sec x2   sidelying hip ABD  Brace on 25x; 25x on left    Heel raises x 30x   Hip 3 way standing (good foot on 6 in step)  GTB 2x10 reps each R   Standing TKE's (no brace) x Blue theraband x30 reps    Side Lying Hip 5 Ways (Added- no bicycles) x x10 reps each direction   Hamstring Curls with Sliders- Seated (added) x 3 minutes   Heel Slides (added) x 3 minutes     Plan for Next Visit: Progress knee flexion by 10-15 degrees each session or as tolerated by the patient     Clay received the following manual therapy techniques: Myofacial release and Soft tissue Mobilization were applied to the: right knee for (15) minutes, including:    Manual Intervention Performed Today    Soft Tissue Mobilization x right knee grossly and right quadriceps with right LE hanging off mat   Joint Mobilizations x Very gentle patellar   Mobilization with movement          Functional Dry Needling        Plan for Next Visit: Continue     Clay participated in neuromuscular re-education activities to improve: Coordination for (0) minutes. The following activities were included:    Intervention Performed Today    Car Transfer   To allow him to sit in the front seat, provided demonstration first prior to him performed transfer                                        Plan for Next Visit:      Patient Education and Home Exercises     Home Exercises Provided and Patient Education Provided     Education provided:   - Educated patient on importance of not   - Educated patient on the proper form and sequencing of all exercises provided in HEP today    Written Home Exercises Provided: Patient instructed to cont prior HEP. Exercises were reviewed and Clay was able to demonstrate them prior to the end of the session.  Clay demonstrated good  understanding of the education provided. See EMR under Patient Instructions for exercises provided during therapy sessions.    ASSESSMENT     Patient continue to tolerate knee flexion mobility well although still  hovering around 90 degrees of knee flexion. Incorporated two additional exercises to address knee mobility in a controlled manner without any pain reported. Continued to work on quadriceps and hip strengthening with good muscular fatigue present. Patient will continue to benefit from skilled Physical Therapy to address remaining mobility and strength deficits.     Clay Is progressing well towards his goals.   Pt prognosis is Fair.     Pt will continue to benefit from skilled outpatient physical therapy to address the deficits listed in the problem list box on initial evaluation, provide pt/family education and to maximize pt's level of independence in the home and community environment.     Pt's spiritual, cultural and educational needs considered and pt agreeable to plan of care and goals.     Anticipated barriers to physical therapy: adherence to treatment plan and overexerting himself due to high motivation to return to prior level of function    Goals:   Short Term Goals:  6 weeks 2/24/2022   1. Function: Patient will demonstrate improved function as indicated by a score of less than or equal to 47% impairment on FOTO.      2. Mobility: Patient will improve AROM to 50% of stated goals, listed in objective measures above, in order to progress towards independence with functional activities.      3. Strength: Patient will improve strength to 3+/5 in all muscle groups, listed in objective measures above, in order to progress towards independence with functional activities.      4. Gait: Patient will demonstrate improved gait mechanics including more functional knee flexion and extension (per MD approval) in order to improve functional mobility, improve quality of life, and decrease risk of further injury or fall.      5. HEP: Patient will demonstrate independence with HEP in order to progress toward functional independence.        Long Term Goals:  12 weeks/Ongoing 2/24/2022   1. Pain: Pt will demonstrate improved  pain by reports of less than or equal to 1/10 worst pain on the verbal rating scale in order to progress toward maximal functional ability and improve QOL.       2. Function: Patient will demonstrate improved function as indicated by a score of less than or equal to 36% impairment on FOTO.      3. Mobility: Patient will improve AROM to stated goals, listed in objective measures above, in order to return to maximal functional potential and improve quality of life.     4. Strength: Patient will improve strength to stated goals, listed in objective measures above, in order to improve functional independence and quality of life.     5. Gait: Patient will demonstrate normalized gait mechanics with minimal compensation in order to return to PLOF.     6. Patient will return to normal ADL's, IADL's, recreational activities, and work-related activities with less than or equal to 1/10 pain and maximal function.      7. Patient will be able to return to active lifestyle of working out 5 days per week without pain or instability to safely return back to prior level of function.         PLAN     Continue POC and frequency as planned. Continue to progress range of motion per MD restrictions and strength to tolerance taking into account protocol/restrictions.      These services are reasonable and necessary for the conditions set forth above while under my care.    Kasia Quiñones, PT, DPT

## 2022-03-23 ENCOUNTER — CLINICAL SUPPORT (OUTPATIENT)
Dept: REHABILITATION | Facility: HOSPITAL | Age: 77
End: 2022-03-23
Payer: MEDICARE

## 2022-03-23 DIAGNOSIS — R26.89 FUNCTIONAL GAIT ABNORMALITY: ICD-10-CM

## 2022-03-23 DIAGNOSIS — R68.89 DECREASED STRENGTH, ENDURANCE, AND MOBILITY: Primary | ICD-10-CM

## 2022-03-23 DIAGNOSIS — Z74.09 DECREASED STRENGTH, ENDURANCE, AND MOBILITY: Primary | ICD-10-CM

## 2022-03-23 DIAGNOSIS — R53.1 DECREASED STRENGTH, ENDURANCE, AND MOBILITY: Primary | ICD-10-CM

## 2022-03-23 PROCEDURE — 97110 THERAPEUTIC EXERCISES: CPT

## 2022-03-23 PROCEDURE — 97140 MANUAL THERAPY 1/> REGIONS: CPT

## 2022-03-23 NOTE — PROGRESS NOTES
[facility]   Physical Therapy Treatment Note + Progress Note    Name: Clay Marcos  Clinic Number: [id number]    Therapy Diagnosis:   Encounter Diagnoses   Name Primary?    Decreased strength, endurance, and mobility Yes    Functional gait abnormality      Physician: Heath Park MD    Visit Date: 3/23/2022    Physician Orders: PT Eval and Treat  Medical Diagnosis from Referral: S/P total knee arthroplasty right,  Status post open reduction and internal fixation (ORIF) of fracture   Evaluation Date: 2/24/2022  Authorization Period Expiration: 3/15/2022  Plan of Care Expiration: 5/24/2022  Visit # / Visits authorized: 12/40     FOTO: 1/3    PTA Visit #: 0/5     Progress Note Due on 3/24/2022    Time In: 1046  Time Out: 1131  Total Billable Time: 45 minutes (denotes billable time)    Precautions: Standard and Partial weightbearing with TROM brace locked in extension, progress range of motion by 10-15 degrees per session    SUBJECTIVE     Pt reports: that he is continuing to have no pain in his right knee. Patient reports that he decided today to try some things on his own to see if he could do it before going in to see his doctor today. Patient performed mini squat in shower without brace donned and reports that it felt fine.     He was compliant with home exercise program.  Response to previous treatment: No pain noted, better understanding of importance of   Functional change: Improving pain free passive knee mobility, ambulating with more stability utilizing single point cane    Pain: 0/10  Location: right knee    OBJECTIVE     Objective Measures updated at progress report unless specified.     Sensation:  Sensation is intact to light touch even along incision  Posture:  Pt presents with postural abnormalities which include: forward head, rounded shoulders  and knee locked in extension in TROM brace  Movement Analysis: Patient is able to perform straight leg raise with mild quad lag  on descent after performed numerous repetitions and fatiguing. Patient is able to perform bed mobility tasks with less assistance required from upper extremities to assist with right lower extremity movement.   Gait Analysis: The patient ambulated with the following assistive device: straight cane; the pt presents with the following gait abnormalities: sustained right knee extension in TROM brace throughout all phases of gait cycle. Taught patient how to properly ambulate for maximal support with single point cane.      (x = not tested due to pain and/or inability to obtain test position)     Range of Motion/Strength:      Hip Right  3/23/2022 Left  2/24/2022 Pain/Dysfunction with Movement Goal   AROM           Flexion (120)  100   120 Limited by hamstring tightness secondary to knee maintained in extension 120 No pain or limitations   Extension (30) 5  8 No pain 20 B No pain   Abduction (45)  30  30 Tightness in adductors B 40 B No pain   IR (45)  x  9   Assess when able   ER (45)  x  42   Assess when able      Knee Right  3/23/2022 Left  3/23/2022 Pain/Dysfunction with Movement Goal   AROM           Flexion (135)  95  145 No pain 130 No pain   Extension (0)  5 flexion  5 flexion No pain 0      L/E MMT Right Left Pain/Dysfunction with Movement Goal   Hip Flexion  4-/5 4-/5 No pain 4+/5 B   Hip Extension  4-/5 3+/5 No pain 4+/5 B   Hip Abduction  4-/5 3+/5 No pain 4+/5 B   Hip Adduction 4-/5 4-/5 No pain 4+/5 B   Knee Extension 3+/5 4/5 No pain 5/5 B   Knee Flexion 4-/5 4-/5 Cramping in hamstrings noted bilaterally 5/5 B   Hip IR x x   4+/5 B   Hip ER x x   4+/5 B   Ankle DF 4-/5/Not tested today 4-/5/Not tested today No pain 5/5 B   Ankle PF 4-/5/Not tested today 4-/5/Not tested today No pain 5/5 B      MUSCLE LENGTH:      Muscle Tested  Right  2/24/2022 Left   2/24/2022 Goal   Hip Flexors  decreased decreased Normal B   Adductors decreased decreased     Hamstrings  decreased decreased Normal B   Gastrocnemius   decreased decreased Normal B    *decreased but improving    Treatment     Clay received therapeutic exercises to develop strength, endurance, ROM, flexibility, posture and core stabilization for (30) minutes including:     Intervention Performed Today    Quad Sets over Heel Prop  3x10 reps    PROM Knee Flexion controlled by PT x Obtained 95 degrees of flexion 8 minutes   Straight Leg Raises  3x12 reps (no brace)   Hamstring Stretch in Sitting  4x30 second holds    Gastroc Stretch in Standing   45 sec x2   sidelying hip ABD  Brace on 25x; 25x on left    Heel raises  30x   Hip 3 way standing (good foot on 6 in step)  GTB 2x10 reps each R   Standing TKE's (no brace) x Blue theraband x30 reps    Side Lying Hip 5 Ways (Added- no bicycles) x x15 reps each direction B   Hamstring Curls with Sliders- Seated (added)  3 minutes   Heel Slides  x 3 minutes 10 second holds for each    Re-Assessed Objective Measurements x 10 minutes     Plan for Next Visit: Progress knee flexion by 10-15 degrees each session or as tolerated by the patient     Clay received the following manual therapy techniques: Myofacial release and Soft tissue Mobilization were applied to the: right knee for (15) minutes, including:    Manual Intervention Performed Today    Soft Tissue Mobilization x right knee grossly and right quadriceps with right LE hanging off mat   Joint Mobilizations x Very gentle patellar   Mobilization with movement          Functional Dry Needling        Plan for Next Visit: Continue     Clay participated in neuromuscular re-education activities to improve: Coordination for (0) minutes. The following activities were included:    Intervention Performed Today    Car Transfer   To allow him to sit in the front seat, provided demonstration first prior to him performed transfer                                        Plan for Next Visit:      Patient Education and Home Exercises     Home Exercises Provided and Patient Education Provided      Education provided:   - Educated patient on importance of not   - Educated patient on the proper form and sequencing of all exercises provided in HEP today    Written Home Exercises Provided: Patient instructed to cont prior HEP. Exercises were reviewed and Clay was able to demonstrate them prior to the end of the session.  Clay demonstrated good  understanding of the education provided. See EMR under Patient Instructions for exercises provided during therapy sessions.    ASSESSMENT     Patient continue to tolerate knee flexion mobility well with 5 degrees of improvement noted with knee flexion as compared to last few sessions. Knee extension is lacking 5 degrees from full extension although noted bilaterally. Patient noted with improved overall lower extremity strength since initial evaluation in bilateral lower extremities although more weaknesses are present in left hip as compared to right likely because we have been mainly focusing on right lower extremity. Overall patient is tolerating strength and mobility exercises well each session with no real complaints of pain, patient continues to complain of tightness in distal quadriceps region which is consistent with knee locked in extension and extended immobility.     Clay Is progressing well towards his goals.   Pt prognosis is Fair.     Pt will continue to benefit from skilled outpatient physical therapy to address the deficits listed in the problem list box on initial evaluation, provide pt/family education and to maximize pt's level of independence in the home and community environment.     Pt's spiritual, cultural and educational needs considered and pt agreeable to plan of care and goals.     Anticipated barriers to physical therapy: adherence to treatment plan and overexerting himself due to high motivation to return to prior level of function    Goals:   Short Term Goals:  6 weeks 3/23/2022   1. Function: Patient will demonstrate improved function as  indicated by a score of less than or equal to 47% impairment on FOTO.   Not assessed today   2. Mobility: Patient will improve AROM to 50% of stated goals, listed in objective measures above, in order to progress towards independence with functional activities.   Met   3. Strength: Patient will improve strength to 3+/5 in all muscle groups, listed in objective measures above, in order to progress towards independence with functional activities.   Partially Met   4. Gait: Patient will demonstrate improved gait mechanics including more functional knee flexion and extension (per MD approval) in order to improve functional mobility, improve quality of life, and decrease risk of further injury or fall.   Progressing   5. HEP: Patient will demonstrate independence with HEP in order to progress toward functional independence.  Met      Long Term Goals:  12 weeks/Ongoing 3/23/2022   1. Pain: Pt will demonstrate improved pain by reports of less than or equal to 1/10 worst pain on the verbal rating scale in order to progress toward maximal functional ability and improve QOL.    Met   2. Function: Patient will demonstrate improved function as indicated by a score of less than or equal to 36% impairment on FOTO.   Not assessed today   3. Mobility: Patient will improve AROM to stated goals, listed in objective measures above, in order to return to maximal functional potential and improve quality of life.  Progressing   4. Strength: Patient will improve strength to stated goals, listed in objective measures above, in order to improve functional independence and quality of life.  Progressing   5. Gait: Patient will demonstrate normalized gait mechanics with minimal compensation in order to return to PLOF.  Progressing   6. Patient will return to normal ADL's, IADL's, recreational activities, and work-related activities with less than or equal to 1/10 pain and maximal function.   Progressing   7. Patient will be able to return to  active lifestyle of working out 5 days per week without pain or instability to safely return back to prior level of function.   Progressing      PLAN     Continue POC and frequency as planned. Continue to progress range of motion per MD restrictions and strength to tolerance taking into account protocol/restrictions.      These services are reasonable and necessary for the conditions set forth above while under my care.    Kasia Quiñones, PT, DPT

## 2022-03-25 ENCOUNTER — CLINICAL SUPPORT (OUTPATIENT)
Dept: REHABILITATION | Facility: HOSPITAL | Age: 77
End: 2022-03-25
Payer: MEDICARE

## 2022-03-25 DIAGNOSIS — Z74.09 DECREASED STRENGTH, ENDURANCE, AND MOBILITY: Primary | ICD-10-CM

## 2022-03-25 DIAGNOSIS — R26.89 FUNCTIONAL GAIT ABNORMALITY: ICD-10-CM

## 2022-03-25 DIAGNOSIS — R53.1 DECREASED STRENGTH, ENDURANCE, AND MOBILITY: Primary | ICD-10-CM

## 2022-03-25 DIAGNOSIS — R68.89 DECREASED STRENGTH, ENDURANCE, AND MOBILITY: Primary | ICD-10-CM

## 2022-03-25 PROCEDURE — 97140 MANUAL THERAPY 1/> REGIONS: CPT

## 2022-03-25 PROCEDURE — 97110 THERAPEUTIC EXERCISES: CPT

## 2022-03-25 PROCEDURE — 97112 NEUROMUSCULAR REEDUCATION: CPT

## 2022-03-25 NOTE — PROGRESS NOTES
[facility]   Physical Therapy Treatment Note    Name: Clay Marcos  Clinic Number: [id number]    Therapy Diagnosis:   Encounter Diagnoses   Name Primary?    Decreased strength, endurance, and mobility Yes    Functional gait abnormality      Physician: Heath Park MD    Visit Date: 3/25/2022    Physician Orders: PT Eval and Treat  Medical Diagnosis from Referral: S/P total knee arthroplasty right,  Status post open reduction and internal fixation (ORIF) of fracture   Evaluation Date: 2/24/2022  Authorization Period Expiration: 3/15/2022  Plan of Care Expiration: 5/24/2022  Visit # / Visits authorized: 14/40     FOTO: 1/3    PTA Visit #: 0/5     Progress Note Due on 3/24/2022    Time In: 1301  Time Out: 1347  Total Billable Time: 46 minutes (denotes billable time)    Precautions: Standard and Partial weightbearing with TROM brace locked in extension, progress range of motion by 10-15 degrees per session    SUBJECTIVE     Pt reports: that his MD was pleased with the motion that he has obtained. Patient can now unlock his brace to 90 degrees and no longer has to sleep with his brace on. Patient reports that X-Rays are unchanged although imaging was not provided.     He was compliant with home exercise program.  Response to previous treatment: No pain noted, better understanding of importance of   Functional change: Improving pain free passive knee mobility, ambulating with more stability utilizing single point cane    Pain: 0/10  Location: right knee    OBJECTIVE     Objective Measures updated at progress report unless specified.     Treatment     Clay received therapeutic exercises to develop strength, endurance, ROM, flexibility, posture and core stabilization for (26) minutes including:     Intervention Performed Today    Quad Sets over Heel Prop  3x10 reps    PROM Knee Flexion controlled by PT x Obtained 95 degrees of flexion 8 minutes   Straight Leg Raises (added weight) x 3x10  reps 1 lb   Hamstring Stretch in Sitting  4x30 second holds    Gastroc Stretch in Standing   45 sec x2   sidelying hip ABD  Brace on 25x; 25x on left    Heel raises  30x   Hip 3 way standing (good foot on 6 in step)  GTB 2x10 reps each R   Standing TKE's (no brace) x Blue theraband x30 reps    Side Lying Hip 5 Ways (Added- no bicycles)  x15 reps each direction B   Hamstring Curls(progressed) x 3x10 reps green theraband    Long Arc Quads (added) x x25 reps    Heel Slides  x 3 minutes 10 second holds for each    Re-Assessed Objective Measurements  10 minutes     Plan for Next Visit: Progress knee flexion by 10-15 degrees each session or as tolerated by the patient     Clay received the following manual therapy techniques: Myofacial release and Soft tissue Mobilization were applied to the: right knee for (10) minutes, including:    Manual Intervention Performed Today    Soft Tissue Mobilization x right knee grossly and right quadriceps with right LE hanging off mat   Joint Mobilizations x Very gentle patellar   Mobilization with movement          Functional Dry Needling        Plan for Next Visit: Continue     Clay participated in neuromuscular re-education activities to improve: Coordination for (10) minutes. The following activities were included:    Intervention Performed Today    Car Transfer   To allow him to sit in the front seat, provided demonstration first prior to him performed transfer   Sit to Stands x 2x10 reps working on equal weightbearing   Shuttle (added) x 3x10 reps double leg 4 bands                               Plan for Next Visit:      Patient Education and Home Exercises     Home Exercises Provided and Patient Education Provided     Education provided:   - Educated patient on importance of not   - Educated patient on the proper form and sequencing of all exercises provided in HEP today    Written Home Exercises Provided: Patient instructed to cont prior HEP. Exercises were reviewed and Clay was  able to demonstrate them prior to the end of the session.  Clay demonstrated good  understanding of the education provided. See EMR under Patient Instructions for exercises provided during therapy sessions.    ASSESSMENT     Patient tolerated treatment really well today with improvement in knee flexion to 105 degrees. We have previously been stuck at about 90 degrees but due to being able to rest in 90 degrees of flexion with brace now, it allows for more free knee mobility. Incorporated resistance with hamstring curls as well as incorporating sit to stands with emphasis on equal weightbearing bilaterally as well as shuttle strengthening to progress lower extremity strengthening. Overall patient reported no pain with any interventions performed today.     Clay Is progressing well towards his goals.   Pt prognosis is Fair.     Pt will continue to benefit from skilled outpatient physical therapy to address the deficits listed in the problem list box on initial evaluation, provide pt/family education and to maximize pt's level of independence in the home and community environment.     Pt's spiritual, cultural and educational needs considered and pt agreeable to plan of care and goals.     Anticipated barriers to physical therapy: adherence to treatment plan and overexerting himself due to high motivation to return to prior level of function    Goals:   Short Term Goals:  6 weeks 3/23/2022   1. Function: Patient will demonstrate improved function as indicated by a score of less than or equal to 47% impairment on FOTO.   Not assessed today   2. Mobility: Patient will improve AROM to 50% of stated goals, listed in objective measures above, in order to progress towards independence with functional activities.   Met   3. Strength: Patient will improve strength to 3+/5 in all muscle groups, listed in objective measures above, in order to progress towards independence with functional activities.   Partially Met   4. Gait:  Patient will demonstrate improved gait mechanics including more functional knee flexion and extension (per MD approval) in order to improve functional mobility, improve quality of life, and decrease risk of further injury or fall.   Progressing   5. HEP: Patient will demonstrate independence with HEP in order to progress toward functional independence.  Met      Long Term Goals:  12 weeks/Ongoing 3/23/2022   1. Pain: Pt will demonstrate improved pain by reports of less than or equal to 1/10 worst pain on the verbal rating scale in order to progress toward maximal functional ability and improve QOL.    Met   2. Function: Patient will demonstrate improved function as indicated by a score of less than or equal to 36% impairment on FOTO.   Not assessed today   3. Mobility: Patient will improve AROM to stated goals, listed in objective measures above, in order to return to maximal functional potential and improve quality of life.  Progressing   4. Strength: Patient will improve strength to stated goals, listed in objective measures above, in order to improve functional independence and quality of life.  Progressing   5. Gait: Patient will demonstrate normalized gait mechanics with minimal compensation in order to return to PLOF.  Progressing   6. Patient will return to normal ADL's, IADL's, recreational activities, and work-related activities with less than or equal to 1/10 pain and maximal function.   Progressing   7. Patient will be able to return to active lifestyle of working out 5 days per week without pain or instability to safely return back to prior level of function.   Progressing      PLAN     Continue POC and frequency as planned. Continue to progress range of motion per MD restrictions and strength to tolerance taking into account protocol/restrictions.      These services are reasonable and necessary for the conditions set forth above while under my care.    Kasia Quiñones, PT, DPT

## 2022-03-28 ENCOUNTER — CLINICAL SUPPORT (OUTPATIENT)
Dept: REHABILITATION | Facility: HOSPITAL | Age: 77
End: 2022-03-28
Payer: MEDICARE

## 2022-03-28 DIAGNOSIS — Z74.09 DECREASED STRENGTH, ENDURANCE, AND MOBILITY: Primary | ICD-10-CM

## 2022-03-28 DIAGNOSIS — R53.1 DECREASED STRENGTH, ENDURANCE, AND MOBILITY: Primary | ICD-10-CM

## 2022-03-28 DIAGNOSIS — R68.89 DECREASED STRENGTH, ENDURANCE, AND MOBILITY: Primary | ICD-10-CM

## 2022-03-28 DIAGNOSIS — R26.89 FUNCTIONAL GAIT ABNORMALITY: ICD-10-CM

## 2022-03-28 PROCEDURE — 97112 NEUROMUSCULAR REEDUCATION: CPT

## 2022-03-28 PROCEDURE — 97140 MANUAL THERAPY 1/> REGIONS: CPT

## 2022-03-28 PROCEDURE — 97110 THERAPEUTIC EXERCISES: CPT

## 2022-03-28 NOTE — PROGRESS NOTES
[facility]   Physical Therapy Treatment Note    Name: Clay Marcos  Clinic Number: [id number]    Therapy Diagnosis:   Encounter Diagnoses   Name Primary?    Decreased strength, endurance, and mobility Yes    Functional gait abnormality      Physician: Heath Park MD    Visit Date: 3/28/2022    Physician Orders: PT Eval and Treat  Medical Diagnosis from Referral: S/P total knee arthroplasty right,  Status post open reduction and internal fixation (ORIF) of fracture   Evaluation Date: 2/24/2022  Authorization Period Expiration: 3/15/2022  Plan of Care Expiration: 5/24/2022  Visit # / Visits authorized: 14/40     FOTO: 1/3    PTA Visit #: 0/5     Progress Note Due on 4/24/2022    Time In: 1218  Time Out: 1304  Total Billable Time: 46 minutes (denotes billable time)    Precautions: Standard and Partial weightbearing with TROM brace locked in extension, progress range of motion by 10-15 degrees per session    SUBJECTIVE     Pt reports: that he spoke with his MD again today in which he was told that PT will decide when to discontinue use of knee brace. Patient reports that he has been feeling good, no adverse reactions to last treatment.     He was compliant with home exercise program.  Response to previous treatment: No adverse reactions   Functional change: Improving pain free passive knee mobility, ambulating with more stability utilizing single point cane    Pain: 0/10  Location: right knee    OBJECTIVE     Objective Measures updated at progress report unless specified.     Treatment     Clay received therapeutic exercises to develop strength, endurance, ROM, flexibility, posture and core stabilization for (28) minutes including:     Intervention Performed Today    Quad Sets over Heel Prop  3x10 reps    PROM Knee Flexion controlled by PT x Obtained 95 degrees of flexion 8 minutes   Straight Leg Raises (added weight) x 3x10 reps 1 lb   Hamstring Stretch in Sitting  4x30 second holds     Gastroc Stretch in Standing   45 sec x2   sidelying hip ABD  Brace on 25x; 25x on left    Heel raises  30x   Hip 3 way standing (good foot on 6 in step)  GTB 2x10 reps each R   Standing TKE's (no brace) x Blue theraband x20 reps    Side Lying Hip 5 Ways (Added- no bicycles)  x15 reps each direction B   Hamstring Curls  x 3x10 reps green theraband    Long Arc Quads (added weight) x x25 reps 2 lbs   Heel Slides  x 3 minutes 10 second holds for each    Bridges (added) x 2x10 reps    NuStep (added) x 5 minutes level 2   Re-Assessed Objective Measurements  10 minutes     Plan for Next Visit: Progress knee flexion by 10-15 degrees each session or as tolerated by the patient     Clay received the following manual therapy techniques: Myofacial release and Soft tissue Mobilization were applied to the: right knee for (10) minutes, including:    Manual Intervention Performed Today    Soft Tissue Mobilization x right knee grossly and right quadriceps with right LE hanging off mat   Joint Mobilizations x Very gentle patellar   Mobilization with movement          Functional Dry Needling        Plan for Next Visit: Continue     Clay participated in neuromuscular re-education activities to improve: Coordination for (8) minutes. The following activities were included:    Intervention Performed Today    Car Transfer   To allow him to sit in the front seat, provided demonstration first prior to him performed transfer   Sit to Stands x 3x10 reps working on equal weightbearing   Shuttle (added)  3x10 reps double leg 4 bands                               Plan for Next Visit:      Patient Education and Home Exercises     Home Exercises Provided and Patient Education Provided     Education provided:   - Educated patient on importance of not   - Educated patient on the proper form and sequencing of all exercises provided in HEP today    Written Home Exercises Provided: Patient instructed to cont prior HEP. Exercises were reviewed and Clay  was able to demonstrate them prior to the end of the session.  Clay demonstrated good  understanding of the education provided. See EMR under Patient Instructions for exercises provided during therapy sessions.    ASSESSMENT     Patient noted with less restrictions at distal quadriceps today allowing to more easily move through end ranges of knee flexion mobility. Incorporated NuStep today to begin more functional mobility and strengthening with good tolerance. Incorporated bridges as well with emphasis on equal weightbearing bilaterally, emphasized this with sit to stands as well with better follow through today as compared to last session. Overall patient is tolerating strengthening throughout knee flexion range of motion well without any complaints of pain.    Clay Is progressing well towards his goals.   Pt prognosis is Fair.     Pt will continue to benefit from skilled outpatient physical therapy to address the deficits listed in the problem list box on initial evaluation, provide pt/family education and to maximize pt's level of independence in the home and community environment.     Pt's spiritual, cultural and educational needs considered and pt agreeable to plan of care and goals.     Anticipated barriers to physical therapy: adherence to treatment plan and overexerting himself due to high motivation to return to prior level of function    Goals:   Short Term Goals:  6 weeks 3/23/2022   1. Function: Patient will demonstrate improved function as indicated by a score of less than or equal to 47% impairment on FOTO.   Not assessed today   2. Mobility: Patient will improve AROM to 50% of stated goals, listed in objective measures above, in order to progress towards independence with functional activities.   Met   3. Strength: Patient will improve strength to 3+/5 in all muscle groups, listed in objective measures above, in order to progress towards independence with functional activities.   Partially Met    4. Gait: Patient will demonstrate improved gait mechanics including more functional knee flexion and extension (per MD approval) in order to improve functional mobility, improve quality of life, and decrease risk of further injury or fall.   Progressing   5. HEP: Patient will demonstrate independence with HEP in order to progress toward functional independence.  Met      Long Term Goals:  12 weeks/Ongoing 3/23/2022   1. Pain: Pt will demonstrate improved pain by reports of less than or equal to 1/10 worst pain on the verbal rating scale in order to progress toward maximal functional ability and improve QOL.    Met   2. Function: Patient will demonstrate improved function as indicated by a score of less than or equal to 36% impairment on FOTO.   Not assessed today   3. Mobility: Patient will improve AROM to stated goals, listed in objective measures above, in order to return to maximal functional potential and improve quality of life.  Progressing   4. Strength: Patient will improve strength to stated goals, listed in objective measures above, in order to improve functional independence and quality of life.  Progressing   5. Gait: Patient will demonstrate normalized gait mechanics with minimal compensation in order to return to PLOF.  Progressing   6. Patient will return to normal ADL's, IADL's, recreational activities, and work-related activities with less than or equal to 1/10 pain and maximal function.   Progressing   7. Patient will be able to return to active lifestyle of working out 5 days per week without pain or instability to safely return back to prior level of function.   Progressing      PLAN     Continue POC and frequency as planned. Continue to progress range of motion per MD restrictions and strength to tolerance taking into account protocol/restrictions.      These services are reasonable and necessary for the conditions set forth above while under my care.    Kasia Quiñones, PT, DPT

## 2022-03-30 ENCOUNTER — CLINICAL SUPPORT (OUTPATIENT)
Dept: REHABILITATION | Facility: HOSPITAL | Age: 77
End: 2022-03-30
Payer: MEDICARE

## 2022-03-30 DIAGNOSIS — R53.1 DECREASED STRENGTH, ENDURANCE, AND MOBILITY: Primary | ICD-10-CM

## 2022-03-30 DIAGNOSIS — Z74.09 DECREASED STRENGTH, ENDURANCE, AND MOBILITY: Primary | ICD-10-CM

## 2022-03-30 DIAGNOSIS — R26.89 FUNCTIONAL GAIT ABNORMALITY: ICD-10-CM

## 2022-03-30 DIAGNOSIS — R68.89 DECREASED STRENGTH, ENDURANCE, AND MOBILITY: Primary | ICD-10-CM

## 2022-03-30 PROCEDURE — 97140 MANUAL THERAPY 1/> REGIONS: CPT

## 2022-03-30 PROCEDURE — 97110 THERAPEUTIC EXERCISES: CPT

## 2022-03-30 PROCEDURE — 97112 NEUROMUSCULAR REEDUCATION: CPT

## 2022-03-30 NOTE — PROGRESS NOTES
[facility]   Physical Therapy Treatment Note    Name: Clay Marcos  Clinic Number: [id number]    Therapy Diagnosis:   Encounter Diagnoses   Name Primary?    Decreased strength, endurance, and mobility Yes    Functional gait abnormality      Physician: Heath Park MD    Visit Date: 3/30/2022    Physician Orders: PT Eval and Treat  Medical Diagnosis from Referral: S/P total knee arthroplasty right,  Status post open reduction and internal fixation (ORIF) of fracture   Evaluation Date: 2/24/2022  Authorization Period Expiration: 3/15/2022  Plan of Care Expiration: 5/24/2022  Visit # / Visits authorized: 16/40     FOTO: 1/3    PTA Visit #: 0/5     Progress Note Due on 4/24/2022    Time In: 1216  Time Out: 1303  Total Billable Time: 45 minutes (denotes billable time)    Precautions: Standard and Partial weightbearing with TROM brace locked in extension, progress range of motion by 10-15 degrees per session    SUBJECTIVE     Pt reports: that he was sore after last visit, still some residual soreness present. Patient reports that he did not have any pain following though.     He was compliant with home exercise program.  Response to previous treatment: No adverse reactions   Functional change: Improving pain free passive knee mobility, ambulating with more stability utilizing single point cane    Pain: 0/10  Location: right knee    OBJECTIVE     Objective Measures updated at progress report unless specified.     Treatment     Clay received therapeutic exercises to develop strength, endurance, ROM, flexibility, posture and core stabilization for (27) minutes including:     Intervention Performed Today    Quad Sets over Heel Prop  3x10 reps    PROM Knee Flexion controlled by PT x Obtained 95 degrees of flexion 8 minutes   Straight Leg Raises (added weight) x 3x10 reps 1 lb   Hamstring Stretch in Sitting  4x30 second holds    Gastroc Stretch in Standing   45 sec x2   sidelying hip ABD  Brace  on 25x; 25x on left    Heel raises  30x   Hip 3 way standing (good foot on 6 in step)  GTB 2x10 reps each R        Side Lying Hip 5 Ways (Added- no bicycles)  x15 reps each direction B   Hamstring Curls   3x10 reps green theraband    Long Arc Quads  x 3x10 reps 2 lbs   Heel Slides  x 3 minutes 10 second holds for each    Bridges (added) x 3x10 reps    Recumbent Bike (added) x 5 minutes level 1   Re-Assessed Objective Measurements  10 minutes     Plan for Next Visit: Progress knee flexion by 10-15 degrees each session or as tolerated by the patient     Clay received the following manual therapy techniques: Myofacial release and Soft tissue Mobilization were applied to the: right knee for (10) minutes, including:    Manual Intervention Performed Today    Soft Tissue Mobilization x right knee grossly and right quadriceps with right LE hanging off mat   Joint Mobilizations x Very gentle patellar   Mobilization with movement          Functional Dry Needling        Plan for Next Visit: Continue     Clay participated in neuromuscular re-education activities to improve: Coordination for (8) minutes. The following activities were included:    Intervention Performed Today    Car Transfer   To allow him to sit in the front seat, provided demonstration first prior to him performed transfer   Sit to Stands x 3x10 reps working on equal weightbearing   Shuttle (added)  3x10 reps double leg 4 bands    Terminal Knee Extension  x Black theraband x20 reps                          Plan for Next Visit:      Patient Education and Home Exercises     Home Exercises Provided and Patient Education Provided     Education provided:   - Educated patient on importance of not   - Educated patient on the proper form and sequencing of all exercises provided in HEP today    Written Home Exercises Provided: Patient instructed to cont prior HEP. Exercises were reviewed and Clay was able to demonstrate them prior to the end of the session.  Clay  demonstrated good  understanding of the education provided. See EMR under Patient Instructions for exercises provided during therapy sessions.    ASSESSMENT     Patient noted with improved knee flexion mobility to 107 degrees today. Continued to focus on right knee mobility as well as lower extremity strengthening, progressing TKE therband resistance today. Patient was very sore after last session so did not progress to much today, plan to progress more after next session.     Clay Is progressing well towards his goals.   Pt prognosis is Fair.     Pt will continue to benefit from skilled outpatient physical therapy to address the deficits listed in the problem list box on initial evaluation, provide pt/family education and to maximize pt's level of independence in the home and community environment.     Pt's spiritual, cultural and educational needs considered and pt agreeable to plan of care and goals.     Anticipated barriers to physical therapy: adherence to treatment plan and overexerting himself due to high motivation to return to prior level of function    Goals:   Short Term Goals:  6 weeks 3/23/2022   1. Function: Patient will demonstrate improved function as indicated by a score of less than or equal to 47% impairment on FOTO.   Not assessed today   2. Mobility: Patient will improve AROM to 50% of stated goals, listed in objective measures above, in order to progress towards independence with functional activities.   Met   3. Strength: Patient will improve strength to 3+/5 in all muscle groups, listed in objective measures above, in order to progress towards independence with functional activities.   Partially Met   4. Gait: Patient will demonstrate improved gait mechanics including more functional knee flexion and extension (per MD approval) in order to improve functional mobility, improve quality of life, and decrease risk of further injury or fall.   Progressing   5. HEP: Patient will demonstrate  independence with HEP in order to progress toward functional independence.  Met      Long Term Goals:  12 weeks/Ongoing 3/23/2022   1. Pain: Pt will demonstrate improved pain by reports of less than or equal to 1/10 worst pain on the verbal rating scale in order to progress toward maximal functional ability and improve QOL.    Met   2. Function: Patient will demonstrate improved function as indicated by a score of less than or equal to 36% impairment on FOTO.   Not assessed today   3. Mobility: Patient will improve AROM to stated goals, listed in objective measures above, in order to return to maximal functional potential and improve quality of life.  Progressing   4. Strength: Patient will improve strength to stated goals, listed in objective measures above, in order to improve functional independence and quality of life.  Progressing   5. Gait: Patient will demonstrate normalized gait mechanics with minimal compensation in order to return to PLOF.  Progressing   6. Patient will return to normal ADL's, IADL's, recreational activities, and work-related activities with less than or equal to 1/10 pain and maximal function.   Progressing   7. Patient will be able to return to active lifestyle of working out 5 days per week without pain or instability to safely return back to prior level of function.   Progressing      PLAN     Continue POC and frequency as planned. Continue to progress range of motion per MD restrictions and strength to tolerance taking into account protocol/restrictions.      These services are reasonable and necessary for the conditions set forth above while under my care.    Kasia Quiñones, PT, DPT

## 2022-04-01 ENCOUNTER — CLINICAL SUPPORT (OUTPATIENT)
Dept: REHABILITATION | Facility: HOSPITAL | Age: 77
End: 2022-04-01
Payer: MEDICARE

## 2022-04-01 DIAGNOSIS — R53.1 DECREASED STRENGTH, ENDURANCE, AND MOBILITY: Primary | ICD-10-CM

## 2022-04-01 DIAGNOSIS — Z74.09 DECREASED STRENGTH, ENDURANCE, AND MOBILITY: Primary | ICD-10-CM

## 2022-04-01 DIAGNOSIS — R68.89 DECREASED STRENGTH, ENDURANCE, AND MOBILITY: Primary | ICD-10-CM

## 2022-04-01 DIAGNOSIS — R26.89 FUNCTIONAL GAIT ABNORMALITY: ICD-10-CM

## 2022-04-01 PROCEDURE — 97112 NEUROMUSCULAR REEDUCATION: CPT

## 2022-04-01 PROCEDURE — 97140 MANUAL THERAPY 1/> REGIONS: CPT

## 2022-04-01 PROCEDURE — 97110 THERAPEUTIC EXERCISES: CPT

## 2022-04-01 NOTE — PROGRESS NOTES
[facility]   Physical Therapy Treatment Note    Name: Clay Marcos  Clinic Number: [id number]    Therapy Diagnosis:   Encounter Diagnoses   Name Primary?    Decreased strength, endurance, and mobility Yes    Functional gait abnormality      Physician: Heath Park MD    Visit Date: 4/1/2022    Physician Orders: PT Eval and Treat  Medical Diagnosis from Referral: S/P total knee arthroplasty right,  Status post open reduction and internal fixation (ORIF) of fracture   Evaluation Date: 2/24/2022  Authorization Period Expiration: 3/15/2022  Plan of Care Expiration: 5/24/2022  Visit # / Visits authorized: 17/40     FOTO: 1/3    PTA Visit #: 0/5     Progress Note Due on 4/24/2022    Time In: 1217  Time Out: 1304  Total Billable Time: 45 minutes (denotes billable time)    Precautions: Standard and Partial weightbearing with TROM brace locked in extension, progress range of motion by 10-15 degrees per session    SUBJECTIVE     Pt reports: that he was still sore after last visit although manageable. No pain reported.      He was compliant with home exercise program.  Response to previous treatment: No adverse reactions   Functional change: Improving pain free passive knee mobility, ambulating with more stability utilizing single point cane    Pain: 0/10  Location: right knee    OBJECTIVE     Objective Measures updated at progress report unless specified.     Treatment     Clay received therapeutic exercises to develop strength, endurance, ROM, flexibility, posture and core stabilization for (18) minutes including:     Intervention Performed Today    Quad Sets over Heel Prop  3x10 reps    PROM Knee Flexion controlled by PT  Obtained 95 degrees of flexion 8 minutes   Straight Leg Raises (added weight) x 3x10 reps 1 lb   Hamstring Stretch in Sitting  4x30 second holds    Gastroc Stretch in Standing   45 sec x2   sidelying hip ABD  Brace on 25x; 25x on left    Heel raises  30x   Hip 3 way  standing (good foot on 6 in step)  GTB 2x10 reps each R        Side Lying Hip 5 Ways (Added- no bicycles)  x15 reps each direction B   Hamstring Curls  x 2x10 reps blue theraband    Long Arc Quads (increased weight) x 2x10 reps 4 lbs   Heel Slides  x 3 minutes 10 second holds for each    Bridges (added)  3x10 reps    Recumbent Bike (added) x 5 minutes level 1   Re-Assessed Objective Measurements  10 minutes     Plan for Next Visit: Progress knee flexion by 10-15 degrees each session or as tolerated by the patient     Clay received the following manual therapy techniques: Myofacial release and Soft tissue Mobilization were applied to the: right knee for (12) minutes, including:    Manual Intervention Performed Today    Soft Tissue Mobilization x right knee grossly and right quadriceps with right LE hanging off mat   Joint Mobilizations x Very gentle patellar   Mobilization with movement          Functional Dry Needling        Plan for Next Visit: Continue     Clay participated in neuromuscular re-education activities to improve: Coordination for (10) minutes. The following activities were included:    Intervention Performed Today    Car Transfer   To allow him to sit in the front seat, provided demonstration first prior to him performed transfer   Sit to Stands x 3x10 reps with tap to chair with UE support   Shuttle (added) X  x Double Leg 3x10 reps 5 bands   Single Leg 2x10 reps 3 bands (added)   Terminal Knee Extension   Black theraband x20 reps                          Plan for Next Visit:      Patient Education and Home Exercises     Home Exercises Provided and Patient Education Provided     Education provided:   - Educated patient on importance of not   - Educated patient on the proper form and sequencing of all exercises provided in HEP today    Written Home Exercises Provided: Patient instructed to cont prior HEP. Exercises were reviewed and Clay was able to demonstrate them prior to the end of the session.   Clay demonstrated good  understanding of the education provided. See EMR under Patient Instructions for exercises provided during therapy sessions.    ASSESSMENT     Patient continues to demonstrate improving knee flexion mobility. Continued to progress exercises with more resistance and weight incorporated today. Sit to stands progressed to more of a squat without sitting rest between each repetition, very fatigued after this. Overall felt good upon exiting session today.     Clay Is progressing well towards his goals.   Pt prognosis is Fair.     Pt will continue to benefit from skilled outpatient physical therapy to address the deficits listed in the problem list box on initial evaluation, provide pt/family education and to maximize pt's level of independence in the home and community environment.     Pt's spiritual, cultural and educational needs considered and pt agreeable to plan of care and goals.     Anticipated barriers to physical therapy: adherence to treatment plan and overexerting himself due to high motivation to return to prior level of function    Goals:   Short Term Goals:  6 weeks 3/23/2022   1. Function: Patient will demonstrate improved function as indicated by a score of less than or equal to 47% impairment on FOTO.   Not assessed today   2. Mobility: Patient will improve AROM to 50% of stated goals, listed in objective measures above, in order to progress towards independence with functional activities.   Met   3. Strength: Patient will improve strength to 3+/5 in all muscle groups, listed in objective measures above, in order to progress towards independence with functional activities.   Partially Met   4. Gait: Patient will demonstrate improved gait mechanics including more functional knee flexion and extension (per MD approval) in order to improve functional mobility, improve quality of life, and decrease risk of further injury or fall.   Progressing   5. HEP: Patient will demonstrate  independence with HEP in order to progress toward functional independence.  Met      Long Term Goals:  12 weeks/Ongoing 3/23/2022   1. Pain: Pt will demonstrate improved pain by reports of less than or equal to 1/10 worst pain on the verbal rating scale in order to progress toward maximal functional ability and improve QOL.    Met   2. Function: Patient will demonstrate improved function as indicated by a score of less than or equal to 36% impairment on FOTO.   Not assessed today   3. Mobility: Patient will improve AROM to stated goals, listed in objective measures above, in order to return to maximal functional potential and improve quality of life.  Progressing   4. Strength: Patient will improve strength to stated goals, listed in objective measures above, in order to improve functional independence and quality of life.  Progressing   5. Gait: Patient will demonstrate normalized gait mechanics with minimal compensation in order to return to PLOF.  Progressing   6. Patient will return to normal ADL's, IADL's, recreational activities, and work-related activities with less than or equal to 1/10 pain and maximal function.   Progressing   7. Patient will be able to return to active lifestyle of working out 5 days per week without pain or instability to safely return back to prior level of function.   Progressing      PLAN     Continue POC and frequency as planned. Continue to progress range of motion per MD restrictions and strength to tolerance taking into account protocol/restrictions.      These services are reasonable and necessary for the conditions set forth above while under my care.    Kasia Quiñones, PT, DPT

## 2022-04-04 ENCOUNTER — CLINICAL SUPPORT (OUTPATIENT)
Dept: REHABILITATION | Facility: HOSPITAL | Age: 77
End: 2022-04-04
Payer: MEDICARE

## 2022-04-04 DIAGNOSIS — R26.89 FUNCTIONAL GAIT ABNORMALITY: ICD-10-CM

## 2022-04-04 DIAGNOSIS — R68.89 DECREASED STRENGTH, ENDURANCE, AND MOBILITY: Primary | ICD-10-CM

## 2022-04-04 DIAGNOSIS — R53.1 DECREASED STRENGTH, ENDURANCE, AND MOBILITY: Primary | ICD-10-CM

## 2022-04-04 DIAGNOSIS — Z74.09 DECREASED STRENGTH, ENDURANCE, AND MOBILITY: Primary | ICD-10-CM

## 2022-04-04 PROCEDURE — 97110 THERAPEUTIC EXERCISES: CPT

## 2022-04-04 PROCEDURE — 97140 MANUAL THERAPY 1/> REGIONS: CPT

## 2022-04-04 PROCEDURE — 97112 NEUROMUSCULAR REEDUCATION: CPT

## 2022-04-04 NOTE — PROGRESS NOTES
[facility]   Physical Therapy Treatment Note    Name: Clay Marcos  Clinic Number: [id number]    Therapy Diagnosis:   Encounter Diagnoses   Name Primary?    Decreased strength, endurance, and mobility Yes    Functional gait abnormality      Physician: Heath Park MD    Visit Date: 4/4/2022    Physician Orders: PT Eval and Treat  Medical Diagnosis from Referral: S/P total knee arthroplasty right,  Status post open reduction and internal fixation (ORIF) of fracture   Evaluation Date: 2/24/2022  Authorization Period Expiration: 3/15/2022  Plan of Care Expiration: 5/24/2022  Visit # / Visits authorized: 18/40     FOTO: 1/3    PTA Visit #: 0/5     Progress Note Due on 4/24/2022    Time In: 1247  Time Out: 1330  Total Billable Time: 43 minutes (denotes billable time)    Precautions: Standard and Partial weightbearing with TROM brace locked in extension, progress range of motion by 10-15 degrees per session    SUBJECTIVE     Pt reports: to therapy without brace donned today as he reports that he drove himself to therapy. Patient reports that he has another appointment after this so he did not want to make his wife wait in the car.     He was compliant with home exercise program.  Response to previous treatment: No adverse reactions   Functional change: Improving pain free passive knee mobility, ambulating with more stability utilizing single point cane    Pain: 0/10  Location: right knee    OBJECTIVE     Objective Measures updated at progress report unless specified.     Treatment     Clay received therapeutic exercises to develop strength, endurance, ROM, flexibility, posture and core stabilization for (18) minutes including:     Intervention Performed Today    Quad Sets over Heel Prop  3x10 reps    PROM Knee Flexion controlled by PT  Obtained 95 degrees of flexion 8 minutes   Straight Leg Raises (added weight)  3x10 reps 1 lb   Hamstring Stretch in Sitting  4x30 second holds    Gastroc  Stretch in Standing   45 sec x2   sidelying hip ABD  Brace on 25x; 25x on left    Heel raises  30x   Hip 3 way standing (good foot on 6 in step)  GTB 2x10 reps each R        Side Lying Hip 5 Ways (Added- no bicycles)  x15 reps each direction B   Hamstring Curls  x x25 reps blue theraband    Long Arc Quads (increased weight) x x25 reps 6 lbs   Heel Slides  x 2 minutes 10 second holds for each passive with strap, 2 minute 10 second holds actively   Bridges  X  x 2x10 reps   With theraball 2x10 reps (added)   Recumbent Bike (added) x 5 minutes level 2   Re-Assessed Objective Measurements  10 minutes     Plan for Next Visit: Progress knee flexion by 10-15 degrees each session or as tolerated by the patient     Clay received the following manual therapy techniques: Myofacial release and Soft tissue Mobilization were applied to the: right knee for (10) minutes, including:    Manual Intervention Performed Today    Soft Tissue Mobilization x right knee grossly and right quadriceps with right LE hanging off mat   Joint Mobilizations  Very gentle patellar   Mobilization with movement          Functional Dry Needling        Plan for Next Visit: Continue     Clay participated in neuromuscular re-education activities to improve: Coordination for (15) minutes. The following activities were included:    Intervention Performed Today    Car Transfer   To allow him to sit in the front seat, provided demonstration first prior to him performed transfer   Sit to Stands x 3x10 reps with tap to chair with UE support   Shuttle (added)  Double Leg 3x10 reps 5 bands   Single Leg 2x10 reps 3 bands (added)   Terminal Knee Extension  x Black theraband x20 reps    Stair Negotiation (added) X  x Step to step 4 steps x5 reps  Step over step 4 steps x3 reps                     Plan for Next Visit:      Patient Education and Home Exercises     Home Exercises Provided and Patient Education Provided     Education provided:   - Educated patient on  importance of not   - Educated patient on the proper form and sequencing of all exercises provided in HEP today    Written Home Exercises Provided: Patient instructed to cont prior HEP. Exercises were reviewed and Clay was able to demonstrate them prior to the end of the session.  Clay demonstrated good  understanding of the education provided. See EMR under Patient Instructions for exercises provided during therapy sessions.    ASSESSMENT     Patient was able to demonstrate improved knee flexion to 117 degrees today. Progressed bridges to be performed from theraball to challenge balance and stability a bit more. Increased resistance and weight for all exercises today with good tolerance. Patient noted with better transitioning with sit to stand/squats today although continued soreness was present. Trialled stair negotiation, educating patient on proper sequencing when performing step to step with the ability to progress to step over step patterning.    Clay Is progressing well towards his goals.   Pt prognosis is Fair.     Pt will continue to benefit from skilled outpatient physical therapy to address the deficits listed in the problem list box on initial evaluation, provide pt/family education and to maximize pt's level of independence in the home and community environment.     Pt's spiritual, cultural and educational needs considered and pt agreeable to plan of care and goals.     Anticipated barriers to physical therapy: adherence to treatment plan and overexerting himself due to high motivation to return to prior level of function    Goals:   Short Term Goals:  6 weeks 3/23/2022   1. Function: Patient will demonstrate improved function as indicated by a score of less than or equal to 47% impairment on FOTO.   Not assessed today   2. Mobility: Patient will improve AROM to 50% of stated goals, listed in objective measures above, in order to progress towards independence with functional activities.   Met    3. Strength: Patient will improve strength to 3+/5 in all muscle groups, listed in objective measures above, in order to progress towards independence with functional activities.   Partially Met   4. Gait: Patient will demonstrate improved gait mechanics including more functional knee flexion and extension (per MD approval) in order to improve functional mobility, improve quality of life, and decrease risk of further injury or fall.   Progressing   5. HEP: Patient will demonstrate independence with HEP in order to progress toward functional independence.  Met      Long Term Goals:  12 weeks/Ongoing 3/23/2022   1. Pain: Pt will demonstrate improved pain by reports of less than or equal to 1/10 worst pain on the verbal rating scale in order to progress toward maximal functional ability and improve QOL.    Met   2. Function: Patient will demonstrate improved function as indicated by a score of less than or equal to 36% impairment on FOTO.   Not assessed today   3. Mobility: Patient will improve AROM to stated goals, listed in objective measures above, in order to return to maximal functional potential and improve quality of life.  Progressing   4. Strength: Patient will improve strength to stated goals, listed in objective measures above, in order to improve functional independence and quality of life.  Progressing   5. Gait: Patient will demonstrate normalized gait mechanics with minimal compensation in order to return to PLOF.  Progressing   6. Patient will return to normal ADL's, IADL's, recreational activities, and work-related activities with less than or equal to 1/10 pain and maximal function.   Progressing   7. Patient will be able to return to active lifestyle of working out 5 days per week without pain or instability to safely return back to prior level of function.   Progressing      PLAN     Continue POC and frequency as planned. Continue to progress range of motion per MD restrictions and strength to  tolerance taking into account protocol/restrictions.      These services are reasonable and necessary for the conditions set forth above while under my care.    Kasia Quiñones, PT, DPT

## 2022-04-05 ENCOUNTER — DOCUMENTATION ONLY (OUTPATIENT)
Dept: REHABILITATION | Facility: HOSPITAL | Age: 77
End: 2022-04-05
Payer: MEDICARE

## 2022-04-05 ENCOUNTER — CLINICAL SUPPORT (OUTPATIENT)
Dept: REHABILITATION | Facility: HOSPITAL | Age: 77
End: 2022-04-05
Payer: MEDICARE

## 2022-04-05 DIAGNOSIS — R26.89 FUNCTIONAL GAIT ABNORMALITY: ICD-10-CM

## 2022-04-05 DIAGNOSIS — Z74.09 DECREASED STRENGTH, ENDURANCE, AND MOBILITY: Primary | ICD-10-CM

## 2022-04-05 DIAGNOSIS — R68.89 DECREASED STRENGTH, ENDURANCE, AND MOBILITY: Primary | ICD-10-CM

## 2022-04-05 DIAGNOSIS — R53.1 DECREASED STRENGTH, ENDURANCE, AND MOBILITY: Primary | ICD-10-CM

## 2022-04-05 PROCEDURE — 97110 THERAPEUTIC EXERCISES: CPT | Mod: CQ

## 2022-04-05 PROCEDURE — 97140 MANUAL THERAPY 1/> REGIONS: CPT | Mod: CQ

## 2022-04-05 PROCEDURE — 97112 NEUROMUSCULAR REEDUCATION: CPT | Mod: CQ

## 2022-04-05 NOTE — PROGRESS NOTES
[facility]   Physical Therapy Treatment Note    Name: Clay Marcos  Clinic Number: [id number]    Therapy Diagnosis:   Encounter Diagnoses   Name Primary?    Decreased strength, endurance, and mobility Yes    Functional gait abnormality      Physician: Heath Park MD    Visit Date: 4/5/2022    Physician Orders: PT Eval and Treat  Medical Diagnosis from Referral: S/P total knee arthroplasty right,  Status post open reduction and internal fixation (ORIF) of fracture   Evaluation Date: 2/24/2022  Authorization Period Expiration: 3/15/2022  Plan of Care Expiration: 5/24/2022  Visit # / Visits authorized: 19/40     FOTO: 1/3    PTA Visit #: 1/5     Progress Note Due on 4/24/2022    Time In: 1030  Time Out: 1130  Total Billable Time: 40 minutes (denotes billable time)    Precautions: Standard and Partial weightbearing with TROM brace locked in extension, progress range of motion as tolerated     SUBJECTIVE     Pt reports: that he feels really good today    He was compliant with home exercise program.    Response to previous treatment: No adverse reactions     Functional change: Improving pain free passive knee mobility, ambulating with more stability utilizing single point cane    Pain: 0/10  Location: right knee (no pain today)    OBJECTIVE     Objective Measures updated at progress report unless specified.     Treatment     Clay received therapeutic exercises to develop strength, endurance, ROM, flexibility, posture and core stabilization for (15) minutes including:     Intervention Performed Today    Quad Sets over Heel Prop  3x10 reps    PROM Knee Flexion controlled by PT  Obtained 95 degrees of flexion 8 minutes   Straight Leg Raises (added weight)  3x10 reps 1 pound   Hamstring Stretch in Sitting  4x30 second holds    Gastroc Stretch in Standing   45 seconds x2   sidelying hip ABD  Brace on 25x; 25x on left    Heel raises  30x   Hip 3 way standing (good foot on floor) x Green Band  3x10 reps each lower extremity 1 hand assist bilateral lower extremity         Side Lying Hip 5 Ways (Added- no bicycles)  x15 reps each direction Bilateral   Hamstring Curls   x25 reps blue theraband    Long Arc Quads (increased weight)  x25 reps 6 poinds   Heel Slides   2 minutes 10 second holds for each passive with strap, 2 minute 10 second holds actively   Bridges     2x10 reps   With theraball 2x10 reps (added)   Recumbent Bike  x 5 minutes level 2   Re-Assessed Objective Measurements  10 minutes     Plan for Next Visit: Progress knee flexion by 10-15 degrees each session or as tolerated by the patient     Clay received the following manual therapy techniques: Myofacial release and Soft tissue Mobilization were applied to the: right knee for (10) minutes, including:    Manual Intervention Performed Today    Soft Tissue Mobilization x right knee grossly and right distal quadriceps    Joint Mobilizations x    Mobilization with movement          Functional Dry Needling        Plan for Next Visit: Continue     Clay participated in neuromuscular re-education activities to improve: Coordination for (15) minutes. The following activities were included:    Intervention Performed Today    Car Transfer   To allow him to sit in the front seat, provided demonstration first prior to him performed transfer   Sit to Stands  3x10 reps with tap to chair with UE support   Shuttle (added) X  x Double Leg 3 x 10 reps 6 bands (increased)  Single Leg 3 x 10 reps 4 bands (increased)   Terminal Knee Extension   Black therab ad x20 reps    Stair Negotiation (added)    Step to step 4 steps x5 reps  Step over step 4 steps x3 reps                     Plan for Next Visit:      Patient Education and Home Exercises     Home Exercises Provided and Patient Education Provided     Education provided:   - Educated patient on importance of not   - Educated patient on the proper form and sequencing of all exercises provided in home exercise  program today    Written Home Exercises Provided: Patient instructed to cont prior home exercise program. Exercises were reviewed and Clay was able to demonstrate them prior to the end of the session.  Clay demonstrated good  understanding of the education provided. See electronic medical record under Patient Instructions for exercises provided during therapy sessions.    ASSESSMENT     Patient was able to demonstrate improved knee flexion to 121 degrees today. Increased resistance with bilateral and unilateral shuttle exercise today with no complaints and with good quality.    Clay Is progressing well towards his goals.   Pt prognosis is Fair.     Pt will continue to benefit from skilled outpatient physical therapy to address the deficits listed in the problem list box on initial evaluation, provide pt/family education and to maximize pt's level of independence in the home and community environment.     Pt's spiritual, cultural and educational needs considered and pt agreeable to plan of care and goals.     Anticipated barriers to physical therapy: adherence to treatment plan and overexerting himself due to high motivation to return to prior level of function    Goals:   Short Term Goals:  6 weeks 3/23/2022   1. Function: Patient will demonstrate improved function as indicated by a score of less than or equal to 47% impairment on FOTO.   Not assessed today   2. Mobility: Patient will improve active range of motion to 50% of stated goals, listed in objective measures above, in order to progress towards independence with functional activities.   Met   3. Strength: Patient will improve strength to 3+/5 in all muscle groups, listed in objective measures above, in order to progress towards independence with functional activities.   Partially Met   4. Gait: Patient will demonstrate improved gait mechanics including more functional knee flexion and extension (per MD approval) in order to improve functional mobility,  improve quality of life, and decrease risk of further injury or fall.   Progressing   5. Home exercise program: Patient will demonstrate independence with home exercise program in order to progress toward functional independence.  Met      Long Term Goals:  12 weeks/Ongoing 3/23/2022   1. Pain: Pt will demonstrate improved pain by reports of less than or equal to 1/10 worst pain on the verbal rating scale in order to progress toward maximal functional ability and improve quality of life.    Met   2. Function: Patient will demonstrate improved function as indicated by a score of less than or equal to 36% impairment on FOTO.   Not assessed today   3. Mobility: Patient will improve AROM to stated goals, listed in objective measures above, in order to return to maximal functional potential and improve quality of life.  Progressing   4. Strength: Patient will improve strength to stated goals, listed in objective measures above, in order to improve functional independence and quality of life.  Progressing   5. Gait: Patient will demonstrate normalized gait mechanics with minimal compensation in order to return to prior level of function.  Progressing   6. Patient will return to normal activties of daily living, IADL's, recreational activities, and work-related activities with less than or equal to 1/10 pain and maximal function.   Progressing   7. Patient will be able to return to active lifestyle of working out 5 days per week without pain or instability to safely return back to prior level of function.   Progressing      PLAN     Continue plan of care and frequency as planned. Continue to progress range of motion per Medical Doctor restrictions and strength to tolerance taking into account protocol/restrictions.      These services are reasonable and necessary for the conditions set forth above while under my care.    Damien Izaguirre, PTA

## 2022-04-05 NOTE — PROGRESS NOTES
[facility]   Physical Therapy Treatment Note    Name: Clay Marcos  Clinic Number: [id number]    Therapy Diagnosis:   Encounter Diagnoses   Name Primary?    Decreased strength, endurance, and mobility Yes    Functional gait abnormality      Physician: Heath Park MD    Visit Date: 4/5/2022    Physician Orders: PT Eval and Treat  Medical Diagnosis from Referral: S/P total knee arthroplasty right,  Status post open reduction and internal fixation (ORIF) of fracture   Evaluation Date: 2/24/2022  Authorization Period Expiration: 3/15/2022  Plan of Care Expiration: 5/24/2022  Visit # / Visits authorized: 19/40     FOTO: 1/3    PTA Visit #: 0/5     Progress Note Due on 4/24/2022    Time In: 1031  Time Out: 11***  Total Billable Time: 43 minutes (denotes billable time)    Precautions: Standard and Partial weightbearing with TROM brace locked in extension, progress range of motion by 10-15 degrees per session    SUBJECTIVE     Pt reports: that he     He was compliant with home exercise program.  Response to previous treatment: No adverse reactions   Functional change: Improving pain free passive knee mobility, ambulating with more stability utilizing single point cane    Pain: 0/10  Location: right knee    OBJECTIVE     Objective Measures updated at progress report unless specified.     Treatment     Clay received therapeutic exercises to develop strength, endurance, ROM, flexibility, posture and core stabilization for (18) minutes including:     Intervention Performed Today    Quad Sets over Heel Prop  3x10 reps    PROM Knee Flexion controlled by PT  Obtained 95 degrees of flexion 8 minutes   Straight Leg Raises (added weight)  3x10 reps 1 lb   Hamstring Stretch in Sitting  4x30 second holds    Gastroc Stretch in Standing   45 sec x2   sidelying hip ABD  Brace on 25x; 25x on left    Heel raises  30x   Hip 3 way standing (good foot on 6 in step)  GTB 2x10 reps each R        Side Lying Hip 5  Ways (Added- no bicycles)  x15 reps each direction B   Hamstring Curls  x x25 reps blue theraband    Long Arc Quads (increased weight) x x25 reps 6 lbs   Heel Slides  x 2 minutes 10 second holds for each passive with strap, 2 minute 10 second holds actively   Bridges  X  x 2x10 reps   With theraball 2x10 reps (added)   Recumbent Bike (added) x 5 minutes level 2   Re-Assessed Objective Measurements  10 minutes     Plan for Next Visit: Progress knee flexion by 10-15 degrees each session or as tolerated by the patient     Clay received the following manual therapy techniques: Myofacial release and Soft tissue Mobilization were applied to the: right knee for (10) minutes, including:    Manual Intervention Performed Today    Soft Tissue Mobilization x right knee grossly and right quadriceps with right LE hanging off mat   Joint Mobilizations  Very gentle patellar   Mobilization with movement          Functional Dry Needling        Plan for Next Visit: Continue     Clay participated in neuromuscular re-education activities to improve: Coordination for (15) minutes. The following activities were included:    Intervention Performed Today    Car Transfer   To allow him to sit in the front seat, provided demonstration first prior to him performed transfer   Sit to Stands x 3x10 reps with tap to chair with UE support   Shuttle (added)  Double Leg 3x10 reps 5 bands   Single Leg 2x10 reps 3 bands (added)   Terminal Knee Extension  x Black theraband x20 reps    Stair Negotiation (added) X  x Step to step 4 steps x5 reps  Step over step 4 steps x3 reps                     Plan for Next Visit:      Patient Education and Home Exercises     Home Exercises Provided and Patient Education Provided     Education provided:   - Educated patient on importance of not   - Educated patient on the proper form and sequencing of all exercises provided in HEP today    Written Home Exercises Provided: Patient instructed to cont prior HEP. Exercises  were reviewed and Clay was able to demonstrate them prior to the end of the session.  Clay demonstrated good  understanding of the education provided. See EMR under Patient Instructions for exercises provided during therapy sessions.    ASSESSMENT     Patient was able to demonstrate improved knee flexion to 117 degrees today. Progressed bridges to be performed from theraball to challenge balance and stability a bit more. Increased resistance and weight for all exercises today with good tolerance. Patient noted with better transitioning with sit to stand/squats today although continued soreness was present. Trialled stair negotiation, educating patient on proper sequencing when performing step to step with the ability to progress to step over step patterning.    Clay Is progressing well towards his goals.   Pt prognosis is Fair.     Pt will continue to benefit from skilled outpatient physical therapy to address the deficits listed in the problem list box on initial evaluation, provide pt/family education and to maximize pt's level of independence in the home and community environment.     Pt's spiritual, cultural and educational needs considered and pt agreeable to plan of care and goals.     Anticipated barriers to physical therapy: adherence to treatment plan and overexerting himself due to high motivation to return to prior level of function    Goals:   Short Term Goals:  6 weeks 3/23/2022   1. Function: Patient will demonstrate improved function as indicated by a score of less than or equal to 47% impairment on FOTO.   Not assessed today   2. Mobility: Patient will improve AROM to 50% of stated goals, listed in objective measures above, in order to progress towards independence with functional activities.   Met   3. Strength: Patient will improve strength to 3+/5 in all muscle groups, listed in objective measures above, in order to progress towards independence with functional activities.   Partially Met    4. Gait: Patient will demonstrate improved gait mechanics including more functional knee flexion and extension (per MD approval) in order to improve functional mobility, improve quality of life, and decrease risk of further injury or fall.   Progressing   5. HEP: Patient will demonstrate independence with HEP in order to progress toward functional independence.  Met      Long Term Goals:  12 weeks/Ongoing 3/23/2022   1. Pain: Pt will demonstrate improved pain by reports of less than or equal to 1/10 worst pain on the verbal rating scale in order to progress toward maximal functional ability and improve QOL.    Met   2. Function: Patient will demonstrate improved function as indicated by a score of less than or equal to 36% impairment on FOTO.   Not assessed today   3. Mobility: Patient will improve AROM to stated goals, listed in objective measures above, in order to return to maximal functional potential and improve quality of life.  Progressing   4. Strength: Patient will improve strength to stated goals, listed in objective measures above, in order to improve functional independence and quality of life.  Progressing   5. Gait: Patient will demonstrate normalized gait mechanics with minimal compensation in order to return to PLOF.  Progressing   6. Patient will return to normal ADL's, IADL's, recreational activities, and work-related activities with less than or equal to 1/10 pain and maximal function.   Progressing   7. Patient will be able to return to active lifestyle of working out 5 days per week without pain or instability to safely return back to prior level of function.   Progressing      PLAN     Continue POC and frequency as planned. Continue to progress range of motion per MD restrictions and strength to tolerance taking into account protocol/restrictions.      These services are reasonable and necessary for the conditions set forth above while under my care.    Kasia Quiñones, PT, DPT

## 2022-04-05 NOTE — PROGRESS NOTES
[facility]   Physical Therapy Treatment Note    Name: Clay Marcos  Clinic Number: [id number]    Therapy Diagnosis:   Encounter Diagnoses   Name Primary?    Decreased strength, endurance, and mobility Yes    Functional gait abnormality      Physician: Heath Park MD    Visit Date: 4/5/2022    Physician Orders: PT Eval and Treat  Medical Diagnosis from Referral: S/P total knee arthroplasty right,  Status post open reduction and internal fixation (ORIF) of fracture   Evaluation Date: 2/24/2022  Authorization Period Expiration: 3/15/2022  Plan of Care Expiration: 5/24/2022  Visit # / Visits authorized: 19/40     FOTO: 1/3    PTA Visit #: 1/5     Progress Note Due on 4/24/2022    Time In: 1030  Time Out: 1115  Total Billable Time: 40 minutes (denotes billable time)    Precautions: Standard and Partial weightbearing with TROM brace locked in extension, progress range of motion as tolerated     SUBJECTIVE     Pt reports: that he feels really good today    He was compliant with home exercise program.    Response to previous treatment: No adverse reactions     Functional change: Improving pain free passive knee mobility, ambulating with more stability utilizing single point cane    Pain: 0/10  Location: right knee (no pain today)    OBJECTIVE     Objective Measures updated at progress report unless specified.     Treatment     Clay received therapeutic exercises to develop strength, endurance, range of motion, flexibility, posture and core stabilization for (15) minutes including:     Intervention Performed Today    Quad Sets over Heel Prop  3x10 reps    PROM Knee Flexion controlled by PT  Obtained 95 degrees of flexion 8 minutes   Straight Leg Raises (added weight)  3x10 reps 1 pound   Hamstring Stretch in Sitting  4x30 second holds    Gastroc Stretch in Standing   45 seconds x2   sidelying hip ABD  Brace on 25x; 25x on left    Heel raises  30x   Hip 3 way standing (good foot on floor) x  Green Band 3x10 reps each lower extremity 1 hand assist bilateral lower extremity         Side Lying Hip 5 Ways (Added- no bicycles)  x15 reps each direction Bilateral   Hamstring Curls   x25 reps blue theraband    Long Arc Quads (increased weight)  x25 reps 6 poinds   Heel Slides   2 minutes 10 second holds for each passive with strap, 2 minute 10 second holds actively   Bridges     2x10 reps   With theraball 2x10 reps (added)   Recumbent Bike  x 5 minutes level 2   Re-Assessed Objective Measurements  10 minutes     Plan for Next Visit: Progress knee flexion by 10-15 degrees each session or as tolerated by the patient     Clay received the following manual therapy techniques: Myofacial release and Soft tissue Mobilization were applied to the: right knee for (10) minutes, including:    Manual Intervention Performed Today    Soft Tissue Mobilization x right knee grossly and right distal quadriceps    Joint Mobilizations x    Mobilization with movement          Functional Dry Needling        Plan for Next Visit: Continue     Clay participated in neuromuscular re-education activities to improve: Coordination for (15) minutes. The following activities were included:    Intervention Performed Today    Car Transfer   To allow him to sit in the front seat, provided demonstration first prior to him performed transfer   Sit to Stands  3x10 reps with tap to chair with UE support   Shuttle (added) X  x Double Leg 3 x 10 reps 6 bands (increased)  Single Leg 3 x 10 reps 4 bands (increased)   Terminal Knee Extension   Black therab ad x20 reps    Stair Negotiation (added)    Step to step 4 steps x5 reps  Step over step 4 steps x3 reps                     Plan for Next Visit:      Patient Education and Home Exercises     Home Exercises Provided and Patient Education Provided     Education provided:    - Educated patient on the proper form and sequencing of all exercises provided in home exercise program today    Written Home  Exercises Provided: Patient instructed to cont prior home exercise program. Exercises were reviewed and Clay was able to demonstrate them prior to the end of the session.  Clay demonstrated good  understanding of the education provided. See electronic medical record under Patient Instructions for exercises provided during therapy sessions.    ASSESSMENT     Patient was able to demonstrate improved knee flexion to 121 degrees today. Increased resistance with bilateral and unilateral shuttle exercise today with no complaints and with good quality.    Clay Is progressing well towards his goals.   Pt prognosis is Fair.     Pt will continue to benefit from skilled outpatient physical therapy to address the deficits listed in the problem list box on initial evaluation, provide pt/family education and to maximize pt's level of independence in the home and community environment.     Pt's spiritual, cultural and educational needs considered and pt agreeable to plan of care and goals.     Anticipated barriers to physical therapy: adherence to treatment plan and overexerting himself due to high motivation to return to prior level of function    Goals:   Short Term Goals:  6 weeks 3/23/2022   1. Function: Patient will demonstrate improved function as indicated by a score of less than or equal to 47% impairment on FOTO.   Not assessed today   2. Mobility: Patient will improve active range of motion to 50% of stated goals, listed in objective measures above, in order to progress towards independence with functional activities.   Met   3. Strength: Patient will improve strength to 3+/5 in all muscle groups, listed in objective measures above, in order to progress towards independence with functional activities.   Partially Met   4. Gait: Patient will demonstrate improved gait mechanics including more functional knee flexion and extension (per MD approval) in order to improve functional mobility, improve quality of life, and  decrease risk of further injury or fall.   Progressing   5. Home exercise program: Patient will demonstrate independence with home exercise program in order to progress toward functional independence.  Met      Long Term Goals:  12 weeks/Ongoing 3/23/2022   1. Pain: Pt will demonstrate improved pain by reports of less than or equal to 1/10 worst pain on the verbal rating scale in order to progress toward maximal functional ability and improve quality of life.    Met   2. Function: Patient will demonstrate improved function as indicated by a score of less than or equal to 36% impairment on FOTO.   Not assessed today   3. Mobility: Patient will improve AROM to stated goals, listed in objective measures above, in order to return to maximal functional potential and improve quality of life.  Progressing   4. Strength: Patient will improve strength to stated goals, listed in objective measures above, in order to improve functional independence and quality of life.  Progressing   5. Gait: Patient will demonstrate normalized gait mechanics with minimal compensation in order to return to prior level of function.  Progressing   6. Patient will return to normal activties of daily living, IADL's, recreational activities, and work-related activities with less than or equal to 1/10 pain and maximal function.   Progressing   7. Patient will be able to return to active lifestyle of working out 5 days per week without pain or instability to safely return back to prior level of function.   Progressing      PLAN     Continue plan of care and frequency as planned. Continue to progress range of motion per Medical Doctor restrictions and strength to tolerance taking into account protocol/restrictions.      These services are reasonable and necessary for the conditions set forth above while under my care.    Damien Izaguirre, PTA

## 2022-04-07 ENCOUNTER — CLINICAL SUPPORT (OUTPATIENT)
Dept: REHABILITATION | Facility: HOSPITAL | Age: 77
End: 2022-04-07
Payer: MEDICARE

## 2022-04-07 DIAGNOSIS — R53.1 DECREASED STRENGTH, ENDURANCE, AND MOBILITY: Primary | ICD-10-CM

## 2022-04-07 DIAGNOSIS — R68.89 DECREASED STRENGTH, ENDURANCE, AND MOBILITY: Primary | ICD-10-CM

## 2022-04-07 DIAGNOSIS — R26.89 FUNCTIONAL GAIT ABNORMALITY: ICD-10-CM

## 2022-04-07 DIAGNOSIS — Z74.09 DECREASED STRENGTH, ENDURANCE, AND MOBILITY: Primary | ICD-10-CM

## 2022-04-07 PROCEDURE — 97112 NEUROMUSCULAR REEDUCATION: CPT

## 2022-04-07 PROCEDURE — 97110 THERAPEUTIC EXERCISES: CPT

## 2022-04-07 PROCEDURE — 97140 MANUAL THERAPY 1/> REGIONS: CPT

## 2022-04-07 NOTE — PROGRESS NOTES
[facility]   Physical Therapy Treatment Note    Name: Clay Marcos  Clinic Number: [id number]    Therapy Diagnosis:   Encounter Diagnoses   Name Primary?    Decreased strength, endurance, and mobility Yes    Functional gait abnormality      Physician: Heath Park MD    Visit Date: 4/5/2022    Physician Orders: PT Eval and Treat  Medical Diagnosis from Referral: S/P total knee arthroplasty right,  Status post open reduction and internal fixation (ORIF) of fracture   Evaluation Date: 2/24/2022  Authorization Period Expiration: 3/15/2022  Plan of Care Expiration: 5/24/2022  Visit # / Visits authorized: 20/40     FOTO: 1/3    PTA Visit #: 0/5     Progress Note Due on 4/24/2022    Time In: 1503  Time Out: 1656  Total Billable Time: 53 minutes (denotes billable time)    Precautions: Standard and Partial weightbearing with TROM brace locked in extension, progress range of motion as tolerated     SUBJECTIVE     Pt reports: that he was a little sore from exercises performed last session but overall has been feeling the best he has felt in quite a while.     He was compliant with home exercise program.    Response to previous treatment: No adverse reactions     Functional change: Improving pain free passive knee mobility, ambulating with more stability utilizing single point cane    Pain: 0/10  Location: right knee (no pain today)    OBJECTIVE     Objective Measures updated at progress report unless specified.     Treatment     Clay received therapeutic exercises to develop strength, endurance, ROM, flexibility, posture and core stabilization for (18) minutes including:     Intervention Performed Today    Quad Sets over Heel Prop  3x10 reps    PROM Knee Flexion controlled by PT  Obtained 95 degrees of flexion 8 minutes   Straight Leg Raises Upright (progressed) x 2x10 reps    Hamstring Stretch in Sitting  4x30 second holds    Gastroc Stretch in Standing   45 seconds x2   sidelying hip ABD   Brace on 25x; 25x on left    Heel raises  30x   Hip 3 way standing (good foot on floor)  Green Band 3x10 reps each lower extremity 1 hand assist bilateral lower extremity         Side Lying Hip 5 Ways (Added- no bicycles)  x15 reps each direction Bilateral   Hamstring Curls   x25 reps blue theraband    Long Arc Quads (increased weight) x x30 reps 10 pounds   Heel Slides  x 3 minutes 10 second holds for each passive with strap   Bridges     2x10 reps   With theraball 2x10 reps (added)   Upright Bike  x 7 minutes level 6   Re-Assessed Objective Measurements  10 minutes     Plan for Next Visit: Progress knee flexion by 10-15 degrees each session or as tolerated by the patient     Clay received the following manual therapy techniques: Myofacial release and Soft tissue Mobilization were applied to the: right knee for (10) minutes, including:    Manual Intervention Performed Today    Soft Tissue Mobilization x right knee grossly and right distal quadriceps    Joint Mobilizations x    Mobilization with movement          Functional Dry Needling        Plan for Next Visit: Continue     Clay participated in neuromuscular re-education activities to improve: Coordination for (25) minutes. The following activities were included:    Intervention Performed Today    Car Transfer   To allow him to sit in the front seat, provided demonstration first prior to him performed transfer   Sit to Stands  3x10 reps with tap to chair with UE support   Shuttle (added)  Double Leg 3 x 10 reps 6 bands (increased)  Single Leg 3 x 10 reps 4 bands (increased)   Terminal Knee Extension   Black therab ad x20 reps    Stair Negotiation (added)    Step to step 4 steps x5 reps  Step over step 4 steps x3 reps    Step Ups (added) x 8 inch 3x10 reps    Step Downs (added) x 6 inch to half distance 2x10 reps    Lateral Walk Outs (added) x Green theraband 5 laps back and forth length of parallel bars      Plan for Next Visit:      Patient Education and Home  Exercises     Home Exercises Provided and Patient Education Provided     Education provided:   - Educated patient on importance of not   - Educated patient on the proper form and sequencing of all exercises provided in home exercise program today    Written Home Exercises Provided: Patient instructed to cont prior home exercise program. Exercises were reviewed and Clay was able to demonstrate them prior to the end of the session.  Clay demonstrated good  understanding of the education provided. See electronic medical record under Patient Instructions for exercises provided during therapy sessions.    ASSESSMENT     Patient was able to demonstrate improved knee flexion to 120 degrees today following manual therapy interventions and mobility exercises. Progressed straight leg raises to upright position without weight requiring initial assistance to decrease quad lag but was able to maintain during last set and a half. Incorporated step ups and step downs today with good tolerance, shortened height for step downs to ensure adequate control. Patient noted with extensive muscular fatigue with this although no pain was reported.     Clay Is progressing well towards his goals.   Pt prognosis is Fair.     Pt will continue to benefit from skilled outpatient physical therapy to address the deficits listed in the problem list box on initial evaluation, provide pt/family education and to maximize pt's level of independence in the home and community environment.     Pt's spiritual, cultural and educational needs considered and pt agreeable to plan of care and goals.     Anticipated barriers to physical therapy: adherence to treatment plan and overexerting himself due to high motivation to return to prior level of function    Goals:   Short Term Goals:  6 weeks 3/23/2022   1. Function: Patient will demonstrate improved function as indicated by a score of less than or equal to 47% impairment on FOTO.   Not assessed today    2. Mobility: Patient will improve active range of motion to 50% of stated goals, listed in objective measures above, in order to progress towards independence with functional activities.   Met   3. Strength: Patient will improve strength to 3+/5 in all muscle groups, listed in objective measures above, in order to progress towards independence with functional activities.   Partially Met   4. Gait: Patient will demonstrate improved gait mechanics including more functional knee flexion and extension (per MD approval) in order to improve functional mobility, improve quality of life, and decrease risk of further injury or fall.   Progressing   5. Home exercise program: Patient will demonstrate independence with home exercise program in order to progress toward functional independence.  Met      Long Term Goals:  12 weeks/Ongoing 3/23/2022   1. Pain: Pt will demonstrate improved pain by reports of less than or equal to 1/10 worst pain on the verbal rating scale in order to progress toward maximal functional ability and improve quality of life.    Met   2. Function: Patient will demonstrate improved function as indicated by a score of less than or equal to 36% impairment on FOTO.   Not assessed today   3. Mobility: Patient will improve AROM to stated goals, listed in objective measures above, in order to return to maximal functional potential and improve quality of life.  Progressing   4. Strength: Patient will improve strength to stated goals, listed in objective measures above, in order to improve functional independence and quality of life.  Progressing   5. Gait: Patient will demonstrate normalized gait mechanics with minimal compensation in order to return to prior level of function.  Progressing   6. Patient will return to normal activties of daily living, IADL's, recreational activities, and work-related activities with less than or equal to 1/10 pain and maximal function.   Progressing   7. Patient will be able  to return to active lifestyle of working out 5 days per week without pain or instability to safely return back to prior level of function.   Progressing      PLAN     Continue plan of care and frequency as planned. Continue to progress range of motion per Medical Doctor restrictions and strength to tolerance taking into account protocol/restrictions.      These services are reasonable and necessary for the conditions set forth above while under my care.    Damien Izaguirre, PTA

## 2022-04-11 ENCOUNTER — CLINICAL SUPPORT (OUTPATIENT)
Dept: REHABILITATION | Facility: HOSPITAL | Age: 77
End: 2022-04-11
Payer: MEDICARE

## 2022-04-11 DIAGNOSIS — Z74.09 DECREASED STRENGTH, ENDURANCE, AND MOBILITY: Primary | ICD-10-CM

## 2022-04-11 DIAGNOSIS — R68.89 DECREASED STRENGTH, ENDURANCE, AND MOBILITY: Primary | ICD-10-CM

## 2022-04-11 DIAGNOSIS — R53.1 DECREASED STRENGTH, ENDURANCE, AND MOBILITY: Primary | ICD-10-CM

## 2022-04-11 DIAGNOSIS — R26.89 FUNCTIONAL GAIT ABNORMALITY: ICD-10-CM

## 2022-04-11 PROCEDURE — 97112 NEUROMUSCULAR REEDUCATION: CPT

## 2022-04-11 PROCEDURE — 97140 MANUAL THERAPY 1/> REGIONS: CPT

## 2022-04-11 PROCEDURE — 97110 THERAPEUTIC EXERCISES: CPT

## 2022-04-13 ENCOUNTER — CLINICAL SUPPORT (OUTPATIENT)
Dept: REHABILITATION | Facility: HOSPITAL | Age: 77
End: 2022-04-13
Payer: MEDICARE

## 2022-04-13 DIAGNOSIS — Z74.09 DECREASED STRENGTH, ENDURANCE, AND MOBILITY: Primary | ICD-10-CM

## 2022-04-13 DIAGNOSIS — R53.1 DECREASED STRENGTH, ENDURANCE, AND MOBILITY: Primary | ICD-10-CM

## 2022-04-13 DIAGNOSIS — R26.89 FUNCTIONAL GAIT ABNORMALITY: ICD-10-CM

## 2022-04-13 DIAGNOSIS — R68.89 DECREASED STRENGTH, ENDURANCE, AND MOBILITY: Primary | ICD-10-CM

## 2022-04-13 PROCEDURE — 97112 NEUROMUSCULAR REEDUCATION: CPT

## 2022-04-13 PROCEDURE — 97140 MANUAL THERAPY 1/> REGIONS: CPT

## 2022-04-13 PROCEDURE — 97110 THERAPEUTIC EXERCISES: CPT

## 2022-04-13 NOTE — PROGRESS NOTES
[facility]   Physical Therapy Treatment Note    Name: Clay Marcos  Clinic Number: [id number]    Therapy Diagnosis:   Encounter Diagnoses   Name Primary?    Decreased strength, endurance, and mobility Yes    Functional gait abnormality      Physician: Heath Park MD    Visit Date: 4/5/2022    Physician Orders: PT Eval and Treat  Medical Diagnosis from Referral: S/P total knee arthroplasty right,  Status post open reduction and internal fixation (ORIF) of fracture   Evaluation Date: 2/24/2022  Authorization Period Expiration: 3/15/2022  Plan of Care Expiration: 5/24/2022  Visit # / Visits authorized: 22/40     FOTO: 1/3    PTA Visit #: 0/5     Progress Note Due on 4/24/2022    Time In: 1216  Time Out: 1300  Total Billable Time: 44 minutes (denotes billable time)    Precautions: Standard and Partial weightbearing with TROM brace locked in extension, progress range of motion as tolerated     SUBJECTIVE     Pt reports: that his knee is feeling good today although continues to feel stiff. he is feeling okay today. Patient reports that he went back to the gym this morning and walked on the treadmill for half a mile and tried to cycle but the resistance was too hard so he got off.     He was compliant with home exercise program.    Response to previous treatment: No adverse reactions     Functional change: Improving pain free passive knee mobility, ambulating with more stability utilizing single point cane    Pain: 0/10  Location: right knee (no pain today)    OBJECTIVE     Objective Measures updated at progress report unless specified.     Treatment     Clay received therapeutic exercises to develop strength, endurance, ROM, flexibility, posture and core stabilization for (19) minutes including:     Intervention Performed Today    Quad Sets over Heel Prop  3x10 reps    PROM Knee Flexion controlled by PT  Obtained 95 degrees of flexion 8 minutes   Straight Leg Raises Upright  x 5 minutes    Hamstring Stretch in Sitting  4x30 second holds    Gastroc Stretch in Standing   45 seconds x2   sidelying hip ABD  Brace on 25x; 25x on left    Heel raises x 30x no UE use Double Leg (progressed)  2x10 reps Single Leg (added)   Hip 3 way standing (good foot on floor)  Green Band 3x10 reps each lower extremity 1 hand assist bilateral lower extremity         Side Lying Hip 5 Ways (Added- no bicycles)  x15 reps each direction Bilateral   Hamstring Curls   x25 reps blue theraband    Long Arc Quads  x 3 minutes 10 pounds   Heel Slides  x 3 minutes 10 second holds for each passive with strap   Bridges     2x10 reps   With theraball 2x10 reps (added)   Upright Bike (lowered seat height to 6) x 5 minutes level 6   Re-Assessed Objective Measurements  10 minutes     Plan for Next Visit: Progress knee flexion to tolerance     Clay received the following manual therapy techniques: Myofacial release and Soft tissue Mobilization were applied to the: right knee for (10) minutes, including:    Manual Intervention Performed Today    Soft Tissue Mobilization x right knee grossly and right distal quadriceps    Joint Mobilizations     Mobilization with movement          Functional Dry Needling        Plan for Next Visit: Continue     Clay participated in neuromuscular re-education activities to improve: Coordination for (15) minutes. The following activities were included:    Intervention Performed Today    Car Transfer   To allow him to sit in the front seat, provided demonstration first prior to him performed transfer   Sit to Stands x 3x10 reps with tap to chair with UE support   Shuttle (added) X  x Double Leg 3 x 10 reps 6 bands  Single Leg 3 x 10 reps 4 bands (increased)   Terminal Knee Extension Standing (increased resistance) x Brown tubing x30 reps    Stair Negotiation (added)    Step to step 4 steps x5 reps  Step over step 4 steps x3 reps    Step Ups  x 8 inch 3x10 reps (less UE use)   Step Downs (added)  6 inch to half  distance 2x10 reps    Lateral Walk Outs (added)  Green theraband 5 laps back and forth length of parallel bars      Plan for Next Visit:      Patient Education and Home Exercises     Home Exercises Provided and Patient Education Provided     Education provided:   - Educated patient on importance of not   - Educated patient on the proper form and sequencing of all exercises provided in home exercise program today    Written Home Exercises Provided: Patient instructed to cont prior home exercise program. Exercises were reviewed and Clay was able to demonstrate them prior to the end of the session.  Clay demonstrated good  understanding of the education provided. See electronic medical record under Patient Instructions for exercises provided during therapy sessions.    ASSESSMENT     Lowered seat height for upright bike today which requires a little more knee flexion so partial revolutions were performed initially until mobility improved and he was able to continue with full revolutions for duration of time. Better quadriceps activation noted with less significant quad lag with straight leg raises today. Continued to work on functional strengthening with improved strength and endurance. Will continue to progress strength and range of motion to tolerance.     Clay Is progressing well towards his goals.   Pt prognosis is Fair.     Pt will continue to benefit from skilled outpatient physical therapy to address the deficits listed in the problem list box on initial evaluation, provide pt/family education and to maximize pt's level of independence in the home and community environment.     Pt's spiritual, cultural and educational needs considered and pt agreeable to plan of care and goals.     Anticipated barriers to physical therapy: adherence to treatment plan and overexerting himself due to high motivation to return to prior level of function    Goals:   Short Term Goals:  6 weeks 3/23/2022   1. Function: Patient will  demonstrate improved function as indicated by a score of less than or equal to 47% impairment on FOTO.   Not assessed today   2. Mobility: Patient will improve active range of motion to 50% of stated goals, listed in objective measures above, in order to progress towards independence with functional activities.   Met   3. Strength: Patient will improve strength to 3+/5 in all muscle groups, listed in objective measures above, in order to progress towards independence with functional activities.   Partially Met   4. Gait: Patient will demonstrate improved gait mechanics including more functional knee flexion and extension (per MD approval) in order to improve functional mobility, improve quality of life, and decrease risk of further injury or fall.   Progressing   5. Home exercise program: Patient will demonstrate independence with home exercise program in order to progress toward functional independence.  Met      Long Term Goals:  12 weeks/Ongoing 3/23/2022   1. Pain: Pt will demonstrate improved pain by reports of less than or equal to 1/10 worst pain on the verbal rating scale in order to progress toward maximal functional ability and improve quality of life.    Met   2. Function: Patient will demonstrate improved function as indicated by a score of less than or equal to 36% impairment on FOTO.   Not assessed today   3. Mobility: Patient will improve AROM to stated goals, listed in objective measures above, in order to return to maximal functional potential and improve quality of life.  Progressing   4. Strength: Patient will improve strength to stated goals, listed in objective measures above, in order to improve functional independence and quality of life.  Progressing   5. Gait: Patient will demonstrate normalized gait mechanics with minimal compensation in order to return to prior level of function.  Progressing   6. Patient will return to normal activties of daily living, IADL's, recreational activities, and  work-related activities with less than or equal to 1/10 pain and maximal function.   Progressing   7. Patient will be able to return to active lifestyle of working out 5 days per week without pain or instability to safely return back to prior level of function.   Progressing      PLAN     Continue plan of care and frequency as planned. Continue to progress range of motion per Medical Doctor restrictions and strength to tolerance taking into account protocol/restrictions.      These services are reasonable and necessary for the conditions set forth above while under my care.    Kasia Quiñones, PT, DPT

## 2022-04-14 ENCOUNTER — CLINICAL SUPPORT (OUTPATIENT)
Dept: REHABILITATION | Facility: HOSPITAL | Age: 77
End: 2022-04-14
Payer: MEDICARE

## 2022-04-14 DIAGNOSIS — R53.1 DECREASED STRENGTH, ENDURANCE, AND MOBILITY: Primary | ICD-10-CM

## 2022-04-14 DIAGNOSIS — R68.89 DECREASED STRENGTH, ENDURANCE, AND MOBILITY: Primary | ICD-10-CM

## 2022-04-14 DIAGNOSIS — R26.89 FUNCTIONAL GAIT ABNORMALITY: ICD-10-CM

## 2022-04-14 DIAGNOSIS — Z74.09 DECREASED STRENGTH, ENDURANCE, AND MOBILITY: Primary | ICD-10-CM

## 2022-04-14 PROCEDURE — 97112 NEUROMUSCULAR REEDUCATION: CPT

## 2022-04-14 PROCEDURE — 97110 THERAPEUTIC EXERCISES: CPT

## 2022-04-14 NOTE — PROGRESS NOTES
[facility]   Physical Therapy Treatment Note    Name: Clay Marcos  Clinic Number: [id number]      Therapy Diagnosis:  Encounter Diagnoses   Name Primary?    Decreased strength, endurance, and mobility Yes    Functional gait abnormality        Physician: Heath Park MD    Visit Date: 4/5/2022    Physician Orders: PT Eval and Treat  Medical Diagnosis from Referral: S/P total knee arthroplasty right,  Status post open reduction and internal fixation (ORIF) of fracture   Evaluation Date: 2/24/2022  Authorization Period Expiration: 12/31/2022  Plan of Care Expiration: 5/24/2022  Visit # / Visits authorized: 23/40     FOTO: 1/3    PTA Visit #: 0/5     Progress Note Due on 4/24/2022    Time In: 1:03 pm  Time Out: 1:55 pm  Total Billable Time: 45 minutes     Precautions: no longer in brace, progress range of motion as tolerated     SUBJECTIVE     Pt reports: that his knee is continuing to be stiff, no pain.  It does feel better overall, however he in nervous about doing too much on his upcoming cruise and will be asking MD for what he cannot do.    He was compliant with home exercise program.    Response to previous treatment: No adverse reactions     Functional change: Improving pain free passive knee mobility, ambulating with more stability utilizing single point cane    Pain: 0/10  Location: right knee (no pain today)    OBJECTIVE     Objective Measures updated at progress report unless specified.     Treatment     Clay received therapeutic exercises to develop strength, endurance, ROM, flexibility, posture and core stabilization for (20) minutes including:     Intervention Performed Today    Quad Sets over Heel Prop  3x10 reps    PROM Knee Flexion controlled by PT  Obtained 95 degrees of flexion 8 minutes   Straight Leg Raises Upright  x 5 minutes   Hamstring Stretch in Sitting  4x30 second holds    Gastroc Stretch in Standing   45 seconds x2   sidelying hip ABD  Brace on 25x; 25x on  left    Heel raises (time) 30x no UE use Double Leg (progressed)  2x10 reps Single Leg (added)   Hip 3 way standing (good foot on floor)  Green Band 3x10 reps each lower extremity 1 hand assist bilateral lower extremity         Side Lying Hip 5 Ways (Added- no bicycles)  x15 reps each direction Bilateral   Hamstring Curls   x25 reps blue theraband    Long Arc Quads  x 10x/ 3 sets; 10 pounds   Heel Slides  x 3 minutes 10 second holds for each passive with strap   Bridges     2x10 reps   With theraball 2x10 reps (added)   Upright Bike (lowered seat height to 6) x 7 minutes level 6, to increase strength and endurance   Knee extension prop x 2 min   Re-Assessed Objective Measurements  10 minutes     Plan for Next Visit: Progress knee flexion to tolerance     Clay received the following manual therapy techniques: Myofacial release and Soft tissue Mobilization were applied to the: right knee for (5) minutes, including:    Manual Intervention Performed Today    Soft Tissue Mobilization x scar massage   Joint Mobilizations x Patellar glides, medial/lateral/superior/inferior   Mobilization with movement          Functional Dry Needling        Plan for Next Visit: Continue     Clay participated in neuromuscular re-education activities to improve: Coordination for (15) minutes. The following activities were included:     Intervention Performed Today    Car Transfer   To allow him to sit in the front seat, provided demonstration first prior to him performed transfer   Sit to Stands x 3x10 reps with tap to chair with UE support   Shuttle (added) x  x Double Leg 3 x 10 reps 6 bands  Single Leg 3 x 10 reps 4 bands (increased)   Terminal Knee Extension Standing (increased resistance) x Brown tubing x 3 min 5 seconds hold   Stair Negotiation (added)    Step to step 4 steps x5 reps  Step over step 4 steps x3 reps    Step Ups  x 8 inch 2 x10 reps (less UE use) (time - decreased reps)   Step Downs (added)  6 inch to half distance 2x10  reps    Lateral Walk Outs (added)  Green theraband 5 laps back and forth length of parallel bars      Plan for Next Visit:      Patient Education and Home Exercises     Home Exercises Provided and Patient Education Provided     Education provided:   - Educated patient on importance of not   - Educated patient on the proper form and sequencing of all exercises provided in home exercise program today    Written Home Exercises Provided: Patient instructed to cont prior home exercise program. Exercises were reviewed and Clay was able to demonstrate them prior to the end of the session.  Clay demonstrated good  understanding of the education provided. See electronic medical record under Patient Instructions for exercises provided during therapy sessions.    ASSESSMENT     Physical Therapy requires cues for end range extension with all activities due to substitution and quick momentum and for squats, decreased ROM with SLR in order to decreased extension lag. Educated on scar massage and knee extension stretch.      Clay Is progressing well towards his goals.   Pt prognosis is Fair.     Pt will continue to benefit from skilled outpatient physical therapy to address the deficits listed in the problem list box on initial evaluation, provide pt/family education and to maximize pt's level of independence in the home and community environment.     Pt's spiritual, cultural and educational needs considered and pt agreeable to plan of care and goals.     Anticipated barriers to physical therapy: adherence to treatment plan and overexerting himself due to high motivation to return to prior level of function    Goals:   Short Term Goals:  6 weeks 3/23/2022   1. Function: Patient will demonstrate improved function as indicated by a score of less than or equal to 47% impairment on FOTO.   Not assessed today   2. Mobility: Patient will improve active range of motion to 50% of stated goals, listed in objective measures above, in order  to progress towards independence with functional activities.   Met   3. Strength: Patient will improve strength to 3+/5 in all muscle groups, listed in objective measures above, in order to progress towards independence with functional activities.   Partially Met   4. Gait: Patient will demonstrate improved gait mechanics including more functional knee flexion and extension (per MD approval) in order to improve functional mobility, improve quality of life, and decrease risk of further injury or fall.   Progressing   5. Home exercise program: Patient will demonstrate independence with home exercise program in order to progress toward functional independence.  Met      Long Term Goals:  12 weeks/Ongoing 3/23/2022   1. Pain: Pt will demonstrate improved pain by reports of less than or equal to 1/10 worst pain on the verbal rating scale in order to progress toward maximal functional ability and improve quality of life.    Met   2. Function: Patient will demonstrate improved function as indicated by a score of less than or equal to 36% impairment on FOTO.   Not assessed today   3. Mobility: Patient will improve AROM to stated goals, listed in objective measures above, in order to return to maximal functional potential and improve quality of life.  Progressing   4. Strength: Patient will improve strength to stated goals, listed in objective measures above, in order to improve functional independence and quality of life.  Progressing   5. Gait: Patient will demonstrate normalized gait mechanics with minimal compensation in order to return to prior level of function.  Progressing   6. Patient will return to normal activties of daily living, IADL's, recreational activities, and work-related activities with less than or equal to 1/10 pain and maximal function.   Progressing   7. Patient will be able to return to active lifestyle of working out 5 days per week without pain or instability to safely return back to prior level of  function.   Progressing      PLAN     Continue plan of care and frequency as planned. Continue to progress range of motion per Medical Doctor restrictions and strength to tolerance taking into account protocol/restrictions.      These services are reasonable and necessary for the conditions set forth above while under my care.    Yanna Brennan, PT

## 2022-04-19 ENCOUNTER — CLINICAL SUPPORT (OUTPATIENT)
Dept: REHABILITATION | Facility: HOSPITAL | Age: 77
End: 2022-04-19
Payer: MEDICARE

## 2022-04-19 DIAGNOSIS — R68.89 DECREASED STRENGTH, ENDURANCE, AND MOBILITY: Primary | ICD-10-CM

## 2022-04-19 DIAGNOSIS — R53.1 DECREASED STRENGTH, ENDURANCE, AND MOBILITY: Primary | ICD-10-CM

## 2022-04-19 DIAGNOSIS — R26.89 FUNCTIONAL GAIT ABNORMALITY: ICD-10-CM

## 2022-04-19 DIAGNOSIS — Z74.09 DECREASED STRENGTH, ENDURANCE, AND MOBILITY: Primary | ICD-10-CM

## 2022-04-19 PROCEDURE — 97140 MANUAL THERAPY 1/> REGIONS: CPT | Performed by: PHYSICAL THERAPIST

## 2022-04-19 PROCEDURE — 97112 NEUROMUSCULAR REEDUCATION: CPT | Performed by: PHYSICAL THERAPIST

## 2022-04-19 NOTE — PROGRESS NOTES
[facility]   Physical Therapy Treatment Note    Name: Clay Marcos  Clinic Number: [id number]      Therapy Diagnosis:  Encounter Diagnoses   Name Primary?    Decreased strength, endurance, and mobility Yes    Functional gait abnormality        Physician: Heath Park MD    Visit Date: 4/5/2022    Physician Orders: PT Eval and Treat  Medical Diagnosis from Referral: S/P total knee arthroplasty right,  Status post open reduction and internal fixation (ORIF) of fracture   Evaluation Date: 2/24/2022  Authorization Period Expiration: 12/31/2022  Plan of Care Expiration: 5/24/2022  Visit # / Visits authorized: 24/40     FOTO: 1/3    PTA Visit #: 0/5     Progress Note Due on 4/24/2022    Time In: 833 am  Time Out: 918 am  Total Billable Time: 45 minutes     Precautions: no longer in brace, progress range of motion as tolerated     SUBJECTIVE     Pt reports: stiffness is main issue. Especially in flexion. Pt feels after sitting for a period of time his stiffness is worse.    He was compliant with home exercise program.    Response to previous treatment: No adverse reactions     Functional change: Improving pain free passive knee mobility, ambulating with more stability utilizing single point cane    Pain: 0/10  Location: right knee (no pain today)    OBJECTIVE     Objective Measures updated at progress report unless specified.     Treatment     Clay received therapeutic exercises to develop strength, endurance, ROM, flexibility, posture and core stabilization for (10) minutes including:     Intervention Performed Today    Quad Sets over Heel Prop  3x10 reps    Straight Leg Raises Upright   5 minutes   Hamstring Stretch in Sitting  4x30 second holds    Gastroc Stretch in Standing   45 seconds x2   sidelying hip ABD  Brace on 25x; 25x on left    Hip 3 way standing (good foot on floor)  Green Band 3x10 reps each lower extremity 1 hand assist bilateral lower extremity         Side Lying Hip 5  "Ways (Added- no bicycles)  x15 reps each direction Bilateral   Hamstring Curls   x25 reps blue theraband    Long Arc Quads   10x/ 3 sets; 10 pounds   Heel Slides   3 minutes 10 second holds for each passive with strap   Bridges     2x10 reps   With theraball 2x10 reps (added)   Upright Bike (seat 9) x 7 minutes level 6, to increase strength and endurance   Knee extension machine x 1 plate, RLE             Knee extension prop  2 min   Re-Assessed Objective Measurements  10 minutes     Plan for Next Visit: Progress knee flexion to tolerance     Clay received the following manual therapy techniques: Myofacial release and Soft tissue Mobilization were applied to the: right knee for (10) minutes, including:    Manual Intervention Performed Today    Soft Tissue Mobilization x scar massage   Joint Mobilizations x Patellar glides, medial/lateral/superior/inferior   Mobilization with movement          Functional Dry Needling        Plan for Next Visit: Continue     Clay participated in neuromuscular re-education activities to improve: Coordination for (25) minutes. The following activities were included:     Intervention Performed Today    Sit to Stands x 2x10 reps with tap to 20" box, 10#. Staggered stance   Shuttle     Double Leg 3 x 10 reps 6 bands  Single Leg 3 x 10 reps 4 bands (increased)   Stair Negotiation     Step to step 4 steps x5 reps  Step over step 4 steps x3 reps    Step Ups  x 6 inch 2 x10 reps (less UE use)    Step Downs (added)  6 inch to half distance 2x10 reps    Lateral squat walks x GTB 2 laps   Monster walks x  GTB 2 laps   Standing TKE x Purple SC, 5" hold, 15x   Lateral Walk Outs (added)  Green theraband 5 laps back and forth length of parallel bars      Plan for Next Visit:      Patient Education and Home Exercises     Home Exercises Provided and Patient Education Provided     Education provided:   - Educated patient on importance of not   - Educated patient on the proper form and sequencing of all " exercises provided in home exercise program today    Written Home Exercises Provided: Patient instructed to cont prior home exercise program. Exercises were reviewed and Clay was able to demonstrate them prior to the end of the session.  Clay demonstrated good  understanding of the education provided. See electronic medical record under Patient Instructions for exercises provided during therapy sessions.    ASSESSMENT     Pt feels less stiffness overall with activity. No increase in pain today. Pt will see MD tomorrow for further checkup. Pt will continue to benefit from further quadriceps strengthening to stabilize knee during activity.    Clay Is progressing well towards his goals.   Pt prognosis is Fair.     Pt will continue to benefit from skilled outpatient physical therapy to address the deficits listed in the problem list box on initial evaluation, provide pt/family education and to maximize pt's level of independence in the home and community environment.     Pt's spiritual, cultural and educational needs considered and pt agreeable to plan of care and goals.     Anticipated barriers to physical therapy: adherence to treatment plan and overexerting himself due to high motivation to return to prior level of function    Goals:   Short Term Goals:  6 weeks 3/23/2022   1. Function: Patient will demonstrate improved function as indicated by a score of less than or equal to 47% impairment on FOTO.   Not assessed today   2. Mobility: Patient will improve active range of motion to 50% of stated goals, listed in objective measures above, in order to progress towards independence with functional activities.   Met   3. Strength: Patient will improve strength to 3+/5 in all muscle groups, listed in objective measures above, in order to progress towards independence with functional activities.   Partially Met   4. Gait: Patient will demonstrate improved gait mechanics including more functional knee flexion and extension  (per MD approval) in order to improve functional mobility, improve quality of life, and decrease risk of further injury or fall.   Progressing   5. Home exercise program: Patient will demonstrate independence with home exercise program in order to progress toward functional independence.  Met      Long Term Goals:  12 weeks/Ongoing 3/23/2022   1. Pain: Pt will demonstrate improved pain by reports of less than or equal to 1/10 worst pain on the verbal rating scale in order to progress toward maximal functional ability and improve quality of life.    Met   2. Function: Patient will demonstrate improved function as indicated by a score of less than or equal to 36% impairment on FOTO.   Not assessed today   3. Mobility: Patient will improve AROM to stated goals, listed in objective measures above, in order to return to maximal functional potential and improve quality of life.  Progressing   4. Strength: Patient will improve strength to stated goals, listed in objective measures above, in order to improve functional independence and quality of life.  Progressing   5. Gait: Patient will demonstrate normalized gait mechanics with minimal compensation in order to return to prior level of function.  Progressing   6. Patient will return to normal activties of daily living, IADL's, recreational activities, and work-related activities with less than or equal to 1/10 pain and maximal function.   Progressing   7. Patient will be able to return to active lifestyle of working out 5 days per week without pain or instability to safely return back to prior level of function.   Progressing      PLAN     Continue plan of care and frequency as planned. Continue to progress range of motion per Medical Doctor restrictions and strength to tolerance taking into account protocol/restrictions.      These services are reasonable and necessary for the conditions set forth above while under my care.    Ehsan Dumont, PT

## 2022-04-22 ENCOUNTER — CLINICAL SUPPORT (OUTPATIENT)
Dept: REHABILITATION | Facility: HOSPITAL | Age: 77
End: 2022-04-22
Payer: MEDICARE

## 2022-04-22 DIAGNOSIS — R26.89 FUNCTIONAL GAIT ABNORMALITY: ICD-10-CM

## 2022-04-22 DIAGNOSIS — Z74.09 DECREASED STRENGTH, ENDURANCE, AND MOBILITY: Primary | ICD-10-CM

## 2022-04-22 DIAGNOSIS — R68.89 DECREASED STRENGTH, ENDURANCE, AND MOBILITY: Primary | ICD-10-CM

## 2022-04-22 DIAGNOSIS — R53.1 DECREASED STRENGTH, ENDURANCE, AND MOBILITY: Primary | ICD-10-CM

## 2022-04-22 PROCEDURE — 97140 MANUAL THERAPY 1/> REGIONS: CPT

## 2022-04-22 PROCEDURE — 97112 NEUROMUSCULAR REEDUCATION: CPT

## 2022-04-22 PROCEDURE — 97110 THERAPEUTIC EXERCISES: CPT

## 2022-04-22 NOTE — PROGRESS NOTES
[facility]   Physical Therapy Treatment Note    Name: Clay Marcos  Clinic Number: [id number]      Therapy Diagnosis:  Encounter Diagnoses   Name Primary?    Decreased strength, endurance, and mobility Yes    Functional gait abnormality        Physician: Heath Park MD    Visit Date: 4/5/2022    Physician Orders: PT Eval and Treat  Medical Diagnosis from Referral: S/P total knee arthroplasty right,  Status post open reduction and internal fixation (ORIF) of fracture   Evaluation Date: 2/24/2022  Authorization Period Expiration: 12/31/2022  Plan of Care Expiration: 5/24/2022  Visit # / Visits authorized: 25/40     FOTO: 1/3    PTA Visit #: 0/5     Progress Note Due on 4/24/2022    Time In: 1246  Time Out: 1335  Total Billable Time: 47 minutes (denotes billable time)    Precautions: no longer in brace, progress range of motion as tolerated     SUBJECTIVE     Pt reports: that he is having continued stiffness along the lateral aspect of his knee.     He was compliant with home exercise program.    Response to previous treatment: No adverse reactions     Functional change: Near full knee flexion/extension mobility, better stability with ambulation without use of assistive device.     Pain: 1/10  Location: right knee (no pain today)    OBJECTIVE     Objective Measures updated at progress report unless specified.   Knee Flexion 123 degrees    Treatment     Clay received therapeutic exercises to develop strength, endurance, ROM, flexibility, posture and core stabilization for (13) minutes including:     Intervention Performed Today    Quad Sets over Heel Prop  3x10 reps    Straight Leg Raises Upright   5 minutes   Hamstring Stretch in Sitting  4x30 second holds    Gastroc Stretch in Standing   45 seconds x2   sidelying hip ABD  Brace on 25x; 25x on left    Hip 3 way standing (good foot on floor)  Green Band 3x10 reps each lower extremity 1 hand assist bilateral lower extremity        "  Side Lying Hip 5 Ways (Added- no bicycles)  x15 reps each direction Bilateral   Hamstring Curls   x25 reps blue theraband    Long Arc Quads   10x/ 3 sets; 10 pounds   Heel Slides  x 3 minutes 10 second holds for each passive with strap   Bridges     2x10 reps   With theraball 2x10 reps (added)   Upright Bike (seat 9) x 5 minutes level 7, to increase strength and endurance   Knee extension machine X  x 2 plates Double Leg x30 reps   1 plate RLE x10 reps              Knee extension prop  2 min   Re-Assessed Objective Measurements  10 minutes     Plan for Next Visit: Progress knee flexion to tolerance     Clay received the following manual therapy techniques: Myofacial release and Soft tissue Mobilization were applied to the: right knee for (10) minutes, including:    Manual Intervention Performed Today    Soft Tissue Mobilization x scar massage   Joint Mobilizations x Patellar glides, medial/lateral/superior/inferior   Mobilization with movement          Functional Dry Needling        Plan for Next Visit: Continue     Clay participated in neuromuscular re-education activities to improve: Coordination for (24) minutes. The following activities were included:     Intervention Performed Today    Sit to Stands x 3x10 reps with tap to 20" box, 10#. Staggered stance   Shuttle     Double Leg 3 x 10 reps 6 bands  Single Leg 3 x 10 reps 4 bands (increased)   Stair Negotiation     Step to step 4 steps x5 reps  Step over step 4 steps x3 reps    Step Ups  x 6 inch 2 x10 reps (less UE use)    Step Downs (added)  6 inch to half distance 2x10 reps         Monster walks x Green theraband 2 laps forward only   Standing TKE x Purple Cook Bands, 5" hold, 30x   Lateral Walk Outs x Green theraband 3 laps back and forth length of mirror     Plan for Next Visit:      Patient Education and Home Exercises     Home Exercises Provided and Patient Education Provided     Education provided:   - Educated patient on importance of not   - Educated " patient on the proper form and sequencing of all exercises provided in home exercise program today    Written Home Exercises Provided: Patient instructed to cont prior home exercise program. Exercises were reviewed and Clay was able to demonstrate them prior to the end of the session.  Clay demonstrated good  understanding of the education provided. See electronic medical record under Patient Instructions for exercises provided during therapy sessions.    ASSESSMENT     Patient tolerated treatment well today. Patient noted with better scar mobility following manual therapy interventions. Patient noted with limited knee flexion today likely due to stiffness reported so performed heel slides with better mobility following. Continued to work on functional strengthening and well as isolated strengthening. For knee extension machine performed double leg and single leg with significant muscular fatigued reported. Overall patient reported less stiffness upon exiting session today.     Clay Is progressing well towards his goals.   Pt prognosis is Fair.     Pt will continue to benefit from skilled outpatient physical therapy to address the deficits listed in the problem list box on initial evaluation, provide pt/family education and to maximize pt's level of independence in the home and community environment.     Pt's spiritual, cultural and educational needs considered and pt agreeable to plan of care and goals.     Anticipated barriers to physical therapy: adherence to treatment plan and overexerting himself due to high motivation to return to prior level of function    Goals:   Short Term Goals:  6 weeks 3/23/2022   1. Function: Patient will demonstrate improved function as indicated by a score of less than or equal to 47% impairment on FOTO.   Not assessed today   2. Mobility: Patient will improve active range of motion to 50% of stated goals, listed in objective measures above, in order to progress towards independence  with functional activities.   Met   3. Strength: Patient will improve strength to 3+/5 in all muscle groups, listed in objective measures above, in order to progress towards independence with functional activities.   Partially Met   4. Gait: Patient will demonstrate improved gait mechanics including more functional knee flexion and extension (per MD approval) in order to improve functional mobility, improve quality of life, and decrease risk of further injury or fall.   Progressing   5. Home exercise program: Patient will demonstrate independence with home exercise program in order to progress toward functional independence.  Met      Long Term Goals:  12 weeks/Ongoing 3/23/2022   1. Pain: Pt will demonstrate improved pain by reports of less than or equal to 1/10 worst pain on the verbal rating scale in order to progress toward maximal functional ability and improve quality of life.    Met   2. Function: Patient will demonstrate improved function as indicated by a score of less than or equal to 36% impairment on FOTO.   Not assessed today   3. Mobility: Patient will improve AROM to stated goals, listed in objective measures above, in order to return to maximal functional potential and improve quality of life.  Progressing   4. Strength: Patient will improve strength to stated goals, listed in objective measures above, in order to improve functional independence and quality of life.  Progressing   5. Gait: Patient will demonstrate normalized gait mechanics with minimal compensation in order to return to prior level of function.  Progressing   6. Patient will return to normal activties of daily living, IADL's, recreational activities, and work-related activities with less than or equal to 1/10 pain and maximal function.   Progressing   7. Patient will be able to return to active lifestyle of working out 5 days per week without pain or instability to safely return back to prior level of function.   Progressing       PLAN     Continue plan of care and frequency as planned. Continue to progress range of motion per Medical Doctor restrictions and strength to tolerance taking into account protocol/restrictions.      These services are reasonable and necessary for the conditions set forth above while under my care.    Kasia Quiñones, PT, DPT

## 2022-05-09 ENCOUNTER — CLINICAL SUPPORT (OUTPATIENT)
Dept: REHABILITATION | Facility: HOSPITAL | Age: 77
End: 2022-05-09
Payer: MEDICARE

## 2022-05-09 DIAGNOSIS — Z74.09 DECREASED STRENGTH, ENDURANCE, AND MOBILITY: Primary | ICD-10-CM

## 2022-05-09 DIAGNOSIS — R68.89 DECREASED STRENGTH, ENDURANCE, AND MOBILITY: Primary | ICD-10-CM

## 2022-05-09 DIAGNOSIS — R26.89 FUNCTIONAL GAIT ABNORMALITY: ICD-10-CM

## 2022-05-09 DIAGNOSIS — R53.1 DECREASED STRENGTH, ENDURANCE, AND MOBILITY: Primary | ICD-10-CM

## 2022-05-09 PROCEDURE — 97140 MANUAL THERAPY 1/> REGIONS: CPT

## 2022-05-09 PROCEDURE — 97110 THERAPEUTIC EXERCISES: CPT

## 2022-05-09 NOTE — PROGRESS NOTES
[facility]   Physical Therapy Treatment Note + Progress Note     Name: Clay Marcos  Clinic Number: [id number]      Therapy Diagnosis:  Encounter Diagnoses   Name Primary?    Decreased strength, endurance, and mobility Yes    Functional gait abnormality        Physician: Heath Park MD    Visit Date: 4/5/2022    Physician Orders: PT Eval and Treat  Medical Diagnosis from Referral: S/P total knee arthroplasty right,  Status post open reduction and internal fixation (ORIF) of fracture   Evaluation Date: 2/24/2022  Authorization Period Expiration: 12/31/2022  Plan of Care Expiration: 5/24/2022  Visit # / Visits authorized: 26/40     FOTO: 1/3    PTA Visit #: 0/5     Progress Note Due on 6/9/2022    Time In: 1247  Time Out: 1330  Total Billable Time: 40 minutes (denotes billable time)    Precautions: no longer in brace, progress range of motion as tolerated     SUBJECTIVE     Pt reports: that he is feeling good. Patient reports to therapy after 12 day cruise which required him to do a lot of walking and worked out everyday for about 45-50 minutes. Patient reports that he already complete 55 minute cycle class today in which he just pedaled the bike without resistance.     He was compliant with home exercise program.    Response to previous treatment: No adverse reactions   Functional change: Returning to workout     Pain: 0/10  Location: right knee (no pain today)    OBJECTIVE     Objective Measures updated at progress report unless specified.      Sensation:  Sensation is intact to light touch   Posture:  Pt presents with postural abnormalities which include: forward head, rounded shoulders  Gait Analysis: The patient ambulated with the following assistive device: none; the pt presents with the following gait abnormalities: fluid knee flexion and extension throughout gait cycle with lateral trunk lean noted      (x = not tested due to pain and/or inability to obtain test position)      Range of Motion/Strength:      Knee Right  5/9/2022 Left  3/23/2022 Pain/Dysfunction with Movement Goal   AROM           Flexion (135)  124  145 No pain 130 No pain   Extension (0)  5 flexion  5 flexion No pain 0      L/E MMT Right Left Pain/Dysfunction with Movement Goal   Hip Flexion  4/5 4/5 No pain 4+/5 B   Hip Extension  4-/5 4-/5 No pain 4+/5 B   Hip Abduction  4-/5 4-/5 No pain 4+/5 B   Hip Adduction 4/5 4/5 No pain 4+/5 B   Knee Extension 4-/5 4+/5 No pain 5/5 B   Knee Flexion 4/5 4/5 Cramping in hamstrings noted bilaterally 5/5 B   Hip IR 4-/5 4-/5   4+/5 B   Hip ER 4-/5 4-/5   4+/5 B   Ankle DF 4-/5/Not tested today 4-/5/Not tested today No pain 5/5 B   Ankle PF 4-/5/Not tested today 4-/5/Not tested today No pain 5/5 B      MUSCLE LENGTH:      Muscle Tested  Right  2/24/2022 Left   2/24/2022 Goal   Hip Flexors  decreased decreased Normal B   Adductors decreased decreased     Hamstrings  decreased decreased Normal B   Gastrocnemius  decreased decreased Normal B    *decreased but improving    Sit to Stand/Squats: to low therapy mat without use of UEs x30 reps     Treatment     Clay received therapeutic exercises to develop strength, endurance, ROM, flexibility, posture and core stabilization for (23) minutes including:     Intervention Performed Today    Quad Sets over Heel Prop  3x10 reps    Straight Leg Raises Upright   5 minutes   Hamstring Stretch in Sitting  4x30 second holds    Gastroc Stretch in Standing   45 seconds x2   sidelying hip ABD  Brace on 25x; 25x on left    Hip 3 way standing (good foot on floor)  Green Band 3x10 reps each lower extremity 1 hand assist bilateral lower extremity         Side Lying Hip 5 Ways (Added- no bicycles)  x15 reps each direction Bilateral   Hamstring Curls   x25 reps blue theraband    Long Arc Quads   10x/ 3 sets; 10 pounds   Heel Slides  x 5 minutes 10 second holds for each passive with strap   Bridges     2x10 reps   With theraball 2x10 reps (added)   Upright  "Bike (seat 9)  5 minutes level 7, to increase strength and endurance   Knee extension machine X   2 plates Double Leg x30 reps   1 plate RLE x10 reps    Leg Press (added) x Double Le plates x10 reps, 7 plates x10 reps, 8 plates x10 reps   Single Le plates 3x10 reps         Knee extension prop  2 min   Re-Assessed Objective Measurements x 10 minutes     Plan for Next Visit: Progress knee flexion to tolerance     Clay received the following manual therapy techniques: Myofacial release and Soft tissue Mobilization were applied to the: right knee for (12) minutes, including:    Manual Intervention Performed Today    Soft Tissue Mobilization x Scar mobilizations, distal 1/2 of quadriceps    Joint Mobilizations x Patellar glides, medial/lateral/superior/inferior   Mobilization with movement          Functional Dry Needling        Plan for Next Visit: Continue     Clay participated in neuromuscular re-education activities to improve: Coordination for (5) minutes. The following activities were included:     Intervention Performed Today    Sit to Stands x 3x10 reps with tap to 20" box, 10#. Staggered stance   Shuttle     Double Leg 3 x 10 reps 6 bands  Single Leg 3 x 10 reps 4 bands (increased)   Stair Negotiation     Step to step 4 steps x5 reps  Step over step 4 steps x3 reps    Step Ups   6 inch 2 x10 reps (less UE use)    Step Downs (added)  6 inch to half distance 2x10 reps         Monster walks  Green theraband 2 laps forward only   Standing TKE  Purple Cook Bands, 5" hold, 30x   Lateral Walk Outs  Green theraband 3 laps back and forth length of mirror     Plan for Next Visit:      Patient Education and Home Exercises     Home Exercises Provided and Patient Education Provided     Education provided:   - Educated patient on importance of not   - Educated patient on the proper form and sequencing of all exercises provided in home exercise program today    Written Home Exercises Provided: Patient instructed to " cont prior home exercise program. Exercises were reviewed and Clay was able to demonstrate them prior to the end of the session.  Clay demonstrated good  understanding of the education provided. See electronic medical record under Patient Instructions for exercises provided during therapy sessions.    ASSESSMENT     Patient noted with decreased knee flexion range of motion today upon entry at about 110 degrees of knee flexion which improved to normal range of motion by the end of the session. Focused on progressing lower extremity strengthening program today by incorporating double leg and single leg, leg press with good muscular fatigue reported. Overall patient's range of motion and strength has improved within the last month and has functionally made progress as well with the ability to return to physical activity outside of therapy without pain. Will be decreasing frequency to 2 times a week and will be returning to more regular exercise classes gradually progressing as tolerated although not returning to kickboxing indefinitely. Patient will continue to benefit from skilled Physical Therapy to address the remaining strength and functional stability/endurance deficits to safely return to prior level of function.     Clay Is progressing well towards his goals.   Pt prognosis is Fair.     Pt will continue to benefit from skilled outpatient physical therapy to address the deficits listed in the problem list box on initial evaluation, provide pt/family education and to maximize pt's level of independence in the home and community environment.     Pt's spiritual, cultural and educational needs considered and pt agreeable to plan of care and goals.     Anticipated barriers to physical therapy: adherence to treatment plan and overexerting himself due to high motivation to return to prior level of function    Goals:   Short Term Goals:  6 weeks 5/9/2022   1. Function: Patient will demonstrate improved function as  indicated by a score of less than or equal to 47% impairment on FOTO.   Not assessed today   2. Mobility: Patient will improve active range of motion to 50% of stated goals, listed in objective measures above, in order to progress towards independence with functional activities.   Met   3. Strength: Patient will improve strength to 3+/5 in all muscle groups, listed in objective measures above, in order to progress towards independence with functional activities.   Met   4. Gait: Patient will demonstrate improved gait mechanics including more functional knee flexion and extension (per MD approval) in order to improve functional mobility, improve quality of life, and decrease risk of further injury or fall.   Met   5. Home exercise program: Patient will demonstrate independence with home exercise program in order to progress toward functional independence.  Met      Long Term Goals:  12 weeks/Ongoing 5/9/2022   1. Pain: Pt will demonstrate improved pain by reports of less than or equal to 1/10 worst pain on the verbal rating scale in order to progress toward maximal functional ability and improve quality of life.    Met   2. Function: Patient will demonstrate improved function as indicated by a score of less than or equal to 36% impairment on FOTO.   Not assessed today   3. Mobility: Patient will improve AROM to stated goals, listed in objective measures above, in order to return to maximal functional potential and improve quality of life.  Intermittently Met   4. Strength: Patient will improve strength to stated goals, listed in objective measures above, in order to improve functional independence and quality of life.  Progressing   5. Gait: Patient will demonstrate normalized gait mechanics with minimal compensation in order to return to prior level of function.  Progressing   6. Patient will return to normal activties of daily living, IADL's, recreational activities, and work-related activities with less than or  equal to 1/10 pain and maximal function.   Progressing   7. Patient will be able to return to active lifestyle of working out 5 days per week without pain or instability to safely return back to prior level of function.   Progressing      PLAN     Continue plan of care and frequency as planned. Continue to progress range of motion per Medical Doctor restrictions and strength to tolerance taking into account protocol/restrictions.      These services are reasonable and necessary for the conditions set forth above while under my care.    Kasia Quiñones, PT, DPT

## 2022-05-13 ENCOUNTER — CLINICAL SUPPORT (OUTPATIENT)
Dept: REHABILITATION | Facility: HOSPITAL | Age: 77
End: 2022-05-13
Payer: MEDICARE

## 2022-05-13 DIAGNOSIS — Z74.09 DECREASED STRENGTH, ENDURANCE, AND MOBILITY: Primary | ICD-10-CM

## 2022-05-13 DIAGNOSIS — R53.1 DECREASED STRENGTH, ENDURANCE, AND MOBILITY: Primary | ICD-10-CM

## 2022-05-13 DIAGNOSIS — R26.89 FUNCTIONAL GAIT ABNORMALITY: ICD-10-CM

## 2022-05-13 DIAGNOSIS — R68.89 DECREASED STRENGTH, ENDURANCE, AND MOBILITY: Primary | ICD-10-CM

## 2022-05-13 PROCEDURE — 97110 THERAPEUTIC EXERCISES: CPT

## 2022-05-13 PROCEDURE — 97112 NEUROMUSCULAR REEDUCATION: CPT

## 2022-05-13 NOTE — PROGRESS NOTES
[facility]   Physical Therapy Treatment Note + Progress Note     Name: Clay Marcos  Clinic Number: [id number]      Therapy Diagnosis:  Encounter Diagnoses   Name Primary?    Decreased strength, endurance, and mobility Yes    Functional gait abnormality        Physician: Heath Park MD    Visit Date: 4/5/2022    Physician Orders: PT Eval and Treat  Medical Diagnosis from Referral: S/P total knee arthroplasty right,  Status post open reduction and internal fixation (ORIF) of fracture   Evaluation Date: 2/24/2022  Authorization Period Expiration: 12/31/2022  Plan of Care Expiration: 5/24/2022  Visit # / Visits authorized: 27/40   FOTO: 2/3 (5/13/22)    PTA Visit #: 0/5     Progress Note Due on 6/9/2022    Time In: 12:23 pm  Time Out: 1:15 pm  Total Billable Time: 40 minutes (denotes billable time)    Precautions: no longer in brace, progress range of motion as tolerated     SUBJECTIVE     Pt reports: that he has just finished a bike class, no pain today.  Was able to add a little more resistance, but did start to add resistance and stood for about 30 seconds.      He was compliant with home exercise program.    Response to previous treatment: No adverse reactions   Functional change: Returning to workout     Pain: 0/10  Location: right knee (no pain today)    OBJECTIVE     Objective Measures updated at progress report unless specified.   FOTO:         Treatment     Clay received therapeutic exercises to develop strength, endurance, ROM, flexibility, posture and core stabilization for (10) minutes including:     Intervention Performed Today    Quad Sets over Heel Prop  3x10 reps    Straight Leg Raises Upright   5 minutes   Hamstring Stretch in Sitting  4x30 second holds    Gastroc Stretch in Standing   45 seconds x2   sidelying hip ABD  Brace on 25x; 25x on left    Hip 3 way standing (good foot on floor)  Green Band 3x10 reps each lower extremity 1 hand assist bilateral lower  "extremity         Side Lying Hip 5 Ways (Added- no bicycles)  x15 reps each direction Bilateral   Hamstring Curls   x25 reps blue theraband    Long Arc Quads   10x/ 3 sets; 10 pounds   Heel Slides  x 10x/ 5 second holds for each passive with strap   Bridges     2x10 reps   With theraball 2x10 reps (added)   Upright Bike (seat 9)  5 minutes level 7, to increase strength and endurance   Knee extension machine    2 plates Double Leg x30 reps   1 plate RLE x10 reps    Leg Press (added) x Double Le plates x10 reps, 7 plates x10 reps, 8 plates x10 reps   Single Le plates 3x10 reps    Knee flexion on 12" rouge box x 10x/5s   Knee extension prop  2 min   Re-Assessed Objective Measurements  10 minutes   Kneeling on airex/ modified cirilo pose  x Gentle stretch knee flexion 3x, 5 seconds hold     Plan for Next Visit: Progress knee flexion to tolerance     Clay received the following manual therapy techniques: Myofacial release and Soft tissue Mobilization were applied to the: right knee for (0) minutes, including:    Manual Intervention Performed Today    Soft Tissue Mobilization  Scar mobilizations, distal 1/2 of quadriceps    Joint Mobilizations  Patellar glides, medial/lateral/superior/inferior   Mobilization with movement          Functional Dry Needling        Plan for Next Visit: Continue     Clay participated in neuromuscular re-education activities to improve: Coordination for (30) minutes. The following activities were included:     Intervention Performed Today    Sit to Stands x 3x10 reps with tap to 20" box, 10#. Staggered stance   Shuttle     Double Leg 3 x 10 reps 6 bands  Single Leg 3 x 10 reps 4 bands (increased)   Stair Negotiation     Step to step 4 steps x5 reps  Step over step 4 steps x3 reps    Step Ups  x 4 inch 20x reps (NO UE use)    Step Downs (added)  6 inch to half distance 2x10 reps    1/4 squat in parallel bars x 10x   Monster walks x Green theraband at thighs, 2 laps forward /backwards ~15 " "feet with handheld assistance for balance   Standing TKE x Purple Cook Bands, 5" hold, 3 min   Lateral Walk Outs x Green theraband at thighs 5 laps back and forth length of mirror     Plan for Next Visit:      Patient Education and Home Exercises     Home Exercises Provided and Patient Education Provided     Education provided:   - Educated patient on the proper form and sequencing of all exercises provided in home exercise program today    Written Home Exercises Provided: Patient instructed to continue prior home exercise program.   Exercises were reviewed and Clay was able to demonstrate them prior to the end of the session.  Clay demonstrated good  understanding of the education provided. See electronic medical record under Patient Instructions for exercises provided during therapy sessions.    ASSESSMENT     Patient with continued end range stiffness into flexion of his knee, he was able to progress with all therapy today and tolerated all treatment well.  Encouraged continued participation with home exercise program and continue stretches of knee flexion.    Clay Is progressing well towards his goals.   Pt prognosis is Fair.     Pt will continue to benefit from skilled outpatient physical therapy to address the deficits listed in the problem list box on initial evaluation, provide pt/family education and to maximize pt's level of independence in the home and community environment.     Pt's spiritual, cultural and educational needs considered and pt agreeable to plan of care and goals.     Anticipated barriers to physical therapy: adherence to treatment plan and overexerting himself due to high motivation to return to prior level of function    Goals:   Short Term Goals:  6 weeks 5/9/2022   1. Function: Patient will demonstrate improved function as indicated by a score of less than or equal to 47% impairment on FOTO.   Not assessed today   2. Mobility: Patient will improve active range of motion to 50% of stated " goals, listed in objective measures above, in order to progress towards independence with functional activities.   Met   3. Strength: Patient will improve strength to 3+/5 in all muscle groups, listed in objective measures above, in order to progress towards independence with functional activities.   Met   4. Gait: Patient will demonstrate improved gait mechanics including more functional knee flexion and extension (per MD approval) in order to improve functional mobility, improve quality of life, and decrease risk of further injury or fall.   Met   5. Home exercise program: Patient will demonstrate independence with home exercise program in order to progress toward functional independence.  Met      Long Term Goals:  12 weeks/Ongoing 5/9/2022   1. Pain: Pt will demonstrate improved pain by reports of less than or equal to 1/10 worst pain on the verbal rating scale in order to progress toward maximal functional ability and improve quality of life.    Met   2. Function: Patient will demonstrate improved function as indicated by a score of less than or equal to 36% impairment on FOTO.   Not assessed today   3. Mobility: Patient will improve AROM to stated goals, listed in objective measures above, in order to return to maximal functional potential and improve quality of life.  Intermittently Met   4. Strength: Patient will improve strength to stated goals, listed in objective measures above, in order to improve functional independence and quality of life.  Progressing   5. Gait: Patient will demonstrate normalized gait mechanics with minimal compensation in order to return to prior level of function.  Progressing   6. Patient will return to normal activties of daily living, IADL's, recreational activities, and work-related activities with less than or equal to 1/10 pain and maximal function.   Progressing   7. Patient will be able to return to active lifestyle of working out 5 days per week without pain or instability  to safely return back to prior level of function.   Progressing      PLAN     Continue plan of care and frequency as planned. Continue to progress range of motion per Medical Doctor restrictions and strength to tolerance taking into account protocol/restrictions.      These services are reasonable and necessary for the conditions set forth above while under my care.    Yanna Brennan, PT

## 2022-05-16 ENCOUNTER — CLINICAL SUPPORT (OUTPATIENT)
Dept: REHABILITATION | Facility: HOSPITAL | Age: 77
End: 2022-05-16
Payer: MEDICARE

## 2022-05-16 DIAGNOSIS — Z74.09 DECREASED STRENGTH, ENDURANCE, AND MOBILITY: Primary | ICD-10-CM

## 2022-05-16 DIAGNOSIS — R26.89 FUNCTIONAL GAIT ABNORMALITY: ICD-10-CM

## 2022-05-16 DIAGNOSIS — R53.1 DECREASED STRENGTH, ENDURANCE, AND MOBILITY: Primary | ICD-10-CM

## 2022-05-16 DIAGNOSIS — R68.89 DECREASED STRENGTH, ENDURANCE, AND MOBILITY: Primary | ICD-10-CM

## 2022-05-16 PROCEDURE — 97110 THERAPEUTIC EXERCISES: CPT

## 2022-05-16 PROCEDURE — 97140 MANUAL THERAPY 1/> REGIONS: CPT

## 2022-05-16 PROCEDURE — 97112 NEUROMUSCULAR REEDUCATION: CPT

## 2022-05-16 NOTE — PROGRESS NOTES
[facility]   Physical Therapy Treatment Note    Name: Clay Marcos  Clinic Number: [id number]      Therapy Diagnosis:  Encounter Diagnoses   Name Primary?    Decreased strength, endurance, and mobility Yes    Functional gait abnormality        Physician: Heath Park MD    Visit Date: 4/5/2022    Physician Orders: PT Eval and Treat  Medical Diagnosis from Referral: S/P total knee arthroplasty right,  Status post open reduction and internal fixation (ORIF) of fracture   Evaluation Date: 2/24/2022  Authorization Period Expiration: 12/31/2022  Plan of Care Expiration: 5/24/2022  Visit # / Visits authorized: 28/40   FOTO: 2/3 (5/13/22)    PTA Visit #: 0/5     Progress Note Due on 6/9/2022    Time In: 12:46 pm  Time Out: 1:30 pm  Total Billable Time: 42 minutes (denotes billable time)    Precautions: no longer in brace, progress range of motion as tolerated     SUBJECTIVE     Pt reports: that he participated in cycling class every day last week. Patient reports that he is still modifying his speed and resistance and is mainly stationary, not much standing intervals when cycling.     He was compliant with home exercise program.    Response to previous treatment: No adverse reactions   Functional change: Returning to workout     Pain: 0/10  Location: right knee (no pain today)    OBJECTIVE     Objective Measures updated at progress report unless specified.      Treatment     Clay received therapeutic exercises to develop strength, endurance, ROM, flexibility, posture and core stabilization for (18) minutes including:     Intervention Performed Today    Quad Sets over Heel Prop  3x10 reps    Straight Leg Raises Upright   5 minutes   Hamstring Stretch in Sitting  4x30 second holds    Gastroc Stretch in Standing   45 seconds x2   sidelying hip ABD  Brace on 25x; 25x on left    Hip 3 way standing (good foot on floor)  Green Band 3x10 reps each lower extremity 1 hand assist bilateral lower  "extremity         Side Lying Hip 5 Ways (Added- no bicycles)  x15 reps each direction Bilateral   Hamstring Curls   x25 reps blue theraband    Long Arc Quads   10x/ 3 sets; 10 pounds   Heel Slides  x 3 minutes 5 second holds for each passive with strap   Bridges     2x10 reps   With theraball 2x10 reps (added)   Upright Bike (seat 9)  5 minutes level 7, to increase strength and endurance   Knee extension machine X  x 1 plates Double Leg x30 reps   1 plate Double Leg with Eccentric RLE lowering x10 reps    Leg Press (added) X    x Double Le plates x10 reps, 7 plates x10 reps, 8 plates x10 reps   Single Le plates 3x10 reps    Knee flexion on 12" rouge box x 10x/5s   Knee extension prop  2 min   Re-Assessed Objective Measurements  10 minutes   Kneeling on airex/ modified cirilo pose  x Gentle stretch knee flexion 3x, 5 seconds hold     Plan for Next Visit: Progress knee flexion to tolerance     Clay received the following manual therapy techniques: Myofacial release and Soft tissue Mobilization were applied to the: right knee for (8) minutes, including:    Manual Intervention Performed Today    Soft Tissue Mobilization x Right popliteus, medial hamstring and gastrocnemius   Joint Mobilizations  Patellar glides, medial/lateral/superior/inferior   Mobilization with movement          Functional Dry Needling        Plan for Next Visit: Continue     Clay participated in neuromuscular re-education activities to improve: Coordination for (16) minutes. The following activities were included:     Intervention Performed Today    Sit to Stands  3x10 reps with tap to 20" box, 10#. Staggered stance   Shuttle     Double Leg 3 x 10 reps 6 bands  Single Leg 3 x 10 reps 4 bands (increased)   Stair Negotiation     Step to step 4 steps x5 reps  Step over step 4 steps x3 reps    Step Ups   4 inch 20x reps (NO UE use)    Step Downs (added)  6 inch to half distance 2x10 reps    1/4 squat in parallel bars  10x   Monster walks  Green " "theraband at thighs, 2 laps forward /backwards ~15 feet with handheld assistance for balance   Standing TKE  Purple Cook Bands, 5" hold, 3 min   Lateral Walk Outs  Green theraband at thighs 5 laps back and forth length of mirror   Lunges in Parallel Bars with B UE Support (added) X  X  x With Bosu + Airex 2x10 reps B  With Bosu x6 reps B  Starting at ground on Airex coming up 2 inches x10 reps B     Plan for Next Visit:      Patient Education and Home Exercises     Home Exercises Provided and Patient Education Provided     Education provided:   - Educated patient on the proper form and sequencing of all exercises provided in home exercise program today    Written Home Exercises Provided: Patient instructed to continue prior home exercise program.   Exercises were reviewed and Clay was able to demonstrate them prior to the end of the session.  Clay demonstrated good  understanding of the education provided. See electronic medical record under Patient Instructions for exercises provided during therapy sessions.    ASSESSMENT     Patient noted with limited knee mobility upon entry into clinic so proceeded to continue working on improving this with mobility exercises with normal knee flexion mobility obtained by the end of the session. Patient noted with tightness in posterior knee today along popliteus, medial hamstrings and gastrocnemius which improved following manual therapy interventions performed today. Continued to progress lower extremity strengthening program with patient reporting that his goal upon discharge would be to be able to get on and off of the floor without upper extremity support. Focused on lunges in various depths and variations to strengthening in this range that will assist in allowing him to get on and off the of the floor with better ease and strength.    Clay Is progressing well towards his goals.   Pt prognosis is Fair.     Pt will continue to benefit from skilled outpatient physical therapy " to address the deficits listed in the problem list box on initial evaluation, provide pt/family education and to maximize pt's level of independence in the home and community environment.     Pt's spiritual, cultural and educational needs considered and pt agreeable to plan of care and goals.     Anticipated barriers to physical therapy: adherence to treatment plan and overexerting himself due to high motivation to return to prior level of function    Goals:   Short Term Goals:  6 weeks 5/9/2022   1. Function: Patient will demonstrate improved function as indicated by a score of less than or equal to 47% impairment on FOTO.   Not assessed today   2. Mobility: Patient will improve active range of motion to 50% of stated goals, listed in objective measures above, in order to progress towards independence with functional activities.   Met   3. Strength: Patient will improve strength to 3+/5 in all muscle groups, listed in objective measures above, in order to progress towards independence with functional activities.   Met   4. Gait: Patient will demonstrate improved gait mechanics including more functional knee flexion and extension (per MD approval) in order to improve functional mobility, improve quality of life, and decrease risk of further injury or fall.   Met   5. Home exercise program: Patient will demonstrate independence with home exercise program in order to progress toward functional independence.  Met      Long Term Goals:  12 weeks/Ongoing 5/9/2022   1. Pain: Pt will demonstrate improved pain by reports of less than or equal to 1/10 worst pain on the verbal rating scale in order to progress toward maximal functional ability and improve quality of life.    Met   2. Function: Patient will demonstrate improved function as indicated by a score of less than or equal to 36% impairment on FOTO.   Not assessed today   3. Mobility: Patient will improve AROM to stated goals, listed in objective measures above, in  order to return to maximal functional potential and improve quality of life.  Intermittently Met   4. Strength: Patient will improve strength to stated goals, listed in objective measures above, in order to improve functional independence and quality of life.  Progressing   5. Gait: Patient will demonstrate normalized gait mechanics with minimal compensation in order to return to prior level of function.  Progressing   6. Patient will return to normal activties of daily living, IADL's, recreational activities, and work-related activities with less than or equal to 1/10 pain and maximal function.   Progressing   7. Patient will be able to return to active lifestyle of working out 5 days per week without pain or instability to safely return back to prior level of function.   Progressing      PLAN     Continue plan of care and frequency as planned. Continue to progress range of motion per Medical Doctor restrictions and strength to tolerance taking into account protocol/restrictions.      These services are reasonable and necessary for the conditions set forth above while under my care.    Kasia Quiñones, PT, DPT

## 2022-05-20 ENCOUNTER — CLINICAL SUPPORT (OUTPATIENT)
Dept: REHABILITATION | Facility: HOSPITAL | Age: 77
End: 2022-05-20
Payer: MEDICARE

## 2022-05-20 DIAGNOSIS — Z74.09 DECREASED STRENGTH, ENDURANCE, AND MOBILITY: Primary | ICD-10-CM

## 2022-05-20 DIAGNOSIS — R68.89 DECREASED STRENGTH, ENDURANCE, AND MOBILITY: Primary | ICD-10-CM

## 2022-05-20 DIAGNOSIS — R26.89 FUNCTIONAL GAIT ABNORMALITY: ICD-10-CM

## 2022-05-20 DIAGNOSIS — R53.1 DECREASED STRENGTH, ENDURANCE, AND MOBILITY: Primary | ICD-10-CM

## 2022-05-20 PROCEDURE — 97110 THERAPEUTIC EXERCISES: CPT

## 2022-05-20 PROCEDURE — 97112 NEUROMUSCULAR REEDUCATION: CPT

## 2022-05-20 NOTE — PROGRESS NOTES
[facility]   Physical Therapy Treatment Note    Name: Clay Marcos  Clinic Number: [id number]      Therapy Diagnosis:  Encounter Diagnoses   Name Primary?    Decreased strength, endurance, and mobility Yes    Functional gait abnormality        Physician: Heath Park MD    Visit Date: 4/5/2022    Physician Orders: PT Eval and Treat  Medical Diagnosis from Referral: S/P total knee arthroplasty right,  Status post open reduction and internal fixation (ORIF) of fracture   Evaluation Date: 2/24/2022  Authorization Period Expiration: 12/31/2022  Plan of Care Expiration: 5/24/2022  Visit # / Visits authorized: 29/40   FOTO: 2/3 (5/13/22)    PTA Visit #: 0/5     Progress Note Due on 6/9/2022    Time In: 12:45 pm  Time Out: 1:25 pm  Total Billable Time: 40 minutes (denotes billable time)    Precautions: no longer in brace, progress range of motion as tolerated     SUBJECTIVE     Pt reports: that he has been able to stand more when cycling although he is gradually easing back into this. Patient feels that his next visit will be discharge unless his MD says otherwise the following day at appointment. Patient has been practicing floor work and lunges at home to improve his ability to do this.     He was compliant with home exercise program.    Response to previous treatment: No adverse reactions   Functional change: Returning to workout     Pain: 0/10  Location: right knee (no pain today)    OBJECTIVE     Objective Measures updated at progress report unless specified.      Treatment     Clay received therapeutic exercises to develop strength, endurance, ROM, flexibility, posture and core stabilization for (20) minutes including:     Intervention Performed Today    Quad Sets over Heel Prop  3x10 reps    Straight Leg Raises Upright   5 minutes   Hamstring Stretch in Sitting  4x30 second holds    Gastroc Stretch in Standing   45 seconds x2   sidelying hip ABD  Brace on 25x; 25x on left    Hip 3  "way standing (good foot on floor)  Green Band 3x10 reps each lower extremity 1 hand assist bilateral lower extremity         Side Lying Hip 5 Ways (Added- no bicycles)  x15 reps each direction Bilateral   Hamstring Curls   x25 reps blue theraband    Long Arc Quads   10x/ 3 sets; 10 pounds   Heel Slides  x 2 minutes 5 second holds for each passive with strap   Bridges     2x10 reps   With theraball 2x10 reps (added)   Upright Bike (seat 9)  5 minutes level 7, to increase strength and endurance   Knee extension machine x  x 2 plates Double Leg x30 reps   1 plate Double Leg with Eccentric RLE lowering 2x10 reps    Leg Press (added) X  x Double Le plates 3x10 reps   Single Le plates 3x10 reps    Knee flexion on 12" rouge box  10x/5s   Knee extension prop  2 min   Re-Assessed Objective Measurements  10 minutes   Kneeling on airex/ modified cirilo pose  x Gentle stretch knee flexion 3x, 5 seconds hold     Plan for Next Visit: Progress knee flexion to tolerance     Clay received the following manual therapy techniques: Myofacial release and Soft tissue Mobilization were applied to the: right knee for (5) minutes, including:    Manual Intervention Performed Today    Soft Tissue Mobilization x Right popliteus, medial hamstring and gastrocnemius   Joint Mobilizations  Patellar glides, medial/lateral/superior/inferior   Mobilization with movement          Functional Dry Needling        Plan for Next Visit: Lia     Clay participated in neuromuscular re-education activities to improve: Coordination for (15) minutes. The following activities were included:     Intervention Performed Today    Sit to Stands  3x10 reps with tap to 20" box, 10#. Staggered stance   Shuttle     Double Leg 3 x 10 reps 6 bands  Single Leg 3 x 10 reps 4 bands (increased)   Stair Negotiation     Step to step 4 steps x5 reps  Step over step 4 steps x3 reps    Step Ups   4 inch 20x reps (NO UE use)    Step Downs (added)  6 inch to half distance " "2x10 reps    1/4 squat in parallel bars  10x   Monster walks  Green theraband at thighs, 2 laps forward /backwards ~15 feet with handheld assistance for balance   Standing TKE  Purple Cook Bands, 5" hold, 3 min   Lateral Walk Outs  Green theraband at thighs 5 laps back and forth length of mirror   Lunges in Parallel Bars with B UE Support (added) X  X  x With Airex x8 reps B (progressed)  With Bosu x15 reps B  Starting at ground on Airex coming up 2 inches x10 reps B     Plan for Next Visit:      Patient Education and Home Exercises     Home Exercises Provided and Patient Education Provided     Education provided:   - Educated patient on the proper form and sequencing of all exercises provided in home exercise program today    Written Home Exercises Provided: Patient instructed to continue prior home exercise program.   Exercises were reviewed and Clay was able to demonstrate them prior to the end of the session.  Clay demonstrated good  understanding of the education provided. See electronic medical record under Patient Instructions for exercises provided during therapy sessions.    ASSESSMENT     Patient noted with slight limited knee flexion upon entry which normalized with mobility exercise. Progressed weight for leg press and knee extension machine and continued to work on improving fluidity, depth and endurance with lunges. Overall patient is progressing well towards goals and will be ready for discharge in the near future.     Clay Is progressing well towards his goals.   Pt prognosis is Fair.     Pt will continue to benefit from skilled outpatient physical therapy to address the deficits listed in the problem list box on initial evaluation, provide pt/family education and to maximize pt's level of independence in the home and community environment.     Pt's spiritual, cultural and educational needs considered and pt agreeable to plan of care and goals.     Anticipated barriers to physical therapy: adherence " to treatment plan and overexerting himself due to high motivation to return to prior level of function    Goals:   Short Term Goals:  6 weeks 5/20/2022   1. Function: Patient will demonstrate improved function as indicated by a score of less than or equal to 47% impairment on FOTO.   Met   2. Mobility: Patient will improve active range of motion to 50% of stated goals, listed in objective measures above, in order to progress towards independence with functional activities.   Met   3. Strength: Patient will improve strength to 3+/5 in all muscle groups, listed in objective measures above, in order to progress towards independence with functional activities.   Met   4. Gait: Patient will demonstrate improved gait mechanics including more functional knee flexion and extension (per MD approval) in order to improve functional mobility, improve quality of life, and decrease risk of further injury or fall.   Met   5. Home exercise program: Patient will demonstrate independence with home exercise program in order to progress toward functional independence.  Met      Long Term Goals:  12 weeks/Ongoing 5/20/2022   1. Pain: Pt will demonstrate improved pain by reports of less than or equal to 1/10 worst pain on the verbal rating scale in order to progress toward maximal functional ability and improve quality of life.    Met   2. Function: Patient will demonstrate improved function as indicated by a score of less than or equal to 36% impairment on FOTO.   Progressing   3. Mobility: Patient will improve AROM to stated goals, listed in objective measures above, in order to return to maximal functional potential and improve quality of life.  Intermittently Met   4. Strength: Patient will improve strength to stated goals, listed in objective measures above, in order to improve functional independence and quality of life.  Progressing   5. Gait: Patient will demonstrate normalized gait mechanics with minimal compensation in order to  return to prior level of function.  Progressing   6. Patient will return to normal activties of daily living, IADL's, recreational activities, and work-related activities with less than or equal to 1/10 pain and maximal function.   Progressing   7. Patient will be able to return to active lifestyle of working out 5 days per week without pain or instability to safely return back to prior level of function.   Progressing      PLAN     Continue plan of care and frequency as planned. Continue to progress range of motion per Medical Doctor restrictions and strength to tolerance taking into account protocol/restrictions.      These services are reasonable and necessary for the conditions set forth above while under my care.    Kasia Quiñones, PT, DPT

## 2022-05-23 ENCOUNTER — CLINICAL SUPPORT (OUTPATIENT)
Dept: REHABILITATION | Facility: HOSPITAL | Age: 77
End: 2022-05-23
Payer: MEDICARE

## 2022-05-23 DIAGNOSIS — Z74.09 DECREASED STRENGTH, ENDURANCE, AND MOBILITY: Primary | ICD-10-CM

## 2022-05-23 DIAGNOSIS — R68.89 DECREASED STRENGTH, ENDURANCE, AND MOBILITY: Primary | ICD-10-CM

## 2022-05-23 DIAGNOSIS — R53.1 DECREASED STRENGTH, ENDURANCE, AND MOBILITY: Primary | ICD-10-CM

## 2022-05-23 DIAGNOSIS — R26.89 FUNCTIONAL GAIT ABNORMALITY: ICD-10-CM

## 2022-05-23 PROCEDURE — 97110 THERAPEUTIC EXERCISES: CPT

## 2022-05-23 PROCEDURE — 97112 NEUROMUSCULAR REEDUCATION: CPT

## 2022-05-23 NOTE — PROGRESS NOTES
[facility]   Physical Therapy Treatment Note + Progress Note    Name: Clay Marcos  Clinic Number: [id number]      Therapy Diagnosis:  Encounter Diagnoses   Name Primary?    Decreased strength, endurance, and mobility Yes    Functional gait abnormality        Physician: Heath Park MD    Visit Date: 4/5/2022    Physician Orders: PT Eval and Treat  Medical Diagnosis from Referral: S/P total knee arthroplasty right,  Status post open reduction and internal fixation (ORIF) of fracture   Evaluation Date: 2/24/2022  Authorization Period Expiration: 12/31/2022  Plan of Care Expiration: 5/24/2022  Visit # / Visits authorized: 30/40   FOTO: 2/3 (5/13/22)    PTA Visit #: 0/5     Progress Note Due on 6/9/2022    Time In: 1:30 pm  Time Out: 2:18 pm  Total Billable Time: 45 minutes (denotes billable time)    Precautions: no longer in brace, progress range of motion as tolerated     SUBJECTIVE     Pt reports: that he has been feeling good and feel ready for discharge. Patient has a follow up appointment tomorrow with MD so is awaiting full clearance from him for discharge.    He was compliant with home exercise program.    Response to previous treatment: No adverse reactions   Functional change: Returning to workout, able to transition on and off the floor independently, able to return to work out classes gradually without pain     Pain: 0/10  Location: right knee (no pain today)    OBJECTIVE     Objective Measures updated at progress report unless specified.      Sensation:  Sensation is intact to light touch   Posture:  Pt presents with postural abnormalities which include: forward head, rounded shoulders  Gait Analysis: The patient ambulated with the following assistive device: none; the pt presents with the following gait abnormalities: fluid knee flexion and extension throughout gait cycle with lateral trunk lean noted      (x = not tested due to pain and/or inability to obtain test  position)     Range of Motion/Strength:      Knee Right  5/23/2022 Left  5/23/2022 Pain/Dysfunction with Movement Goal   AROM           Flexion (135)  125  145 No pain 130 No pain   Extension (0)  5 flexion  5 flexion No pain 0      L/E MMT Right Left Pain/Dysfunction with Movement Goal   Hip Flexion  4+/5 4+/5 No pain 4+/5 B   Hip Extension  4/5 4/5 No pain 4+/5 B   Hip Abduction  4+/5 4/5 No pain 4+/5 B   Hip Adduction 4+/5 4/5 No pain 4+/5 B   Knee Extension 4+/5 4/5 No pain 5/5 B   Knee Flexion 4+/5 4/5 Cramping in hamstrings noted bilaterally 5/5 B   Hip IR 4/5 4/5   4+/5 B   Hip ER 4/5 4/5   4+/5 B     Sit to Stand/Squats: to 20 inch box with 15 lb kettle bell with fair stability  Lunges: to moderate depth with good form and stability with unilateral upper extremity support required  Transition to/from Floor: fluid transitions with use of upper extremity support         Treatment     Clay received therapeutic exercises to develop strength, endurance, ROM, flexibility, posture and core stabilization for (30) minutes including:     Intervention Performed Today    Quad Sets over Heel Prop  3x10 reps    Straight Leg Raises Upright   5 minutes   Hamstring Stretch in Sitting  4x30 second holds    Gastroc Stretch in Standing   45 seconds x2   sidelying hip ABD  Brace on 25x; 25x on left    Hip 3 way standing x Green Band x15 reps each lower extremity 1 hand assist bilateral lower extremity         Side Lying Hip 5 Ways (Added- no bicycles)  x15 reps each direction Bilateral   Hamstring Curls   x25 reps blue theraband    Long Arc Quads   10x/ 3 sets; 10 pounds   Heel Slides  x 2 minutes 5 second holds for each passive with strap   Bridges     2x10 reps   With theraball 2x10 reps (added)   Upright Bike (seat 9)  5 minutes level 7, to increase strength and endurance   Knee extension machine x  x 2 plates Double Leg x30 reps   1 plate Double Leg with Eccentric RLE lowering 2x10 reps    Leg Press (added) X  x Double Leg:  "10 plates 3x10 reps   Single Le plates 3x10 reps    Knee flexion on 12" rouge box  10x/5s   Knee extension prop  2 min   Re-Assessed Objective Measurements x 8 minutes   Kneeling on airex/ modified cirilo pose   Gentle stretch knee flexion 3x, 5 seconds hold   Went through updated HEP x      Plan for Next Visit: Progress knee flexion to tolerance     Clay received the following manual therapy techniques: Myofacial release and Soft tissue Mobilization were applied to the: right knee for (0) minutes, including:    Manual Intervention Performed Today    Soft Tissue Mobilization  Right popliteus, medial hamstring and gastrocnemius   Joint Mobilizations  Patellar glides, medial/lateral/superior/inferior   Mobilization with movement          Functional Dry Needling        Plan for Next Visit: Continue     Clay participated in neuromuscular re-education activities to improve: Coordination for (15) minutes. The following activities were included:     Intervention Performed Today    Sit to Stands x x20 reps with tap to 20" box, 15#   Shuttle     Double Leg 3 x 10 reps 6 bands  Single Leg 3 x 10 reps 4 bands (increased)   Stair Negotiation     Step to step 4 steps x5 reps  Step over step 4 steps x3 reps    Step Ups   4 inch 20x reps (NO UE use)    Step Downs (added)  6 inch to half distance 2x10 reps    1/4 squat in parallel bars  10x   Monster walks  Green theraband at thighs, 2 laps forward /backwards ~15 feet with handheld assistance for balance   Standing TKE  Purple Cook Bands, 5" hold, 3 min   Lateral Walk Outs  Green theraband at thighs 5 laps back and forth length of mirror   Lunges in Parallel Bars with B UE Support (added) X     With Airex x15 reps B   With Bosu x15 reps B  Starting at ground on Airex coming up 2 inches x10 reps B     Plan for Next Visit:      Patient Education and Home Exercises     Home Exercises Provided and Patient Education Provided     Education provided:   - Educated patient on the proper " form and sequencing of all exercises provided in home exercise program today    Written Home Exercises Provided: Patient instructed to continue prior home exercise program.   Exercises were reviewed and Clay was able to demonstrate them prior to the end of the session.  Clay demonstrated good  understanding of the education provided. See electronic medical record under Patient Instructions for exercises provided during therapy sessions.    ASSESSMENT      Overall his range of motion has normalized, although requires heel slides to obtain end range knee flexion, since initial evaluation. Patient's strength has improved well as well, with some muscle groups being stronger on right as compared to left due to heavy focus on improving right lower extremity strength. Educated patient on the importance of maintaining good strength and mobility bilaterally to decrease risk of regressions or further need for interventions in the future. Patient is able to perform transitions to and from the floor safely and smoothly without assistance. Patient is able to perform modified range lunges and squats with light weight as his functional strength is still improving but functional at this point. Patient has been regularly participating in workout classes without any pain. Overall he has progressed well with Physical Therapy and I feel that discharge is appropriate at this time. Will await MD clearance for this.    Clay Is progressing well towards his goals.   Pt prognosis is Fair.     Pt will continue to benefit from skilled outpatient physical therapy to address the deficits listed in the problem list box on initial evaluation, provide pt/family education and to maximize pt's level of independence in the home and community environment.     Pt's spiritual, cultural and educational needs considered and pt agreeable to plan of care and goals.     Anticipated barriers to physical therapy: adherence to treatment plan and overexerting  himself due to high motivation to return to prior level of function    Goals:   Short Term Goals:  6 weeks 5/23/2022   1. Function: Patient will demonstrate improved function as indicated by a score of less than or equal to 47% impairment on FOTO.   Met   2. Mobility: Patient will improve active range of motion to 50% of stated goals, listed in objective measures above, in order to progress towards independence with functional activities.   Met   3. Strength: Patient will improve strength to 3+/5 in all muscle groups, listed in objective measures above, in order to progress towards independence with functional activities.   Met   4. Gait: Patient will demonstrate improved gait mechanics including more functional knee flexion and extension (per MD approval) in order to improve functional mobility, improve quality of life, and decrease risk of further injury or fall.   Met   5. Home exercise program: Patient will demonstrate independence with home exercise program in order to progress toward functional independence.  Met      Long Term Goals:  12 weeks/Ongoing 5/23/2022   1. Pain: Pt will demonstrate improved pain by reports of less than or equal to 1/10 worst pain on the verbal rating scale in order to progress toward maximal functional ability and improve quality of life.    Met   2. Function: Patient will demonstrate improved function as indicated by a score of less than or equal to 36% impairment on FOTO.  Met    3. Mobility: Patient will improve AROM to stated goals, listed in objective measures above, in order to return to maximal functional potential and improve quality of life.  Met   4. Strength: Patient will improve strength to stated goals, listed in objective measures above, in order to improve functional independence and quality of life.  Progressed Towards   5. Gait: Patient will demonstrate normalized gait mechanics with minimal compensation in order to return to prior level of function.  Met    6. Patient will return to normal activties of daily living, IADL's, recreational activities, and work-related activities with less than or equal to 1/10 pain and maximal function.   Met   7. Patient will be able to return to active lifestyle of working out 5 days per week without pain or instability to safely return back to prior level of function.  Met      PLAN     Plan to discharge pending MD clearance as patient has met all goals and is functional ready for discharge.      These services are reasonable and necessary for the conditions set forth above while under my care.    Kasia Quiñones, PT, DPT

## 2022-06-20 ENCOUNTER — DOCUMENTATION ONLY (OUTPATIENT)
Dept: REHABILITATION | Facility: HOSPITAL | Age: 77
End: 2022-06-20
Payer: MEDICARE

## 2022-06-20 DIAGNOSIS — R26.89 FUNCTIONAL GAIT ABNORMALITY: ICD-10-CM

## 2022-06-20 DIAGNOSIS — R53.1 DECREASED STRENGTH, ENDURANCE, AND MOBILITY: Primary | ICD-10-CM

## 2022-06-20 DIAGNOSIS — R68.89 DECREASED STRENGTH, ENDURANCE, AND MOBILITY: Primary | ICD-10-CM

## 2022-06-20 DIAGNOSIS — Z74.09 DECREASED STRENGTH, ENDURANCE, AND MOBILITY: Primary | ICD-10-CM

## 2022-06-20 NOTE — PROGRESS NOTES
[facility]  Physical Therapy Discharge Note    Name: Clay Marcos  [facility] Number: [id number]    Therapy Diagnosis:   Encounter Diagnoses   Name Primary?    Decreased strength, endurance, and mobility Yes    Functional gait abnormality      Physician: Heath Park MD     Physician Orders: PT Eval and Treat  Medical Diagnosis from Referral: S/P total knee arthroplasty right,  Status post open reduction and internal fixation (ORIF) of fracture   Evaluation Date: 2/24/2022    Date of Last visit: 5/23/2022  Total Visits Received: 30    ASSESSMENT         Sensation:  Sensation is intact to light touch   Posture:  Pt presents with postural abnormalities which include: forward head, rounded shoulders  Gait Analysis: The patient ambulated with the following assistive device: none; the pt presents with the following gait abnormalities: fluid knee flexion and extension throughout gait cycle with lateral trunk lean noted      (x = not tested due to pain and/or inability to obtain test position)     Range of Motion/Strength:      Knee Right  5/23/2022 Left  5/23/2022 Pain/Dysfunction with Movement Goal   AROM           Flexion (135)  125  145 No pain 130 No pain   Extension (0)  5 flexion  5 flexion No pain 0      L/E MMT Right Left Pain/Dysfunction with Movement Goal   Hip Flexion  4+/5 4+/5 No pain 4+/5 B   Hip Extension  4/5 4/5 No pain 4+/5 B   Hip Abduction  4+/5 4/5 No pain 4+/5 B   Hip Adduction 4+/5 4/5 No pain 4+/5 B   Knee Extension 4+/5 4/5 No pain 5/5 B   Knee Flexion 4+/5 4/5 Cramping in hamstrings noted bilaterally 5/5 B   Hip IR 4/5 4/5   4+/5 B   Hip ER 4/5 4/5   4+/5 B      Sit to Stand/Squats: to 20 inch box with 15 lb kettle bell with fair stability  Lunges: to moderate depth with good form and stability with unilateral upper extremity support required  Transition to/from Floor: fluid transitions with use of upper extremity support          Discharge reason: Patient is now  asymptomatic and Patient has met all of his/her goals    Discharge FOTO Score: 31%    Goals:   Short Term Goals:  6 weeks 5/23/2022   1. Function: Patient will demonstrate improved function as indicated by a score of less than or equal to 47% impairment on FOTO.   Met   2. Mobility: Patient will improve active range of motion to 50% of stated goals, listed in objective measures above, in order to progress towards independence with functional activities.   Met   3. Strength: Patient will improve strength to 3+/5 in all muscle groups, listed in objective measures above, in order to progress towards independence with functional activities.   Met   4. Gait: Patient will demonstrate improved gait mechanics including more functional knee flexion and extension (per MD approval) in order to improve functional mobility, improve quality of life, and decrease risk of further injury or fall.   Met   5. Home exercise program: Patient will demonstrate independence with home exercise program in order to progress toward functional independence.  Met      Long Term Goals:  12 weeks/Ongoing 5/23/2022   1. Pain: Pt will demonstrate improved pain by reports of less than or equal to 1/10 worst pain on the verbal rating scale in order to progress toward maximal functional ability and improve quality of life.    Met   2. Function: Patient will demonstrate improved function as indicated by a score of less than or equal to 36% impairment on FOTO.  Met    3. Mobility: Patient will improve AROM to stated goals, listed in objective measures above, in order to return to maximal functional potential and improve quality of life.  Met   4. Strength: Patient will improve strength to stated goals, listed in objective measures above, in order to improve functional independence and quality of life.  Progressed Towards   5. Gait: Patient will demonstrate normalized gait mechanics with minimal compensation in order to return to prior level of function.   Met   6. Patient will return to normal activties of daily living, IADL's, recreational activities, and work-related activities with less than or equal to 1/10 pain and maximal function.   Met   7. Patient will be able to return to active lifestyle of working out 5 days per week without pain or instability to safely return back to prior level of function.  Met        PLAN   This patient is discharged from Physical Therapy    Kasia Quiñones, PT, DPT

## 2022-06-30 ENCOUNTER — TELEPHONE (OUTPATIENT)
Dept: ADMINISTRATIVE | Facility: HOSPITAL | Age: 77
End: 2022-06-30
Payer: MEDICARE

## 2022-07-08 ENCOUNTER — TELEPHONE (OUTPATIENT)
Dept: FAMILY MEDICINE | Facility: CLINIC | Age: 77
End: 2022-07-08

## 2022-07-08 ENCOUNTER — OFFICE VISIT (OUTPATIENT)
Dept: FAMILY MEDICINE | Facility: CLINIC | Age: 77
End: 2022-07-08
Payer: MEDICARE

## 2022-07-08 VITALS
SYSTOLIC BLOOD PRESSURE: 128 MMHG | BODY MASS INDEX: 33.25 KG/M2 | DIASTOLIC BLOOD PRESSURE: 78 MMHG | RESPIRATION RATE: 20 BRPM | WEIGHT: 250.88 LBS | HEIGHT: 73 IN | HEART RATE: 80 BPM | TEMPERATURE: 97 F | OXYGEN SATURATION: 98 %

## 2022-07-08 DIAGNOSIS — I77.1 TORTUOUS AORTA: ICD-10-CM

## 2022-07-08 DIAGNOSIS — Z74.09 OTHER REDUCED MOBILITY: ICD-10-CM

## 2022-07-08 DIAGNOSIS — Z00.00 ENCOUNTER FOR ANNUAL WELLNESS VISIT (AWV) IN MEDICARE PATIENT: Primary | ICD-10-CM

## 2022-07-08 DIAGNOSIS — Z13.220 ENCOUNTER FOR LIPID SCREENING FOR CARDIOVASCULAR DISEASE: ICD-10-CM

## 2022-07-08 DIAGNOSIS — N13.8 BPH WITH URINARY OBSTRUCTION: ICD-10-CM

## 2022-07-08 DIAGNOSIS — N40.1 BPH WITH URINARY OBSTRUCTION: ICD-10-CM

## 2022-07-08 DIAGNOSIS — M51.36 DDD (DEGENERATIVE DISC DISEASE), LUMBAR: ICD-10-CM

## 2022-07-08 DIAGNOSIS — R91.8 MULTIPLE LUNG NODULES ON CT: Chronic | ICD-10-CM

## 2022-07-08 DIAGNOSIS — E66.9 OBESITY (BMI 30-39.9): ICD-10-CM

## 2022-07-08 DIAGNOSIS — J45.20 MILD INTERMITTENT ASTHMA WITHOUT COMPLICATION: Primary | ICD-10-CM

## 2022-07-08 DIAGNOSIS — I34.1 MVP (MITRAL VALVE PROLAPSE): ICD-10-CM

## 2022-07-08 DIAGNOSIS — G47.39 TREATMENT-EMERGENT CENTRAL SLEEP APNEA: ICD-10-CM

## 2022-07-08 DIAGNOSIS — Z13.6 ENCOUNTER FOR LIPID SCREENING FOR CARDIOVASCULAR DISEASE: ICD-10-CM

## 2022-07-08 DIAGNOSIS — K43.9 HERNIA OF ABDOMINAL WALL: ICD-10-CM

## 2022-07-08 DIAGNOSIS — J45.20 MILD INTERMITTENT ASTHMA WITHOUT COMPLICATION: ICD-10-CM

## 2022-07-08 DIAGNOSIS — Z00.00 ENCOUNTER FOR PREVENTIVE HEALTH EXAMINATION: ICD-10-CM

## 2022-07-08 PROBLEM — G47.33 OSA (OBSTRUCTIVE SLEEP APNEA): Status: RESOLVED | Noted: 2021-11-16 | Resolved: 2022-07-08

## 2022-07-08 PROBLEM — D69.2 OTHER NONTHROMBOCYTOPENIC PURPURA: Status: RESOLVED | Noted: 2021-10-26 | Resolved: 2022-07-08

## 2022-07-08 PROBLEM — G47.33 OSA (OBSTRUCTIVE SLEEP APNEA): Status: ACTIVE | Noted: 2021-11-16

## 2022-07-08 PROCEDURE — 1126F AMNT PAIN NOTED NONE PRSNT: CPT | Mod: CPTII,S$GLB,, | Performed by: NURSE PRACTITIONER

## 2022-07-08 PROCEDURE — 3288F FALL RISK ASSESSMENT DOCD: CPT | Mod: CPTII,S$GLB,, | Performed by: NURSE PRACTITIONER

## 2022-07-08 PROCEDURE — 1160F RVW MEDS BY RX/DR IN RCRD: CPT | Mod: CPTII,S$GLB,, | Performed by: NURSE PRACTITIONER

## 2022-07-08 PROCEDURE — 1170F FXNL STATUS ASSESSED: CPT | Mod: CPTII,S$GLB,, | Performed by: NURSE PRACTITIONER

## 2022-07-08 PROCEDURE — 3288F PR FALLS RISK ASSESSMENT DOCUMENTED: ICD-10-PCS | Mod: CPTII,S$GLB,, | Performed by: NURSE PRACTITIONER

## 2022-07-08 PROCEDURE — 1170F PR FUNCTIONAL STATUS ASSESSED: ICD-10-PCS | Mod: CPTII,S$GLB,, | Performed by: NURSE PRACTITIONER

## 2022-07-08 PROCEDURE — G0439 PPPS, SUBSEQ VISIT: HCPCS | Mod: S$GLB,,, | Performed by: NURSE PRACTITIONER

## 2022-07-08 PROCEDURE — 1160F PR REVIEW ALL MEDS BY PRESCRIBER/CLIN PHARMACIST DOCUMENTED: ICD-10-PCS | Mod: CPTII,S$GLB,, | Performed by: NURSE PRACTITIONER

## 2022-07-08 PROCEDURE — 1126F PR PAIN SEVERITY QUANTIFIED, NO PAIN PRESENT: ICD-10-PCS | Mod: CPTII,S$GLB,, | Performed by: NURSE PRACTITIONER

## 2022-07-08 PROCEDURE — 1159F MED LIST DOCD IN RCRD: CPT | Mod: CPTII,S$GLB,, | Performed by: NURSE PRACTITIONER

## 2022-07-08 PROCEDURE — 1159F PR MEDICATION LIST DOCUMENTED IN MEDICAL RECORD: ICD-10-PCS | Mod: CPTII,S$GLB,, | Performed by: NURSE PRACTITIONER

## 2022-07-08 PROCEDURE — 1101F PT FALLS ASSESS-DOCD LE1/YR: CPT | Mod: CPTII,S$GLB,, | Performed by: NURSE PRACTITIONER

## 2022-07-08 PROCEDURE — 99999 PR PBB SHADOW E&M-EST. PATIENT-LVL IV: ICD-10-PCS | Mod: PBBFAC,,, | Performed by: NURSE PRACTITIONER

## 2022-07-08 PROCEDURE — G0439 PR MEDICARE ANNUAL WELLNESS SUBSEQUENT VISIT: ICD-10-PCS | Mod: S$GLB,,, | Performed by: NURSE PRACTITIONER

## 2022-07-08 PROCEDURE — 99999 PR PBB SHADOW E&M-EST. PATIENT-LVL IV: CPT | Mod: PBBFAC,,, | Performed by: NURSE PRACTITIONER

## 2022-07-08 PROCEDURE — 1101F PR PT FALLS ASSESS DOC 0-1 FALLS W/OUT INJ PAST YR: ICD-10-PCS | Mod: CPTII,S$GLB,, | Performed by: NURSE PRACTITIONER

## 2022-07-08 NOTE — Clinical Note
Your patient was seen today for a HRA visit.  I have included a copy of my visit note, please review the note and feel free to contact me with any questions.  Thank you for allowing me to participate in the care of your patients.  Diamond Turner NP

## 2022-07-08 NOTE — PROGRESS NOTES
"  Clay Marcos presented for a  Medicare AWV and comprehensive Health Risk Assessment today. The following components were reviewed and updated:    · Medical history  · Family History  · Social history  · Allergies and Current Medications  · Health Risk Assessment  · Health Maintenance  · Care Team         ** See Completed Assessments for Annual Wellness Visit within the encounter summary.**         The following assessments were completed:  · Living Situation  · CAGE  · Depression Screening  · Timed Get Up and Go  · Whisper Test  · Cognitive Function Screening  · Nutrition Screening  · ADL Screening  · PAQ Screening        Vitals:    07/08/22 0901   BP: 128/78   Pulse: 80   Resp: 20   Temp: 96.6 °F (35.9 °C)   SpO2: 98%   Weight: 113.8 kg (250 lb 14.1 oz)   Height: 6' 1" (1.854 m)     Body mass index is 33.1 kg/m².  Physical Exam  Vitals and nursing note reviewed.   Constitutional:       General: He is not in acute distress.     Appearance: He is well-developed.   HENT:      Head: Normocephalic and atraumatic.   Eyes:      Pupils: Pupils are equal, round, and reactive to light.   Neck:      Vascular: No carotid bruit.   Cardiovascular:      Rate and Rhythm: Normal rate and regular rhythm.      Pulses: Normal pulses.      Heart sounds: Murmur heard.     No gallop.   Pulmonary:      Effort: Pulmonary effort is normal.      Breath sounds: Normal breath sounds.   Abdominal:      General: Bowel sounds are normal. There is no distension.      Palpations: Abdomen is soft.      Tenderness: There is no abdominal tenderness.   Musculoskeletal:         General: Normal range of motion.   Skin:     General: Skin is warm and dry.   Neurological:      Motor: No abnormal muscle tone.      Gait: Gait normal.   Psychiatric:         Speech: Speech normal.         Behavior: Behavior normal.         Thought Content: Thought content normal.         Judgment: Judgment normal.       Current Outpatient Medications   Medication Instructions "    celecoxib (CELEBREX) 200 MG capsule No dose, route, or frequency recorded.    fluorouraciL (EFUDEX) 5 % cream No dose, route, or frequency recorded.    hydrOXYzine HCL (ATARAX) 25 MG tablet No dose, route, or frequency recorded.    montelukast (SINGULAIR) 10 mg, Oral, Nightly    multivitamin (THERAGRAN) tablet 1 tablet, Oral, Daily    omeprazole magnesium (PRILOSEC ORAL) 20 mg, Oral, Daily    tamsulosin (FLOMAX) 0.4 mg Cap TAKE 1 CAPSULE EVERY EVENING             Diagnoses and health risks identified today and associated recommendations/orders:    1. Encounter for annual wellness visit (AWV) in Medicare patient    2. Tortuous aorta  Chronic and Stable blood pressure an d cholesterol levels. Continue current treatment plan as previously prescribed with your PCP    3. Obesity (BMI 30-39.9)  Wt: 113.8 kg (250 lb 14.1 oz)  BMI: 33.10 kg/m²  Idl Wt: 79.9 kg (176 lb 2.4 oz)  Adj Wt: 93.5 kg (206 lb 0.7 oz   Reinforce healhty diet and continue exercise    4. Multiple lung nodules on CT  8/2020 CT scan...Stable 3.0 cm lesion at the left lung base along with a stable 0.9 cm lesion at the right lung base.  No new pulmonary nodules.  Chronic and Stable. Continue current treatment plan as previously prescribed with your pulmonologist    5. MVP (mitral valve prolapse)  Chronic and Stable. Pt asymptomatic- followed by PCP     6. Treatment-emergent central sleep apnea  Chronic and Stable on CPAP. Continue current treatment plan as previously prescribed with your PCP    7. MCCANN (degenerative disc disease), lumbar  Chronic and Stable with Celebrex. Continue current treatment plan as previously prescribed with your PCP    8. Hernia of abdominal wall - left lateral  Chronic and Stable. Pt asymptomatic- decline the need for surgical repair     9. Mild intermittent asthma without complication  Chronic and Stable on Singular. Continue current treatment plan as previously prescribed with your pulmonologist    10. BPH with  urinary obstruction  Chronic and Ongoing. Taking Flomax- reports frequent urination- scheduled for PSA /annual with PCP 7/28/2022     I offered to discuss advanced care planning, including how to pick a person who would make decisions for you if you were unable to make them for yourself, called a health care power of , and what kind of decisions you might make such as use of life sustaining treatments such as ventilators and tube feeding when faced with a life limiting illness recorded on a living will that they will need to know. (How you want to be cared for as you near the end of your natural life)     X  Patient has advanced directives written and agrees to provide copies to the institution.    Provided Clay with a 5-10 year written screening schedule and personal prevention plan. Recommendations were developed using the USPHS age appropriate recommendations. Education, counseling, and referrals were provided as needed. After Visit Summary printed and given to patient which includes a list of additional screenings\tests needed.    Follow up in about 1 year (around 7/8/2023) for Follow up for Annual with PCP.    Diamond Turner NP

## 2022-07-08 NOTE — PATIENT INSTRUCTIONS
Counseling and Referral of Other Preventative  (Italic type indicates deductible and co-insurance are waived)    Patient Name: Clay Marcos  Today's Date: 7/8/2022    Health Maintenance       Date Due Completion Date    Shingles Vaccine (2 of 3) 05/30/2011 4/4/2011    TETANUS VACCINE 08/14/2022 8/14/2012    Influenza Vaccine (1) 09/01/2022 10/26/2021    Lipid Panel 09/23/2026 9/23/2021        No orders of the defined types were placed in this encounter.    The following information is provided to all patients.  This information is to help you find resources for any of the problems found today that may be affecting your health:                Living healthy guide: www.[facility].[place].gov      Understanding Diabetes: www.diabetes.org      Eating healthy: www.cdc.gov/healthyweight      CDC home safety checklist: www.cdc.gov/steadi/patient.html      Agency on Aging: www.goea.[place].[facility]      Alcoholics anonymous (AA): www.aa.org      Physical Activity: www.darrick.nih.gov/vf1nksg      Tobacco use: www.quitwithusla.org

## 2022-07-20 ENCOUNTER — LAB VISIT (OUTPATIENT)
Dept: LAB | Facility: HOSPITAL | Age: 77
End: 2022-07-20
Attending: FAMILY MEDICINE
Payer: MEDICARE

## 2022-07-20 DIAGNOSIS — J45.20 MILD INTERMITTENT ASTHMA WITHOUT COMPLICATION: ICD-10-CM

## 2022-07-20 DIAGNOSIS — Z13.220 ENCOUNTER FOR LIPID SCREENING FOR CARDIOVASCULAR DISEASE: ICD-10-CM

## 2022-07-20 DIAGNOSIS — G47.39 TREATMENT-EMERGENT CENTRAL SLEEP APNEA: ICD-10-CM

## 2022-07-20 DIAGNOSIS — Z13.6 ENCOUNTER FOR LIPID SCREENING FOR CARDIOVASCULAR DISEASE: ICD-10-CM

## 2022-07-20 LAB
ALBUMIN SERPL BCP-MCNC: 3.9 G/DL (ref 3.5–5.2)
ALP SERPL-CCNC: 100 U/L (ref 55–135)
ALT SERPL W/O P-5'-P-CCNC: 16 U/L (ref 10–44)
ANION GAP SERPL CALC-SCNC: 8 MMOL/L (ref 8–16)
AST SERPL-CCNC: 22 U/L (ref 10–40)
BASOPHILS # BLD AUTO: 0.04 K/UL (ref 0–0.2)
BASOPHILS NFR BLD: 0.5 % (ref 0–1.9)
BILIRUB SERPL-MCNC: 0.5 MG/DL (ref 0.1–1)
BUN SERPL-MCNC: 19 MG/DL (ref 8–23)
CALCIUM SERPL-MCNC: 9.4 MG/DL (ref 8.7–10.5)
CHLORIDE SERPL-SCNC: 105 MMOL/L (ref 95–110)
CHOLEST SERPL-MCNC: 150 MG/DL (ref 120–199)
CHOLEST/HDLC SERPL: 3.8 {RATIO} (ref 2–5)
CO2 SERPL-SCNC: 26 MMOL/L (ref 23–29)
CREAT SERPL-MCNC: 1.2 MG/DL (ref 0.5–1.4)
DIFFERENTIAL METHOD: NORMAL
EOSINOPHIL # BLD AUTO: 0.5 K/UL (ref 0–0.5)
EOSINOPHIL NFR BLD: 6.1 % (ref 0–8)
ERYTHROCYTE [DISTWIDTH] IN BLOOD BY AUTOMATED COUNT: 13.6 % (ref 11.5–14.5)
EST. GFR  (AFRICAN AMERICAN): >60 ML/MIN/1.73 M^2
EST. GFR  (NON AFRICAN AMERICAN): 58 ML/MIN/1.73 M^2
GLUCOSE SERPL-MCNC: 98 MG/DL (ref 70–110)
HCT VFR BLD AUTO: 46.7 % (ref 40–54)
HDLC SERPL-MCNC: 39 MG/DL (ref 40–75)
HDLC SERPL: 26 % (ref 20–50)
HGB BLD-MCNC: 15 G/DL (ref 14–18)
IMM GRANULOCYTES # BLD AUTO: 0.02 K/UL (ref 0–0.04)
IMM GRANULOCYTES NFR BLD AUTO: 0.2 % (ref 0–0.5)
LDLC SERPL CALC-MCNC: 87.4 MG/DL (ref 63–159)
LYMPHOCYTES # BLD AUTO: 1.7 K/UL (ref 1–4.8)
LYMPHOCYTES NFR BLD: 20.1 % (ref 18–48)
MCH RBC QN AUTO: 30.7 PG (ref 27–31)
MCHC RBC AUTO-ENTMCNC: 32.1 G/DL (ref 32–36)
MCV RBC AUTO: 96 FL (ref 82–98)
MONOCYTES # BLD AUTO: 0.8 K/UL (ref 0.3–1)
MONOCYTES NFR BLD: 10.1 % (ref 4–15)
NEUTROPHILS # BLD AUTO: 5.2 K/UL (ref 1.8–7.7)
NEUTROPHILS NFR BLD: 63 % (ref 38–73)
NONHDLC SERPL-MCNC: 111 MG/DL
NRBC BLD-RTO: 0 /100 WBC
PLATELET # BLD AUTO: 244 K/UL (ref 150–450)
PMV BLD AUTO: 10.7 FL (ref 9.2–12.9)
POTASSIUM SERPL-SCNC: 5.1 MMOL/L (ref 3.5–5.1)
PROT SERPL-MCNC: 7.1 G/DL (ref 6–8.4)
RBC # BLD AUTO: 4.88 M/UL (ref 4.6–6.2)
SODIUM SERPL-SCNC: 139 MMOL/L (ref 136–145)
TRIGL SERPL-MCNC: 118 MG/DL (ref 30–150)
TSH SERPL DL<=0.005 MIU/L-ACNC: 3.12 UIU/ML (ref 0.4–4)
WBC # BLD AUTO: 8.21 K/UL (ref 3.9–12.7)

## 2022-07-20 PROCEDURE — 36415 COLL VENOUS BLD VENIPUNCTURE: CPT | Performed by: FAMILY MEDICINE

## 2022-07-20 PROCEDURE — 84443 ASSAY THYROID STIM HORMONE: CPT | Performed by: FAMILY MEDICINE

## 2022-07-20 PROCEDURE — 80053 COMPREHEN METABOLIC PANEL: CPT | Performed by: FAMILY MEDICINE

## 2022-07-20 PROCEDURE — 80061 LIPID PANEL: CPT | Performed by: FAMILY MEDICINE

## 2022-07-20 PROCEDURE — 85025 COMPLETE CBC W/AUTO DIFF WBC: CPT | Performed by: FAMILY MEDICINE

## 2022-07-28 ENCOUNTER — OFFICE VISIT (OUTPATIENT)
Dept: FAMILY MEDICINE | Facility: CLINIC | Age: 77
End: 2022-07-28
Payer: MEDICARE

## 2022-07-28 VITALS
OXYGEN SATURATION: 97 % | TEMPERATURE: 97 F | HEART RATE: 77 BPM | SYSTOLIC BLOOD PRESSURE: 139 MMHG | DIASTOLIC BLOOD PRESSURE: 89 MMHG | BODY MASS INDEX: 33.78 KG/M2 | WEIGHT: 254.88 LBS | HEIGHT: 73 IN

## 2022-07-28 DIAGNOSIS — E66.9 OBESITY (BMI 30-39.9): ICD-10-CM

## 2022-07-28 DIAGNOSIS — G47.39 TREATMENT-EMERGENT CENTRAL SLEEP APNEA: ICD-10-CM

## 2022-07-28 DIAGNOSIS — Z00.00 PREVENTATIVE HEALTH CARE: Primary | ICD-10-CM

## 2022-07-28 DIAGNOSIS — N13.8 BPH WITH URINARY OBSTRUCTION: ICD-10-CM

## 2022-07-28 DIAGNOSIS — N40.1 BPH WITH URINARY OBSTRUCTION: ICD-10-CM

## 2022-07-28 PROCEDURE — 3079F DIAST BP 80-89 MM HG: CPT | Mod: CPTII,S$GLB,, | Performed by: FAMILY MEDICINE

## 2022-07-28 PROCEDURE — 1160F PR REVIEW ALL MEDS BY PRESCRIBER/CLIN PHARMACIST DOCUMENTED: ICD-10-PCS | Mod: CPTII,S$GLB,, | Performed by: FAMILY MEDICINE

## 2022-07-28 PROCEDURE — 99999 PR PBB SHADOW E&M-EST. PATIENT-LVL IV: CPT | Mod: PBBFAC,,, | Performed by: FAMILY MEDICINE

## 2022-07-28 PROCEDURE — 1101F PT FALLS ASSESS-DOCD LE1/YR: CPT | Mod: CPTII,S$GLB,, | Performed by: FAMILY MEDICINE

## 2022-07-28 PROCEDURE — 3077F PR MOST RECENT SYSTOLIC BLOOD PRESSURE >= 140 MM HG: ICD-10-PCS | Mod: CPTII,S$GLB,, | Performed by: FAMILY MEDICINE

## 2022-07-28 PROCEDURE — 1126F PR PAIN SEVERITY QUANTIFIED, NO PAIN PRESENT: ICD-10-PCS | Mod: CPTII,S$GLB,, | Performed by: FAMILY MEDICINE

## 2022-07-28 PROCEDURE — 99397 PER PM REEVAL EST PAT 65+ YR: CPT | Mod: S$GLB,,, | Performed by: FAMILY MEDICINE

## 2022-07-28 PROCEDURE — 3077F SYST BP >= 140 MM HG: CPT | Mod: CPTII,S$GLB,, | Performed by: FAMILY MEDICINE

## 2022-07-28 PROCEDURE — 1126F AMNT PAIN NOTED NONE PRSNT: CPT | Mod: CPTII,S$GLB,, | Performed by: FAMILY MEDICINE

## 2022-07-28 PROCEDURE — 1159F PR MEDICATION LIST DOCUMENTED IN MEDICAL RECORD: ICD-10-PCS | Mod: CPTII,S$GLB,, | Performed by: FAMILY MEDICINE

## 2022-07-28 PROCEDURE — 3079F PR MOST RECENT DIASTOLIC BLOOD PRESSURE 80-89 MM HG: ICD-10-PCS | Mod: CPTII,S$GLB,, | Performed by: FAMILY MEDICINE

## 2022-07-28 PROCEDURE — 1101F PR PT FALLS ASSESS DOC 0-1 FALLS W/OUT INJ PAST YR: ICD-10-PCS | Mod: CPTII,S$GLB,, | Performed by: FAMILY MEDICINE

## 2022-07-28 PROCEDURE — 99999 PR PBB SHADOW E&M-EST. PATIENT-LVL IV: ICD-10-PCS | Mod: PBBFAC,,, | Performed by: FAMILY MEDICINE

## 2022-07-28 PROCEDURE — 3288F FALL RISK ASSESSMENT DOCD: CPT | Mod: CPTII,S$GLB,, | Performed by: FAMILY MEDICINE

## 2022-07-28 PROCEDURE — 99397 PR PREVENTIVE VISIT,EST,65 & OVER: ICD-10-PCS | Mod: S$GLB,,, | Performed by: FAMILY MEDICINE

## 2022-07-28 PROCEDURE — 1160F RVW MEDS BY RX/DR IN RCRD: CPT | Mod: CPTII,S$GLB,, | Performed by: FAMILY MEDICINE

## 2022-07-28 PROCEDURE — 1159F MED LIST DOCD IN RCRD: CPT | Mod: CPTII,S$GLB,, | Performed by: FAMILY MEDICINE

## 2022-07-28 PROCEDURE — 3288F PR FALLS RISK ASSESSMENT DOCUMENTED: ICD-10-PCS | Mod: CPTII,S$GLB,, | Performed by: FAMILY MEDICINE

## 2022-07-28 NOTE — PROGRESS NOTES
CHIEF COMPLAINT:  This is a 76-year-old male here for preventive health exam.     SUBJECTIVE: Patient is doing well without complaints except for persistent post-nasal drip for which he was evaluated by ENT.  Nonsedating antihistamines and nasal steroids are ineffective.  He had right TKA in November 2021 and repair of patella avulsion fracture in January 2022. Patient has BPH with urinary symptoms for which he takes Flomax 0.4 mg daily.  He has mild asthma, central sleep apnea and follow up of multiple pulmonary nodules. He uses a type of CPAP.  He takes Singulair 10 mg daily. He takes omeprazole for GERD.  Patient's blood pressure today is elevated at 147/72 but he has normal readings at home in the 130s over 70s to 80s.     Eye exam December 2021. Colonoscopy April 2012 due again April 2022. Pneumovax March 2012. Prevnar August 2015. Zostavax April 2011. TD booster August 2012. Flu vaccine October 2020. COVID 19 vaccine February 2021, January, June 2022.      ROS:  GENERAL: Patient denies fever, chills, night sweats. Patient denies weight loss. Patient denies anorexia, fatigue, weakness or swollen glands.  SKIN: Patient denies rash or hair loss.  HEENT: Patient denies sore throat, ear pain, hearing loss, nasal congestion, or runny nose. Patient denies visual disturbance, eye irritation or discharge.  LUNGS: Patient denies cough, wheeze or hemoptysis.  CARDIOVASCULAR: Patient denies chest pain, shortness of breath, palpitations, syncope or lower extremity edema.  GI: Patient denies abdominal pain, nausea, vomiting, diarrhea, constipation, blood in stool or melena.  GENITOURINARY: Patient denies dysuria, frequency, hematuria, nocturia, urgency or incontinence.  MUSCULOSKELETAL: Patient denies joint pain, swelling, redness or warmth.  NEUROLOGIC: Patient denies headache, vertigo, paresthesias, weakness in limb, dysarthria, dysphagia or abnormality of gait.  PSYCHIATRIC: Patient denies anxiety, depression, or memory  loss.     OBJECTIVE:   GENERAL: Well-developed well-nourished, obese, white male alert and oriented x3, in no acute distress. Memory, judgment and cognition without deficit. Weight gain of 10 pounds in the last year.  SKIN: Clear without rash. Normal color and tone.   HEENT: Eyes: Clear conjunctivae. Pupils equal reactive to light and accommodation. Ears: Clear TMs clear canals. Nose: Without congestion. Pharynx: Without injection or exudates.  NECK: Supple, normal range of motion. No masses, nodes or enlarged thyroid. No JVD. Carotids 2+ and equal. No bruits.  LUNGS: Clear to auscultation. Normal respiratory effort.  CARDIOVASCULAR: Regular rhythm, normal S1, S2 without murmur, gallop or rub.  BACK: No CVA or spinal tenderness.  ABDOMEN: Normal appearance. Active bowel sounds. Soft, nontender without mass or organomegaly. No rebound or guarding.  EXTREMITIES: Without cyanosis, clubbing or edema. Distal pulses 2+ and equal. Normal range of motion in all extremities. No joint effusion, erythema or warmth. Onychomycosis toenails.  NEUROLOGIC: Cranial nerves II through XII without deficit. Motor strength equal bilaterally. Sensation normal to touch. Deep tendon reflexes 2+ and equal. Gait without abnormality. No tremor. Negative cerebellar signs.   RUSTY: Minimally enlarged prostate, smooth, nontender. No masses. Anorectal exam: No masses, nontender. Heme negative stool x2.    ASSESSMENT:  1. Preventative health care    2. Treatment-emergent central sleep apnea    3. BPH with urinary obstruction    4. Obesity (BMI 30-39.9)      PLAN:  1. Weight reduction. Exercise regularly.  2. Age-appropriate counseling.  3. Recent lab reviewed and acceptable.  4. Refill medication as needed.  5. Monitor blood pressure.  Report readings greater than equal to 140/90.  6.  Follow-up annually.    This note is generated with speech recognition software and is subject to transcription error and sound alike phrases that may be missed by  proofreading.

## 2022-08-26 RX ORDER — TAMSULOSIN HYDROCHLORIDE 0.4 MG/1
CAPSULE ORAL
Qty: 90 CAPSULE | Refills: 3 | Status: SHIPPED | OUTPATIENT
Start: 2022-08-26 | End: 2023-08-27

## 2022-08-26 NOTE — TELEPHONE ENCOUNTER
Refill Decision Note   Clay Marcos  is requesting a refill authorization.  Brief Assessment and Rationale for Refill:  Approve     Medication Therapy Plan:       Medication Reconciliation Completed: No   Comments:     No Care Gaps recommended.     Note composed:9:53 AM 08/26/2022

## 2022-08-26 NOTE — TELEPHONE ENCOUNTER
No new care gaps identified.  [employer] Embedded Care Gaps. Reference number: [id number]. 8/26/2022   9:11:08 AM CDT

## 2023-02-07 DIAGNOSIS — Z00.00 ENCOUNTER FOR MEDICARE ANNUAL WELLNESS EXAM: ICD-10-CM

## 2023-02-09 DIAGNOSIS — Z00.00 ENCOUNTER FOR MEDICARE ANNUAL WELLNESS EXAM: ICD-10-CM

## 2023-03-01 ENCOUNTER — TELEPHONE (OUTPATIENT)
Dept: FAMILY MEDICINE | Facility: CLINIC | Age: 78
End: 2023-03-01

## 2023-03-01 ENCOUNTER — PATIENT MESSAGE (OUTPATIENT)
Dept: FAMILY MEDICINE | Facility: CLINIC | Age: 78
End: 2023-03-01
Payer: MEDICARE

## 2023-03-02 ENCOUNTER — OFFICE VISIT (OUTPATIENT)
Dept: FAMILY MEDICINE | Facility: CLINIC | Age: 78
End: 2023-03-02
Payer: MEDICARE

## 2023-03-02 VITALS
TEMPERATURE: 97 F | HEART RATE: 64 BPM | HEIGHT: 73 IN | RESPIRATION RATE: 20 BRPM | WEIGHT: 253.06 LBS | OXYGEN SATURATION: 97 % | BODY MASS INDEX: 33.54 KG/M2 | SYSTOLIC BLOOD PRESSURE: 138 MMHG | DIASTOLIC BLOOD PRESSURE: 78 MMHG

## 2023-03-02 DIAGNOSIS — Z00.00 ENCOUNTER FOR MEDICARE ANNUAL WELLNESS EXAM: ICD-10-CM

## 2023-03-02 DIAGNOSIS — J45.20 MILD INTERMITTENT ASTHMA WITHOUT COMPLICATION: ICD-10-CM

## 2023-03-02 DIAGNOSIS — Z00.00 ENCOUNTER FOR PREVENTIVE HEALTH EXAMINATION: Primary | ICD-10-CM

## 2023-03-02 DIAGNOSIS — E66.9 OBESITY (BMI 30-39.9): ICD-10-CM

## 2023-03-02 DIAGNOSIS — Z98.890 HISTORY OF BASAL CELL CARCINOMA (BCC) EXCISION: ICD-10-CM

## 2023-03-02 DIAGNOSIS — N13.8 BPH WITH URINARY OBSTRUCTION: ICD-10-CM

## 2023-03-02 DIAGNOSIS — Z85.828 HISTORY OF BASAL CELL CARCINOMA (BCC) EXCISION: ICD-10-CM

## 2023-03-02 DIAGNOSIS — K43.9 HERNIA OF ABDOMINAL WALL: ICD-10-CM

## 2023-03-02 DIAGNOSIS — K21.00 GASTROESOPHAGEAL REFLUX DISEASE WITH ESOPHAGITIS WITHOUT HEMORRHAGE: ICD-10-CM

## 2023-03-02 DIAGNOSIS — G47.39 TREATMENT-EMERGENT CENTRAL SLEEP APNEA: ICD-10-CM

## 2023-03-02 DIAGNOSIS — M51.36 DDD (DEGENERATIVE DISC DISEASE), LUMBAR: ICD-10-CM

## 2023-03-02 DIAGNOSIS — N40.1 BPH WITH URINARY OBSTRUCTION: ICD-10-CM

## 2023-03-02 DIAGNOSIS — H25.011 CORTICAL AGE-RELATED CATARACT OF RIGHT EYE: ICD-10-CM

## 2023-03-02 DIAGNOSIS — I77.1 TORTUOUS AORTA: ICD-10-CM

## 2023-03-02 DIAGNOSIS — Z74.09 OTHER REDUCED MOBILITY: ICD-10-CM

## 2023-03-02 DIAGNOSIS — I34.1 MVP (MITRAL VALVE PROLAPSE): ICD-10-CM

## 2023-03-02 PROCEDURE — 1101F PT FALLS ASSESS-DOCD LE1/YR: CPT | Mod: HCNC,CPTII,S$GLB, | Performed by: NURSE PRACTITIONER

## 2023-03-02 PROCEDURE — 1160F RVW MEDS BY RX/DR IN RCRD: CPT | Mod: HCNC,CPTII,S$GLB, | Performed by: NURSE PRACTITIONER

## 2023-03-02 PROCEDURE — 3288F FALL RISK ASSESSMENT DOCD: CPT | Mod: HCNC,CPTII,S$GLB, | Performed by: NURSE PRACTITIONER

## 2023-03-02 PROCEDURE — 99999 PR PBB SHADOW E&M-EST. PATIENT-LVL V: ICD-10-PCS | Mod: PBBFAC,HCNC,, | Performed by: NURSE PRACTITIONER

## 2023-03-02 PROCEDURE — 3078F PR MOST RECENT DIASTOLIC BLOOD PRESSURE < 80 MM HG: ICD-10-PCS | Mod: HCNC,CPTII,S$GLB, | Performed by: NURSE PRACTITIONER

## 2023-03-02 PROCEDURE — G0439 PPPS, SUBSEQ VISIT: HCPCS | Mod: HCNC,S$GLB,, | Performed by: NURSE PRACTITIONER

## 2023-03-02 PROCEDURE — 3288F PR FALLS RISK ASSESSMENT DOCUMENTED: ICD-10-PCS | Mod: HCNC,CPTII,S$GLB, | Performed by: NURSE PRACTITIONER

## 2023-03-02 PROCEDURE — G0439 PR MEDICARE ANNUAL WELLNESS SUBSEQUENT VISIT: ICD-10-PCS | Mod: HCNC,S$GLB,, | Performed by: NURSE PRACTITIONER

## 2023-03-02 PROCEDURE — 3075F PR MOST RECENT SYSTOLIC BLOOD PRESS GE 130-139MM HG: ICD-10-PCS | Mod: HCNC,CPTII,S$GLB, | Performed by: NURSE PRACTITIONER

## 2023-03-02 PROCEDURE — 1160F PR REVIEW ALL MEDS BY PRESCRIBER/CLIN PHARMACIST DOCUMENTED: ICD-10-PCS | Mod: HCNC,CPTII,S$GLB, | Performed by: NURSE PRACTITIONER

## 2023-03-02 PROCEDURE — 1101F PR PT FALLS ASSESS DOC 0-1 FALLS W/OUT INJ PAST YR: ICD-10-PCS | Mod: HCNC,CPTII,S$GLB, | Performed by: NURSE PRACTITIONER

## 2023-03-02 PROCEDURE — 99999 PR PBB SHADOW E&M-EST. PATIENT-LVL V: CPT | Mod: PBBFAC,HCNC,, | Performed by: NURSE PRACTITIONER

## 2023-03-02 PROCEDURE — 3075F SYST BP GE 130 - 139MM HG: CPT | Mod: HCNC,CPTII,S$GLB, | Performed by: NURSE PRACTITIONER

## 2023-03-02 PROCEDURE — 1159F MED LIST DOCD IN RCRD: CPT | Mod: HCNC,CPTII,S$GLB, | Performed by: NURSE PRACTITIONER

## 2023-03-02 PROCEDURE — 3078F DIAST BP <80 MM HG: CPT | Mod: HCNC,CPTII,S$GLB, | Performed by: NURSE PRACTITIONER

## 2023-03-02 PROCEDURE — 1159F PR MEDICATION LIST DOCUMENTED IN MEDICAL RECORD: ICD-10-PCS | Mod: HCNC,CPTII,S$GLB, | Performed by: NURSE PRACTITIONER

## 2023-03-02 NOTE — PROGRESS NOTES
"  Clay Marcos presented for a  Medicare AWV and comprehensive Health Risk Assessment today. The following components were reviewed and updated:    Medical history  Family History  Social history  Allergies and Current Medications  Health Risk Assessment  Health Maintenance  Care Team         ** See Completed Assessments for Annual Wellness Visit within the encounter summary.**         The following assessments were completed:  Living Situation  CAGE  Depression Screening  Timed Get Up and Go  Whisper Test  Cognitive Function Screening  Nutrition Screening  ADL Screening  PAQ Screening        Vitals:    03/02/23 0817   BP: 138/78   Pulse: 64   Resp: 20   Temp: 96.8 °F (36 °C)   Weight: 114.8 kg (253 lb 1.4 oz)   Height: 6' 1" (1.854 m)     Body mass index is 33.39 kg/m².  Physical Exam  Vitals and nursing note reviewed.   Constitutional:       General: He is not in acute distress.     Appearance: He is well-developed.   HENT:      Head: Normocephalic and atraumatic.   Eyes:      Pupils: Pupils are equal, round, and reactive to light.   Neck:      Vascular: No carotid bruit.   Cardiovascular:      Rate and Rhythm: Normal rate and regular rhythm.      Pulses: Normal pulses.      Heart sounds: Murmur heard.     No gallop.   Pulmonary:      Effort: Pulmonary effort is normal.      Breath sounds: Normal breath sounds.   Abdominal:      General: Bowel sounds are normal. There is no distension.      Palpations: Abdomen is soft.      Tenderness: There is no abdominal tenderness.   Musculoskeletal:         General: Normal range of motion.   Skin:     General: Skin is warm and dry.   Neurological:      Motor: No abnormal muscle tone.      Gait: Gait normal.   Psychiatric:         Speech: Speech normal.         Behavior: Behavior normal.         Thought Content: Thought content normal.         Judgment: Judgment normal.     Current Outpatient Medications   Medication Instructions    celecoxib (CELEBREX) 200 MG capsule No dose, " From: Frances Hester  To: Eder Abreu  Sent: 12/6/2020 8:49 AM CST  Subject: Non-Urgent Medical Question    My psoriasis is acting up badly. Wondering if I can have a prescription cream sent in?   route, or frequency recorded.    fluorouraciL (EFUDEX) 5 % cream No dose, route, or frequency recorded.    hydrOXYzine HCL (ATARAX) 25 MG tablet No dose, route, or frequency recorded.    montelukast (SINGULAIR) 10 mg, Oral, Nightly    multivitamin (THERAGRAN) tablet 1 tablet, Oral, Daily    omeprazole magnesium (PRILOSEC ORAL) 20 mg, Oral, Daily    tamsulosin (FLOMAX) 0.4 mg Cap TAKE 1 CAPSULE EVERY EVENING             Diagnoses and health risks identified today and associated recommendations/orders:    Ambulatory Referral/Consult to Enhanced Annual Wellness Visit (eAWV)  Review for Opioid Screening: Patient does not have rx for Opioids.  Review for Substance Use Disorders: Patient does not use substance.    2. Tortuous aorta  Chronic/ Monitored/Stable blood pressure and chol levels  Followed by CP     3. Obesity (BMI 30-39.9)  BMI: 33.39 kg/m²  Adj Wt: 93.9 kg (206 lb 14.8 oz)  Discussed and recommend  low fat/carb/chol diet. Cardio exercise as tolerated. Life style modifications.    4. BPH with urinary obstruction  Chronic/ Monitored/Stable on  Flomax as directed.  Followed by PCP  5. Mild intermittent asthma without complication  Chronic/ Monitored/Stable - no meds. No recent flare. Followed by PCP    6. History of basal cell carcinoma (BCC) excision  Chronic/ Monitored/Stable outside derm every  6 months  Followed by PCP    7. Cortical age-related cataract of right eye  Chronic and Ongoing ...DUE  for surgery  in 4/2023 with Dr Tadeo- will with f/u with PCP team for clearance    8. MVP (mitral valve prolapse)  Chronic/ Monitored/Stable on  no med. Pt asymptomatic. Does not see card md   Followed by PCP    9. Hernia of abdominal wall - left lateral  Chronic/ Monitored/Stable on  no med. Pt asymptomatic. Does not see card md   Followed by PCP    10. Gastroesophageal reflux disease with esophagitis without hemorrhage  Chronic/ Monitored/Stable on  no med. Pt asymptomatic. Does not see card md Discussed and  recommend  low fat/carb/chol diet. Cardio exercise as tolerated. Life style modifications.    11. Treatment-emergent central sleep apnea  Chronic/ Monitored/Stable on  CPAP as directed.  Followed by  PCP    12. DDD (degenerative disc disease), lumbar  Chronic/ Monitored/Stable on  Celebrex as needed  as directed.  Followed by PCP    I offered to discuss advanced care planning, including how to pick a person who would make decisions for you if you were unable to make them for yourself, called a health care power of , and what kind of decisions you might make such as use of life sustaining treatments such as ventilators and tube feeding when faced with a life limiting illness recorded on a living will that they will need to know. (How you want to be cared for as you near the end of your natural life)     X Patient is interested in learning more about how to make advanced directives.  I provided them paperwork and offered to discuss this with them.    Provided Clay with a 5-10 year written screening schedule and personal prevention plan. Recommendations were developed using the USPSTF age appropriate recommendations. Education, counseling, and referrals were provided as needed. After Visit Summary printed and given to patient which includes a list of additional screenings\tests needed.    Follow up in about 1 year (around 3/2/2024) for Follow up with PCP for preop clearance.    Diamond Turner NP

## 2023-03-02 NOTE — TELEPHONE ENCOUNTER
Pt came in this morning and stated that he needs a pre-op clearance appt. Pt is having cataract surgery on 4/3/23 and he need clearance. Pt would be out of town until the 19th. Pt would like an appointment the week of the 20th when he returns. Please advise

## 2023-03-02 NOTE — PATIENT INSTRUCTIONS
Counseling and Referral of Other Preventative  (Italic type indicates deductible and co-insurance are waived)    Patient Name: Clay Marcos  Today's Date: 3/2/2023    Health Maintenance       Date Due Completion Date    Shingles Vaccine (2 of 3) 05/30/2011 4/4/2011    TETANUS VACCINE 08/14/2022 8/14/2012    COVID-19 Vaccine (5 - Booster for Pfizer series) 08/15/2022 6/20/2022    Lipid Panel 07/20/2027 7/20/2022        No orders of the defined types were placed in this encounter.      The following information is provided to all patients.  This information is to help you find resources for any of the problems found today that may be affecting your health:                Living healthy guide: www.[facility].[place].gov      Understanding Diabetes: www.diabetes.org      Eating healthy: www.cdc.gov/healthyweight      [facility] home safety checklist: www.cdc.gov/steadi/patient.html      Agency on Aging: www.goea.[place].gov      Alcoholics anonymous (AA): www.aa.org      Physical Activity: www.darrick.nih.gov/si8rbyn      Tobacco use: www.quitwithusla.org

## 2023-03-20 ENCOUNTER — OFFICE VISIT (OUTPATIENT)
Dept: FAMILY MEDICINE | Facility: CLINIC | Age: 78
End: 2023-03-20
Payer: MEDICARE

## 2023-03-20 VITALS
BODY MASS INDEX: 34.24 KG/M2 | HEIGHT: 73 IN | HEART RATE: 90 BPM | SYSTOLIC BLOOD PRESSURE: 144 MMHG | OXYGEN SATURATION: 97 % | DIASTOLIC BLOOD PRESSURE: 68 MMHG | TEMPERATURE: 99 F | WEIGHT: 258.38 LBS

## 2023-03-20 DIAGNOSIS — N13.8 BPH WITH URINARY OBSTRUCTION: ICD-10-CM

## 2023-03-20 DIAGNOSIS — G47.39 TREATMENT-EMERGENT CENTRAL SLEEP APNEA: ICD-10-CM

## 2023-03-20 DIAGNOSIS — K21.00 GASTROESOPHAGEAL REFLUX DISEASE WITH ESOPHAGITIS WITHOUT HEMORRHAGE: ICD-10-CM

## 2023-03-20 DIAGNOSIS — H25.9 SENILE CATARACT OF LEFT EYE, UNSPECIFIED AGE-RELATED CATARACT TYPE: Primary | ICD-10-CM

## 2023-03-20 DIAGNOSIS — R03.0 ELEVATED BLOOD PRESSURE READING: ICD-10-CM

## 2023-03-20 DIAGNOSIS — N40.1 BPH WITH URINARY OBSTRUCTION: ICD-10-CM

## 2023-03-20 PROBLEM — J45.20 MILD INTERMITTENT ASTHMA WITHOUT COMPLICATION: Status: RESOLVED | Noted: 2017-03-17 | Resolved: 2023-03-20

## 2023-03-20 PROCEDURE — 1126F PR PAIN SEVERITY QUANTIFIED, NO PAIN PRESENT: ICD-10-PCS | Mod: HCNC,CPTII,S$GLB, | Performed by: FAMILY MEDICINE

## 2023-03-20 PROCEDURE — 99214 OFFICE O/P EST MOD 30 MIN: CPT | Mod: HCNC,S$GLB,, | Performed by: FAMILY MEDICINE

## 2023-03-20 PROCEDURE — 3078F DIAST BP <80 MM HG: CPT | Mod: HCNC,CPTII,S$GLB, | Performed by: FAMILY MEDICINE

## 2023-03-20 PROCEDURE — 99999 PR PBB SHADOW E&M-EST. PATIENT-LVL III: ICD-10-PCS | Mod: PBBFAC,HCNC,, | Performed by: FAMILY MEDICINE

## 2023-03-20 PROCEDURE — 3288F PR FALLS RISK ASSESSMENT DOCUMENTED: ICD-10-PCS | Mod: HCNC,CPTII,S$GLB, | Performed by: FAMILY MEDICINE

## 2023-03-20 PROCEDURE — 1160F PR REVIEW ALL MEDS BY PRESCRIBER/CLIN PHARMACIST DOCUMENTED: ICD-10-PCS | Mod: HCNC,CPTII,S$GLB, | Performed by: FAMILY MEDICINE

## 2023-03-20 PROCEDURE — 1126F AMNT PAIN NOTED NONE PRSNT: CPT | Mod: HCNC,CPTII,S$GLB, | Performed by: FAMILY MEDICINE

## 2023-03-20 PROCEDURE — 1101F PT FALLS ASSESS-DOCD LE1/YR: CPT | Mod: HCNC,CPTII,S$GLB, | Performed by: FAMILY MEDICINE

## 2023-03-20 PROCEDURE — 3078F PR MOST RECENT DIASTOLIC BLOOD PRESSURE < 80 MM HG: ICD-10-PCS | Mod: HCNC,CPTII,S$GLB, | Performed by: FAMILY MEDICINE

## 2023-03-20 PROCEDURE — 1160F RVW MEDS BY RX/DR IN RCRD: CPT | Mod: HCNC,CPTII,S$GLB, | Performed by: FAMILY MEDICINE

## 2023-03-20 PROCEDURE — 1101F PR PT FALLS ASSESS DOC 0-1 FALLS W/OUT INJ PAST YR: ICD-10-PCS | Mod: HCNC,CPTII,S$GLB, | Performed by: FAMILY MEDICINE

## 2023-03-20 PROCEDURE — 99999 PR PBB SHADOW E&M-EST. PATIENT-LVL III: CPT | Mod: PBBFAC,HCNC,, | Performed by: FAMILY MEDICINE

## 2023-03-20 PROCEDURE — 3077F SYST BP >= 140 MM HG: CPT | Mod: HCNC,CPTII,S$GLB, | Performed by: FAMILY MEDICINE

## 2023-03-20 PROCEDURE — 3077F PR MOST RECENT SYSTOLIC BLOOD PRESSURE >= 140 MM HG: ICD-10-PCS | Mod: HCNC,CPTII,S$GLB, | Performed by: FAMILY MEDICINE

## 2023-03-20 PROCEDURE — 3288F FALL RISK ASSESSMENT DOCD: CPT | Mod: HCNC,CPTII,S$GLB, | Performed by: FAMILY MEDICINE

## 2023-03-20 PROCEDURE — 1159F PR MEDICATION LIST DOCUMENTED IN MEDICAL RECORD: ICD-10-PCS | Mod: HCNC,CPTII,S$GLB, | Performed by: FAMILY MEDICINE

## 2023-03-20 PROCEDURE — 99214 PR OFFICE/OUTPT VISIT, EST, LEVL IV, 30-39 MIN: ICD-10-PCS | Mod: HCNC,S$GLB,, | Performed by: FAMILY MEDICINE

## 2023-03-20 PROCEDURE — 1159F MED LIST DOCD IN RCRD: CPT | Mod: HCNC,CPTII,S$GLB, | Performed by: FAMILY MEDICINE

## 2023-03-20 NOTE — PROGRESS NOTES
CHIEF COMPLAINT:  This is a 77-year-old male here for preoperative clearance for left cataract surgery per Dr. Jeffrey Tadeo due to chronic medical conditions.      SUBJECTIVE: Patient complains of deteriorating vision in OS and is scheduled for surgery April 13, 2023. He has had no previous problems with anesthesia. He denies family history of perioperative complications.  He had right TKA in November 2021 and repair of patella avulsion fracture in January 2022 with no complications.    Patient has BPH with urinary symptoms for which he takes Flomax 0.4 mg daily.  He has mild asthma, central sleep apnea and follow up of multiple pulmonary nodules. He uses a type of CPAP.  He takes Singulair 10 mg daily. He takes omeprazole for GERD.  Patient's blood pressure today is elevated at 144/68 but he has normal readings at home in the 130s over 70s to 80s.     ROS:  GENERAL: Patient denies fever, chills, night sweats. Patient denies weight loss. Patient denies anorexia, fatigue, weakness or swollen glands.  SKIN: Patient denies rash or hair loss.  HEENT: Patient denies sore throat, ear pain, hearing loss, nasal congestion, or runny nose. Patient denies visual disturbance, eye irritation or discharge.  LUNGS: Patient denies cough, wheeze or hemoptysis.  CARDIOVASCULAR: Patient denies chest pain, shortness of breath, palpitations, syncope or lower extremity edema.  GI: Patient denies abdominal pain, nausea, vomiting, diarrhea, constipation, blood in stool or melena.  GENITOURINARY: Patient denies dysuria, frequency, hematuria, nocturia, urgency or incontinence.  MUSCULOSKELETAL: Patient denies joint pain, swelling, redness or warmth.  NEUROLOGIC: Patient denies headache, vertigo, paresthesias, weakness in limb, dysarthria, dysphagia or abnormality of gait.  PSYCHIATRIC: Patient denies anxiety, depression, or memory loss.     OBJECTIVE:   GENERAL: Well-developed well-nourished, obese, white male alert and oriented x3, in no  acute distress. Memory, judgment and cognition without deficit.   SKIN: Clear without rash. Normal color and tone.   HEENT: Eyes: Clear conjunctivae.  No scleral icterus.  NECK: Supple, normal range of motion. No masses, nodes or enlarged thyroid. No JVD. Carotids 2+ and equal. No bruits.  LUNGS: Clear to auscultation. Normal respiratory effort.  CARDIOVASCULAR: Regular rhythm, normal S1, S2 without murmur, gallop or rub.  BACK: No CVA or spinal tenderness.  ABDOMEN: Soft, nontender without mass or organomegaly. No rebound or guarding.  EXTREMITIES: Without cyanosis, clubbing or edema. Distal pulses 2+ and equal. Normal range of motion in all extremities. No joint effusion, erythema or warmth. Onychomycosis toenails.  NEUROLOGIC: Cranial nerves II through XII without deficit. Motor strength equal bilaterally. Sensation normal to touch. Deep tendon reflexes 2+ and equal. Gait without abnormality. No tremor. Negative cerebellar signs.     ASSESSMENT:  1. Senile cataract of left eye, unspecified age-related cataract type    2. Gastroesophageal reflux disease with esophagitis without hemorrhage    3. BPH with urinary obstruction    4. Treatment-emergent central sleep apnea    5. Elevated blood pressure reading      PLAN:  1. Discontinue Celebrex 5-7 days prior to surgery.    2. Weight reduction discussed.  3.  Exercise 150 minutes per week.  4.  Limit salt in diet.  5.  Monitor blood pressure.  Report readings greater than or equal to 140/90.  6.  Patient is cleared for surgery.      Thank you for allowing me to participate in the care of this patient.    30 minutes of total time spent on the encounter, which includes face to face time and non-face to face time preparing to see the patient (eg, review of tests), Obtaining and/or reviewing separately obtained history, Documenting clinical information in the electronic or other health record, Independently interpreting results (not separately reported) and communicating  results to the patient/family/caregiver, or Care coordination (not separately reported).     This note is generated with speech recognition software and is subject to transcription error and sound alike phrases that may be missed by proofreading.

## 2023-05-12 ENCOUNTER — OFFICE VISIT (OUTPATIENT)
Dept: FAMILY MEDICINE | Facility: CLINIC | Age: 78
End: 2023-05-12
Payer: MEDICARE

## 2023-05-12 ENCOUNTER — HOSPITAL ENCOUNTER (OUTPATIENT)
Dept: RADIOLOGY | Facility: HOSPITAL | Age: 78
Discharge: HOME | End: 2023-05-12
Attending: FAMILY MEDICINE
Payer: MEDICARE

## 2023-05-12 VITALS
TEMPERATURE: 97 F | BODY MASS INDEX: 33.23 KG/M2 | SYSTOLIC BLOOD PRESSURE: 134 MMHG | HEART RATE: 96 BPM | DIASTOLIC BLOOD PRESSURE: 72 MMHG | HEIGHT: 73 IN | WEIGHT: 250.75 LBS | OXYGEN SATURATION: 98 %

## 2023-05-12 DIAGNOSIS — R05.1 ACUTE COUGH: Primary | ICD-10-CM

## 2023-05-12 DIAGNOSIS — R06.09 DYSPNEA ON EXERTION: ICD-10-CM

## 2023-05-12 DIAGNOSIS — R39.89 ABNORMAL URINE COLOR: ICD-10-CM

## 2023-05-12 DIAGNOSIS — R05.1 ACUTE COUGH: ICD-10-CM

## 2023-05-12 LAB
BILIRUB SERPL-MCNC: NORMAL MG/DL
BLOOD URINE, POC: NORMAL
CLARITY, POC UA: CLEAR
COLOR, POC UA: NORMAL
CTP QC/QA: YES
GLUCOSE UR QL STRIP: NORMAL
KETONES UR QL STRIP: NORMAL
LEUKOCYTE ESTERASE URINE, POC: NORMAL
NITRITE, POC UA: NORMAL
PH, POC UA: 5
PROTEIN, POC: NORMAL
SARS-COV-2 RDRP RESP QL NAA+PROBE: NEGATIVE
SPECIFIC GRAVITY, POC UA: 1.02
UROBILINOGEN, POC UA: NORMAL

## 2023-05-12 PROCEDURE — 1159F MED LIST DOCD IN RCRD: CPT | Mod: CPTII,S$GLB,, | Performed by: FAMILY MEDICINE

## 2023-05-12 PROCEDURE — 3078F DIAST BP <80 MM HG: CPT | Mod: CPTII,S$GLB,, | Performed by: FAMILY MEDICINE

## 2023-05-12 PROCEDURE — 1101F PR PT FALLS ASSESS DOC 0-1 FALLS W/OUT INJ PAST YR: ICD-10-PCS | Mod: CPTII,S$GLB,, | Performed by: FAMILY MEDICINE

## 2023-05-12 PROCEDURE — 71046 X-RAY EXAM CHEST 2 VIEWS: CPT | Mod: TC,FY,PO

## 2023-05-12 PROCEDURE — 1125F AMNT PAIN NOTED PAIN PRSNT: CPT | Mod: CPTII,S$GLB,, | Performed by: FAMILY MEDICINE

## 2023-05-12 PROCEDURE — 3078F PR MOST RECENT DIASTOLIC BLOOD PRESSURE < 80 MM HG: ICD-10-PCS | Mod: CPTII,S$GLB,, | Performed by: FAMILY MEDICINE

## 2023-05-12 PROCEDURE — 87635: ICD-10-PCS | Mod: QW,S$GLB,, | Performed by: FAMILY MEDICINE

## 2023-05-12 PROCEDURE — 71046 XR CHEST PA AND LATERAL: ICD-10-PCS | Mod: 26,,, | Performed by: RADIOLOGY

## 2023-05-12 PROCEDURE — 81002 URINALYSIS NONAUTO W/O SCOPE: CPT | Mod: S$GLB,,, | Performed by: FAMILY MEDICINE

## 2023-05-12 PROCEDURE — 1125F PR PAIN SEVERITY QUANTIFIED, PAIN PRESENT: ICD-10-PCS | Mod: CPTII,S$GLB,, | Performed by: FAMILY MEDICINE

## 2023-05-12 PROCEDURE — 87635 SARS-COV-2 COVID-19 AMP PRB: CPT | Mod: QW,S$GLB,, | Performed by: FAMILY MEDICINE

## 2023-05-12 PROCEDURE — 99999 PR PBB SHADOW E&M-EST. PATIENT-LVL III: ICD-10-PCS | Mod: PBBFAC,,, | Performed by: FAMILY MEDICINE

## 2023-05-12 PROCEDURE — 99214 PR OFFICE/OUTPT VISIT, EST, LEVL IV, 30-39 MIN: ICD-10-PCS | Mod: 25,S$GLB,, | Performed by: FAMILY MEDICINE

## 2023-05-12 PROCEDURE — 3288F FALL RISK ASSESSMENT DOCD: CPT | Mod: CPTII,S$GLB,, | Performed by: FAMILY MEDICINE

## 2023-05-12 PROCEDURE — 3075F SYST BP GE 130 - 139MM HG: CPT | Mod: CPTII,S$GLB,, | Performed by: FAMILY MEDICINE

## 2023-05-12 PROCEDURE — 1159F PR MEDICATION LIST DOCUMENTED IN MEDICAL RECORD: ICD-10-PCS | Mod: CPTII,S$GLB,, | Performed by: FAMILY MEDICINE

## 2023-05-12 PROCEDURE — 96372 THER/PROPH/DIAG INJ SC/IM: CPT | Mod: S$GLB,,, | Performed by: FAMILY MEDICINE

## 2023-05-12 PROCEDURE — 96372 PR INJECTION,THERAP/PROPH/DIAG2ST, IM OR SUBCUT: ICD-10-PCS | Mod: S$GLB,,, | Performed by: FAMILY MEDICINE

## 2023-05-12 PROCEDURE — 99999 PR PBB SHADOW E&M-EST. PATIENT-LVL III: CPT | Mod: PBBFAC,,, | Performed by: FAMILY MEDICINE

## 2023-05-12 PROCEDURE — 99214 OFFICE O/P EST MOD 30 MIN: CPT | Mod: 25,S$GLB,, | Performed by: FAMILY MEDICINE

## 2023-05-12 PROCEDURE — 1101F PT FALLS ASSESS-DOCD LE1/YR: CPT | Mod: CPTII,S$GLB,, | Performed by: FAMILY MEDICINE

## 2023-05-12 PROCEDURE — 3075F PR MOST RECENT SYSTOLIC BLOOD PRESS GE 130-139MM HG: ICD-10-PCS | Mod: CPTII,S$GLB,, | Performed by: FAMILY MEDICINE

## 2023-05-12 PROCEDURE — 3288F PR FALLS RISK ASSESSMENT DOCUMENTED: ICD-10-PCS | Mod: CPTII,S$GLB,, | Performed by: FAMILY MEDICINE

## 2023-05-12 PROCEDURE — 81002 POCT URINE DIPSTICK WITHOUT MICROSCOPE: ICD-10-PCS | Mod: S$GLB,,, | Performed by: FAMILY MEDICINE

## 2023-05-12 PROCEDURE — 71046 X-RAY EXAM CHEST 2 VIEWS: CPT | Mod: 26,,, | Performed by: RADIOLOGY

## 2023-05-12 RX ORDER — AZITHROMYCIN 250 MG/1
TABLET, FILM COATED ORAL
Qty: 6 TABLET | Refills: 0 | Status: SHIPPED | OUTPATIENT
Start: 2023-05-12 | End: 2024-01-24

## 2023-05-12 RX ORDER — BETAMETHASONE SODIUM PHOSPHATE AND BETAMETHASONE ACETATE 3; 3 MG/ML; MG/ML
9 INJECTION, SUSPENSION INTRA-ARTICULAR; INTRALESIONAL; INTRAMUSCULAR; SOFT TISSUE
Status: COMPLETED | OUTPATIENT
Start: 2023-05-12 | End: 2023-05-12

## 2023-05-12 RX ADMIN — BETAMETHASONE SODIUM PHOSPHATE AND BETAMETHASONE ACETATE 9 MG: 3; 3 INJECTION, SUSPENSION INTRA-ARTICULAR; INTRALESIONAL; INTRAMUSCULAR; SOFT TISSUE at 04:05

## 2023-05-12 NOTE — PROGRESS NOTES
CHIEF COMPLAINT:  This is a 77-year-old male complaining of respiratory symptoms, shortness of breath and dizziness.     SUBJECTIVE:  The patient complains of onset of head congestion, low-grade fever of 99.7 and cough 5 days ago.  Cough is productive of green mucus.  Symptoms are associated with fatigue.  Patient reports worsening symptoms with shortness of breath with coughing or exertion and dizziness.  He is concerned because he is had pneumonia in the past.  He also complains of dark orange-colored urine without dysuria, frequency or hematuria.  Patient denies sore throat, ear pain, chest congestion or wheezing.  He denies ill contacts.  He has been taking Mucinex, NyQuil and using Flonase inhaler.  He tested negative for COVID at home 48 hours ago.  He continues to have loss of smell and taste from COVID infection over 1 year ago.    The patient has BPH with urinary symptoms for which he takes Flomax 0.4 mg daily.  He has mild asthma, central sleep apnea and follow up of multiple pulmonary nodules. He uses a type of CPAP.  He takes Singulair 10 mg daily. He takes omeprazole for GERD.     ROS:  GENERAL: Patient denies chills, night sweats. Patient denies weight loss. Patient denies anorexia, weakness or swollen glands.  Positive for fever and fatigue.  SKIN: Patient denies rash.  HEENT: Patient denies sore throat, ear pain, hearing loss, or runny nose. Patient denies visual disturbance, eye irritation or discharge.  Positive for nasal congestion.  LUNGS: Patient denies wheeze or hemoptysis.  Positive for cough.  CARDIOVASCULAR: Patient denies chest pain, palpitations, syncope or lower extremity edema.  Positive for shortness of breath.  GI: Patient denies abdominal pain, nausea, vomiting, diarrhea, constipation, blood in stool or melena.  GENITOURINARY: Patient denies dysuria, frequency, hematuria, nocturia, urgency or incontinence.  MUSCULOSKELETAL: Patient denies joint pain, swelling, redness or  warmth.  NEUROLOGIC: Patient denies headache, paresthesias, weakness in limb, dysarthria, dysphagia or abnormality of gait.  Positive for dizziness.  PSYCHIATRIC: Patient denies anxiety, depression, or memory loss.     OBJECTIVE:   GENERAL: Well-developed well-nourished, obese, white male alert and oriented x3, in no acute distress. Memory, judgment and cognition without deficit.  Accompanied by daughter.  SKIN: Clear without rash. Normal color and tone.   HEENT: Eyes: Clear conjunctivae. Pupils equal reactive to light and accommodation. Ears: Clear TMs clear canals. Nose: Without congestion. Pharynx: Without injection or exudates.  NECK: Supple, normal range of motion. No masses, nodes or enlarged thyroid. No JVD. Carotids 2+ and equal. No bruits.  LUNGS: Clear to auscultation. Normal respiratory effort.  O2 saturation 98%.  CARDIOVASCULAR: Regular rhythm, normal S1, S2 without murmur, gallop or rub.  BACK: No CVA or spinal tenderness.  EXTREMITIES: Without cyanosis, clubbing or edema. Distal pulses 2+ and equal. Normal range of motion in all extremities. No joint effusion, erythema or warmth.   NEUROLOGIC: Gait without abnormality. No tremor.      Chest x-ray:  No acute cardiopulmonary disease.  Rapid COVID-19 test:  Negative.    UA dip:  Specific gravity 1.020.  2+ bilirubin.  Otherwise clear.    ASSESSMENT:  1. Acute cough    2. Dyspnea on exertion    3. Abnormal urine color      PLAN:  1. Increase water intake.    2. Z-Ancelmo.    3. Celestone 9 mg IM.    4. Symptomatic treatment.    5. Follow-up if no improvement or worsening symptoms.    30 minutes of total time spent on the encounter, which includes face to face time and non-face to face time preparing to see the patient (eg, review of tests), Obtaining and/or reviewing separately obtained history, Documenting clinical information in the electronic or other health record, Independently interpreting results (not separately reported) and communicating results to  the patient/family/caregiver, or Care coordination (not separately reported).     This note is generated with speech recognition software and is subject to transcription error and sound alike phrases that may be missed by proofreading.

## 2023-08-27 RX ORDER — TAMSULOSIN HYDROCHLORIDE 0.4 MG/1
CAPSULE ORAL
Qty: 90 CAPSULE | Refills: 2 | Status: SHIPPED | OUTPATIENT
Start: 2023-08-27

## 2023-08-27 NOTE — TELEPHONE ENCOUNTER
No care due was identified.  [facility] Embedded Care Due Messages. Reference number: [id number].   8/27/2023 10:05:42 AM CDT

## 2023-08-27 NOTE — TELEPHONE ENCOUNTER
Refill Decision Note   Clay Marcos  is requesting a refill authorization.  Brief Assessment and Rationale for Refill:  Approve     Medication Therapy Plan:         Comments:     Note composed:11:04 AM 08/27/2023             Appointments     Last Visit   5/12/2023 Tori Marino MD   Next Visit   Visit date not found Tori Marino MD

## 2024-01-23 ENCOUNTER — OFFICE VISIT (OUTPATIENT)
Dept: INTERNAL MEDICINE | Facility: CLINIC | Age: 79
End: 2024-01-23
Payer: MEDICARE

## 2024-01-23 VITALS
BODY MASS INDEX: 33.72 KG/M2 | DIASTOLIC BLOOD PRESSURE: 82 MMHG | RESPIRATION RATE: 20 BRPM | HEART RATE: 74 BPM | WEIGHT: 254.44 LBS | HEIGHT: 73 IN | SYSTOLIC BLOOD PRESSURE: 138 MMHG

## 2024-01-23 DIAGNOSIS — G47.39 TREATMENT-EMERGENT CENTRAL SLEEP APNEA: Chronic | ICD-10-CM

## 2024-01-23 DIAGNOSIS — K21.9 GASTROESOPHAGEAL REFLUX DISEASE, UNSPECIFIED WHETHER ESOPHAGITIS PRESENT: Chronic | ICD-10-CM

## 2024-01-23 DIAGNOSIS — Z82.49 FAMILY HISTORY OF EARLY CAD: ICD-10-CM

## 2024-01-23 DIAGNOSIS — I77.1 TORTUOUS AORTA: ICD-10-CM

## 2024-01-23 DIAGNOSIS — R91.8 MULTIPLE LUNG NODULES ON CT: ICD-10-CM

## 2024-01-23 DIAGNOSIS — I34.1 MVP (MITRAL VALVE PROLAPSE): ICD-10-CM

## 2024-01-23 DIAGNOSIS — N40.1 BPH WITH URINARY OBSTRUCTION: Chronic | ICD-10-CM

## 2024-01-23 DIAGNOSIS — Z85.828 HISTORY OF BASAL CELL CARCINOMA (BCC) EXCISION: ICD-10-CM

## 2024-01-23 DIAGNOSIS — Z00.00 ENCOUNTER FOR PREVENTIVE HEALTH EXAMINATION: ICD-10-CM

## 2024-01-23 DIAGNOSIS — Z00.00 ENCOUNTER FOR PREVENTATIVE ADULT HEALTH CARE EXAMINATION: Primary | ICD-10-CM

## 2024-01-23 DIAGNOSIS — Z86.010 HISTORY OF COLON POLYPS: ICD-10-CM

## 2024-01-23 DIAGNOSIS — K43.9 HERNIA OF ABDOMINAL WALL: ICD-10-CM

## 2024-01-23 DIAGNOSIS — Z98.890 HISTORY OF BASAL CELL CARCINOMA (BCC) EXCISION: ICD-10-CM

## 2024-01-23 DIAGNOSIS — E66.9 OBESITY (BMI 30-39.9): ICD-10-CM

## 2024-01-23 DIAGNOSIS — M51.36 DDD (DEGENERATIVE DISC DISEASE), LUMBAR: ICD-10-CM

## 2024-01-23 DIAGNOSIS — N13.8 BPH WITH URINARY OBSTRUCTION: Chronic | ICD-10-CM

## 2024-01-23 PROCEDURE — 1159F MED LIST DOCD IN RCRD: CPT | Mod: HCNC,CPTII,S$GLB, | Performed by: NURSE PRACTITIONER

## 2024-01-23 PROCEDURE — 1160F RVW MEDS BY RX/DR IN RCRD: CPT | Mod: HCNC,CPTII,S$GLB, | Performed by: NURSE PRACTITIONER

## 2024-01-23 PROCEDURE — 99999 PR PBB SHADOW E&M-EST. PATIENT-LVL IV: CPT | Mod: PBBFAC,HCNC,, | Performed by: NURSE PRACTITIONER

## 2024-01-23 PROCEDURE — 3079F DIAST BP 80-89 MM HG: CPT | Mod: HCNC,CPTII,S$GLB, | Performed by: NURSE PRACTITIONER

## 2024-01-23 PROCEDURE — 3075F SYST BP GE 130 - 139MM HG: CPT | Mod: HCNC,CPTII,S$GLB, | Performed by: NURSE PRACTITIONER

## 2024-01-23 PROCEDURE — 3288F FALL RISK ASSESSMENT DOCD: CPT | Mod: HCNC,CPTII,S$GLB, | Performed by: NURSE PRACTITIONER

## 2024-01-23 PROCEDURE — 1170F FXNL STATUS ASSESSED: CPT | Mod: HCNC,CPTII,S$GLB, | Performed by: NURSE PRACTITIONER

## 2024-01-23 PROCEDURE — 1101F PT FALLS ASSESS-DOCD LE1/YR: CPT | Mod: HCNC,CPTII,S$GLB, | Performed by: NURSE PRACTITIONER

## 2024-01-23 PROCEDURE — G0439 PPPS, SUBSEQ VISIT: HCPCS | Mod: HCNC,S$GLB,, | Performed by: NURSE PRACTITIONER

## 2024-01-23 NOTE — PROGRESS NOTES
"  Clay Marcos presented for a  Medicare AWV and comprehensive Health Risk Assessment today. The following components were reviewed and updated:    Medical history  Family History  Social history  Allergies and Current Medications  Health Risk Assessment  Health Maintenance  Care Team         ** See Completed Assessments for Annual Wellness Visit within the encounter summary.**         The following assessments were completed:  Living Situation  CAGE  Depression Screening  Timed Get Up and Go  Whisper Test  Cognitive Function Screening  Nutrition Screening  ADL Screening  PAQ Screening        Vitals:    01/23/24 1508   BP: 138/82   BP Location: Right arm   Patient Position: Sitting   Pulse: 74   Resp: 20   Weight: 115.4 kg (254 lb 6.6 oz)   Height:    Pain scale 6' 1" (1.854 m)    0     Body mass index is 33.57 kg/m².  Physical Exam  Constitutional:       General: He is not in acute distress.     Appearance: He is not ill-appearing or diaphoretic.   HENT:      Head: Normocephalic and atraumatic.      Mouth/Throat:      Mouth: Mucous membranes are moist.   Eyes:      General:         Right eye: No discharge.         Left eye: No discharge.      Conjunctiva/sclera: Conjunctivae normal.   Cardiovascular:      Rate and Rhythm: Normal rate and regular rhythm.      Heart sounds: Normal heart sounds.   Pulmonary:      Effort: Pulmonary effort is normal. No respiratory distress.      Breath sounds: Normal breath sounds.   Skin:     General: Skin is warm and dry.   Neurological:      Mental Status: He is alert and oriented to person, place, and time. Mental status is at baseline.   Psychiatric:         Mood and Affect: Mood normal.         Behavior: Behavior normal.         Thought Content: Thought content normal.         Judgment: Judgment normal.       Diagnoses and health risks identified today and associated recommendations/orders:    1. Encounter for preventative adult health care examination  Review for opioid " screening: Patient does not have Rx for Opioids  Review for substance use disorder: Patient does not use substance per chart    Patient states he drinks alcohol- 1-2 drinks a couple times a week    I offered to discuss advanced care planning, including how to pick a person who would make decisions for you if you were unable to make them for yourself, called a health care power of , and what kind of decisions you might make such as use of life sustaining treatments such as ventilators and tube feeding when faced with a life limiting illness recorded on a living will that they will need to know. (How you want to be cared for as you near the end of your natural life)     X Patient is interested in learning more about how to make advanced directives.  I provided them paperwork and offered to discuss this with them.    2. Tortuous aorta  Monitor  Follow up as directed  Blood pressure control    3. DDD (degenerative disc disease), lumbar  Monitor  Follow up as needed, directed    4. Multiple lung nodules on CT  Monitor  Follow up as directed     5. MVP (mitral valve prolapse)   Monitor  Follow up as directed    6. BPH with urinary obstruction  Monitor  Stable  Continue home flomax    7. History of basal cell carcinoma (BCC) excision   Monitor  Follow up as directed     8. Obesity (BMI 30-39.9)    Monitor  Follow up with PCP    9. Gastroesophageal reflux disease, unspecified whether esophagitis present  Monitor  Stable  Continue home prilosec    10. Hernia of abdominal wall - large left lateral   Monitor  Stable at this time  Follow up as directed     11. Treatment-emergent central sleep apnea   Monitor  Patient states he has not been wearing his CPAP at home  Patient educated on importance of wearing his CPAP. Patient verbalized understanding.    12. History of colon polyp  Monitor  Last colonoscopy 2012 showing a colon polyp which was removed  Follow up with PCP for further discussion on if repeat colonoscopy  recommended. Patient verbalized understanding     13. Family history of early CAD  Monitor  Patient reports strong family Hx CAD- currently no SOB, CP, or palpitations but states father had Hx MI and  at age 45. He states he also has a brother who had MI and also had CABG in his 50s, and had 2 sisters who both had to have cardiac stent placement at an early age.   No recent routine cardiac workup done. Patient is a VERY YOUNG 77 yo male  - Ambulatory referral/consult to Cardiology; Future- Ordered for routine workup due to strong family history                            Provided Clay with a 5-10 year written screening schedule and personal prevention plan. Recommendations were developed using the USPSTF age appropriate recommendations. Education, counseling, and referrals were provided as needed. After Visit Summary printed and given to patient which includes a list of additional screenings\tests needed.    Follow up in about 1 year (around 2025) for Annual wellness visit.    KIERA Abernathy

## 2024-01-24 NOTE — PATIENT INSTRUCTIONS
Counseling and Referral of Other Preventative  (Italic type indicates deductible and co-insurance are waived)    Patient Name: Clay Marcos  Today's Date: 1/24/2024    Health Maintenance       Date Due Completion Date    RSV Vaccine (Age 60+ and Pregnant patients) (1 - 1-dose 60+ series) Never done ---    Shingles Vaccine (2 of 3) 05/30/2011 4/4/2011    TETANUS VACCINE 08/14/2022 8/14/2012    COVID-19 Vaccine (5 - 2023-24 season) 09/01/2023 6/20/2022    Lipid Panel 07/20/2027 7/20/2022        Orders Placed This Encounter   Procedures    Ambulatory referral/consult to Cardiology       The following information is provided to all patients.  This information is to help you find resources for any of the problems found today that may be affecting your health:                  Living healthy guide: www.[facility].[place].gov      Understanding Diabetes: www.diabetes.org      Eating healthy: www.cdc.gov/healthyweight      CDC home safety checklist: www.cdc.gov/steadi/patient.html      Agency on Aging: www.goea.[place].gov      Alcoholics anonymous (AA): www.aa.org      Physical Activity: www.darrick.nih.gov/pu8qtdl      Tobacco use: www.quitwithusla.org

## 2024-01-31 DIAGNOSIS — Z76.89 ENCOUNTER TO ESTABLISH CARE WITH NEW DOCTOR: Primary | ICD-10-CM

## 2024-02-01 ENCOUNTER — TELEPHONE (OUTPATIENT)
Dept: CARDIOLOGY | Facility: HOSPITAL | Age: 79
End: 2024-02-01
Payer: MEDICARE

## 2024-02-01 ENCOUNTER — HOSPITAL ENCOUNTER (OUTPATIENT)
Dept: CARDIOLOGY | Facility: HOSPITAL | Age: 79
Discharge: HOME | End: 2024-02-01
Attending: INTERNAL MEDICINE
Payer: MEDICARE

## 2024-02-01 ENCOUNTER — OFFICE VISIT (OUTPATIENT)
Dept: CARDIOLOGY | Facility: CLINIC | Age: 79
End: 2024-02-01
Payer: MEDICARE

## 2024-02-01 VITALS
HEIGHT: 73 IN | BODY MASS INDEX: 33.69 KG/M2 | DIASTOLIC BLOOD PRESSURE: 70 MMHG | HEART RATE: 73 BPM | OXYGEN SATURATION: 97 % | WEIGHT: 254.19 LBS | SYSTOLIC BLOOD PRESSURE: 118 MMHG

## 2024-02-01 DIAGNOSIS — Z82.49 FAMILY HISTORY OF PREMATURE CAD: Primary | ICD-10-CM

## 2024-02-01 DIAGNOSIS — Z82.49 FAMILY HISTORY OF CABG: ICD-10-CM

## 2024-02-01 DIAGNOSIS — Z82.49 FAMILY HISTORY OF EARLY CAD: ICD-10-CM

## 2024-02-01 DIAGNOSIS — E78.5 HYPERLIPIDEMIA, UNSPECIFIED HYPERLIPIDEMIA TYPE: ICD-10-CM

## 2024-02-01 DIAGNOSIS — R06.09 OTHER FORM OF DYSPNEA: ICD-10-CM

## 2024-02-01 DIAGNOSIS — Z76.89 ENCOUNTER TO ESTABLISH CARE WITH NEW DOCTOR: ICD-10-CM

## 2024-02-01 DIAGNOSIS — Z00.00 ENCOUNTER FOR PREVENTATIVE ADULT HEALTH CARE EXAMINATION: ICD-10-CM

## 2024-02-01 DIAGNOSIS — I25.10 CORONARY ARTERY CALCIFICATION SEEN ON CAT SCAN: ICD-10-CM

## 2024-02-01 PROCEDURE — 3074F SYST BP LT 130 MM HG: CPT | Mod: HCNC,CPTII,S$GLB, | Performed by: INTERNAL MEDICINE

## 2024-02-01 PROCEDURE — 99204 OFFICE O/P NEW MOD 45 MIN: CPT | Mod: HCNC,S$GLB,, | Performed by: INTERNAL MEDICINE

## 2024-02-01 PROCEDURE — 3078F DIAST BP <80 MM HG: CPT | Mod: HCNC,CPTII,S$GLB, | Performed by: INTERNAL MEDICINE

## 2024-02-01 PROCEDURE — 93010 ELECTROCARDIOGRAM REPORT: CPT | Mod: HCNC,,, | Performed by: INTERNAL MEDICINE

## 2024-02-01 PROCEDURE — 3288F FALL RISK ASSESSMENT DOCD: CPT | Mod: HCNC,CPTII,S$GLB, | Performed by: INTERNAL MEDICINE

## 2024-02-01 PROCEDURE — 93005 ELECTROCARDIOGRAM TRACING: CPT | Mod: HCNC

## 2024-02-01 PROCEDURE — 1159F MED LIST DOCD IN RCRD: CPT | Mod: HCNC,CPTII,S$GLB, | Performed by: INTERNAL MEDICINE

## 2024-02-01 PROCEDURE — 99999 PR PBB SHADOW E&M-EST. PATIENT-LVL III: CPT | Mod: PBBFAC,HCNC,, | Performed by: INTERNAL MEDICINE

## 2024-02-01 PROCEDURE — 1125F AMNT PAIN NOTED PAIN PRSNT: CPT | Mod: HCNC,CPTII,S$GLB, | Performed by: INTERNAL MEDICINE

## 2024-02-01 PROCEDURE — 1101F PT FALLS ASSESS-DOCD LE1/YR: CPT | Mod: HCNC,CPTII,S$GLB, | Performed by: INTERNAL MEDICINE

## 2024-02-01 RX ORDER — ATORVASTATIN CALCIUM 10 MG/1
10 TABLET, FILM COATED ORAL NIGHTLY
Qty: 90 TABLET | Refills: 3 | Status: SHIPPED | OUTPATIENT
Start: 2024-02-01 | End: 2025-01-31

## 2024-02-01 RX ORDER — PREGABALIN 75 MG/1
75 CAPSULE ORAL DAILY
COMMUNITY
Start: 2023-11-09

## 2024-02-01 NOTE — PROGRESS NOTES
Subjective:   Patient ID:  Clay Marcos is a 78 y.o. male who presents for evaluation of No chief complaint on file.      HPI  77 yo male, ex smoker more than 30 years ago, 10 pqy   Brother had cabg at 55, father had mi at 44 yo   Dyspnea after getting up the stairs he does some exercise the gym, limited with his knee however has osteoarthritis.    Here for cardiac evaluation.    No lower extremity swelling.  No palpitations syncope presyncope         Past Medical History:   Diagnosis Date    Basal cell carcinoma     Scalp    BPH with urinary obstruction     Calcaneal spur of left foot     Colon polyps     DDD (degenerative disc disease), lumbar     Diverticulosis of colon     Dyslipidemia     Dysmetabolic syndrome     Fracture of rib of left side     History of tobacco use     Internal and external hemorrhoids without complication     MVP (mitral valve prolapse)     Obesity     Osteoarthritis of both thumbs 9/15/2015    Pharyngoesophageal dysphagia     Pneumonia 2016    Reflux esophagitis        Past Surgical History:   Procedure Laterality Date    ANAL FISTULOTOMY      BASAL CELL CARCINOMA EXCISION      Scalp    CATARACT EXTRACTION W/  INTRAOCULAR LENS IMPLANT Right 2017    HEMORRHOID SURGERY      PATELLA FRACTURE SURGERY Right 2022    SINUS SURGERY  2017    TONSILLECTOMY      TOTAL KNEE ARTHROPLASTY Right 2021       Social History     Tobacco Use    Smoking status: Former     Current packs/day: 0.00     Average packs/day: 1 pack/day for 12.0 years (12.0 ttl pk-yrs)     Types: Cigarettes     Start date: 1966     Quit date: 1978     Years since quittin.1    Smokeless tobacco: Never   Substance Use Topics    Alcohol use: Yes     Alcohol/week: 15.0 standard drinks of alcohol     Types: 14 Glasses of wine, 1 Shots of liquor per week    Drug use: No       Family History   Problem Relation Age of Onset    Heart attacks under age 50 Father         Age 45    Breast cancer Mother     Coronary  artery disease Brother         Status post CABG, age 55    Heart attack Brother     Hyperlipidemia Brother     Coronary artery disease Sister         Status post CABG    Heart attack Sister     Coronary artery disease Sister         Status post stent    Heart attack Sister     Lymphoma Sister         B-cell    Dementia Sister         Mild cognitive impairment    Heart attack Other         Nephew, age 51    Vision loss Other     Coronary artery disease Other        Review of Systems   Cardiovascular:  Positive for dyspnea on exertion. Negative for chest pain, palpitations and syncope.   Genitourinary: Negative.    Neurological: Negative.        Current Outpatient Medications on File Prior to Visit   Medication Sig    celecoxib (CELEBREX) 200 MG capsule     multivitamin (THERAGRAN) tablet Take 1 tablet by mouth once daily.    omeprazole magnesium (PRILOSEC ORAL) Take 20 mg by mouth once daily.    pregabalin (LYRICA) 75 MG capsule Take 75 mg by mouth once daily.    tamsulosin (FLOMAX) 0.4 mg Cap TAKE 1 CAPSULE EVERY EVENING     No current facility-administered medications on file prior to visit.       Objective:   Objective:  Wt Readings from Last 3 Encounters:   02/01/24 115.3 kg (254 lb 3.1 oz)   01/23/24 115.4 kg (254 lb 6.6 oz)   05/12/23 113.7 kg (250 lb 12.4 oz)     Temp Readings from Last 3 Encounters:   05/12/23 97 °F (36.1 °C)   03/20/23 98.6 °F (37 °C)   03/02/23 96.8 °F (36 °C)     BP Readings from Last 3 Encounters:   02/01/24 118/70   01/23/24 138/82   05/12/23 134/72     Pulse Readings from Last 3 Encounters:   02/01/24 73   01/23/24 74   05/12/23 96       Physical Exam  Vitals reviewed.   Constitutional:       Appearance: He is well-developed.   Neck:      Vascular: No carotid bruit.   Cardiovascular:      Rate and Rhythm: Normal rate and regular rhythm.      Pulses: Intact distal pulses.      Heart sounds: Normal heart sounds. No murmur heard.  Pulmonary:      Breath sounds: Normal breath sounds.    Neurological:      Mental Status: He is oriented to person, place, and time.         Lab Results   Component Value Date    CHOL 150 07/20/2022    CHOL 158 09/23/2021    CHOL 157 12/21/2020     Lab Results   Component Value Date    HDL 39 (L) 07/20/2022    HDL 44 09/23/2021    HDL 49 12/21/2020     Lab Results   Component Value Date    LDLCALC 87.4 07/20/2022    LDLCALC 95.4 09/23/2021    LDLCALC 91.0 12/21/2020     Lab Results   Component Value Date    TRIG 118 07/20/2022    TRIG 93 09/23/2021    TRIG 85 12/21/2020     Lab Results   Component Value Date    CHOLHDL 26.0 07/20/2022    CHOLHDL 27.8 09/23/2021    CHOLHDL 31.2 12/21/2020       Chemistry        Component Value Date/Time     07/20/2022 0710    K 5.1 07/20/2022 0710     07/20/2022 0710    CO2 26 07/20/2022 0710    BUN 19 07/20/2022 0710    CREATININE 1.2 07/20/2022 0710    GLU 98 07/20/2022 0710        Component Value Date/Time    CALCIUM 9.4 07/20/2022 0710    ALKPHOS 100 07/20/2022 0710    AST 22 07/20/2022 0710    ALT 16 07/20/2022 0710    BILITOT 0.5 07/20/2022 0710    ESTGFRAFRICA >60.0 07/20/2022 0710    EGFRNONAA 58.0 (A) 07/20/2022 0710          Lab Results   Component Value Date    TSH 3.125 07/20/2022     Lab Results   Component Value Date    INR 1.1 09/01/2016     Lab Results   Component Value Date    WBC 8.21 07/20/2022    HGB 15.0 07/20/2022    HCT 46.7 07/20/2022    MCV 96 07/20/2022     07/20/2022     BNP  @LABRCNTIP(BNP,BNPTRIAGEBLO)@  CrCl cannot be calculated (Patient's most recent lab result is older than the maximum 7 days allowed.).     Imaging:  ======    No results found for this or any previous visit.    No results found for this or any previous visit.    Results for orders placed during the hospital encounter of 05/12/23    X-Ray Chest PA And Lateral    Narrative  EXAMINATION:  XR CHEST PA AND LATERAL    CLINICAL HISTORY:  Acute cough    TECHNIQUE:  PA and lateral views of the chest were  performed.    COMPARISON:  10/26/2021    FINDINGS:  No infiltrates.  There is chronic blunting the left costophrenic angle with chronic pleural thickening seen along the periphery of the left mid to lower lung zone as well.  The heart is enlarged.  Biapical pleural thickening also noted.  There is tortuosity of the descending thoracic aorta.    Impression  No acute cardiopulmonary process.    Stable appearance of chronic changes.      Electronically signed by: El Cisneros DO  Date:    05/12/2023  Time:    15:53    No results found for this or any previous visit.    No valid procedures specified.    No results found for this or any previous visit.      No results found for this or any previous visit.      No results found for this or any previous visit.      Diagnostic Results:  ECG: Reviewed    Sinus rhythm with Premature atrial complexes   Low voltage QRS   Cannot rule out Anterior infarct ,age undetermined   Abnormal ECG   When compared with ECG of 26-OCT-2021 14:35,   Premature atrial complexes are now Present       The 10-year ASCVD risk score (Rosaline CORONADO, et al., 2019) is: 27.6%    Values used to calculate the score:      Age: 78 years      Sex: Male      Is Non- : No      Diabetic: No      Tobacco smoker: No      Systolic Blood Pressure: 118 mmHg      Is BP treated: No      HDL Cholesterol: 39 mg/dL      Total Cholesterol: 150 mg/dL        Assessment and Plan:   Family history of premature CAD    Encounter for preventative adult health care examination  -     Ambulatory referral/consult to Cardiology    Family history of early CAD  -     Ambulatory referral/consult to Cardiology    Family history of CABG    Coronary artery calcification seen on CAT scan  -     Cancel: Nuclear Stress - Cardiology Interpreted; Future  -     Nuclear Stress - Cardiology Interpreted; Future  -     atorvastatin (LIPITOR) 10 MG tablet; Take 1 tablet (10 mg total) by mouth every evening.  Dispense: 90 tablet;  Refill: 3    Other form of dyspnea  -     Echo; Future    Hyperlipidemia, unspecified hyperlipidemia type  -     atorvastatin (LIPITOR) 10 MG tablet; Take 1 tablet (10 mg total) by mouth every evening.  Dispense: 90 tablet; Refill: 3      Reviewed CT scan from 2020 with coronary artery calcification mainly to his LAD.  Can not do the treadmill due to his knee.  We will get a nuclear stress test.    Explained if abnormal nuclear stress test when he had a heart catheterization.  Also start aspirin 81 mg daily.    Start statin low-dose.  LDL goal less than 70  Reviewed all tests and above medical conditions with patient in detail and formulated treatment plan.  Risk factor modification discussed.   Cardiac low salt diet discussed.  Maintaining healthy weight and weight loss goals were discussed in clinic.    Follow up in  6 months

## 2024-03-25 ENCOUNTER — HOSPITAL ENCOUNTER (OUTPATIENT)
Dept: RADIOLOGY | Facility: HOSPITAL | Age: 79
Discharge: HOME | End: 2024-03-25
Attending: INTERNAL MEDICINE
Payer: MEDICARE

## 2024-03-25 ENCOUNTER — HOSPITAL ENCOUNTER (OUTPATIENT)
Dept: CARDIOLOGY | Facility: HOSPITAL | Age: 79
Discharge: HOME | End: 2024-03-25
Attending: INTERNAL MEDICINE
Payer: MEDICARE

## 2024-03-25 VITALS
BODY MASS INDEX: 33.66 KG/M2 | HEIGHT: 73 IN | WEIGHT: 254 LBS | SYSTOLIC BLOOD PRESSURE: 118 MMHG | DIASTOLIC BLOOD PRESSURE: 70 MMHG

## 2024-03-25 DIAGNOSIS — R06.09 OTHER FORM OF DYSPNEA: ICD-10-CM

## 2024-03-25 DIAGNOSIS — I25.10 CORONARY ARTERY CALCIFICATION SEEN ON CAT SCAN: ICD-10-CM

## 2024-03-25 LAB
AORTIC ROOT ANNULUS: 3.69 CM
ASCENDING AORTA: 3.17 CM
AV INDEX (PROSTH): 0.85
AV MEAN GRADIENT: 2 MMHG
AV PEAK GRADIENT: 4 MMHG
AV REGURGITATION PRESSURE HALF TIME: 581.15 MS
AV VALVE AREA BY VELOCITY RATIO: 4.03 CM²
AV VALVE AREA: 3.83 CM²
AV VELOCITY RATIO: 0.9
BSA FOR ECHO PROCEDURE: 2.44 M2
CV ECHO LV RWT: 0.41 CM
CV STRESS BASE HR: 60 BPM
DIASTOLIC BLOOD PRESSURE: 91 MMHG
DOP CALC AO PEAK VEL: 0.98 M/S
DOP CALC AO VTI: 24.6 CM
DOP CALC LVOT AREA: 4.5 CM2
DOP CALC LVOT DIAMETER: 2.39 CM
DOP CALC LVOT PEAK VEL: 0.88 M/S
DOP CALC LVOT STROKE VOLUME: 94.16 CM3
DOP CALC RVOT AREA: 7.59 CM2
DOP CALC RVOT DIAMETER: 3.11 CM
DOP CALC RVOT PEAK VEL: 0.56 M/S
DOP CALC RVOT VTI: 11.8 CM
DOP CALCLVOT PEAK VEL VTI: 21 CM
E WAVE DECELERATION TIME: 257.13 MSEC
E/A RATIO: 0.98
E/E' RATIO: 16.73 M/S
ECHO LV POSTERIOR WALL: 1.04 CM (ref 0.6–1.1)
FRACTIONAL SHORTENING: 36 % (ref 28–44)
INTERVENTRICULAR SEPTUM: 1.02 CM (ref 0.6–1.1)
IVC DIAMETER: 2.12 CM
IVRT: 111.32 MSEC
LA MAJOR: 6.49 CM
LA MINOR: 6.36 CM
LA WIDTH: 3.5 CM
LEFT ATRIUM SIZE: 4.41 CM
LEFT ATRIUM VOLUME INDEX MOD: 25.5 ML/M2
LEFT ATRIUM VOLUME INDEX: 35.4 ML/M2
LEFT ATRIUM VOLUME MOD: 60.72 CM3
LEFT ATRIUM VOLUME: 84.29 CM3
LEFT INTERNAL DIMENSION IN SYSTOLE: 3.24 CM (ref 2.1–4)
LEFT VENTRICLE DIASTOLIC VOLUME INDEX: 50.34 ML/M2
LEFT VENTRICLE DIASTOLIC VOLUME: 119.81 ML
LEFT VENTRICLE MASS INDEX: 80 G/M2
LEFT VENTRICLE SYSTOLIC VOLUME INDEX: 17.8 ML/M2
LEFT VENTRICLE SYSTOLIC VOLUME: 42.34 ML
LEFT VENTRICULAR INTERNAL DIMENSION IN DIASTOLE: 5.03 CM (ref 3.5–6)
LEFT VENTRICULAR MASS: 191.24 G
LV LATERAL E/E' RATIO: 18.4 M/S
LV SEPTAL E/E' RATIO: 15.33 M/S
LVOT MG: 1.75 MMHG
LVOT MV: 0.63 CM/S
MV PEAK A VEL: 0.94 M/S
MV PEAK E VEL: 0.92 M/S
MV STENOSIS PRESSURE HALF TIME: 74.57 MS
MV VALVE AREA P 1/2 METHOD: 2.95 CM2
NUC REST EJECTION FRACTION: 71
NUC STRESS EJECTION FRACTION: 74 %
OHS CV CPX 85 PERCENT MAX PREDICTED HEART RATE MALE: 120
OHS CV CPX ESTIMATED METS: 1
OHS CV CPX MAX PREDICTED HEART RATE: 141
OHS CV CPX PATIENT IS FEMALE: 0
OHS CV CPX PATIENT IS MALE: 1
OHS CV CPX PEAK DIASTOLIC BLOOD PRESSURE: 91 MMHG
OHS CV CPX PEAK HEAR RATE: 86 BPM
OHS CV CPX PEAK RATE PRESSURE PRODUCT: NORMAL
OHS CV CPX PEAK SYSTOLIC BLOOD PRESSURE: 165 MMHG
OHS CV CPX PERCENT MAX PREDICTED HEART RATE ACHIEVED: 61
OHS CV CPX RATE PRESSURE PRODUCT PRESENTING: 8460
PISA AR MAX VEL: 3.43 M/S
PISA MRMAX VEL: 6.24 M/S
PISA TR MAX VEL: 2.61 M/S
PULM VEIN S/D RATIO: 1.47
PV MEAN GRADIENT: 1 MMHG
PV MV: 0.89 M/S
PV PEAK D VEL: 0.32 M/S
PV PEAK GRADIENT: 8 MMHG
PV PEAK S VEL: 0.47 M/S
PV PEAK VELOCITY: 1.41 M/S
RA MAJOR: 5.77 CM
RA PRESSURE ESTIMATED: 3 MMHG
RA WIDTH: 4.58 CM
RIGHT VENTRICULAR END-DIASTOLIC DIMENSION: 4.11 CM
RV TB RVSP: 6 MMHG
RV TISSUE DOPPLER FREE WALL SYSTOLIC VELOCITY 1 (APICAL 4 CHAMBER VIEW): 14.8 CM/S
SINUS: 3.97 CM
STJ: 3.23 CM
STRESS ECHO POST EXERCISE DUR MIN: 1 MINUTES
SYSTOLIC BLOOD PRESSURE: 141 MMHG
TDI LATERAL: 0.05 M/S
TDI SEPTAL: 0.06 M/S
TDI: 0.06 M/S
TR MAX PG: 27 MMHG
TRICUSPID ANNULAR PLANE SYSTOLIC EXCURSION: 2.61 CM
TV REST PULMONARY ARTERY PRESSURE: 30 MMHG
Z-SCORE OF LEFT VENTRICULAR DIMENSION IN END DIASTOLE: -7.33
Z-SCORE OF LEFT VENTRICULAR DIMENSION IN END SYSTOLE: -5.23

## 2024-03-25 PROCEDURE — 78452 HT MUSCLE IMAGE SPECT MULT: CPT | Mod: 26,HCNC,, | Performed by: INTERNAL MEDICINE

## 2024-03-25 PROCEDURE — 93306 TTE W/DOPPLER COMPLETE: CPT | Mod: HCNC

## 2024-03-25 PROCEDURE — 63600175 PHARM REV CODE 636 W HCPCS: Mod: HCNC | Performed by: INTERNAL MEDICINE

## 2024-03-25 PROCEDURE — A9502 TC99M TETROFOSMIN: HCPCS | Mod: HCNC | Performed by: INTERNAL MEDICINE

## 2024-03-25 PROCEDURE — 93018 CV STRESS TEST I&R ONLY: CPT | Mod: HCNC,,, | Performed by: INTERNAL MEDICINE

## 2024-03-25 PROCEDURE — 93017 CV STRESS TEST TRACING ONLY: CPT | Mod: HCNC

## 2024-03-25 PROCEDURE — 93306 TTE W/DOPPLER COMPLETE: CPT | Mod: 26,HCNC,, | Performed by: INTERNAL MEDICINE

## 2024-03-25 PROCEDURE — 93016 CV STRESS TEST SUPVJ ONLY: CPT | Mod: HCNC,,, | Performed by: INTERNAL MEDICINE

## 2024-03-25 PROCEDURE — 78452 HT MUSCLE IMAGE SPECT MULT: CPT | Mod: HCNC

## 2024-03-25 RX ORDER — REGADENOSON 0.08 MG/ML
0.4 INJECTION, SOLUTION INTRAVENOUS ONCE
Status: DISCONTINUED | OUTPATIENT
Start: 2024-03-25 | End: 2024-03-25

## 2024-03-25 RX ORDER — REGADENOSON 0.08 MG/ML
0.4 INJECTION, SOLUTION INTRAVENOUS ONCE
Status: COMPLETED | OUTPATIENT
Start: 2024-03-25 | End: 2024-03-25

## 2024-03-25 RX ADMIN — REGADENOSON 0.4 MG: 0.08 INJECTION, SOLUTION INTRAVENOUS at 11:03

## 2024-03-25 RX ADMIN — TETROFOSMIN 10 MILLICURIE: 1.38 INJECTION, POWDER, LYOPHILIZED, FOR SOLUTION INTRAVENOUS at 08:03

## 2024-03-25 RX ADMIN — TETROFOSMIN 27.1 MILLICURIE: 1.38 INJECTION, POWDER, LYOPHILIZED, FOR SOLUTION INTRAVENOUS at 11:03

## 2024-04-03 ENCOUNTER — PATIENT MESSAGE (OUTPATIENT)
Dept: PULMONOLOGY | Facility: CLINIC | Age: 79
End: 2024-04-03
Payer: MEDICARE

## 2024-08-21 RX ORDER — TAMSULOSIN HYDROCHLORIDE 0.4 MG/1
CAPSULE ORAL
Qty: 90 CAPSULE | Refills: 3 | OUTPATIENT
Start: 2024-08-21

## 2024-08-21 NOTE — TELEPHONE ENCOUNTER
Refill Routing Note   Medication(s) are not appropriate for processing by [facility] for the following reason(s):        Patient not seen by provider within 15 months    [facility] action(s):  Defer   Requires appointment : Yes               Appointments  past 12m or future 3m with PCP    Date Provider   Last Visit   5/12/2023 Tori Marino MD   Next Visit   Visit date not found Tori Marino MD   ED visits in past 90 days: 0        Note composed:2:55 PM 08/21/2024

## 2024-08-21 NOTE — TELEPHONE ENCOUNTER
Care Due:                  Date            Visit Type   Department     Provider  --------------------------------------------------------------------------------                                MYCHART                              FOLLOWUP/OF  [facility]  Last Visit: 05-      [facility]ICE VISIT   MEDICINE       Tori Marino  Next Visit: None Scheduled  None         None Found                                                            Last  Test          Frequency    Reason                     Performed    Due Date  --------------------------------------------------------------------------------    Office Visit  15 months..  tamsulosin...............  05- 08-    [facility] Embedded Care Due Messages. Reference number: [id number].   8/21/2024 1:51:45 AM CDT

## 2024-08-22 NOTE — TELEPHONE ENCOUNTER
Contact pt spoke with his wife she states she will let him know to schedule an appointment in order refill medication.

## 2024-09-06 NOTE — TELEPHONE ENCOUNTER
Patient states that he could not get budesonide inhaler medication due to already having it prescribed from Dr. Lopez as a nasal rinse.  Insurance rejected it.  Request advice.  Note to Dr. Blank  
Please call patient - budesonide jet nebs sent to [facility] for authorization - need to send office note  
neck pain